# Patient Record
Sex: FEMALE | Race: WHITE | NOT HISPANIC OR LATINO | Employment: OTHER
[De-identification: names, ages, dates, MRNs, and addresses within clinical notes are randomized per-mention and may not be internally consistent; named-entity substitution may affect disease eponyms.]

---

## 2019-01-21 ENCOUNTER — RECORDS - HEALTHEAST (OUTPATIENT)
Dept: ADMINISTRATIVE | Facility: OTHER | Age: 64
End: 2019-01-21

## 2019-01-22 ENCOUNTER — COMMUNICATION - HEALTHEAST (OUTPATIENT)
Dept: TELEHEALTH | Facility: CLINIC | Age: 64
End: 2019-01-22

## 2019-01-22 ENCOUNTER — HOSPITAL ENCOUNTER (OUTPATIENT)
Dept: RADIOLOGY | Facility: HOSPITAL | Age: 64
Discharge: HOME | End: 2019-01-22
Attending: INTERNAL MEDICINE

## 2019-01-22 DIAGNOSIS — R13.10 DYSPHAGIA: ICD-10-CM

## 2019-02-26 ENCOUNTER — OFFICE VISIT - HEALTHEAST (OUTPATIENT)
Dept: CARDIOLOGY | Facility: CLINIC | Age: 64
End: 2019-02-26

## 2019-02-26 DIAGNOSIS — E78.00 HYPERCHOLESTEROLEMIA: ICD-10-CM

## 2019-02-26 DIAGNOSIS — R07.1 CHEST PAIN ON BREATHING: ICD-10-CM

## 2019-02-26 RX ORDER — MAGNESIUM OXIDE 400 MG/1
400 TABLET ORAL DAILY
Status: SHIPPED | COMMUNITY
Start: 2019-02-12 | End: 2024-04-14

## 2019-02-26 RX ORDER — LOPERAMIDE HCL 2 MG
2 CAPSULE ORAL 4 TIMES DAILY PRN
Status: SHIPPED | COMMUNITY
Start: 2018-11-19

## 2019-02-26 RX ORDER — GABAPENTIN 300 MG/1
600 CAPSULE ORAL 3 TIMES DAILY
Status: SHIPPED | COMMUNITY
Start: 2017-11-02

## 2019-02-26 RX ORDER — SUCRALFATE 1 G/1
1 TABLET ORAL
Status: SHIPPED | COMMUNITY
Start: 2019-02-26 | End: 2024-04-14

## 2019-02-26 RX ORDER — ACETAMINOPHEN 500 MG
1000 TABLET ORAL DAILY PRN
Status: SHIPPED | COMMUNITY
Start: 2019-02-26

## 2019-02-26 ASSESSMENT — MIFFLIN-ST. JEOR: SCORE: 1249.43

## 2020-04-11 ENCOUNTER — VIRTUAL VISIT (OUTPATIENT)
Dept: FAMILY MEDICINE | Facility: OTHER | Age: 65
End: 2020-04-11

## 2020-04-12 NOTE — PROGRESS NOTES
"Date: 2020 16:15:54  Clinician: Kristal Keyes  Clinician NPI: [id number]  Patient: Odette Escobar  Patient : 1955  Patient Address: [address] [place] [address], [place] [id number]  Patient Phone: [phone]  Visit Protocol: URI  Patient Summary:  Odette is a 64 year old ( : 1955 ) female who initiated a Visit for COVID-19 (Coronavirus) evaluation and screening. When asked the question \"Please sign me up to receive news, health information and promotions. \", Odette responded \"No\".    Odette states her symptoms started today.   Her symptoms consist of chills, a headache, a sore throat, a cough, nasal congestion, and malaise. She is experiencing mild difficulty breathing with activities but can speak normally in full sentences. Odette also feels feverish.   Symptom details     Nasal secretions: The color of her mucus is clear.    Cough: Odette coughs almost every minute and her cough is more bothersome at night. Phlegm does not come into her throat when she coughs. She does not believe her cough is caused by post-nasal drip.     Sore throat: Odette reports having moderate throat pain (4-6 on a 10 point pain scale), does not have exudate on her tonsils, and can swallow liquids. She is not sure if the lymph nodes in her neck are enlarged. A rash has not appeared on the skin since the sore throat started.     Temperature: Her current temperature is 102 degrees Fahrenheit. Odette has had a temperature over 100 degrees Fahrenheit for 1-2 days.     Headache: She states the headache is severe (7-9 on a 10 point pain scale).      Odette denies having rhinitis, facial pain or pressure, myalgias, diarrhea, vomiting, nausea, teeth pain, wheezing, and ear pain. She also denies taking antibiotic medication for the symptoms and having recent facial or sinus surgery in the past 60 days.   Precipitating events  Within the past week, Odette has not been exposed to someone with strep throat. She has not recently been " exposed to someone with influenza. Odette has been in close contact with the following high risk individuals: people with asthma, heart disease or diabetes and adults 65 or older.   Pertinent COVID-19 (Coronavirus) information  Odette has not traveled internationally or to the areas where COVID-19 (Coronavirus) is widespread, including cruise ship travel in the last 14 days before the start of her symptoms.   Odette does not work or volunteer as healthcare worker or a  and does not work or volunteer in a healthcare facility.   She does not live with a healthcare worker.   Odette has not had a close contact with a laboratory-confirmed COVID-19 patient within 14 days of symptom onset. She also has not had a close contact with a suspected COVID-19 patient within 14 days of symptom onset.   Pertinent medical history  Odette had 1 sinus infection within the past year.   Odette does not get yeast infections when she takes antibiotics.   Odette does not need a return to work/school note.   Weight: 175 lbs   Odette does not smoke or use smokeless tobacco.   Weight: 175 lbs    MEDICATIONS: losartan oral, amitriptyline oral, montelukast oral, sertraline oral, propranolol-hydrochlorothiazide oral, cimetidine oral, atorvastatin oral, gabapentin oral, omeprazole oral, Advair Diskus inhalation, albuterol sulfate inhalation, Tylenol oral, ALLERGIES: aspirin  Clinician Response:  Dear Odette,   Based on the information you have provided, you do have symptoms that are consistent with Coronavirus (COVID-19).  The coronavirus causes mild to severe respiratory illness with the most common symptoms including fever, cough and difficulty breathing. Unfortunately, many viruses cause similar symptoms and it can be difficult to distinguish between viruses, especially in mild cases, so we are presuming that anyone with cough or fever has coronavirus at this time.  Coronavirus/COVID-19 has reached the point of community spread in  [place], meaning that we are finding the virus in people with no known exposure risk for edward the virus. Given the increasing commonness of coronavirus in the community we are no longer testing patients who are not critically ill.  If you are a health care worker, you should refer to your employee health office for instructions about testing and returning to work.  For everyone else who has cough or fever, you should assume you are infected with coronavirus. Since you will not be tested but have symptoms that may be consistent with coronavirus, the CDC recommends you stay in self-isolation until these three things have happened:    You have had no fever for at least 72 hours (that is three full days of no fever without the use of medicine that reduces fevers)    AND   Other symptoms have improved (for example, when your cough or shortness of breath have improved)   AND   At least 7 days have passed since your symptoms first appeared.   How to Isolate:   Isolate yourself at home.  Do Not allow any visitors  Do Not go to work or school  Do Not go to S[employer],  centers, shopping, or other public places.  Do Not shake hands.  Avoid close contact with others (hugging, kissing).   Protect Others:   Cover Your Mouth and Nose with a mask, disposable tissue or wash cloth to avoid spreading germs to others.  Wash your hands and face frequently with soap and water.   We know it can be scary to hear that you might have COVID-19. Our team can help track your symptoms and make sure you are doing ok over the next two weeks using a program called "[employer] to keep in touch. When you receive an email from "[employer] please consider enrolling in our monitoring program. There is no cost to you for monitoring. Here is a URL where you can learn more: [email or web address]  Managing Symptoms:   At this time, we primarily recommend Tylenol (Acetaminophen) for fever or pain. If you have liver or kidney  problems, contact your primary care provider for instructions on use of tylenol. Adults can take 650 mg (two 325 mg pills) by mouth every 4-6 hours as needed OR 1,000 mg (two 500 mg pills) every 8 hours as needed. MAXIMUM DAILY DOSE: 3,000mg. For children, refer to dosing on bottle based on age or weight.   If you develop significant shortness of breath that prevents you from doing normal activities, please call 911 or proceed to the nearest emergency room and alert them immediately that you have been in self-isolation for possible coronavirus.  If you have a higher risk medical condition such as cancer, heart failure, end stage renal disease on dialysis or have a transplant, please reach out to your specialist's clinic to advise them of your [facility] visit should you not improve within the next two days.   For more information about COVID19 and options for caring for yourself at home, please visit the CDC website at https://www.cdc.gov/coronavirus/2019-ncov/about/steps-when-sick.htmlFor more options for care at [facility], please visit our website at [email or web address]    Diagnosis: Cough  Diagnosis ICD: R05  Prescription: brompheniramine-pseudoeph-DM (Bromfed DM) 2-30-10 mg/5 mL oral syrup 280 milliliter, 7 days supply. Take 10 milliliters by mouth every 6 hours for 7 days. Refills: 0, Refill as needed: no, Allow substitutions: yes  Prescription: benzonatate (Tessalon Perles) 100 mg oral capsule 21 capsule, 7 days supply. Take 1-2 capsule by mouth 3 times per day as needed. Refills: 0, Refill as needed: no, Allow substitutions: yes  Pharmacy: [id number] - [phone] - [address] [id number]

## 2020-07-03 DIAGNOSIS — Z11.59 SCREENING FOR VIRAL DISEASE: ICD-10-CM

## 2020-07-07 ENCOUNTER — AMBULATORY - HEALTHEAST (OUTPATIENT)
Dept: SCHEDULING | Facility: CLINIC | Age: 65
End: 2020-07-07

## 2021-02-23 ENCOUNTER — TRANSFERRED RECORDS (OUTPATIENT)
Dept: HEALTH INFORMATION MANAGEMENT | Facility: CLINIC | Age: 66
End: 2021-02-23

## 2021-06-02 VITALS — BODY MASS INDEX: 26.7 KG/M2 | HEIGHT: 64 IN | WEIGHT: 156.4 LBS

## 2021-06-07 ENCOUNTER — TRANSFERRED RECORDS (OUTPATIENT)
Dept: HEALTH INFORMATION MANAGEMENT | Facility: CLINIC | Age: 66
End: 2021-06-07

## 2021-06-16 PROBLEM — K21.00 GASTROESOPHAGEAL REFLUX DISEASE WITH ESOPHAGITIS: Status: ACTIVE | Noted: 2017-11-02

## 2021-06-23 NOTE — PROGRESS NOTES
Speech Language/Pathology  Videofluoroscopic Swallow Study     Problem:  Patient Active Problem List   Diagnosis     Low back pain       Onset date/date of surgery: 1/22/19  Pertinent history includes: Pt is a 63 year old female.  Pt was referred for this evaluation due to dysphagia with solids. Pt has an esophagram to follow. 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 This was treated with dilation. Pt reports that she was supposed to go back a second time but did not return.  Current Diet: Reg/Thin  Baseline Diet: Reg/Thin    Patient presents as alert and cooperative during this evaluation.   An  was not applicable.    Patient was given puree, honey, nectar, thin and regular solid.    Oral Phase:    Dentition/Oral hygiene: Intact/WNL    Bolus prep and oral control was not impaired. Mastication was safe and effective and the patient used rotary chewing.    Anterior-Posterior transit was not impaired.    Premature spillage did not occur with any texture.    Tongue base retraction was not impaired.    Oral stasis did not occur with any texture.    Pharyngeal Phase:    Aspiration did not occur with any texture.     Laryngeal penetration did not occur with any texture.     Swallow response was timely with all textures trialed.     Epiglottic movement was complete consistently across texture trials.    Pharyngeal stasis did not occur with any texture.     Pharyngeal constriction was not impaired.    Hyolaryngeal elevation was not impaired. Hyolaryngeal excursion was not impaired.    Cricopharyngeal function was not impaired. Cervical esophageal function was not impaired.    Assessment:    Patient demonstrated no oral and no pharyngeal dysphagia.    Patient is at minimal aspiration risk with all intake.    Rehab potential is good based on evaluation results.    Recommendations:    Plan: Regular and thin liquids    Strategies: Standard safe  swallow strategies of upright for intake, small bites and sips and eat slowly.    Speech therapy is not recommended at this time    Referrals: N/A    Time: 20 minutes dysphagia evaluation    Iesha Gregory MA, CCC-SLP

## 2021-06-24 NOTE — PATIENT INSTRUCTIONS - HE
1. Continue current medications  2. Try Tylenol or Ibuprofen for the chest discomfort, but if you take Ibuprofen make certain it is taken with food.  3. Follow up with PMD for doses of Atorvastatin and Losartan

## 2021-06-27 NOTE — PROGRESS NOTES
Progress Notes by Jenelle Solorzano MD at 2/26/2019  3:50 PM     Author: Jenelle Solorzano MD Service: -- Author Type: Physician    Filed: 2/26/2019  6:13 PM Encounter Date: 2/26/2019 Status: Signed    : Jenelle Solorzano MD (Physician)           Click to link to [facility] HEART CARE NOTE    Thank you, Dr. Johnson, for asking the [facility] Heart Care team to see Ms. Odette Escobar to evaluate pleuritic chest pain.    Assessment/Recommendations   Assessment:    1.  Pleuritic chest pain, likely musculoskeletal in etiology as the patient does have point tenderness on examination which reproduces her symptoms.  At this point, I recommended a trial of Tylenol or ibuprofen although cautioned that if she utilizes ibuprofen, she should take it with food.  I would also recommend that she follow-up with her primary physician thereafter.  2.  Hypercholesterolemia, currently on atorvastatin.  She has been off her atorvastatin recently but I encouraged her to go back on given her history of type 2 diabetes mellitus and need to slow progression of any coronary artery disease.    Plan:  1.  Continue current medications  2.  Trial of Tylenol or ibuprofen for chest wall tenderness  3.  Follow-up with primary physician regarding restarting both losartan and atorvastatin       History of Present Illness    Ms. Odette Escobar is a 63 y.o. female with strong family history of coronary artery disease, type 2 diabetes mellitus and previous history of atypical chest pain with negative stress test and echocardiogram one year ago presents to cardiac clinic today for evaluation of chest pain.  She describes pleuritic chest discomfort which has been present for 2 weeks now and aggravated by deep breathing as well as certain movements.  She has had a nonproductive cough as well.  Because of the symptoms, she was seen in the ED nearly 2 weeks ago where initial cardiac workup was unrevealing.  She is now referred here for  further recommendation.    ECG (personally reviewed): ECG in the ED demonstrates normal sinus rhythm.  No acute ST or T wave abnormalities    Cardiac Imaging Studies (personally reviewed): No recent cardiac imaging.     Physical Examination Review of Systems   Vitals:    02/26/19 1553   BP: 118/60   Pulse: 68   Resp: 16     Body mass index is 26.85 kg/m .  Wt Readings from Last 3 Encounters:   02/26/19 156 lb 6.4 oz (70.9 kg)   02/12/19 154 lb 15.7 oz (70.3 kg)   02/11/19 155 lb (70.3 kg)     General Appearance:   Awake, Alert, No acute distress.   HEENT:  No scleral icterus; the mucous membranes were pink and moist.   Neck: No cervical bruits or jugular venous distention    Chest: The spine was straight. The chest was symmetric.  There is tenderness to palpation in the posterior mid thoracic spine region which radiates around to the anterior chest.  This reproduces her discomfort.   Lungs:   Respirations unlabored; the lungs are clear to auscultation. No wheezing   Cardiovascular:    Regular rate and rhythm.  S1, S2 normal.  No murmur or gallop   Abdomen:  No organomegaly, masses, bruits, or tenderness. Bowels sounds are present   Extremities:  No peripheral edema, clubbing or cyanosis.   Skin: No xanthelasma. Warm, Dry.   Musculoskeletal: No tenderness.   Neurologic: Mood and affect are appropriate.    General: Night Sweats, Weight Loss  Eyes: Visual Distubance  Ears/Nose/Throat: Hearing Loss  Lungs: Cough, Shortness of Breath, Wheezing  Heart: Chest Pain, Shortness of Breath with activity  Stomach: Constipation, Diarrhea, Heartburn, Nausea  Bladder: Frequent Urination at Night  Muscle/Joints: Joint Pain, Muscle Pain  Skin: WNL  Nervous System: Falls, Daytime Sleepiness, Dizziness, Loss of Balance  Mental Health: Anxiety     Blood: Easy Bleeding, Easy Bruising     Medical History  Surgical History Family History Social History   Past Medical History:   Diagnosis Date   ? Angina at rest (H)    ? Asthma    ?  Diabetes (H)    ? Fibromyalgia    ? GERD (gastroesophageal reflux disease)    ? History of endometrial ablation    ? Menorrhagia    ? Migraine     Past Surgical History:   Procedure Laterality Date   ? ENDOMETRIAL ABLATION      Family History   Problem Relation Age of Onset   ? Heart failure Mother    ? Heart failure Father    ? Heart failure Sister    ? Coronary artery disease Brother    ? Coronary artery disease Brother    ? Coronary artery disease Brother    ? Stroke Sister     Social History     Socioeconomic History   ? Marital status: Single     Spouse name: Not on file   ? Number of children: Not on file   ? Years of education: Not on file   ? Highest education level: Not on file   Occupational History   ? Not on file   Social Needs   ? Financial resource strain: Not on file   ? Food insecurity:     Worry: Not on file     Inability: Not on file   ? Transportation needs:     Medical: Not on file     Non-medical: Not on file   Tobacco Use   ? Smoking status: Never Smoker   ? Smokeless tobacco: Never Used   Substance and Sexual Activity   ? Alcohol use: Yes     Comment: Occasional usage   ? Drug use: No   ? Sexual activity: Not on file   Lifestyle   ? Physical activity:     Days per week: Not on file     Minutes per session: Not on file   ? Stress: Not on file   Relationships   ? Social connections:     Talks on phone: Not on file     Gets together: Not on file     Attends Anabaptist service: Not on file     Active member of club or organization: Not on file     Attends meetings of clubs or organizations: Not on file     Relationship status: Not on file   ? Intimate partner violence:     Fear of current or ex partner: Not on file     Emotionally abused: Not on file     Physically abused: Not on file     Forced sexual activity: Not on file   Other Topics Concern   ? Not on file   Social History Narrative   ? Not on file          Medications  Allergies   Current Outpatient Medications   Medication Sig Dispense  Refill   ? acetaminophen (TYLENOL) 500 MG tablet Take 500 mg by mouth every 6 (six) hours as needed for pain.     ? albuterol (PROVENTIL HFA;VENTOLIN HFA) 90 mcg/actuation inhaler Inhale 2 puffs every 6 (six) hours as needed for wheezing.     ? CANNABIDIOL, CBD, EXTRACT ORAL Take 1 drop by mouth daily.     ? fluticasone (FLOVENT HFA) 110 mcg/actuation inhaler Inhale 2 puffs 2 (two) times a day.     ? gabapentin (NEURONTIN) 300 MG capsule Take 300 mg by mouth 3 (three) times a day.     ? loperamide (IMODIUM) 2 mg capsule Take 2 mg by mouth 4 (four) times a day.     ? magnesium oxide 400 mg cap Take 400 mg by mouth daily.     ? metFORMIN (GLUCOPHAGE) 500 MG tablet Take 500 mg by mouth daily with breakfast.            ? omeprazole (PRILOSEC) 20 MG capsule Take 20 mg by mouth daily.     ? propranolol (INDERAL LA) 60 mg 24 hr capsule Take 60 mg by mouth daily.     ? sertraline (ZOLOFT) 50 MG tablet Take 1 tablet (50 mg total) by mouth daily. 20 tablet 0   ? sucralfate (CARAFATE) 1 gram tablet Take 1 g by mouth once as needed.     ? TRAZODONE HCL (TRAZODONE ORAL) Take by mouth. Unknown dose       No current facility-administered medications for this visit.       Allergies   Allergen Reactions   ? Aspirin Hives   ? Dolobid [Diflunisal] Nausea And Vomiting         Lab Results    Chemistry/lipid CBC Cardiac Enzymes/BNP/TSH/INR   Lab Results   Component Value Date    CREATININE 0.69 02/11/2019    BUN 10 02/11/2019    K 3.5 02/11/2019     02/11/2019     02/11/2019    CO2 30 02/11/2019    Lab Results   Component Value Date    WBC 6.8 02/11/2019    HGB 13.8 02/11/2019    HCT 41.3 02/11/2019    MCV 94 02/11/2019     02/11/2019    Lab Results   Component Value Date    TROPONINI <0.01 02/11/2019    TSH 2.20 02/11/2019

## 2024-03-07 ENCOUNTER — HOSPITAL ENCOUNTER (EMERGENCY)
Facility: HOSPITAL | Age: 69
Discharge: HOME | End: 2024-03-07
Attending: EMERGENCY MEDICINE | Admitting: EMERGENCY MEDICINE
Payer: COMMERCIAL

## 2024-03-07 ENCOUNTER — APPOINTMENT (OUTPATIENT)
Dept: CT IMAGING | Facility: HOSPITAL | Age: 69
End: 2024-03-07
Attending: EMERGENCY MEDICINE
Payer: COMMERCIAL

## 2024-03-07 VITALS
TEMPERATURE: 98.3 F | WEIGHT: 142 LBS | SYSTOLIC BLOOD PRESSURE: 128 MMHG | HEIGHT: 64 IN | DIASTOLIC BLOOD PRESSURE: 65 MMHG | RESPIRATION RATE: 18 BRPM | BODY MASS INDEX: 24.24 KG/M2 | HEART RATE: 81 BPM | OXYGEN SATURATION: 98 %

## 2024-03-07 DIAGNOSIS — K52.9 ENTERITIS: ICD-10-CM

## 2024-03-07 LAB
ALBUMIN SERPL BCG-MCNC: 3.7 G/DL (ref 3.5–5.2)
ALBUMIN UR-MCNC: NEGATIVE MG/DL
ALP SERPL-CCNC: 84 U/L (ref 40–150)
ALT SERPL W P-5'-P-CCNC: 22 U/L (ref 0–50)
ANION GAP SERPL CALCULATED.3IONS-SCNC: 10 MMOL/L (ref 7–15)
APPEARANCE UR: CLEAR
AST SERPL W P-5'-P-CCNC: 31 U/L (ref 0–45)
BACTERIA #/AREA URNS HPF: ABNORMAL /HPF
BASOPHILS # BLD AUTO: 0.1 10E3/UL (ref 0–0.2)
BASOPHILS NFR BLD AUTO: 1 %
BILIRUB SERPL-MCNC: 0.5 MG/DL
BILIRUB UR QL STRIP: NEGATIVE
BUN SERPL-MCNC: 10.9 MG/DL (ref 8–23)
CALCIUM SERPL-MCNC: 9.7 MG/DL (ref 8.8–10.2)
CHLORIDE SERPL-SCNC: 104 MMOL/L (ref 98–107)
COLOR UR AUTO: COLORLESS
CREAT SERPL-MCNC: 0.63 MG/DL (ref 0.51–0.95)
DEPRECATED HCO3 PLAS-SCNC: 27 MMOL/L (ref 22–29)
EGFRCR SERPLBLD CKD-EPI 2021: >90 ML/MIN/1.73M2
EOSINOPHIL # BLD AUTO: 0.1 10E3/UL (ref 0–0.7)
EOSINOPHIL NFR BLD AUTO: 1 %
ERYTHROCYTE [DISTWIDTH] IN BLOOD BY AUTOMATED COUNT: 13.3 % (ref 10–15)
GLUCOSE SERPL-MCNC: 87 MG/DL (ref 70–99)
GLUCOSE UR STRIP-MCNC: 500 MG/DL
HCT VFR BLD AUTO: 42 % (ref 35–47)
HGB BLD-MCNC: 14 G/DL (ref 11.7–15.7)
HGB UR QL STRIP: NEGATIVE
IMM GRANULOCYTES # BLD: 0 10E3/UL
IMM GRANULOCYTES NFR BLD: 0 %
KETONES UR STRIP-MCNC: NEGATIVE MG/DL
LEUKOCYTE ESTERASE UR QL STRIP: ABNORMAL
LIPASE SERPL-CCNC: 31 U/L (ref 13–60)
LYMPHOCYTES # BLD AUTO: 2.4 10E3/UL (ref 0.8–5.3)
LYMPHOCYTES NFR BLD AUTO: 31 %
MCH RBC QN AUTO: 30.4 PG (ref 26.5–33)
MCHC RBC AUTO-ENTMCNC: 33.3 G/DL (ref 31.5–36.5)
MCV RBC AUTO: 91 FL (ref 78–100)
MONOCYTES # BLD AUTO: 0.8 10E3/UL (ref 0–1.3)
MONOCYTES NFR BLD AUTO: 11 %
NEUTROPHILS # BLD AUTO: 4.2 10E3/UL (ref 1.6–8.3)
NEUTROPHILS NFR BLD AUTO: 56 %
NITRATE UR QL: NEGATIVE
NRBC # BLD AUTO: 0 10E3/UL
NRBC BLD AUTO-RTO: 0 /100
PH UR STRIP: 6 [PH] (ref 5–7)
PLATELET # BLD AUTO: 269 10E3/UL (ref 150–450)
POTASSIUM SERPL-SCNC: 3.8 MMOL/L (ref 3.4–5.3)
PROT SERPL-MCNC: 7.5 G/DL (ref 6.4–8.3)
RBC # BLD AUTO: 4.61 10E6/UL (ref 3.8–5.2)
RBC URINE: <1 /HPF
SODIUM SERPL-SCNC: 141 MMOL/L (ref 135–145)
SP GR UR STRIP: 1 (ref 1–1.03)
SQUAMOUS EPITHELIAL: <1 /HPF
UROBILINOGEN UR STRIP-MCNC: <2 MG/DL
WBC # BLD AUTO: 7.6 10E3/UL (ref 4–11)
WBC URINE: <1 /HPF

## 2024-03-07 PROCEDURE — 96361 HYDRATE IV INFUSION ADD-ON: CPT

## 2024-03-07 PROCEDURE — 99285 EMERGENCY DEPT VISIT HI MDM: CPT | Mod: 25

## 2024-03-07 PROCEDURE — 74176 CT ABD & PELVIS W/O CONTRAST: CPT

## 2024-03-07 PROCEDURE — 96375 TX/PRO/DX INJ NEW DRUG ADDON: CPT

## 2024-03-07 PROCEDURE — 258N000003 HC RX IP 258 OP 636: Performed by: EMERGENCY MEDICINE

## 2024-03-07 PROCEDURE — 83690 ASSAY OF LIPASE: CPT | Performed by: EMERGENCY MEDICINE

## 2024-03-07 PROCEDURE — 96374 THER/PROPH/DIAG INJ IV PUSH: CPT

## 2024-03-07 PROCEDURE — 250N000011 HC RX IP 250 OP 636: Performed by: EMERGENCY MEDICINE

## 2024-03-07 PROCEDURE — 36415 COLL VENOUS BLD VENIPUNCTURE: CPT | Performed by: EMERGENCY MEDICINE

## 2024-03-07 PROCEDURE — 80053 COMPREHEN METABOLIC PANEL: CPT | Performed by: EMERGENCY MEDICINE

## 2024-03-07 PROCEDURE — 85025 COMPLETE CBC W/AUTO DIFF WBC: CPT | Performed by: EMERGENCY MEDICINE

## 2024-03-07 PROCEDURE — 81001 URINALYSIS AUTO W/SCOPE: CPT | Performed by: EMERGENCY MEDICINE

## 2024-03-07 RX ORDER — ONDANSETRON 2 MG/ML
4 INJECTION INTRAMUSCULAR; INTRAVENOUS ONCE
Status: COMPLETED | OUTPATIENT
Start: 2024-03-07 | End: 2024-03-07

## 2024-03-07 RX ORDER — OXYCODONE AND ACETAMINOPHEN 5; 325 MG/1; MG/1
1 TABLET ORAL EVERY 6 HOURS PRN
Qty: 12 TABLET | Refills: 0 | Status: SHIPPED | OUTPATIENT
Start: 2024-03-07 | End: 2024-03-10

## 2024-03-07 RX ORDER — MORPHINE SULFATE 4 MG/ML
4 INJECTION, SOLUTION INTRAMUSCULAR; INTRAVENOUS EVERY 30 MIN PRN
Status: DISCONTINUED | OUTPATIENT
Start: 2024-03-07 | End: 2024-03-07 | Stop reason: HOSPADM

## 2024-03-07 RX ORDER — ONDANSETRON 4 MG/1
4 TABLET, FILM COATED ORAL EVERY 8 HOURS PRN
Qty: 10 TABLET | Refills: 0 | Status: SHIPPED | OUTPATIENT
Start: 2024-03-07 | End: 2024-04-14

## 2024-03-07 RX ADMIN — ONDANSETRON 4 MG: 2 INJECTION INTRAMUSCULAR; INTRAVENOUS at 09:52

## 2024-03-07 RX ADMIN — MORPHINE SULFATE 4 MG: 4 INJECTION, SOLUTION INTRAMUSCULAR; INTRAVENOUS at 09:51

## 2024-03-07 RX ADMIN — SODIUM CHLORIDE 1000 ML: 9 INJECTION, SOLUTION INTRAVENOUS at 09:52

## 2024-03-07 ASSESSMENT — COLUMBIA-SUICIDE SEVERITY RATING SCALE - C-SSRS
2. HAVE YOU ACTUALLY HAD ANY THOUGHTS OF KILLING YOURSELF IN THE PAST MONTH?: NO
6. HAVE YOU EVER DONE ANYTHING, STARTED TO DO ANYTHING, OR PREPARED TO DO ANYTHING TO END YOUR LIFE?: NO
1. IN THE PAST MONTH, HAVE YOU WISHED YOU WERE DEAD OR WISHED YOU COULD GO TO SLEEP AND NOT WAKE UP?: NO

## 2024-03-07 ASSESSMENT — ACTIVITIES OF DAILY LIVING (ADL)
ADLS_ACUITY_SCORE: 35
ADLS_ACUITY_SCORE: 35

## 2024-03-07 NOTE — DISCHARGE INSTRUCTIONS
Encourage fluids.  Clear liquids only for the next day or 2.  Thereafter, advance diet as tolerated.  Ice or heat off-and-on to sore areas can help with pain.  Ibuprofen or Tylenol every 6 hours can help with pain.  Zofran every 6 hours if needed for nausea or vomiting.  1 Percocet every 6 hours if needed for stronger pain.  Do not drive with this.  Have someone drive you home today.  See your doctor or clinic in the next week if not getting better in the next few days.  See them sooner if worse or problems.

## 2024-03-07 NOTE — ED PROVIDER NOTES
EMERGENCY DEPARTMENT ENCOUNTER      NAME: Odette Escobar  AGE: 68 year old female  YOB: 1955  MRN: [id number]  EVALUATION DATE & TIME: 3/7/2024  9:15 AM    PCP: lAlie Johnson    ED PROVIDER: Martell Millan M.D.      Chief Complaint   Patient presents with    Abdominal Pain    Nausea & Vomiting         FINAL IMPRESSION:  1.  Acute enteritis.      ED COURSE & MEDICAL DECISION MAKIN:30 AM.  I met with the patient to gather history and to perform my initial exam. We discussed plans for the ED course, including diagnostic testing and treatment. PPE worn: cloth mask.  Patient with abdominal pain and nausea vomiting since last night.  Pain is diffuse and throughout.  No current nausea or vomiting.  Evaluation proceeding.  11:10 AM.  CT showing fluid loops consistent with enteritis otherwise negative.  Urinalysis with glucose but otherwise negative.  Chemistries, liver profile and lipase normal.  CBC normal.  Patient feeling better after fluids and medications will be discharged home.  Patient in agreement.    Pertinent Labs & Imaging studies reviewed. (See chart for details)  68 year old female presents to the Emergency Department for evaluation of abdominal pain with nausea and vomiting.    At the conclusion of the encounter I discussed the results of all of the tests and the disposition. The questions were answered. The patient or family acknowledged understanding and was agreeable with the care plan.              Medical Decision Making  Obtained supplemental history:Supplemental history obtained?: No  Reviewed external records: Both inpatient and outpatient computer records were reviewed.  Care impacted by chronic illness: Endometriosis, anxiety, asthma, diabetes, hypertension, GERD  Care significantly affected by social determinants of health:Access to Medical Care  Did you consider but not order tests?: Work up considered but not performed and documented in chart, if applicable  Did you  interpret images independently?: Independent interpretation of ECG and images noted in documentation, when applicable.  Consultation discussion with other provider:Did you involve another provider (consultant, , pharmacy, etc.)?: No  Anticipate discharge home after evaluation.      MEDICATIONS GIVEN IN THE EMERGENCY:  Medications   sodium chloride 0.9% BOLUS 1,000 mL (has no administration in time range)   ondansetron (ZOFRAN) injection 4 mg (has no administration in time range)   morphine (PF) injection 4 mg (has no administration in time range)       NEW PRESCRIPTIONS STARTED AT TODAY'S ER VISIT  New Prescriptions    No medications on file          =================================================================    HPI    Patient information was obtained from: The patient.    Use of : N/A         Odette Escobar is a 68 year old female with a pertinent history of endometriosis, anxiety, asthma and diabetes who presents to this ED today for evaluation of nausea vomiting abdominal pain since yesterday.  Pain is diffuse.  Recently started on Augmentin for a sinus infection.    She does not identify any waxing or waning symptoms otherwise, exacerbating or alleviating features, associated symptoms except as mentioned.  She otherwise denies any pain related complaints.    REVIEW OF SYSTEMS   Review of Systems patient complaining of nausea and vomiting and abdominal pain.  No other complaints made at this time.    PAST MEDICAL HISTORY:  No past medical history on file.    PAST SURGICAL HISTORY:  Past Surgical History:   Procedure Laterality Date    ENDOMETRIAL ABLATION             CURRENT MEDICATIONS:    acetaminophen (TYLENOL) 500 MG tablet  albuterol (PROVENTIL HFA;VENTOLIN HFA) 90 mcg/actuation inhaler  CANNABIDIOL, CBD, EXTRACT ORAL  famotidine (PEPCID) 20 MG tablet  fluticasone (FLOVENT HFA) 110 mcg/actuation inhaler  gabapentin (NEURONTIN) 300 MG capsule  loperamide (IMODIUM) 2 mg capsule  magnesium  "oxide 400 mg cap  metFORMIN (GLUCOPHAGE) 500 MG tablet  omeprazole (PRILOSEC) 20 MG capsule  propranolol (INDERAL LA) 60 mg 24 hr capsule  sertraline (ZOLOFT) 50 MG tablet  sucralfate (CARAFATE) 1 gram tablet  TRAZODONE HCL (TRAZODONE ORAL)        ALLERGIES:  Allergies   Allergen Reactions    Aspirin Hives    Dolobid [Diflunisal] Nausea and Vomiting       FAMILY HISTORY:  Family History   Problem Relation Age of Onset    Heart Failure Mother     Heart Failure Father     Heart Failure Sister     Coronary Artery Disease Brother     Coronary Artery Disease Brother     Coronary Artery Disease Brother     Cerebrovascular Disease Sister        SOCIAL HISTORY:   Social History     Socioeconomic History    Marital status: Single   Tobacco Use    Smoking status: Never    Smokeless tobacco: Never   Substance and Sexual Activity    Alcohol use: Yes     Comment: Alcoholic Drinks/day: Occasional usage    Drug use: No   Occasional alcohol.  No drugs or tobacco.    VITALS:  /70   Pulse 96   Temp 98.3  F (36.8  C) (Oral)   Resp 18   Ht 1.626 m (5' 4\")   Wt 64.4 kg (142 lb)   SpO2 96%   BMI 24.37 kg/m      PHYSICAL EXAM    Vital Signs:  /70   Pulse 96   Temp 98.3  F (36.8  C) (Oral)   Resp 18   Ht 1.626 m (5' 4\")   Wt 64.4 kg (142 lb)   SpO2 96%   BMI 24.37 kg/m    General:  On entering the room she is in no apparent distress.    Neck:  Neck supple with full range of motion and nontender.    Back:  Back and spine are nontender.  No costovertebral angle tenderness.    HEENT:  Oropharynx clear with moist mucous membranes.  HEENT unremarkable.    Pulmonary:  Chest clear to auscultation without rhonchi rales or wheezing.    Cardiovascular:  Cardiac regular rate and rhythm without murmurs rubs or gallops.    Abdomen:  Abdomen soft and diffusely mildly tender.  There is no rebound or guarding.    Muskuloskeletal:  She moves all 4 without any difficulty and has normal neurovascular exams.  Extremities without " clubbing, cyanosis, or edema.  Legs and calves are nontender.    Neuro:  She is alert and oriented ×3 and moves all extremities symmetrically.    Psych:  Normal affect.    Skin:  Unremarkable and warm and dry.       LAB:  All pertinent labs reviewed and interpreted.  Labs Ordered and Resulted from Time of ED Arrival to Time of ED Departure   ROUTINE UA WITH MICROSCOPIC REFLEX TO CULTURE - Abnormal       Result Value    Color Urine Colorless      Appearance Urine Clear      Glucose Urine 500 (*)     Bilirubin Urine Negative      Ketones Urine Negative      Specific Gravity Urine 1.005      Blood Urine Negative      pH Urine 6.0      Protein Albumin Urine Negative      Urobilinogen Urine <2.0      Nitrite Urine Negative      Leukocyte Esterase Urine 75 Lupillo/uL (*)     Bacteria Urine Few (*)     RBC Urine <1      WBC Urine <1      Squamous Epithelials Urine <1     COMPREHENSIVE METABOLIC PANEL - Normal    Sodium 141      Potassium 3.8      Carbon Dioxide (CO2) 27      Anion Gap 10      Urea Nitrogen 10.9      Creatinine 0.63      GFR Estimate >90      Calcium 9.7      Chloride 104      Glucose 87      Alkaline Phosphatase 84      AST 31      ALT 22      Protein Total 7.5      Albumin 3.7      Bilirubin Total 0.5     LIPASE - Normal    Lipase 31     CBC WITH PLATELETS AND DIFFERENTIAL    WBC Count 7.6      RBC Count 4.61      Hemoglobin 14.0      Hematocrit 42.0      MCV 91      MCH 30.4      MCHC 33.3      RDW 13.3      Platelet Count 269      % Neutrophils 56      % Lymphocytes 31      % Monocytes 11      % Eosinophils 1      % Basophils 1      % Immature Granulocytes 0      NRBCs per 100 WBC 0      Absolute Neutrophils 4.2      Absolute Lymphocytes 2.4      Absolute Monocytes 0.8      Absolute Eosinophils 0.1      Absolute Basophils 0.1      Absolute Immature Granulocytes 0.0      Absolute NRBCs 0.0         RADIOLOGY:  Reviewed all pertinent imaging. Please see official radiology report.  Abd/pelvis CT no contrast -  Stone Protocol   Final Result   IMPRESSION:    1.  A few prominent fluid-filled loops of small bowel in the abdomen are nonspecific but can be seen in the setting of enteritis.   2.  Otherwise, no acute abnormality in the abdomen and pelvis.   3.  Additional details in the findings.                        Martell Millan M.D.  Emergency Medicine  [facility] EMERGENCY DEPARTMENT  [address] [place] [id number]  [phone]  Dept: [phone]       Martell Millan MD  03/07/24 0935       Martell Millan MD  03/07/24 2768

## 2024-03-07 NOTE — ED TRIAGE NOTES
Patient c/o low abdominal pain , nausea and vomiting started yesterday and sore throat.  Patient stated she had sinus infection and  was prescribed augmentin.      Triage Assessment (Adult)       Row Name 03/07/24 0912          Triage Assessment    Airway WDL WDL        Respiratory WDL    Respiratory WDL WDL        Skin Circulation/Temperature WDL    Skin Circulation/Temperature WDL WDL        Cardiac WDL    Cardiac WDL WDL        Peripheral/Neurovascular WDL    Peripheral Neurovascular WDL WDL        Cognitive/Neuro/Behavioral WDL    Cognitive/Neuro/Behavioral WDL WDL

## 2024-03-11 ENCOUNTER — OFFICE VISIT (OUTPATIENT)
Dept: PHYSICAL MEDICINE AND REHAB | Facility: CLINIC | Age: 69
End: 2024-03-11
Payer: COMMERCIAL

## 2024-03-11 VITALS — DIASTOLIC BLOOD PRESSURE: 62 MMHG | OXYGEN SATURATION: 99 % | HEART RATE: 64 BPM | SYSTOLIC BLOOD PRESSURE: 116 MMHG

## 2024-03-11 DIAGNOSIS — G89.29 CHRONIC BILATERAL LOW BACK PAIN WITH RIGHT-SIDED SCIATICA: Primary | ICD-10-CM

## 2024-03-11 DIAGNOSIS — M54.41 CHRONIC BILATERAL LOW BACK PAIN WITH RIGHT-SIDED SCIATICA: Primary | ICD-10-CM

## 2024-03-11 DIAGNOSIS — M79.7 FIBROMYALGIA: ICD-10-CM

## 2024-03-11 DIAGNOSIS — G89.29 CHRONIC NECK PAIN: ICD-10-CM

## 2024-03-11 DIAGNOSIS — M54.2 CHRONIC NECK PAIN: ICD-10-CM

## 2024-03-11 DIAGNOSIS — M51.369 DDD (DEGENERATIVE DISC DISEASE), LUMBAR: ICD-10-CM

## 2024-03-11 DIAGNOSIS — M47.816 LUMBAR FACET ARTHROPATHY: ICD-10-CM

## 2024-03-11 DIAGNOSIS — M54.16 LUMBAR RADICULOPATHY: ICD-10-CM

## 2024-03-11 PROCEDURE — 99204 OFFICE O/P NEW MOD 45 MIN: CPT | Performed by: NURSE PRACTITIONER

## 2024-03-11 NOTE — PROGRESS NOTES
ASSESSMENT: Odette Escobar is a 68 year old female who presents for consultation with a past medical history significant for hyperlipidemia, GERD, asthma, hypertension, diabetes mellitus, anxiety, history of vertigo who presents today for new patient evaluation of:    -Chronic mild neck pain    -Chronic mild thoracic pain    -Progressive worsening chronic bilateral low back pain rating to bilateral hips and right sciatica/lumbar radiculopathy type symptoms in an L5 dermatomal pattern today.    Patient is neurologically intact on exam. No myelopathic or red flag symptoms.          No data to display                     Diagnoses and all orders for this visit:  Chronic bilateral low back pain with right-sided sciatica  -     MR Lumbar Spine w/o Contrast; Future  -     Physical Therapy  Referral; Future  Lumbar radiculopathy  -     MR Lumbar Spine w/o Contrast; Future  -     Physical Therapy  Referral; Future  Lumbar facet arthropathy  -     MR Lumbar Spine w/o Contrast; Future  -     Physical Therapy  Referral; Future  DDD (degenerative disc disease), lumbar  -     MR Lumbar Spine w/o Contrast; Future  -     Physical Therapy  Referral; Future  Fibromyalgia  -     Physical Therapy  Referral; Future  Chronic neck pain  -     Physical Therapy  Referral; Future    PLAN:  Reviewed spine anatomy and disease process. Discussed diagnosis and treatment options with the patient today. A shared decision making model was used.  The patient's values and choices were respected. The following represents what was discussed and decided upon by the provider and the patient.      -DIAGNOSTIC TESTS:  Images were personally reviewed and interpreted and explained to patient today using spine model.   -- Ordered lumbar spine MRI to further evaluate chronic progressive bilateral low back pain with lumbar radiculopathy.  --CT abdomen pelvis 3/7/2024 with normal alignment, mild disc height  loss at L5-S1 with disc bulging noted at L4-5 as well as upper lumbar spine.    -PHYSICAL THERAPY: Referral to physical therapy placed to address home exercises for core strengthening and nerve glides.  Discussed the importance of core strengthening, ROM, stretching exercises with the patient and how each of these entities is important in decreasing pain.  Explained to the patient that the purpose of physical therapy is to teach the patient a home exercise program.  These exercises need to be performed every day in order to decrease pain and prevent future occurrences of pain.        -MEDICATIONS: No changes in medications advised patient to continue with gabapentin and acetaminophen, she does take CBD Gummies and Percocet as needed for severe breakthrough pain.  Discussed with patient that we will not take over prescribing Percocet going forward.    Discussed multiple medication options today with patient. Discussed risks, side effects, and proper use of medications. Patient verbalized understanding.    -INTERVENTIONS:  Could consider potential RIGHT lumbar CHRISSIE pending imaging review which she would be open to.  Discussed risks and benefits of injections with patient today.    -PATIENT EDUCATION:  Total time of 46 minutes, on the day of service, spent with the patient, reviewing the chart, placing orders, and documenting.     -FOLLOW-UP:   Follow-up either nurse call if no appointment scheduled otherwise in person visit for MRI review.    Advised patient to call the Spine Center if symptoms worsen or you have problems controlling bladder and bowel function.   ______________________________________________________________________    SUBJECTIVE:  HPI:  Odette Escobar  Is a 68 year old female who presents today for new patient evaluation of low back pain bilateral lower back that radiates to bilateral hips with pain rating into the lateral thigh lateral shin and bilateral feet.  She reports that she has had this pain  for a long time but it has been worse since she had a fall on ice 2/15/2024, denies any new pain since fall just chronic worsening pain.  Today she reports that her pain is a 4/10, 10 at its worst, 2 at its best.  Denies any lower extremity weakness.  Denies bowel or bladder loss control, denies saddle anesthesia.    She does not endorse chronic neck pain as well as chronic thoracic pain however her back pain is her biggest concern today.    Patient reports a history of fibromyalgia.    -Treatment to Date: No prior spinal surgery.  Physical therapy [facility] in the past, nothing recent  Chiropractic treatments are not held in the past    Lumbar spine injection at age 26  Cervical spine injection 2020 [facility]:  Left third occipital nerve, C3, C4, C5 RFA 4/30/2021  Right third occipital nerve, C3, C4, C5 RFA 5/7/2021    -Medications:  Acetaminophen  CBD gabapentin  300 mg    *Percocet prescribed 3/7/2024, 12 tablets given - ED for acute enteritis    Current Outpatient Medications   Medication    acetaminophen (TYLENOL) 500 MG tablet    albuterol (PROVENTIL HFA;VENTOLIN HFA) 90 mcg/actuation inhaler    CANNABIDIOL, CBD, EXTRACT ORAL    famotidine (PEPCID) 20 MG tablet    fluticasone (FLOVENT HFA) 110 mcg/actuation inhaler    gabapentin (NEURONTIN) 300 MG capsule    loperamide (IMODIUM) 2 mg capsule    magnesium oxide 400 mg cap    metFORMIN (GLUCOPHAGE) 500 MG tablet    omeprazole (PRILOSEC) 20 MG capsule    ondansetron (ZOFRAN) 4 MG tablet    propranolol (INDERAL LA) 60 mg 24 hr capsule    sertraline (ZOLOFT) 50 MG tablet    sucralfate (CARAFATE) 1 gram tablet    TRAZODONE HCL (TRAZODONE ORAL)     No current facility-administered medications for this visit.       Allergies   Allergen Reactions    Aspirin Hives    Diflunisal Nausea and Vomiting     (Dolobid) Occurred approximately 20 years ago    Occurred approximately 20 years ago   Occurred approximately 20 years ago   (Dolobid) Occurred approximately 20 years ago     Fd&C Yellow #5 (Tartrazine) Nausea and Vomiting       No past medical history on file.     Patient Active Problem List   Diagnosis    Low back pain    Anxiety    Asthma    Diabetes mellitus (H)    Essential hypertension    Gastroesophageal reflux disease with esophagitis    Hyperlipidemia       Past Surgical History:   Procedure Laterality Date    ENDOMETRIAL ABLATION         Family History   Problem Relation Age of Onset    Heart Failure Mother     Heart Failure Father     Heart Failure Sister     Coronary Artery Disease Brother     Coronary Artery Disease Brother     Coronary Artery Disease Brother     Cerebrovascular Disease Sister        Reviewed past medical, surgical, and family history with patient found on new patient intake packet located in EMR Media tab.     SOCIAL HX: Patient does not smoke tobacco, currently denies alcohol use.    ROS: Joint pain, muscle pain, sciatica, imbalance, falls, dizziness, easy bruising, insomnia, excessive tiredness, anxiety, abdominal pain, diarrhea/constipation, nausea/vomiting, shortness of breath, cough, wheezing, palpitations, color changes in hands and feet, vision changes, eye pain, headache, hoarseness, ringing in ears, difficulty swallowing, weight gain and weight loss.  Specifically negative for bowel/bladder dysfunction, foot drop, fevers, chills. Otherwise 13 systems reviewed are negative. Please see the patient's intake questionnaire from today for details.    OBJECTIVE:    PHYSICAL EXAMINATION:    --CONSTITUTIONAL:  Vital signs as above.  No acute distress.  The patient is well nourished and well groomed.  --PSYCHIATRIC:  Appropriate mood and affect. The patient is awake, alert, oriented to person, place, time and answering questions appropriately with clear speech.    --SKIN:  Skin over the face, bilateral lower extremities, and posterior torso is clean, dry, intact without rashes.    --RESPIRATORY: Normal rhythm and effort. No abnormal accessory muscle  breathing patterns noted.   --STANDING EXAMINATION:  Normal lumbar lordosis noted, no lateral shift.  --MUSCULOSKELETAL: Range of motion is not limited in lumbar flexion, extension, lateral rotation.  Multilevel tenderness to palpation bilateral thoracic and lumbar spine greatest at the lumbosacral junction bilaterally.  Straight leg raising is negative to radicular pain. Sciatic notch non-tender.  --GROSS MOTOR: Gait is non-antalgic. Easily arises from a seated position.   --LOWER EXTREMITY MOTOR TESTING:  Plantar flexion left 5/5, right 5/5   Dorsiflexion left 5/5, right 5/5   Great toe MTP extension left 5/5, right 5/5  Knee flexion left 5/5, right 5/5  Knee extension left 5/5, right 5/5   Hip flexion left 5/5, right 5/5  Hip abduction left 5/5, right 5/5  Hip adduction left 5/5, right 5/5   --HIPS: Full range of motion bilaterally. Negative FABERs on the involved lower extremity.   --NEUROLOGICAL:  2/4 patellar, medial hamstring, and achilles reflexes bilaterally.  Sensation to light touch is intact in the bilateral L4, L5, and S1 dermatomes. Babinski is negative. No clonus.  Negative Evelyn reflex bilaterally.  --VASCULAR:  Bilateral lower extremities are warm.  There is no pitting edema of the bilateral lower extremities.    RESULTS: Prior medical records from [facility] and Care Everywhere were reviewed today.    Imaging: Spine imaging was personally reviewed and interpreted today. The images were shown to the patient and the findings were explained using a spine model.      Abd/pelvis CT no contrast - Stone Protocol    Result Date: 3/7/2024  EXAM: CT ABDOMEN PELVIS W/O CONTRAST LOCATION: [facility] DATE: 3/7/2024 INDICATION: abd pain COMPARISON: MRI abdomen 11/14/2022 TECHNIQUE: CT scan of the abdomen and pelvis was performed without IV contrast. Multiplanar reformats were obtained. Dose reduction techniques were used. CONTRAST: None. FINDINGS: LOWER CHEST: Normal.  HEPATOBILIARY: Normal unenhanced liver contours. Possible tiny layering gallstones within the gallbladder neck. No pericholecystic inflammation. PANCREAS: Normal. SPLEEN: Normal. ADRENAL GLANDS: Normal. KIDNEYS/BLADDER: Normal unenhanced renal contours. Punctate nonobstructing stone in the lower pole of the right kidney. No hydronephrosis. Normal bladder contours. BOWEL: No bowel obstruction. A few prominent fluid-filled loops of small bowel throughout the abdomen are seen. No evidence of acute diverticulitis. Normal appendix. No free fluid or free air. LYMPH NODES: Abdominal aorta is nonaneurysmal with moderate atheromatous disease. VASCULATURE: No lymphadenopathy. PELVIC ORGANS: No pelvic mass. MUSCULOSKELETAL: No destructive osseous lesions. Healed left rib fractures. Small fat-containing paraumbilical hernia.     IMPRESSION: 1.  A few prominent fluid-filled loops of small bowel in the abdomen are nonspecific but can be seen in the setting of enteritis. 2.  Otherwise, no acute abnormality in the abdomen and pelvis. 3.  Additional details in the findings.     XR Foot Left G/E 3 Views    Result Date: 3/5/2024  For Patients: As a result of the 21st Century Cures Act, medical imaging exams and procedure reports are released immediately into your electronic medical record. You may view this report before your referring provider. If you have questions, please contact your health care provider. EXAM: XR FOOT 3 VIEWS LEFT LOCATION: [facility] DATE: 3/5/2024 INDICATION: Trauma Pain COMPARISON: None.    No fracture or dislocation. There is osteoarthritic change at the proximal interphalangeal joint of the fourth digit. A plantar calcaneal enthesophyte is present.

## 2024-03-11 NOTE — PATIENT INSTRUCTIONS
~Spine Center Scheduling #[phone].  ~Please call our [facility] Spine Nurse Navigation #[phone] with any questions or concerns about your treatment plan, if symptoms worsen and you would like to be seen urgently, or if you have problems controlling bladder and bowel function.  ~For any future flareups or new symptoms, recommend follow-up in clinic or contact the nurse navigator line.  ~Please note that any My Chart messages may take multiple days for a response due to the high volume of patients seen in clinic.  Anything sent Thursday night or after will be answered the following week when able, as Olive Ghosh CNP does not work in clinic on Fridays.   ~Olive Ghosh CNP is at the [facility] on the first and third Tuesdays of the month only, otherwise primarily at the [place] [facility].        ~You have been referred for Physical Therapy to [facility]. They will call you to schedule an appointment.      Scheduling phone number is [phone] for [facility] [place], [place], or [place] location.  If you have not heard from the scheduling office within 2 business days, please call [phone] for ALL other locations.    Discussed the importance of core strengthening, ROM, stretching exercises and how each of these entities is important in decreasing pain and improving long term spine health.  The purpose of physical therapy is to teach you an individualized home exercise program.  These exercises need to be performed every day in order to decrease pain and prevent future occurrences of pain.           Imaging has been ordered. Radiology will call you to schedule. Please call below if you do not hear from them in the next couple of days.     [facility] Radiology Scheduling    Please call [phone] to schedule your image(s) (select option #1). There are 3 different locations, see below.     [facility]  [address]  [facility] [address]    [address], [facility] [address]   [facility] [address]    [place] [id number] [place] [phone]

## 2024-03-18 ENCOUNTER — APPOINTMENT (OUTPATIENT)
Dept: RADIOLOGY | Facility: HOSPITAL | Age: 69
End: 2024-03-18
Attending: STUDENT IN AN ORGANIZED HEALTH CARE EDUCATION/TRAINING PROGRAM
Payer: COMMERCIAL

## 2024-03-18 ENCOUNTER — APPOINTMENT (OUTPATIENT)
Dept: CT IMAGING | Facility: HOSPITAL | Age: 69
End: 2024-03-18
Attending: EMERGENCY MEDICINE
Payer: COMMERCIAL

## 2024-03-18 ENCOUNTER — HOSPITAL ENCOUNTER (EMERGENCY)
Facility: HOSPITAL | Age: 69
Discharge: HOME | End: 2024-03-18
Admitting: EMERGENCY MEDICINE
Payer: COMMERCIAL

## 2024-03-18 VITALS
DIASTOLIC BLOOD PRESSURE: 58 MMHG | TEMPERATURE: 99 F | OXYGEN SATURATION: 94 % | HEART RATE: 101 BPM | SYSTOLIC BLOOD PRESSURE: 121 MMHG | WEIGHT: 138 LBS | BODY MASS INDEX: 23.69 KG/M2 | RESPIRATION RATE: 24 BRPM

## 2024-03-18 DIAGNOSIS — J45.901 ASTHMA EXACERBATION: ICD-10-CM

## 2024-03-18 DIAGNOSIS — J18.9 PNEUMONIA OF RIGHT UPPER LOBE DUE TO INFECTIOUS ORGANISM: ICD-10-CM

## 2024-03-18 LAB
ANION GAP SERPL CALCULATED.3IONS-SCNC: 11 MMOL/L (ref 7–15)
BUN SERPL-MCNC: 7.4 MG/DL (ref 8–23)
CALCIUM SERPL-MCNC: 9.2 MG/DL (ref 8.8–10.2)
CHLORIDE SERPL-SCNC: 104 MMOL/L (ref 98–107)
CREAT SERPL-MCNC: 0.57 MG/DL (ref 0.51–0.95)
D DIMER PPP FEU-MCNC: <0.27 UG/ML FEU (ref 0–0.5)
DEPRECATED HCO3 PLAS-SCNC: 24 MMOL/L (ref 22–29)
EGFRCR SERPLBLD CKD-EPI 2021: >90 ML/MIN/1.73M2
ERYTHROCYTE [DISTWIDTH] IN BLOOD BY AUTOMATED COUNT: 13.2 % (ref 10–15)
FLUAV RNA SPEC QL NAA+PROBE: NEGATIVE
FLUBV RNA RESP QL NAA+PROBE: NEGATIVE
GLUCOSE SERPL-MCNC: 122 MG/DL (ref 70–99)
HCT VFR BLD AUTO: 38.8 % (ref 35–47)
HGB BLD-MCNC: 12.8 G/DL (ref 11.7–15.7)
MCH RBC QN AUTO: 30.1 PG (ref 26.5–33)
MCHC RBC AUTO-ENTMCNC: 33 G/DL (ref 31.5–36.5)
MCV RBC AUTO: 91 FL (ref 78–100)
PLATELET # BLD AUTO: 206 10E3/UL (ref 150–450)
POTASSIUM SERPL-SCNC: 3.4 MMOL/L (ref 3.4–5.3)
RBC # BLD AUTO: 4.25 10E6/UL (ref 3.8–5.2)
RSV RNA SPEC NAA+PROBE: NEGATIVE
SARS-COV-2 RNA RESP QL NAA+PROBE: NEGATIVE
SODIUM SERPL-SCNC: 139 MMOL/L (ref 135–145)
TROPONIN T SERPL HS-MCNC: 7 NG/L
WBC # BLD AUTO: 4.3 10E3/UL (ref 4–11)

## 2024-03-18 PROCEDURE — 84484 ASSAY OF TROPONIN QUANT: CPT | Performed by: EMERGENCY MEDICINE

## 2024-03-18 PROCEDURE — 250N000009 HC RX 250: Performed by: EMERGENCY MEDICINE

## 2024-03-18 PROCEDURE — 36415 COLL VENOUS BLD VENIPUNCTURE: CPT | Performed by: EMERGENCY MEDICINE

## 2024-03-18 PROCEDURE — 250N000011 HC RX IP 250 OP 636: Performed by: EMERGENCY MEDICINE

## 2024-03-18 PROCEDURE — 96374 THER/PROPH/DIAG INJ IV PUSH: CPT

## 2024-03-18 PROCEDURE — 250N000013 HC RX MED GY IP 250 OP 250 PS 637: Performed by: EMERGENCY MEDICINE

## 2024-03-18 PROCEDURE — 85379 FIBRIN DEGRADATION QUANT: CPT | Performed by: EMERGENCY MEDICINE

## 2024-03-18 PROCEDURE — 80048 BASIC METABOLIC PNL TOTAL CA: CPT | Performed by: EMERGENCY MEDICINE

## 2024-03-18 PROCEDURE — 94640 AIRWAY INHALATION TREATMENT: CPT

## 2024-03-18 PROCEDURE — 99285 EMERGENCY DEPT VISIT HI MDM: CPT | Mod: 25

## 2024-03-18 PROCEDURE — 87637 SARSCOV2&INF A&B&RSV AMP PRB: CPT | Performed by: EMERGENCY MEDICINE

## 2024-03-18 PROCEDURE — 71250 CT THORAX DX C-: CPT

## 2024-03-18 PROCEDURE — 85027 COMPLETE CBC AUTOMATED: CPT | Performed by: EMERGENCY MEDICINE

## 2024-03-18 PROCEDURE — 71046 X-RAY EXAM CHEST 2 VIEWS: CPT

## 2024-03-18 RX ORDER — PREDNISONE 20 MG/1
20 TABLET ORAL DAILY
Qty: 4 TABLET | Refills: 0 | Status: SHIPPED | OUTPATIENT
Start: 2024-03-19 | End: 2024-03-23

## 2024-03-18 RX ORDER — ALBUTEROL SULFATE 0.83 MG/ML
2.5 SOLUTION RESPIRATORY (INHALATION) EVERY 6 HOURS PRN
Qty: 90 ML | Refills: 0 | Status: SHIPPED | OUTPATIENT
Start: 2024-03-18 | End: 2024-04-14

## 2024-03-18 RX ORDER — DOXYCYCLINE 100 MG/1
100 CAPSULE ORAL 2 TIMES DAILY
Qty: 14 CAPSULE | Refills: 0 | Status: SHIPPED | OUTPATIENT
Start: 2024-03-19 | End: 2024-03-26

## 2024-03-18 RX ORDER — METHYLPREDNISOLONE SODIUM SUCCINATE 125 MG/2ML
125 INJECTION, POWDER, LYOPHILIZED, FOR SOLUTION INTRAMUSCULAR; INTRAVENOUS ONCE
Status: COMPLETED | OUTPATIENT
Start: 2024-03-18 | End: 2024-03-18

## 2024-03-18 RX ORDER — IPRATROPIUM BROMIDE AND ALBUTEROL SULFATE 2.5; .5 MG/3ML; MG/3ML
3 SOLUTION RESPIRATORY (INHALATION)
Status: DISCONTINUED | OUTPATIENT
Start: 2024-03-18 | End: 2024-03-18

## 2024-03-18 RX ORDER — IPRATROPIUM BROMIDE AND ALBUTEROL SULFATE 2.5; .5 MG/3ML; MG/3ML
3 SOLUTION RESPIRATORY (INHALATION) ONCE
Status: COMPLETED | OUTPATIENT
Start: 2024-03-18 | End: 2024-03-18

## 2024-03-18 RX ORDER — DOXYCYCLINE 100 MG/1
100 CAPSULE ORAL ONCE
Status: COMPLETED | OUTPATIENT
Start: 2024-03-18 | End: 2024-03-18

## 2024-03-18 RX ADMIN — IPRATROPIUM BROMIDE AND ALBUTEROL SULFATE 3 ML: .5; 3 SOLUTION RESPIRATORY (INHALATION) at 20:33

## 2024-03-18 RX ADMIN — DOXYCYCLINE HYCLATE 100 MG: 100 CAPSULE ORAL at 22:24

## 2024-03-18 RX ADMIN — METHYLPREDNISOLONE SODIUM SUCCINATE 125 MG: 125 INJECTION, POWDER, FOR SOLUTION INTRAMUSCULAR; INTRAVENOUS at 20:24

## 2024-03-18 ASSESSMENT — COLUMBIA-SUICIDE SEVERITY RATING SCALE - C-SSRS
6. HAVE YOU EVER DONE ANYTHING, STARTED TO DO ANYTHING, OR PREPARED TO DO ANYTHING TO END YOUR LIFE?: NO
1. IN THE PAST MONTH, HAVE YOU WISHED YOU WERE DEAD OR WISHED YOU COULD GO TO SLEEP AND NOT WAKE UP?: NO
2. HAVE YOU ACTUALLY HAD ANY THOUGHTS OF KILLING YOURSELF IN THE PAST MONTH?: NO

## 2024-03-18 ASSESSMENT — ACTIVITIES OF DAILY LIVING (ADL)
ADLS_ACUITY_SCORE: 35

## 2024-03-19 NOTE — ED TRIAGE NOTES
Pt states that she has been struggling with asthma for a month; she is reportedly toward the end of antibiotics for sinus infection. Pt reports ongoing wheezing, coughing. Pt states that she went to urgent care a week ago and they listened to her lungs and told her that she sounded clear and that she should finish antibiotics.      Triage Assessment (Adult)       Row Name 03/18/24 1919          Triage Assessment    Airway WDL WDL        Respiratory WDL    Respiratory WDL X  wheezing, coughing, asthma exacerbation, end of antibiotics for sinus infection

## 2024-03-19 NOTE — ED PROVIDER NOTES
EMERGENCY DEPARTMENT ENCOUNTER      NAME: Odette Escobar  AGE: 68 year old female  YOB: 1955  MRN: [id number]  EVALUATION DATE & TIME: No admission date for patient encounter.    PCP: Allie Johnson    ED PROVIDER: Chula Andrade PA-C      Chief Complaint   Patient presents with    Asthma Exacerbation     X 1 month         FINAL IMPRESSION:  1. Pneumonia of right upper lobe due to infectious organism    2. Asthma exacerbation          MEDICAL DECISION MAKING:    Pertinent Labs & Imaging studies reviewed. (See chart for details)  68 year old female presents to the Emergency Department for evaluation of cough and shortness of breath. The patient has been struggling with cough and intermittent shortness of breath for the past 4-6 weeks. She was seen by her primary care provider a week ago for a sinus infection and was placed on Augmentin. Since then she continued to have cough and wheezing. She was seen at the urgency room, had a physical exam and was sent home without further intervention. She was concerned as she continued to have cough and intermittent wheezing with shortness of breath. She was concerned about her symptoms and presented to the ED. On my evaluation, the patient was tachycardic at 111 and per tensive at 146/60 but otherwise vitally stable.  Examination with heart in tachycardic rate but regular rhythm.  Patient was coughing frequently during examination and I was unable to get clear breath sounds.  I did reevaluate after nebulizer and patient was not coughing during our conversation but would forcefully cough during exam and again it was hard to listen to her lungs but I did not appreciate any significant wheezing or crackles.  Differential diagnosis included COVID, influenza, RSV, pneumonia, PE, ACS, electrolyte derangement.    CBC without derangement.  BMP without significant derangement.  COVID, influenza, RSV testing negative.  Troponin negative at 8 and patient has been having  symptoms for the past several days and I do not feel ACS is likely cause of symptoms especially with pain being pleuritic in nature.  Concern for PE and D-dimer is negative at less than 0.27.  I did obtain a chest x-ray which showed indistinct airspace opacity in the right mid lung and I have concern for possible pneumonia so I did order a chest CT.  Chest CT shows right upper lobe pneumonia versus pneumonitis.  Patient's curb 65 score is 1 for her age and I do not feel she requires admission at this time.  Patient has been on Augmentin for the past week without improvement of symptoms and will add on doxycycline to cover for infection.  First dose of doxycycline given here.  Additionally, I do feel she likely has an asthma exacerbation which is not helping with her cough or any shortness of breath.  Will discharge home with nebulizer solution, prednisone, Augmentin and doxycycline with close follow-up with primary care.  Patient does have a primary care appointment on 3/21/2024 and I told her to keep this appointment.  We discussed signs and symptoms to return to the emergency department and all questions were answered to the best my ability.  She was discharged in stable condition.    Medical Decision Making  Obtained supplemental history:Supplemental history obtained?: Documented in chart  Reviewed external records: External records reviewed?: Documented in chart  Care impacted by chronic illness:Chronic Lung Disease, Chronic Pain, Diabetes, Hyperlipidemia, and Hypertension  Care significantly affected by social determinants of health:N/A  Did you consider but not order tests?: Work up considered but not performed and documented in chart, if applicable  Did you interpret images independently?: Independent interpretation of ECG and images noted in documentation, when applicable.  Consultation discussion with other provider:Did you involve another provider (consultant, , pharmacy, etc.)?: No  Discharge. I  prescribed additional prescription strength medication(s) as charted. See documentation for any additional details.     ED COURSE:  7:14 PM I met with the patient, obtained history, performed an initial exam, and discussed options and plan for diagnostics and treatment here in the ED.    9:19 PM Patient rechecked.    10:25 PM  Patient discharged after being provided with extensive anticipatory guidance and given return precautions, importance of PCP follow-up emphasized.    At the conclusion of the encounter I discussed the results of all of the tests and the disposition. The questions were answered. The patient or family acknowledged understanding and was agreeable with the care plan.     MEDICATIONS GIVEN IN THE EMERGENCY:  Medications   methylPREDNISolone sodium succinate (solu-MEDROL) injection 125 mg (125 mg Intravenous $Given 3/18/24 2024)   ipratropium - albuterol 0.5 mg/2.5 mg/3 mL (DUONEB) neb solution 3 mL (3 mLs Nebulization $Given 3/18/24 2033)   doxycycline hyclate (VIBRAMYCIN) capsule 100 mg (100 mg Oral $Given 3/18/24 2224)       NEW PRESCRIPTIONS STARTED AT TODAY'S ER VISIT  New Prescriptions    ALBUTEROL (PROVENTIL) (2.5 MG/3ML) 0.083% NEB SOLUTION    Take 1 vial (2.5 mg) by nebulization every 6 hours as needed for shortness of breath, wheezing or cough    AMOXICILLIN-CLAVULANATE (AUGMENTIN) 875-125 MG TABLET    Take 1 tablet by mouth 2 times daily for 7 days    DOXYCYCLINE HYCLATE (VIBRAMYCIN) 100 MG CAPSULE    Take 1 capsule (100 mg) by mouth 2 times daily for 7 days    PREDNISONE (DELTASONE) 20 MG TABLET    Take 1 tablet (20 mg) by mouth daily for 4 days            =================================================================    HPI:    Patient information was obtained from: Patient    Use of Interpretor: N/A     Odette Escobar is a 68 year old female with a pertinent history of asthma CASTRO, hypertension, and hyperlipidemia who presents to this ED for evaluation of asthma exacerbation    Per  "chart review, patient was seen on 3/14/2024 (~ 4 days ago) for an asthma attack with wheezing and lightheadedness. Noted this had occurred after she was doing some dusting in her house and she notes she has a symbicort inhaler but has not been using it. On exam she was not in respiratory distress. Patient was encouraged to use her rescue albuterol inhaler. Discharged with plans for primary care follow up.    Patient reports that she went to [place] in  the beginning of February 2024 since and has had a cough and wheezing since she came back.She comes in today as her symptoms have been exacerbated by cold weather and when she cleans the litter box. She was using symbicort but not using her rescue inhaler until she saw urgent care on the 3/14/24 (~4 days ago). Patient also reports that she has been having chest pain starting and shortness of breath starting yesterday (3/17/24).  She tried tessalon and her rescue inhaler without significant relief. Also notes \"struggling with a sinus infection and states her cough has not improved\". Currently on Augmentin, is on day 7 of 10.     She denied any fever, nausea, vomiting, diarrhea, abdominal pain, or known sick exposures. No other complaints at this time.    REVIEW OF SYSTEMS:  Negative unless otherwise stated in the above HPI.       PAST MEDICAL HISTORY:  History reviewed. No pertinent past medical history.    PAST SURGICAL HISTORY:  Past Surgical History:   Procedure Laterality Date    ENDOMETRIAL ABLATION             CURRENT MEDICATIONS:    No current facility-administered medications for this encounter.    Current Outpatient Medications:     albuterol (PROVENTIL) (2.5 MG/3ML) 0.083% neb solution, Take 1 vial (2.5 mg) by nebulization every 6 hours as needed for shortness of breath, wheezing or cough, Disp: 90 mL, Rfl: 0    [START ON 3/19/2024] amoxicillin-clavulanate (AUGMENTIN) 875-125 MG tablet, Take 1 tablet by mouth 2 times daily for 7 days, Disp: 14 tablet, Rfl: 0    " [START ON 3/19/2024] doxycycline hyclate (VIBRAMYCIN) 100 MG capsule, Take 1 capsule (100 mg) by mouth 2 times daily for 7 days, Disp: 14 capsule, Rfl: 0    [START ON 3/19/2024] predniSONE (DELTASONE) 20 MG tablet, Take 1 tablet (20 mg) by mouth daily for 4 days, Disp: 4 tablet, Rfl: 0    acetaminophen (TYLENOL) 500 MG tablet, [ACETAMINOPHEN (TYLENOL) 500 MG TABLET] Take 500 mg by mouth every 6 (six) hours as needed for pain., Disp: , Rfl:     CANNABIDIOL, CBD, EXTRACT ORAL, [CANNABIDIOL, CBD, EXTRACT ORAL] Take 1 drop by mouth daily., Disp: , Rfl:     famotidine (PEPCID) 20 MG tablet, [FAMOTIDINE (PEPCID) 20 MG TABLET] Take 1 tablet (20 mg total) by mouth 2 (two) times a day., Disp: 30 tablet, Rfl: 0    fluticasone (FLOVENT HFA) 110 mcg/actuation inhaler, [FLUTICASONE (FLOVENT HFA) 110 MCG/ACTUATION INHALER] Inhale 2 puffs 2 (two) times a day., Disp: , Rfl:     gabapentin (NEURONTIN) 300 MG capsule, [GABAPENTIN (NEURONTIN) 300 MG CAPSULE] Take 300 mg by mouth 3 (three) times a day., Disp: , Rfl:     loperamide (IMODIUM) 2 mg capsule, [LOPERAMIDE (IMODIUM) 2 MG CAPSULE] Take 2 mg by mouth 4 (four) times a day., Disp: , Rfl:     magnesium oxide 400 mg cap, [MAGNESIUM OXIDE 400 MG CAP] Take 400 mg by mouth daily., Disp: , Rfl:     metFORMIN (GLUCOPHAGE) 500 MG tablet, [METFORMIN (GLUCOPHAGE) 500 MG TABLET] Take 500 mg by mouth daily with breakfast.       , Disp: , Rfl:     omeprazole (PRILOSEC) 20 MG capsule, [OMEPRAZOLE (PRILOSEC) 20 MG CAPSULE] Take 20 mg by mouth daily., Disp: , Rfl:     ondansetron (ZOFRAN) 4 MG tablet, Take 1 tablet (4 mg) by mouth every 8 hours as needed for nausea, Disp: 10 tablet, Rfl: 0    propranolol (INDERAL LA) 60 mg 24 hr capsule, [PROPRANOLOL (INDERAL LA) 60 MG 24 HR CAPSULE] Take 60 mg by mouth daily., Disp: , Rfl:     sertraline (ZOLOFT) 50 MG tablet, [SERTRALINE (ZOLOFT) 50 MG TABLET] Take 1 tablet (50 mg total) by mouth daily., Disp: 20 tablet, Rfl: 0    sucralfate (CARAFATE) 1 gram  tablet, [SUCRALFATE (CARAFATE) 1 GRAM TABLET] Take 1 g by mouth once as needed., Disp: , Rfl:     TRAZODONE HCL (TRAZODONE ORAL), [TRAZODONE HCL (TRAZODONE ORAL)] Take by mouth. Unknown dose, Disp: , Rfl:       ALLERGIES:  Allergies   Allergen Reactions    Aspirin Hives    Diflunisal Nausea and Vomiting     (Dolobid) Occurred approximately 20 years ago    Occurred approximately 20 years ago   Occurred approximately 20 years ago   (Dolobid) Occurred approximately 20 years ago    Fd&C Yellow #5 (Tartrazine) Nausea and Vomiting       FAMILY HISTORY:  Family History   Problem Relation Age of Onset    Heart Failure Mother     Heart Failure Father     Heart Failure Sister     Coronary Artery Disease Brother     Coronary Artery Disease Brother     Coronary Artery Disease Brother     Cerebrovascular Disease Sister        SOCIAL HISTORY:   Social History     Socioeconomic History    Marital status:    Tobacco Use    Smoking status: Never    Smokeless tobacco: Never   Substance and Sexual Activity    Alcohol use: Yes     Comment: Alcoholic Drinks/day: Occasional usage    Drug use: No       VITALS:  Patient Vitals for the past 24 hrs:   BP Temp Temp src Pulse Resp SpO2 Weight   03/18/24 2115 125/60 -- -- 104 -- 96 % --   03/18/24 2041 118/63 -- -- 106 -- 99 % --   03/18/24 1918 (!) 146/60 99  F (37.2  C) Oral 111 24 97 % 62.6 kg (138 lb)       PHYSICAL EXAM    Constitutional: Well developed, Well nourished, NAD  HENT: Normocephalic, Atraumatic, Bilateral external ears normal, Oropharynx normal, mucous membranes moist, Nose normal.   Neck: Normal range of motion, No tenderness, Supple, No stridor.  Eyes: PERRL, EOMI, Conjunctiva normal, No discharge.   Respiratory: Decreased breath sounds throughout all lung fields, No respiratory distress, No wheezing, Speaks full sentences easily. Frequent cough.  Cardiovascular: Normal heart rate, Regular rhythm, No murmurs, No rubs, No gallops. Chest wall nontender.  GI: Soft, No  tenderness, No masses, No flank tenderness. No rebound or guarding.  Musculoskeletal: 2+ DP pulses. No edema. No cyanosis, No clubbing. Good range of motion in all major joints. No tenderness to palpation or major deformities noted. No tenderness of the CTLS spine.   Integument: Warm, Dry, No erythema, No rash. No petechiae.  Neurologic: Alert & oriented x 3, Normal motor function, Normal sensory function, No focal deficits noted. Normal gait.  Psychiatric: Affect normal, Judgment normal, Mood normal. Cooperative.    LAB:  All pertinent labs reviewed and interpreted.  Recent Results (from the past 24 hour(s))   CBC (+ platelets, no diff)    Collection Time: 03/18/24  8:21 PM   Result Value Ref Range    WBC Count 4.3 4.0 - 11.0 10e3/uL    RBC Count 4.25 3.80 - 5.20 10e6/uL    Hemoglobin 12.8 11.7 - 15.7 g/dL    Hematocrit 38.8 35.0 - 47.0 %    MCV 91 78 - 100 fL    MCH 30.1 26.5 - 33.0 pg    MCHC 33.0 31.5 - 36.5 g/dL    RDW 13.2 10.0 - 15.0 %    Platelet Count 206 150 - 450 10e3/uL   Basic metabolic panel    Collection Time: 03/18/24  8:21 PM   Result Value Ref Range    Sodium 139 135 - 145 mmol/L    Potassium 3.4 3.4 - 5.3 mmol/L    Chloride 104 98 - 107 mmol/L    Carbon Dioxide (CO2) 24 22 - 29 mmol/L    Anion Gap 11 7 - 15 mmol/L    Urea Nitrogen 7.4 (L) 8.0 - 23.0 mg/dL    Creatinine 0.57 0.51 - 0.95 mg/dL    GFR Estimate >90 >60 mL/min/1.73m2    Calcium 9.2 8.8 - 10.2 mg/dL    Glucose 122 (H) 70 - 99 mg/dL   Symptomatic Influenza A/B, RSV, & SARS-CoV2 PCR (COVID-19) Nasopharyngeal    Collection Time: 03/18/24  8:21 PM    Specimen: Nasopharyngeal; Swab   Result Value Ref Range    Influenza A PCR Negative Negative    Influenza B PCR Negative Negative    RSV PCR Negative Negative    SARS CoV2 PCR Negative Negative   Troponin T, High Sensitivity (now)    Collection Time: 03/18/24  8:21 PM   Result Value Ref Range    Troponin T, High Sensitivity 7 <=14 ng/L   D dimer quantitative    Collection Time: 03/18/24  8:21  PM   Result Value Ref Range    D-Dimer Quantitative <0.27 0.00 - 0.50 ug/mL FEU         RADIOLOGY:  Reviewed all pertinent imaging. Please see official radiology report.  Chest CT w/o contrast   Final Result   IMPRESSION:    1.  Findings consistent with a right upper lobe pneumonia or pneumonitis. This could be followed radiographically to ensure resolution.         XR Chest 2 Views   Final Result   IMPRESSION:       There is an indistinct airspace opacity in the right midlung at the level of the anterior third rib consistent with focal inflammation. Follow-up radiographs in 4-6 weeks are suggested for reassessment.       No left lung opacities.      Cardiac silhouette is normal in size. Vascular pedicle width is normal.      Diaphragmatic curvature is preserved. No pleural effusion.      Thoracic vertebra are maintained in height.          PROCEDURES:   None       CHRYSTAL, Marin Martinez , am serving as a scribe to document services personally performed by Chula Andrade PA-C based on my observation and the provider's statements to me. IChula PA-C attest that Marin Martinez  is acting in a scribe capacity, has observed my performance of the services and has documented them in accordance with my direction.    Chula Andrade PA-C  Emergency Medicine  [facility]  3/18/2024       Chula Andrade PA-C  03/18/24 5133

## 2024-03-19 NOTE — DISCHARGE INSTRUCTIONS
You are seen and evaluated here in the emergency department for your cough and shortness of breath.  You do have pneumonia as well as an asthma exacerbation.  Otherwise, workup here is reassuring.  You were on Augmentin for a sinus infection however, I would like to extend this to cover the length of treatment for your pneumonia as well.  Will also add on doxycycline to take twice daily for the next week.  You have close follow-up with your primary care in 3 days which will be perfect for recheck of symptoms.    Of course, in the meantime if you get worsening shortness of breath, cough, uncontrolled vomiting, coughing up blood, chest pain or any other concerns please return to the emergency department.    Otherwise, follow-up with primary care as scheduled on the 21st.

## 2024-03-21 ENCOUNTER — HOSPITAL ENCOUNTER (OUTPATIENT)
Dept: MRI IMAGING | Facility: HOSPITAL | Age: 69
Discharge: HOME | End: 2024-03-21
Attending: NURSE PRACTITIONER | Admitting: NURSE PRACTITIONER
Payer: COMMERCIAL

## 2024-03-21 ENCOUNTER — TELEPHONE (OUTPATIENT)
Dept: PHYSICAL MEDICINE AND REHAB | Facility: CLINIC | Age: 69
End: 2024-03-21
Payer: COMMERCIAL

## 2024-03-21 DIAGNOSIS — M47.816 LUMBAR FACET ARTHROPATHY: ICD-10-CM

## 2024-03-21 DIAGNOSIS — M51.369 DDD (DEGENERATIVE DISC DISEASE), LUMBAR: ICD-10-CM

## 2024-03-21 DIAGNOSIS — M54.16 LUMBAR RADICULOPATHY: ICD-10-CM

## 2024-03-21 DIAGNOSIS — M54.41 CHRONIC BILATERAL LOW BACK PAIN WITH RIGHT-SIDED SCIATICA: ICD-10-CM

## 2024-03-21 DIAGNOSIS — G89.29 CHRONIC BILATERAL LOW BACK PAIN WITH RIGHT-SIDED SCIATICA: ICD-10-CM

## 2024-03-21 PROCEDURE — 72148 MRI LUMBAR SPINE W/O DYE: CPT

## 2024-03-21 NOTE — TELEPHONE ENCOUNTER
----- Message from Olive Ghosh CNP sent at 3/21/2024 11:39 AM CDT -----  Please call patient and notify her that I did review her lumbar spine MRI.  There is slight shifting at a couple different levels of the spine with moderate facet arthropathy/arthritis at both L4-5 and L5-S1.  There is a disc bulge at multiple levels but very subtle nerve compression on the left only at L5-S1, and very mild central nerve compression at L1-2.  Given there is no significant RIGHT nerve compression I would recommend trialing physical therapy first which she is scheduled for.  If symptoms are not improving with physical therapy would recommend that she follows up in clinic to discuss other options at that time.  Thanks,  Olive

## 2024-03-24 ENCOUNTER — HOSPITAL ENCOUNTER (EMERGENCY)
Facility: HOSPITAL | Age: 69
Discharge: HOME | End: 2024-03-24
Payer: COMMERCIAL

## 2024-03-24 ENCOUNTER — APPOINTMENT (OUTPATIENT)
Dept: RADIOLOGY | Facility: HOSPITAL | Age: 69
End: 2024-03-24
Payer: COMMERCIAL

## 2024-03-24 VITALS
DIASTOLIC BLOOD PRESSURE: 84 MMHG | HEART RATE: 70 BPM | SYSTOLIC BLOOD PRESSURE: 149 MMHG | TEMPERATURE: 97.7 F | OXYGEN SATURATION: 97 % | RESPIRATION RATE: 22 BRPM

## 2024-03-24 DIAGNOSIS — J45.901 EXACERBATION OF ASTHMA, UNSPECIFIED ASTHMA SEVERITY, UNSPECIFIED WHETHER PERSISTENT: ICD-10-CM

## 2024-03-24 PROCEDURE — 73030 X-RAY EXAM OF SHOULDER: CPT | Mod: RT

## 2024-03-24 PROCEDURE — 99283 EMERGENCY DEPT VISIT LOW MDM: CPT

## 2024-03-24 PROCEDURE — 250N000009 HC RX 250

## 2024-03-24 PROCEDURE — 94640 AIRWAY INHALATION TREATMENT: CPT

## 2024-03-24 PROCEDURE — 71046 X-RAY EXAM CHEST 2 VIEWS: CPT

## 2024-03-24 RX ORDER — IPRATROPIUM BROMIDE AND ALBUTEROL SULFATE 2.5; .5 MG/3ML; MG/3ML
3 SOLUTION RESPIRATORY (INHALATION) ONCE
Status: COMPLETED | OUTPATIENT
Start: 2024-03-24 | End: 2024-03-24

## 2024-03-24 RX ORDER — BENZONATATE 100 MG/1
200 CAPSULE ORAL 3 TIMES DAILY PRN
Qty: 30 CAPSULE | Refills: 0 | Status: SHIPPED | OUTPATIENT
Start: 2024-03-24 | End: 2024-03-29

## 2024-03-24 RX ORDER — PREDNISONE 20 MG/1
TABLET ORAL
Qty: 10 TABLET | Refills: 0 | Status: SHIPPED | OUTPATIENT
Start: 2024-03-24 | End: 2024-04-14

## 2024-03-24 RX ADMIN — IPRATROPIUM BROMIDE AND ALBUTEROL SULFATE 3 ML: .5; 3 SOLUTION RESPIRATORY (INHALATION) at 22:54

## 2024-03-24 ASSESSMENT — ACTIVITIES OF DAILY LIVING (ADL)
ADLS_ACUITY_SCORE: 35
ADLS_ACUITY_SCORE: 35

## 2024-03-25 NOTE — ED PROVIDER NOTES
EMERGENCY DEPARTMENT ENCOUNTER      NAME: Odette Escobar  AGE: 68 year old female  YOB: 1955  MRN: [id number]  EVALUATION DATE & TIME: 03/24/24 10:33 PM    PCP: Allie Johnson    ED PROVIDER: Cari Camara PA-C      CHIEF COMPLAINT:  Back Pain and Cough      FINAL IMPRESSION:  1. Exacerbation of asthma, unspecified asthma severity, unspecified whether persistent          ED COURSE & MEDICAL DECISION MAKING:  Pertinent Labs & Imaging studies reviewed. (See chart for details)    The patient is a 68 year old-year-old female with a history of asthma and recent pneumonia on Augmentin and doxycycline presenting to the emergency department for evaluation of persistent cough and bilateral chest wall pain with coughing. Associated symptoms include ear fullness, nasal congestion, and rhinorrhea. She has been using her nebulizer treatments at home with temporary improvement of symptoms. She recently finished a course of prednisone with significantly improved symptoms, symptoms worsened once completing the course of prednisone.     Initial vital signs reviewed and significant for elevated blood pressure and borderline tachypnea with respiratory rate 22. Repeat vital signs improved. Breath sounds with expiratory wheezing throughout. No accessory muscle usage. Reproducible lateral rib tenderness bilaterally, no overlying skin changes or crepitus.     She also reports right shoulder pain onset after she pulled bins toward her while cleaning a few days ago. Shoulder pain is reproduced with pulling motion during strength testing, with tenderness to palpation right trapezius region. Strength and sensation intact. No increased warmth or swelling to suggest septic joint, gout, or RA. Given low mechanism of injury and reproducible tenderness with motion that provoked initial pain, suspect this is most likely musculoskeletal in etiology. Right shoulder XR obtained and was negative for acute fracture or  dislocation, pain most consistent with musculoskeletal etiology.    Differential diagnosis includes viral URI including RSV, influenza A/B, COVID-19, postviral cough, allergic rhinitis, pneumonia, asthma exacerbation, costochondritis, musculoskeletal chest wall sprain/strain, rib fractures secondary to coughing, gastroesophageal reflux. Rib pain not pleuritic in nature. Low clinical suspicion for congestive heart failure, pulmonary embolism, atypical ACS, aortic dissection, pertussis, bacterial trachelitis, serious bacterial illness at this time.      Discussed options for workup and management with the patient, she agrees with plan for chest XR and right shoulder XR. Chest XR negative, no rib fractures identified, reassuring given recent pneumonia. Rib pain most consistent with musculoskeletal sprain/strain in the setting of frequent cough and pneumonia. The patient was given a duoneb treatment with significantly improved breath sounds on repeat reassessment. Overall, patient history, exam, and imaging here most consistent with asthma exacerbation in setting of recent pneumonia.     Discussed plan for discharge to home with course of prednisone as this previously helped her symptoms, instruction to continue home albuterol inhaler and nebulizer use as needed, and close outpatient follow up with her primary care clinician for recheck. The patient verbalized understanding and is comfortable with this plan. Symptomatic home cares discussed. Emphasized importance of follow up with primary care clinician. Very strict return precautions discussed.     At the conclusion of the encounter I discussed the results of all of the tests and the disposition. The questions were answered. The patient or family acknowledged understanding and was agreeable with the care plan.       ED COURSE:  10:27 PM I met and introduced myself to the patient. I gathered initial history and performed an initial physical exam. We discussed options  and plan for diagnostics and treatment here in the ED.  11:29 PM I discussed the plan for discharge with the patient, and patient is agreeable. We discussed supportive cares at home and reasons for return to the ER including new or worsening symptoms - all questions and concerns addressed to the best of my ability. Strict return precautions discussed. Patient to be discharged by RN.      Medical Decision Making  Obtained supplemental history:Supplemental history obtained?: No  Reviewed external records: External records reviewed?: Outpatient Record: Office visits 03/19/2024, 03/21/2024  Care impacted by chronic illness:Chronic Pain, Diabetes, Hyperlipidemia, Hypertension, Mental Health, and Other: Asthma, GERD  Care significantly affected by social determinants of health:N/A  Did you consider but not order tests?: Work up considered but not performed and documented in chart, if applicable  Did you interpret images independently?: Independent interpretation of ECG and images noted in documentation, when applicable.  Consultation discussion with other provider:Did you involve another provider (consultant, MH, pharmacy, etc.)?: No  Discharge. I prescribed additional prescription strength medication(s) as charted. I considered admission, but discharged patient after significant clinical improvement.      CRITICAL CARE:  Not applicable      MEDICATIONS GIVEN IN THE EMERGENCY:  Medications   ipratropium - albuterol 0.5 mg/2.5 mg/3 mL (DUONEB) neb solution 3 mL (3 mLs Nebulization $Given 3/24/24 2070)       NEW PRESCRIPTIONS STARTED AT TODAY'S ER VISIT  New Prescriptions    PREDNISONE (DELTASONE) 20 MG TABLET    Take two tablets (= 40mg) each day for 5 (five) days          =================================================================    HPI    Patient information was obtained from: Patient    Use of Intrepreter: N/A        Odette Escobar is a 68 year old female with pertinent medical history of asthma, type 2 diabetes  mellitus, hypertension, hyperlipidemia, GERD, and alcohol abuse in remission who presents to the emergency department for evaluation of cough and back pain.    The patient reports weeks of persistent cough and bilateral rib pain, with recent onset of bilateral ear fullness and discomfort, right greater than left. She reports that her symptoms temporarily improve with nebulizer treatment at home, were significantly improved with prednisone and then worsened once she finished the course of prednisone.    She has been seen in clinic and was prescribed Augmentin initially, then doxycycline was added in a later ED visit. She states that she still has 1.5 weeks left of these prescriptions. The patient endorses history of asthma and states that she last used Symbicort twice this morning. Last nebulizer treatment this evening. She also reports right shoulder pain onset after she pulled bins toward her while cleaning a few days ago. Her shoulder pain is worse with pulling items toward her and is improved with avoiding pulling items, is not provoked by other exertion.    She denies a history of smoking. No recent injury or trauma.    Per chart review, the patient was seen in this ED on 03/18/2024 for evaluation of 4-6 weeks of cough and intermittent shortness of breath. She had been seen by her PCP one week prior and placed on Augmentin, but continued to have cough and intermittent wheezing with shortness of breath. Labs unremarkable. Chest CT showed right upper lobe pneumonia vs pneumonitis. 7 day course of doxycycline added to cover for infection. Patient was discharged with nebulizer solution, 20 mg prednisone, Augmentin, and doxycycline. She was [facility] at [facility] on [facility]/2024 with continued shortness of breath. Prednisone increased to 40 mg daily. Antibiotics, Symbicort, albuterol nebs continued. Patient was improved at follow up on 03/21/2024.      PAST MEDICAL HISTORY:  No past medical history on  file.    PAST SURGICAL HISTORY:  Past Surgical History:   Procedure Laterality Date    ENDOMETRIAL ABLATION         CURRENT MEDICATIONS:    Prior to Admission Medications   Prescriptions Last Dose Informant Patient Reported? Taking?   CANNABIDIOL, CBD, EXTRACT ORAL   Yes No   Sig: [CANNABIDIOL, CBD, EXTRACT ORAL] Take 1 drop by mouth daily.   TRAZODONE HCL (TRAZODONE ORAL)   Yes No   Sig: [TRAZODONE HCL (TRAZODONE ORAL)] Take by mouth. Unknown dose   acetaminophen (TYLENOL) 500 MG tablet   Yes No   Sig: [ACETAMINOPHEN (TYLENOL) 500 MG TABLET] Take 500 mg by mouth every 6 (six) hours as needed for pain.   albuterol (PROVENTIL) (2.5 MG/3ML) 0.083% neb solution   No No   Sig: Take 1 vial (2.5 mg) by nebulization every 6 hours as needed for shortness of breath, wheezing or cough   amoxicillin-clavulanate (AUGMENTIN) 875-125 MG tablet   No No   Sig: Take 1 tablet by mouth 2 times daily for 7 days   doxycycline hyclate (VIBRAMYCIN) 100 MG capsule   No No   Sig: Take 1 capsule (100 mg) by mouth 2 times daily for 7 days   famotidine (PEPCID) 20 MG tablet   No No   Sig: [FAMOTIDINE (PEPCID) 20 MG TABLET] Take 1 tablet (20 mg total) by mouth 2 (two) times a day.   fluticasone (FLOVENT HFA) 110 mcg/actuation inhaler   Yes No   Sig: [FLUTICASONE (FLOVENT HFA) 110 MCG/ACTUATION INHALER] Inhale 2 puffs 2 (two) times a day.   gabapentin (NEURONTIN) 300 MG capsule   Yes No   Sig: [GABAPENTIN (NEURONTIN) 300 MG CAPSULE] Take 300 mg by mouth 3 (three) times a day.   loperamide (IMODIUM) 2 mg capsule   Yes No   Sig: [LOPERAMIDE (IMODIUM) 2 MG CAPSULE] Take 2 mg by mouth 4 (four) times a day.   magnesium oxide 400 mg cap   Yes No   Sig: [MAGNESIUM OXIDE 400 MG CAP] Take 400 mg by mouth daily.   metFORMIN (GLUCOPHAGE) 500 MG tablet   Yes No   Sig: [METFORMIN (GLUCOPHAGE) 500 MG TABLET] Take 500 mg by mouth daily with breakfast.          omeprazole (PRILOSEC) 20 MG capsule   Yes No   Sig: [OMEPRAZOLE (PRILOSEC) 20 MG CAPSULE] Take 20 mg  by mouth daily.   ondansetron (ZOFRAN) 4 MG tablet   No No   Sig: Take 1 tablet (4 mg) by mouth every 8 hours as needed for nausea   predniSONE (DELTASONE) 20 MG tablet   No No   Sig: Take 1 tablet (20 mg) by mouth daily for 4 days   propranolol (INDERAL LA) 60 mg 24 hr capsule   Yes No   Sig: [PROPRANOLOL (INDERAL LA) 60 MG 24 HR CAPSULE] Take 60 mg by mouth daily.   sertraline (ZOLOFT) 50 MG tablet   No No   Sig: [SERTRALINE (ZOLOFT) 50 MG TABLET] Take 1 tablet (50 mg total) by mouth daily.   sucralfate (CARAFATE) 1 gram tablet   Yes No   Sig: [SUCRALFATE (CARAFATE) 1 GRAM TABLET] Take 1 g by mouth once as needed.      Facility-Administered Medications: None       ALLERGIES:  Allergies   Allergen Reactions    Aspirin Hives    Diflunisal Nausea and Vomiting     (Dolobid) Occurred approximately 20 years ago    Occurred approximately 20 years ago   Occurred approximately 20 years ago   (Dolobid) Occurred approximately 20 years ago    Fd&C Yellow #5 (Tartrazine) Nausea and Vomiting       FAMILY HISTORY:  Family History   Problem Relation Age of Onset    Heart Failure Mother     Heart Failure Father     Heart Failure Sister     Coronary Artery Disease Brother     Coronary Artery Disease Brother     Coronary Artery Disease Brother     Cerebrovascular Disease Sister        SOCIAL HISTORY:  Social History     Tobacco Use    Smoking status: Never    Smokeless tobacco: Never   Substance Use Topics    Alcohol use: Yes     Comment: Alcoholic Drinks/day: Occasional usage    Drug use: No        VITALS:    First Vitals:  Patient Vitals for the past 24 hrs:   BP Temp Temp src Pulse Resp SpO2   03/24/24 2317 -- -- -- 70 -- 97 %   03/24/24 2258 -- -- -- 67 -- 100 %   03/24/24 2257 -- -- -- 67 -- 97 %   03/24/24 2241 -- -- -- 69 -- 98 %   03/24/24 2236 -- -- -- 70 -- 97 %   03/24/24 2101 (!) 149/84 97.7  F (36.5  C) Temporal 74 22 95 %       Patient Vitals for the past 24 hrs:   BP Temp Temp src Pulse Resp SpO2   03/24/24 2317 -- --  -- 70 -- 97 %   03/24/24 2258 -- -- -- 67 -- 100 %   03/24/24 2257 -- -- -- 67 -- 97 %   03/24/24 2241 -- -- -- 69 -- 98 %   03/24/24 2236 -- -- -- 70 -- 97 %   03/24/24 2101 (!) 149/84 97.7  F (36.5  C) Temporal 74 22 95 %         PHYSICAL EXAM  VITAL SIGNS: BP (!) 149/84   Pulse 70   Temp 97.7  F (36.5  C) (Temporal)   Resp 22   SpO2 97%    GENERAL: Awake, alert, answering questions appropriately, in no acute distress.  HEENT: Moist mucous membranes. Posterior oropharynx clear with no erythema, no exudate. No tonsillar hypertrophy. Uvula midline. Tolerating secretions, no drooling.  Nasal congestion present. Tms pearly gray with no erythema,.  SPEECH:  Easy to understand speech, Normal volume and alexi. Normal phonation.  PULMONARY: No respiratory distress, Breathing comfortably on room air. Lungs with diffuse wheezing throughout, no rales or rhonchi.   CARDIOVASCULAR: Regular rate and rhythm, radial pulses present, symmetric, and normal.  CHEST: Reproducible bilateral lateral lower rib/chest wall tenderness, no crepitus, flail chest, or overlying skin changes.  ABDOMINAL: Soft, Nondistended, Nontender, No rebound or guarding, No palpable masses. Bowel sounds present.  BACK: No midline cervical, thoracic, or lumbar tenderness to palpation, no crepitus, step offs, or deformities.  EXTREMITIES: Extremities are warm and well perfused. Right shoulder tenderness elicited with pulling motion during strength testing, with tenderness to palpation right trapezius region. 5/5 strength, sensation, and ROM of right upper extremity. No bony tenderness to scapula, AC joint, clavicle, humeral head, olecranon process, or epicondyles. No increased warmth or swelling of the shoulder joint. No lower extremity edema.   NEUROLOGIC: Moving all extremities spontaneously.   SKIN: Exposed areas of skin warm, dry, no rashes.  PSYCH: Normal mood and affect.           RADIOLOGY/LAB:  Reviewed all pertinent imaging. Please see official  radiology report.  All pertinent labs reviewed and interpreted.  Results for orders placed or performed during the hospital encounter of 03/24/24   XR Chest 2 Views    Impression    IMPRESSION: Negative chest.   XR Shoulder Right 2 Views    Impression    IMPRESSION: Normal joint spaces and alignment. No fracture.         EKG:  None      PROCEDURES:  None        IBeronica, am serving as a scribe to document services personally performed by Cari Camara PA-C, based on my observation and the provider's statements to me. I, Cari Camara PA-C attest that Beronica Dill is acting in a scribe capacity, has observed my performance of the services and has documented them in accordance with my direction.         Cari Camara PA-C  Emergency Medicine  [facility] EMERGENCY DEPARTMENT  [address] [place] [id number]  [phone]  Dept: [phone]     Cari Camara PA-C  04/16/24 5624

## 2024-03-25 NOTE — ED TRIAGE NOTES
Patient reports right sided back pain has been seen recently and diagnosed with pneumonia states she feel she needs prednisone on top of her antibiotic states she tried a neb at home for wheezing.

## 2024-03-25 NOTE — TELEPHONE ENCOUNTER
Patient returned call. Results given and explained. Discussed recommendation for the PT as scheduled. She is instructed to return for a follow up with PSP should symptoms not improve with the PT or should they worsen/change. Stated understanding.     Patient wanted PSP to be aware that she was hospitalized/ER for pneumonia last night. Pain throughout back and up into chest with cough.

## 2024-03-25 NOTE — DISCHARGE INSTRUCTIONS
You were seen in the emergency department tonight for your symptoms.  Your x-rays were reassuring.  Please continue using your albuterol inhaler and nebulizer treatments at home as needed.  A prescription was provided for a course of steroid medications to take for your symptoms.  Please follow-up with your primary care physician within the next 3-5 days for close recheck.  Please return to the emergency department if you develop any new, worsening, or concerning symptoms.

## 2024-04-02 ENCOUNTER — APPOINTMENT (OUTPATIENT)
Dept: CT IMAGING | Facility: HOSPITAL | Age: 69
End: 2024-04-02
Payer: COMMERCIAL

## 2024-04-02 ENCOUNTER — THERAPY VISIT (OUTPATIENT)
Dept: PHYSICAL THERAPY | Facility: REHABILITATION | Age: 69
End: 2024-04-02
Attending: NURSE PRACTITIONER
Payer: COMMERCIAL

## 2024-04-02 ENCOUNTER — HOSPITAL ENCOUNTER (EMERGENCY)
Facility: HOSPITAL | Age: 69
Discharge: HOME | End: 2024-04-02
Payer: COMMERCIAL

## 2024-04-02 VITALS
DIASTOLIC BLOOD PRESSURE: 81 MMHG | WEIGHT: 138 LBS | RESPIRATION RATE: 16 BRPM | OXYGEN SATURATION: 97 % | HEART RATE: 84 BPM | HEIGHT: 64 IN | SYSTOLIC BLOOD PRESSURE: 110 MMHG | TEMPERATURE: 97.4 F | BODY MASS INDEX: 23.56 KG/M2

## 2024-04-02 DIAGNOSIS — M54.2 CHRONIC NECK PAIN: ICD-10-CM
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
## 2024-04-02 DIAGNOSIS — G89.29 CHRONIC BILATERAL LOW BACK PAIN WITH RIGHT-SIDED SCIATICA: ICD-10-CM

## 2024-04-02 DIAGNOSIS — S09.90XA INJURY OF HEAD, INITIAL ENCOUNTER: ICD-10-CM

## 2024-04-02 DIAGNOSIS — M51.369 DDD (DEGENERATIVE DISC DISEASE), LUMBAR: ICD-10-CM

## 2024-04-02 DIAGNOSIS — M54.41 CHRONIC BILATERAL LOW BACK PAIN WITH RIGHT-SIDED SCIATICA: ICD-10-CM

## 2024-04-02 DIAGNOSIS — M47.816 LUMBAR FACET ARTHROPATHY: ICD-10-CM

## 2024-04-02 DIAGNOSIS — M54.16 LUMBAR RADICULOPATHY: ICD-10-CM

## 2024-04-02 DIAGNOSIS — G89.29 CHRONIC NECK PAIN: ICD-10-CM
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
## 2024-04-02 DIAGNOSIS — M79.7 FIBROMYALGIA: ICD-10-CM

## 2024-04-02 PROBLEM — G25.0 BENIGN ESSENTIAL TREMOR: Status: ACTIVE | Noted: 2018-07-16

## 2024-04-02 PROBLEM — F10.11 ALCOHOL ABUSE, IN REMISSION: Status: ACTIVE | Noted: 2021-04-15

## 2024-04-02 PROBLEM — R06.02 SOBOE (SHORTNESS OF BREATH ON EXERTION): Status: ACTIVE | Noted: 2020-08-10

## 2024-04-02 PROBLEM — M54.12 CERVICAL RADICULAR PAIN: Status: ACTIVE | Noted: 2021-01-15

## 2024-04-02 PROBLEM — H81.10 BENIGN PAROXYSMAL POSITIONAL VERTIGO: Status: ACTIVE | Noted: 2020-01-14

## 2024-04-02 PROCEDURE — 99284 EMERGENCY DEPT VISIT MOD MDM: CPT | Mod: 25

## 2024-04-02 PROCEDURE — 97110 THERAPEUTIC EXERCISES: CPT | Mod: GP

## 2024-04-02 PROCEDURE — 97161 PT EVAL LOW COMPLEX 20 MIN: CPT | Mod: GP

## 2024-04-02 PROCEDURE — 70450 CT HEAD/BRAIN W/O DYE: CPT

## 2024-04-02 RX ORDER — OXYCODONE HYDROCHLORIDE 5 MG/1
TABLET ORAL
Qty: 8 TABLET | Refills: 0 | Status: ON HOLD | OUTPATIENT
Start: 2024-04-02 | End: 2024-04-17

## 2024-04-02 ASSESSMENT — ACTIVITIES OF DAILY LIVING (ADL): ADLS_ACUITY_SCORE: 35

## 2024-04-02 ASSESSMENT — COLUMBIA-SUICIDE SEVERITY RATING SCALE - C-SSRS
1. IN THE PAST MONTH, HAVE YOU WISHED YOU WERE DEAD OR WISHED YOU COULD GO TO SLEEP AND NOT WAKE UP?: NO
6. HAVE YOU EVER DONE ANYTHING, STARTED TO DO ANYTHING, OR PREPARED TO DO ANYTHING TO END YOUR LIFE?: NO
2. HAVE YOU ACTUALLY HAD ANY THOUGHTS OF KILLING YOURSELF IN THE PAST MONTH?: NO

## 2024-04-02 ASSESSMENT — VISUAL ACUITY: OU: 1

## 2024-04-02 NOTE — DISCHARGE INSTRUCTIONS
It was a pleasure taking care of you in the emergency department today. We saw you for a head injury. Imaging of your head was normal. Because of what we found today, we are reassured.     We recommend you take oxycodone for severe, breakthrough left wrist pain not otherwise managed by acetaminophen and ibuprofen.  Oxycodone is a strong narcotic pain medication that can cause drowsiness.  Please do not drive or operate heavy machinery while taking this medication.    In addition, we recommend that you follow up with [facility] for further work up and management of your fracture.    Lastly, while we are reassured by what we are seeing in the emergency department today, please understand that we only see you at one moment in time. Because of this, please understand that if your symptoms worsen, change or you develop new symptoms like worsening pain, and also concerns, or any other symptoms, please return to the emergency department for further evaluation.    Call 911 anytime you think you may need emergency care. For example, call if:    You have a seizure.     You passed out (lost consciousness).     You are confused or can't stay awake.     You have a headache that gets worse and does not go away.     You have new vision changes or one pupil (the black part in the middle of the eye) that is larger than the other.     You have slurred speech, balance problems, or decreased coordination.   Call your doctor now or seek immediate medical care if:    You have new or worse vomiting.     You feel less alert.     You have new weakness or numbness in any part of your body.     You have new symptoms, such as unclear thinking or changes in mood.   Watch closely for changes in your health, and be sure to contact your doctor if:    You do not get better as expected.     We are always happy to help you out here at [facility] EMERGENCY DEPARTMENT. Take care.

## 2024-04-02 NOTE — ED TRIAGE NOTES
Patient presents here for evaluation of her left fore arm related to a fall that occurred yesterday. She was seen at [facility] and was found to have a fracture. The arm was splinted. She is asking for a cast. She also notes that the provider at [facility] had advised her to come here yesterday for evaluation of a head injury. She did not come here because she was in too much pain with her arm and hand.      Triage Assessment (Adult)       Row Name 04/02/24 0911          Triage Assessment    Airway WDL WDL        Respiratory WDL    Respiratory WDL WDL        Skin Circulation/Temperature WDL    Skin Circulation/Temperature WDL WDL        Cardiac WDL    Cardiac WDL WDL        Peripheral/Neurovascular WDL    Peripheral Neurovascular WDL WDL        Cognitive/Neuro/Behavioral WDL    Cognitive/Neuro/Behavioral WDL WDL

## 2024-04-02 NOTE — ED PROVIDER NOTES
Emergency Department Encounter  [facility] EMERGENCY DEPARTMENT  Odette Escobar   AGE: 68 year old SEX: female  YOB: 1955  MRN: [id number]      EVALUATION DATE & TIME: No admission date for patient encounter. ; PCP: Allie Johnson   ED PROVIDER: Clementina Powers PA-C    CHIEF COMPLAINT: Head Injury      MEDICAL DECISION MAKING   Odette Escobar is a 68 year old female with a history of type 2 diabetes, hypertension, chronic neck pain, lumbar radiculopathy, degenerative disc disease lumbar spine, and hyperlipidemia who presents to the ED for evaluation of head injury sustained from the fall yesterday.  Patient recently diagnosed with a left wrist fracture and splinted by [facility] yesterday.  No blood thinners.  No LOC, vomiting, headache, or seizures after injury.    Vitals reviewed and hemodynamically stable, afebrile. On exam, patient is in no acute distress without any signs or symptoms of serious injury. A&Ox4. Head atraumatic without signs of basilar skull fracture. No focal neurological deficits. Lungs CTAB, breath sounds heard throughout. Abdomen non tender. No midline spinal tenderness. Patient able to actively rotate neck 45  left and right. GCS 15.  Left upper extremity with splint in place, distally neurovascularly intact.    CT head imaging indicated based on [place] CT Head Injury/Trauma Rule and reassuring. Given history, exam, and workup, I have low suspicion for intracranial hemorrhage or trauma,  intra abdominal trauma, pneumothorax, hollow viscus injury, stroke/TIA, thoracic aortic dissection, cardiac tamponade, and additional extremity fracture or dislocation.    Explained to the patient that they will likely be sore for the coming days and can use tylenol/ibuprofen to control the pain.  I provided a limited prescription of oxycodone for fracture left wrist after reviewing PDMP which shows no recent oxycodone fills.  I explained to patient that  losing these prescriptions is not a good idea as these are heavily controlled substances.  Plan to discharge home with symptomatic care and prompt primary care follow up.  Patient was provided with clear, written instructions of potential danger signs and where and when to return for emergency care or re-evaluation.    Medical Decision Making  Obtained supplemental history:Supplemental history obtained?: Documented in chart and Family Member/Significant Other  Reviewed external records: External records reviewed?: Documented in chart  Care impacted by chronic illness:Chronic Pain, Diabetes, Hyperlipidemia, and Hypertension  Care significantly affected by social determinants of health:N/A  Did you consider but not order tests?: Work up considered but not performed and documented in chart, if applicable  Did you interpret images independently?: Independent interpretation of ECG and images noted in documentation, when applicable.  Consultation discussion with other provider:Did you involve another provider (consultant, MH, pharmacy, etc.)?: No  Discharge. I prescribed additional prescription strength medication(s) as charted. See documentation for any additional details.    FINAL IMPRESSION       ICD-10-CM    1. Fall at home, initial encounter  W19.XXXA     Y92.009       2. Injury of head, initial encounter  S09.90XA           ED COURSE   9:19 AM I met and introduced myself to the patient. I gathered initial history and performed my physical exam. We discussed plan for initial workup.   10:37 AM Patient HR noted to be 151 at 10am. Inquired RN about reading. RN reports this reading is a mistake.  10:50 AM I rechecked the patient and discussed results, discharge, follow up, and reasons to return to the ED.     MEDICATIONS GIVEN IN THE EMERGENCY DEPARTMENT:   Medications - No data to display   NEW PRESCRIPTIONS STARTED AT TODAY'S ED VISIT:  Discharge Medication List as of 4/2/2024 10:52 AM        START taking these  "medications    Details   oxyCODONE (ROXICODONE) 5 MG tablet Take 1 tablet by mouth every 6 hours as needed for severe pain or breakthrough pain not otherwise improved by acetaminophen and/or ibuprofen., Disp-8 tablet, R-0, E-Prescribe                 =================================================================         HISTORY OF PRESENT ILLNESS   Patient information was obtained from: patient   Use of Intrepreter: N/A     Odette Escobar is a 68 year old female with a history of type 2 diabetes, hypertension, chronic neck pain, lumbar radiculopathy, degenerative disc disease lumbar spine, and hyperlipidemia who presents to the ED by private vehicle for evaluation of head injury.  Patient reports that yesterday she was bending over the side of the couch and her  \"startled\" her while yelling at the cat and she fell forward off the couch hitting her head on the carpeted ground.  He had left arm pain following this event and was seen by [facility] and diagnosed with a fracture in her left wrist was placed in a splint.  Patient denies headache but continues to have severe pain in her left arm.  Per patient, she was given a prescription for oxycodone, 10 tabs, but her  lost this prescription.    Patient not on blood thinners. Since fall, there has been no vomiting, LOC, or seizure-like activity.    Per chart review,  04/01/2024 - Family medicine phone call note reviewed.  Patient called family medicine clinic and notified of fall, x-ray, and broken wrist.  PDMP reviewed:   Belsomra 10 Mg Tablet (30) filled 03/19/2024  Gabapentin 300 Mg Capsule (180) filled 03/07/2024  No high risk scripts  30 day average MME/day - 3.00.    No past medical history on file.    Past Surgical History:   Procedure Laterality Date    ENDOMETRIAL ABLATION         Family History   Problem Relation Age of Onset    Heart Failure Mother     Heart Failure Father     Heart Failure Sister     Coronary Artery " "Disease Brother     Coronary Artery Disease Brother     Coronary Artery Disease Brother     Cerebrovascular Disease Sister        Social History     Tobacco Use    Smoking status: Never    Smokeless tobacco: Never   Substance Use Topics    Alcohol use: Yes     Comment: Alcoholic Drinks/day: Occasional usage    Drug use: No       oxyCODONE (ROXICODONE) 5 MG tablet  acetaminophen (TYLENOL) 500 MG tablet  albuterol (PROVENTIL) (2.5 MG/3ML) 0.083% neb solution  CANNABIDIOL, CBD, EXTRACT ORAL  famotidine (PEPCID) 20 MG tablet  fluticasone (FLOVENT HFA) 110 mcg/actuation inhaler  gabapentin (NEURONTIN) 300 MG capsule  loperamide (IMODIUM) 2 mg capsule  magnesium oxide 400 mg cap  metFORMIN (GLUCOPHAGE) 500 MG tablet  omeprazole (PRILOSEC) 20 MG capsule  ondansetron (ZOFRAN) 4 MG tablet  predniSONE (DELTASONE) 20 MG tablet  propranolol (INDERAL LA) 60 mg 24 hr capsule  sertraline (ZOLOFT) 50 MG tablet  sucralfate (CARAFATE) 1 gram tablet  TRAZODONE HCL (TRAZODONE ORAL)        PHYSICAL EXAM   VITALS:  Patient Vitals for the past 24 hrs:   BP Temp Temp src Pulse Resp SpO2 Height Weight   04/02/24 1045 -- -- -- 84 -- 97 % -- --   04/02/24 1039 -- -- -- 73 -- 96 % -- --   04/02/24 1000 110/81 -- -- -- -- 90 % -- --   04/02/24 0945 121/58 -- -- 76 -- 97 % -- --   04/02/24 0908 125/63 97.4  F (36.3  C) Oral 85 16 99 % 1.626 m (5' 4\") 62.6 kg (138 lb)       Physical Exam  Vitals and nursing note reviewed.   Constitutional:       General: She is awake. She is not in acute distress.     Appearance: She is not ill-appearing, toxic-appearing or diaphoretic.   HENT:      Head: Normocephalic and atraumatic. No raccoon eyes, Thomas's sign, abrasion, contusion, right periorbital erythema, left periorbital erythema or laceration.      Right Ear: Hearing normal. Tympanic membrane is not perforated or erythematous.      Left Ear: Hearing normal. Tympanic membrane is not perforated or erythematous.      Nose: No nasal deformity or nasal " tenderness.      Mouth/Throat:      Mouth: No injury.   Eyes:      General: Vision grossly intact. Gaze aligned appropriately.      Pupils: Pupils are equal, round, and reactive to light.   Cardiovascular:      Rate and Rhythm: Normal rate.      Heart sounds: S1 normal and S2 normal. Heart sounds not distant.      No friction rub.   Pulmonary:      Effort: No accessory muscle usage or respiratory distress.      Breath sounds: No decreased breath sounds.   Abdominal:      General: Abdomen is flat. There is no distension.      Palpations: Abdomen is soft. There is no mass.      Tenderness: There is no abdominal tenderness.   Musculoskeletal:      Left wrist: Normal pulse.      Left hand: Normal sensation.      Cervical back: No rigidity. No pain with movement or spinous process tenderness. Normal range of motion.      Thoracic back: No deformity or bony tenderness.      Lumbar back: No deformity or bony tenderness.      Comments: Left forearm and wrist in splint. Neurovascularly intact distally.   Skin:     General: Skin is warm.      Capillary Refill: Capillary refill takes less than 2 seconds.      Findings: No abrasion, bruising, ecchymosis or laceration.   Neurological:      General: No focal deficit present.      Mental Status: She is alert and oriented to person, place, and time. Mental status is at baseline.      GCS: GCS eye subscore is 4. GCS verbal subscore is 5. GCS motor subscore is 6.      Motor: No pronator drift.      Coordination: Romberg sign negative. Finger-Nose-Finger Test normal.          LABS AND IMAGING   All pertinent labs reviewed and interpreted  Labs Ordered and Resulted from Time of ED Arrival to Time of ED Departure - No data to display    Head CT w/o contrast   Final Result   IMPRESSION:   1.  No acute intracranial process.          Clementina Powers PA-C   Emergency Medicine   [facility] EMERGENCY DEPARTMENT  [address] [place] [address]  [id number]  [phone]  Dept: [phone]      Clementina Powers PA-C  04/02/24 1102

## 2024-04-02 NOTE — PROGRESS NOTES
PHYSICAL THERAPY EVALUATION  Type of Visit: Evaluation    See electronic medical record for Abuse and Falls Screening details.    Subjective       Presenting condition or subjective complaint: Fall  Date of onset: 03/11/24 (Referral date)    Relevant medical history: Anemia; Arthritis; Asthma; Bladder or bowel problems; Cancer; Chest pain; Cold or hot arm or leg; Depression; Diabetes; Dizziness; Fibromyalgia; High blood pressure; History of fractures; Migraines or headaches; Neck injury; Numbness or tingling in perianal area; Pain at night or rest; Severe dizziness; Severe headaches; Sleep disorder like apnea; Vision problems   Dates & types of surgery:      Prior diagnostic imaging/testing results: MRI; CT scan; X-ray     Prior therapy history for the same diagnosis, illness or injury: Yes      Pt is a 68 year old female presenting with complaints of low back pain. In February, she fell - the following morning, she started to have pain into her low back and hips. Pain has increased into her back after a fall yesterday - her significant other startled her and she turned and fell. Pain has also progressed to her neck. The pain goes down her RLE.    The symptoms started February 15 and was staying about the same until yesterday when she fell - she ended up breaking her left hand - being seen at [facility].    Pt describes the pain as intermittently sharp, otherwise more achy and rates it a 10/10 at its worst.     Aggravating Factors: sitting, standing up from sitting position, car transfers, stairs, bending over    Alleviating Factors: ice    Prior treatments: PT a long time ago for her back    Current level of function: painful to bowl; has to take daily chores slower, difficulty loading/unloading      Sleep Quality: pain interrupts sleep - difficulty falling asleep due to pain (also has sleep apnea, which plays a role)    Red Flags: Pt denies legs just giving way, denies changes in bowel and bladder symptoms  since pain onset, denies changes in sensation in saddle area    Pt goals: decrease pain    PMH: hyperlipidemia, GERD, asthma, HTN, diabetes mellitus, anxiety, history of vertigo, SOB, alcohol abuse (in remission)    DAGOBERTO (3/21/24): IMPRESSION:  1.  Central extrusion at L1-L2.  2.  Other degenerative change as detailed.  3.  No high-grade stenosis      Prior Level of Function  Transfers: Independent  Ambulation: Independent  ADL: Independent    Living Environment  Social support: With a significant other or spouse   Type of home: Apartment/condo; 2-story   Stairs to enter the home: Yes 15 Is there a railing: Yes   Ramp: No   Stairs inside the home: Yes 15 Is there a railing: Yes   Help at home: None  Equipment owned:       Employment: No    Hobbies/Interests: bowling, cards    Patient goals for therapy:       Objective   LUMBAR:    Gait: decreased alexi; slight shuffling pattern    Motor Control:   -TA Activation: able to perform upon mod verbal, tactile and visual cueing    Functional Screen:   -Sit to stand: 1UE assist   -Squat: dynamic knee valgus  -SLS: ~10 sec/side    ROM (* Denotes Pain):  -Flexion: hands to mid-shin  -Extension: 50% limited  -L SB: hand to knee*  -R SB: hand to knee*    Neuro Screen:   Myotomes/Strength (* Denotes Pain):   Myotomes L R   L1-2 (hip flexion) 4/5 4+/5   L3 (knee extension) 4+/5 4+/5   L4 (ankle DF) 4/5 4/5   L5 (g. toe ext) 4/5 4/5   S1 (ankle PF or knee flex) 4/5 (knee flex) 4/5 (knee flex)     Dermatomes: not formally assessed today but pt does report decreased sensation - had prior neuropathy   Babinski: - B    Special Tests:   L R   Slump     SLR -90 -70   Long Axis Traction      FADIR  - (but pain into thoracic region) - (but pain into thoracic region)   PATRICIA     Scour - +   Hamstring 90/90     Piriformis Test     Posterior Capsule Compression         LE Relevant Findings:   -Strength: hip ABD not formally assessed due to inability to lie on left side but performed in  supine hooklyin/5 B  -ROM: hip flex (90/90), IR and ER WFL     Joint Mobility:  -PA spring: unable to formally assess due to inability to lie on stomach - likely decreased mobility    Palpation: TTP thoracic spine and lumbar spine; TTP SIJ    SIJ Screen (Laslett's Cluster) (+/-):   -Distraction: +  -Sidelying Compression: NT  -Thigh Thrust: + on R  -Sacral Thrust: NT    Neck not assessed today - focused on LBP due to limited time and back being a bigger issue today compared to neck. Difficulty keeping pt on task - pt tends to talk about things outside her back pain. Extra time taken to discuss safety screening with pt to ensure pt felt safe at home.     Assessment & Plan   CLINICAL IMPRESSIONS  Medical Diagnosis: Chronic bilateral low back pain with right-sided sciatica; Lumbar radiculopathy; Lumbar facet arthropathy; DDD (degenerative disc disease), lumbar; Fibromyalgia; Chronic neck pain    Treatment Diagnosis: Low back pain; thoracic pain; neck pain   Impression/Assessment: Patient is a 68 year old female with low back pain complaints.  The following significant findings have been identified: Pain, Decreased ROM/flexibility, Decreased joint mobility, Decreased strength, Impaired balance, Impaired gait, Impaired muscle performance, and Decreased activity tolerance. These impairments interfere with their ability to perform self care tasks, work tasks, recreational activities, household chores, driving , household mobility, and community mobility as compared to previous level of function.     Clinical Decision Making (Complexity):  Clinical Presentation: Stable/Uncomplicated  Clinical Presentation Rationale: based on medical and personal factors listed in PT evaluation  Clinical Decision Making (Complexity): Low complexity    PLAN OF CARE  Treatment Interventions:  Modalities: Cryotherapy, Hot Pack, Ultrasound  Interventions: Gait Training, Manual Therapy, Neuromuscular Re-education, Therapeutic Activity,  Therapeutic Exercise, Self-Care/Home Management    Long Term Goals     PT Goal 1  Goal Identifier: Lifting  Goal Description: Pt will be able to lift at leat 10 pounds from floor with proper form and a pain rating of no more than 2/10  Rationale: to maximize safety and independence with performance of ADLs and functional tasks;to maximize safety and independence with self cares  Target Date: 07/23/24  PT Goal 2  Goal Identifier: Sleep  Goal Description: Pt will have 5/7 days of the week where sleep is not limited due to pain  Rationale: to maximize safety and independence with self cares  Target Date: 06/25/24  PT Goal 3  Goal Identifier: Transfers  Goal Description: Pt will be able to transfer in/out of a chair and the car with a pain rating of no more than 2/10  Rationale: to maximize safety and independence with performance of ADLs and functional tasks;to maximize safety and independence within the community;to maximize safety and independence with self cares  Target Date: 07/23/24      Frequency of Treatment: 1x/week, decreasing to every other  Duration of Treatment: 16 weeks    Recommended Referrals to Other Professionals:  none  Education Assessment:   Learner/Method: Patient    Risks and benefits of evaluation/treatment have been explained.   Patient/Family/caregiver agrees with Plan of Care.     Evaluation Time:           Signing Clinician: Brigette Arce PT      [facility]                                                                                   OUTPATIENT PHYSICAL THERAPY      PLAN OF TREATMENT FOR OUTPATIENT REHABILITATION   Patient's Last Name, First Name, Odette Giraldo YOB: 1955   Provider's Name   [facility]   Medical Record No.  [id number]     Onset Date: 03/11/24 (Referral date)  Start of Care Date: 04/02/24     Medical Diagnosis:  Chronic bilateral low back pain with right-sided sciatica; Lumbar  radiculopathy; Lumbar facet arthropathy; DDD (degenerative disc disease), lumbar; Fibromyalgia; Chronic neck pain      PT Treatment Diagnosis:  Low back pain; thoracic pain; neck pain Plan of Treatment  Frequency/Duration: 1x/week, decreasing to every other/ 16 weeks    Certification date from 04/02/24 to 06/30/24         See note for plan of treatment details and functional goals     Brigette Arce, PT                         I CERTIFY THE NEED FOR THESE SERVICES FURNISHED UNDER        THIS PLAN OF TREATMENT AND WHILE UNDER MY CARE     (Physician attestation of this document indicates review and certification of the therapy plan).              Referring Provider:  Olive Ghosh    Initial Assessment  See Epic Evaluation- Start of Care Date: 04/02/24

## 2024-04-09 ENCOUNTER — TELEPHONE (OUTPATIENT)
Dept: PHYSICAL THERAPY | Facility: REHABILITATION | Age: 69
End: 2024-04-09

## 2024-04-09 NOTE — TELEPHONE ENCOUNTER
Attempted to leave VM for patient regarding missed visit today. Voicemail box is full so unable to leave message for patient. [facility] will plan to continue to call patient today in order to confirm next scheduled appointment.  Krystal Garibay, PT, DPT, MHA  4/9/2024

## 2024-04-14 ENCOUNTER — APPOINTMENT (OUTPATIENT)
Dept: CT IMAGING | Facility: HOSPITAL | Age: 69
End: 2024-04-14
Attending: EMERGENCY MEDICINE
Payer: COMMERCIAL

## 2024-04-14 ENCOUNTER — HOSPITAL ENCOUNTER (OUTPATIENT)
Facility: HOSPITAL | Age: 69
Setting detail: OBSERVATION
Discharge: HOME | End: 2024-04-17
Attending: EMERGENCY MEDICINE | Admitting: INTERNAL MEDICINE
Payer: COMMERCIAL

## 2024-04-14 ENCOUNTER — APPOINTMENT (OUTPATIENT)
Dept: MRI IMAGING | Facility: HOSPITAL | Age: 69
End: 2024-04-14
Attending: EMERGENCY MEDICINE
Payer: COMMERCIAL

## 2024-04-14 ENCOUNTER — ANESTHESIA EVENT (OUTPATIENT)
Dept: SURGERY | Facility: HOSPITAL | Age: 69
End: 2024-04-14
Payer: COMMERCIAL

## 2024-04-14 ENCOUNTER — APPOINTMENT (OUTPATIENT)
Dept: ULTRASOUND IMAGING | Facility: HOSPITAL | Age: 69
End: 2024-04-14
Attending: STUDENT IN AN ORGANIZED HEALTH CARE EDUCATION/TRAINING PROGRAM
Payer: COMMERCIAL

## 2024-04-14 ENCOUNTER — ANESTHESIA (OUTPATIENT)
Dept: SURGERY | Facility: HOSPITAL | Age: 69
End: 2024-04-14
Payer: COMMERCIAL

## 2024-04-14 DIAGNOSIS — K74.00 HEPATIC FIBROSIS: ICD-10-CM

## 2024-04-14 DIAGNOSIS — K80.20 SYMPTOMATIC CHOLELITHIASIS: ICD-10-CM

## 2024-04-14 DIAGNOSIS — R10.11 RIGHT UPPER QUADRANT ABDOMINAL PAIN: ICD-10-CM

## 2024-04-14 DIAGNOSIS — R79.89 ELEVATED LFTS: ICD-10-CM

## 2024-04-14 DIAGNOSIS — K81.0 ACUTE CHOLECYSTITIS: Primary | ICD-10-CM

## 2024-04-14 DIAGNOSIS — I10 ESSENTIAL HYPERTENSION: ICD-10-CM

## 2024-04-14 DIAGNOSIS — K76.6 PORTAL HYPERTENSION (H): ICD-10-CM

## 2024-04-14 DIAGNOSIS — K83.8 DILATION OF COMMON BILE DUCT: ICD-10-CM

## 2024-04-14 PROBLEM — Z88.8 ALLERGIC TO ASPIRIN: Status: ACTIVE | Noted: 2019-05-22

## 2024-04-14 PROBLEM — Z87.19 HISTORY OF DIVERTICULITIS: Status: ACTIVE | Noted: 2019-01-13

## 2024-04-14 PROBLEM — N81.11 MIDLINE CYSTOCELE: Status: ACTIVE | Noted: 2019-02-28

## 2024-04-14 PROBLEM — M70.61 TROCHANTERIC BURSITIS OF BOTH HIPS: Status: ACTIVE | Noted: 2019-11-17

## 2024-04-14 PROBLEM — G44.52 NEW DAILY PERSISTENT HEADACHE: Status: ACTIVE | Noted: 2023-05-15

## 2024-04-14 PROBLEM — M22.2X1 PATELLOFEMORAL PAIN SYNDROME OF RIGHT KNEE: Status: ACTIVE | Noted: 2020-02-19

## 2024-04-14 PROBLEM — K22.2 ESOPHAGEAL STRICTURE: Status: ACTIVE | Noted: 2017-11-02

## 2024-04-14 PROBLEM — M75.100: Status: ACTIVE | Noted: 2017-05-05

## 2024-04-14 PROBLEM — B35.1 ONYCHOMYCOSIS: Status: ACTIVE | Noted: 2018-09-06

## 2024-04-14 PROBLEM — I89.1 LYMPHANGITIS: Status: ACTIVE | Noted: 2018-05-24

## 2024-04-14 PROBLEM — L30.9 HAND DERMATITIS: Status: ACTIVE | Noted: 2019-11-17
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
## 2024-04-14 PROBLEM — Z87.898 HISTORY OF SYNCOPE: Status: ACTIVE | Noted: 2018-03-07

## 2024-04-14 PROBLEM — D50.9 IDA (IRON DEFICIENCY ANEMIA): Status: ACTIVE | Noted: 2022-10-06

## 2024-04-14 PROBLEM — R20.0 NUMBNESS AND TINGLING IN LEFT HAND: Status: ACTIVE | Noted: 2022-03-07

## 2024-04-14 PROBLEM — E53.8 LOW FOLIC ACID: Status: ACTIVE | Noted: 2022-10-06

## 2024-04-14 PROBLEM — M17.0 LOCALIZED OSTEOARTHRITIS OF KNEES, BILATERAL: Status: ACTIVE | Noted: 2020-02-19

## 2024-04-14 PROBLEM — N95.2 POSTMENOPAUSAL ATROPHIC VAGINITIS: Status: ACTIVE | Noted: 2023-02-13

## 2024-04-14 PROBLEM — M70.62 TROCHANTERIC BURSITIS OF BOTH HIPS: Status: ACTIVE | Noted: 2019-11-17

## 2024-04-14 PROBLEM — M19.049 ARTHRITIS OF FINGER: Status: ACTIVE | Noted: 2019-11-17

## 2024-04-14 PROBLEM — K21.9 GASTROESOPHAGEAL REFLUX DISEASE WITHOUT ESOPHAGITIS: Status: ACTIVE | Noted: 2017-11-02

## 2024-04-14 PROBLEM — Z72.820 LACK OF ADEQUATE SLEEP: Status: ACTIVE | Noted: 2018-04-10

## 2024-04-14 PROBLEM — L21.9 SEBORRHEIC DERMATITIS OF SCALP: Status: ACTIVE | Noted: 2018-03-07

## 2024-04-14 PROBLEM — E61.1 IRON DEFICIENCY: Status: ACTIVE | Noted: 2024-01-10

## 2024-04-14 PROBLEM — R20.2 NUMBNESS AND TINGLING IN LEFT HAND: Status: ACTIVE | Noted: 2022-03-07

## 2024-04-14 PROBLEM — S61.451A CAT BITE OF RIGHT HAND: Status: ACTIVE | Noted: 2018-05-24

## 2024-04-14 LAB
ALBUMIN SERPL BCG-MCNC: 4 G/DL (ref 3.5–5.2)
ALP SERPL-CCNC: 156 U/L (ref 40–150)
ALT SERPL W P-5'-P-CCNC: 254 U/L (ref 0–50)
ANION GAP SERPL CALCULATED.3IONS-SCNC: 11 MMOL/L (ref 7–15)
APAP SERPL-MCNC: <5 UG/ML (ref 10–30)
AST SERPL W P-5'-P-CCNC: 499 U/L (ref 0–45)
BASOPHILS # BLD AUTO: 0 10E3/UL (ref 0–0.2)
BASOPHILS NFR BLD AUTO: 0 %
BILIRUB SERPL-MCNC: 1 MG/DL
BUN SERPL-MCNC: 11.4 MG/DL (ref 8–23)
CALCIUM SERPL-MCNC: 9.4 MG/DL (ref 8.8–10.2)
CHLORIDE SERPL-SCNC: 105 MMOL/L (ref 98–107)
CREAT SERPL-MCNC: 0.62 MG/DL (ref 0.51–0.95)
DEPRECATED HCO3 PLAS-SCNC: 25 MMOL/L (ref 22–29)
EGFRCR SERPLBLD CKD-EPI 2021: >90 ML/MIN/1.73M2
EOSINOPHIL # BLD AUTO: 0 10E3/UL (ref 0–0.7)
EOSINOPHIL NFR BLD AUTO: 0 %
ERYTHROCYTE [DISTWIDTH] IN BLOOD BY AUTOMATED COUNT: 13.3 % (ref 10–15)
ETHANOL SERPL-MCNC: <0.01 G/DL
GLUCOSE BLDC GLUCOMTR-MCNC: 172 MG/DL (ref 70–99)
GLUCOSE BLDC GLUCOMTR-MCNC: 175 MG/DL (ref 70–99)
GLUCOSE BLDC GLUCOMTR-MCNC: 178 MG/DL (ref 70–99)
GLUCOSE SERPL-MCNC: 207 MG/DL (ref 70–99)
HBA1C MFR BLD: 5.4 %
HCT VFR BLD AUTO: 42 % (ref 35–47)
HGB BLD-MCNC: 13.9 G/DL (ref 11.7–15.7)
IMM GRANULOCYTES # BLD: 0 10E3/UL
IMM GRANULOCYTES NFR BLD: 0 %
LIPASE SERPL-CCNC: 43 U/L (ref 13–60)
LYMPHOCYTES # BLD AUTO: 1.6 10E3/UL (ref 0.8–5.3)
LYMPHOCYTES NFR BLD AUTO: 18 %
MCH RBC QN AUTO: 30.2 PG (ref 26.5–33)
MCHC RBC AUTO-ENTMCNC: 33.1 G/DL (ref 31.5–36.5)
MCV RBC AUTO: 91 FL (ref 78–100)
MONOCYTES # BLD AUTO: 0.6 10E3/UL (ref 0–1.3)
MONOCYTES NFR BLD AUTO: 7 %
NEUTROPHILS # BLD AUTO: 6.6 10E3/UL (ref 1.6–8.3)
NEUTROPHILS NFR BLD AUTO: 75 %
NRBC # BLD AUTO: 0 10E3/UL
NRBC BLD AUTO-RTO: 0 /100
PLATELET # BLD AUTO: 184 10E3/UL (ref 150–450)
PLATELET # BLD AUTO: 217 10E3/UL (ref 150–450)
POTASSIUM SERPL-SCNC: 3.4 MMOL/L (ref 3.4–5.3)
PROT SERPL-MCNC: 6.9 G/DL (ref 6.4–8.3)
RBC # BLD AUTO: 4.6 10E6/UL (ref 3.8–5.2)
SODIUM SERPL-SCNC: 141 MMOL/L (ref 135–145)
TROPONIN T SERPL HS-MCNC: 6 NG/L
WBC # BLD AUTO: 8.8 10E3/UL (ref 4–11)

## 2024-04-14 PROCEDURE — 36415 COLL VENOUS BLD VENIPUNCTURE: CPT | Performed by: STUDENT IN AN ORGANIZED HEALTH CARE EDUCATION/TRAINING PROGRAM

## 2024-04-14 PROCEDURE — 99204 OFFICE O/P NEW MOD 45 MIN: CPT | Mod: 57 | Performed by: SURGERY

## 2024-04-14 PROCEDURE — 47562 LAPAROSCOPIC CHOLECYSTECTOMY: CPT | Mod: AS

## 2024-04-14 PROCEDURE — 250N000009 HC RX 250: Performed by: SURGERY

## 2024-04-14 PROCEDURE — 258N000003 HC RX IP 258 OP 636: Performed by: STUDENT IN AN ORGANIZED HEALTH CARE EDUCATION/TRAINING PROGRAM

## 2024-04-14 PROCEDURE — 258N000003 HC RX IP 258 OP 636: Performed by: ANESTHESIOLOGY

## 2024-04-14 PROCEDURE — 85049 AUTOMATED PLATELET COUNT: CPT | Performed by: SURGERY

## 2024-04-14 PROCEDURE — 710N000009 HC RECOVERY PHASE 1, LEVEL 1, PER MIN: Performed by: SURGERY

## 2024-04-14 PROCEDURE — 76705 ECHO EXAM OF ABDOMEN: CPT

## 2024-04-14 PROCEDURE — 250N000025 HC SEVOFLURANE, PER MIN: Performed by: SURGERY

## 2024-04-14 PROCEDURE — 96375 TX/PRO/DX INJ NEW DRUG ADDON: CPT

## 2024-04-14 PROCEDURE — 96361 HYDRATE IV INFUSION ADD-ON: CPT

## 2024-04-14 PROCEDURE — G0378 HOSPITAL OBSERVATION PER HR: HCPCS

## 2024-04-14 PROCEDURE — A9585 GADOBUTROL INJECTION: HCPCS | Performed by: INTERNAL MEDICINE

## 2024-04-14 PROCEDURE — 83690 ASSAY OF LIPASE: CPT | Performed by: STUDENT IN AN ORGANIZED HEALTH CARE EDUCATION/TRAINING PROGRAM

## 2024-04-14 PROCEDURE — 88304 TISSUE EXAM BY PATHOLOGIST: CPT | Mod: TC | Performed by: SURGERY

## 2024-04-14 PROCEDURE — 250N000011 HC RX IP 250 OP 636: Performed by: STUDENT IN AN ORGANIZED HEALTH CARE EDUCATION/TRAINING PROGRAM

## 2024-04-14 PROCEDURE — 99222 1ST HOSP IP/OBS MODERATE 55: CPT | Mod: FS | Performed by: INTERNAL MEDICINE

## 2024-04-14 PROCEDURE — 47562 LAPAROSCOPIC CHOLECYSTECTOMY: CPT | Performed by: SURGERY

## 2024-04-14 PROCEDURE — 255N000002 HC RX 255 OP 636: Performed by: INTERNAL MEDICINE

## 2024-04-14 PROCEDURE — S2900 ROBOTIC SURGICAL SYSTEM: HCPCS | Performed by: SURGERY

## 2024-04-14 PROCEDURE — 82077 ASSAY SPEC XCP UR&BREATH IA: CPT | Performed by: STUDENT IN AN ORGANIZED HEALTH CARE EDUCATION/TRAINING PROGRAM

## 2024-04-14 PROCEDURE — 250N000009 HC RX 250: Performed by: ANESTHESIOLOGY

## 2024-04-14 PROCEDURE — 99285 EMERGENCY DEPT VISIT HI MDM: CPT | Mod: 25

## 2024-04-14 PROCEDURE — 85004 AUTOMATED DIFF WBC COUNT: CPT | Performed by: STUDENT IN AN ORGANIZED HEALTH CARE EDUCATION/TRAINING PROGRAM

## 2024-04-14 PROCEDURE — 250N000013 HC RX MED GY IP 250 OP 250 PS 637: Performed by: SURGERY

## 2024-04-14 PROCEDURE — 99223 1ST HOSP IP/OBS HIGH 75: CPT | Mod: 57

## 2024-04-14 PROCEDURE — 272N000001 HC OR GENERAL SUPPLY STERILE: Performed by: SURGERY

## 2024-04-14 PROCEDURE — 360N000080 HC SURGERY LEVEL 7, PER MIN: Performed by: SURGERY

## 2024-04-14 PROCEDURE — 93005 ELECTROCARDIOGRAM TRACING: CPT | Mod: 59 | Performed by: STUDENT IN AN ORGANIZED HEALTH CARE EDUCATION/TRAINING PROGRAM

## 2024-04-14 PROCEDURE — 370N000017 HC ANESTHESIA TECHNICAL FEE, PER MIN: Performed by: SURGERY

## 2024-04-14 PROCEDURE — 250N000011 HC RX IP 250 OP 636: Performed by: SURGERY

## 2024-04-14 PROCEDURE — 36415 COLL VENOUS BLD VENIPUNCTURE: CPT | Performed by: SURGERY

## 2024-04-14 PROCEDURE — 84484 ASSAY OF TROPONIN QUANT: CPT | Performed by: STUDENT IN AN ORGANIZED HEALTH CARE EDUCATION/TRAINING PROGRAM

## 2024-04-14 PROCEDURE — 82962 GLUCOSE BLOOD TEST: CPT

## 2024-04-14 PROCEDURE — 250N000011 HC RX IP 250 OP 636: Performed by: ANESTHESIOLOGY

## 2024-04-14 PROCEDURE — 250N000011 HC RX IP 250 OP 636: Performed by: EMERGENCY MEDICINE

## 2024-04-14 PROCEDURE — 250N000013 HC RX MED GY IP 250 OP 250 PS 637: Performed by: INTERNAL MEDICINE

## 2024-04-14 PROCEDURE — 80143 DRUG ASSAY ACETAMINOPHEN: CPT | Performed by: EMERGENCY MEDICINE

## 2024-04-14 PROCEDURE — 999N000141 HC STATISTIC PRE-PROCEDURE NURSING ASSESSMENT: Performed by: SURGERY

## 2024-04-14 PROCEDURE — 258N000003 HC RX IP 258 OP 636: Performed by: INTERNAL MEDICINE

## 2024-04-14 PROCEDURE — 96374 THER/PROPH/DIAG INJ IV PUSH: CPT

## 2024-04-14 PROCEDURE — 83036 HEMOGLOBIN GLYCOSYLATED A1C: CPT | Performed by: INTERNAL MEDICINE

## 2024-04-14 PROCEDURE — 74174 CTA ABD&PLVS W/CONTRAST: CPT

## 2024-04-14 PROCEDURE — 82040 ASSAY OF SERUM ALBUMIN: CPT | Performed by: STUDENT IN AN ORGANIZED HEALTH CARE EDUCATION/TRAINING PROGRAM

## 2024-04-14 PROCEDURE — 36415 COLL VENOUS BLD VENIPUNCTURE: CPT | Performed by: EMERGENCY MEDICINE

## 2024-04-14 PROCEDURE — 74183 MRI ABD W/O CNTR FLWD CNTR: CPT

## 2024-04-14 PROCEDURE — C9113 INJ PANTOPRAZOLE SODIUM, VIA: HCPCS | Performed by: EMERGENCY MEDICINE

## 2024-04-14 RX ORDER — MORPHINE SULFATE 2 MG/ML
1 INJECTION, SOLUTION INTRAMUSCULAR; INTRAVENOUS
Status: DISCONTINUED | OUTPATIENT
Start: 2024-04-14 | End: 2024-04-15

## 2024-04-14 RX ORDER — FENTANYL CITRATE 50 UG/ML
25 INJECTION, SOLUTION INTRAMUSCULAR; INTRAVENOUS EVERY 5 MIN PRN
Status: DISCONTINUED | OUTPATIENT
Start: 2024-04-14 | End: 2024-04-14 | Stop reason: HOSPADM

## 2024-04-14 RX ORDER — HYDROMORPHONE HCL IN WATER/PF 6 MG/30 ML
0.4 PATIENT CONTROLLED ANALGESIA SYRINGE INTRAVENOUS
Status: DISCONTINUED | OUTPATIENT
Start: 2024-04-14 | End: 2024-04-15

## 2024-04-14 RX ORDER — NICOTINE POLACRILEX 4 MG
15-30 LOZENGE BUCCAL
Status: DISCONTINUED | OUTPATIENT
Start: 2024-04-14 | End: 2024-04-17 | Stop reason: HOSPADM

## 2024-04-14 RX ORDER — CELECOXIB 200 MG/1
200 CAPSULE ORAL DAILY
Status: DISCONTINUED | OUTPATIENT
Start: 2024-04-15 | End: 2024-04-17 | Stop reason: HOSPADM

## 2024-04-14 RX ORDER — DEXTROSE MONOHYDRATE 25 G/50ML
25-50 INJECTION, SOLUTION INTRAVENOUS
Status: DISCONTINUED | OUTPATIENT
Start: 2024-04-14 | End: 2024-04-17 | Stop reason: HOSPADM

## 2024-04-14 RX ORDER — PANTOPRAZOLE SODIUM 40 MG/1
40 TABLET, DELAYED RELEASE ORAL
Status: DISCONTINUED | OUTPATIENT
Start: 2024-04-15 | End: 2024-04-17 | Stop reason: HOSPADM

## 2024-04-14 RX ORDER — AMOXICILLIN 250 MG
1 CAPSULE ORAL 2 TIMES DAILY
Status: DISCONTINUED | OUTPATIENT
Start: 2024-04-14 | End: 2024-04-17 | Stop reason: HOSPADM

## 2024-04-14 RX ORDER — BACLOFEN 20 MG
1 TABLET ORAL DAILY
Status: DISCONTINUED | OUTPATIENT
Start: 2024-04-14 | End: 2024-04-14

## 2024-04-14 RX ORDER — EMPAGLIFLOZIN 10 MG/1
10 TABLET, FILM COATED ORAL DAILY
COMMUNITY

## 2024-04-14 RX ORDER — PROPOFOL 10 MG/ML
INJECTION, EMULSION INTRAVENOUS PRN
Status: DISCONTINUED | OUTPATIENT
Start: 2024-04-14 | End: 2024-04-14

## 2024-04-14 RX ORDER — AMOXICILLIN 250 MG
2 CAPSULE ORAL 2 TIMES DAILY PRN
Status: DISCONTINUED | OUTPATIENT
Start: 2024-04-14 | End: 2024-04-17 | Stop reason: HOSPADM

## 2024-04-14 RX ORDER — VARENICLINE 0.03 MG/.05ML
1 SPRAY NASAL 2 TIMES DAILY
COMMUNITY

## 2024-04-14 RX ORDER — ONDANSETRON 2 MG/ML
4 INJECTION INTRAMUSCULAR; INTRAVENOUS EVERY 6 HOURS PRN
Status: DISCONTINUED | OUTPATIENT
Start: 2024-04-14 | End: 2024-04-17 | Stop reason: HOSPADM

## 2024-04-14 RX ORDER — BENZONATATE 100 MG/1
200 CAPSULE ORAL 3 TIMES DAILY PRN
Status: ON HOLD | COMMUNITY
End: 2024-04-28

## 2024-04-14 RX ORDER — HYDROMORPHONE HYDROCHLORIDE 1 MG/ML
0.4 INJECTION, SOLUTION INTRAMUSCULAR; INTRAVENOUS; SUBCUTANEOUS EVERY 5 MIN PRN
Status: DISCONTINUED | OUTPATIENT
Start: 2024-04-14 | End: 2024-04-14 | Stop reason: HOSPADM

## 2024-04-14 RX ORDER — IBUPROFEN 200 MG
600 TABLET ORAL 2 TIMES DAILY PRN
Status: DISCONTINUED | OUTPATIENT
Start: 2024-04-14 | End: 2024-04-17 | Stop reason: HOSPADM

## 2024-04-14 RX ORDER — MONTELUKAST SODIUM 10 MG/1
10 TABLET ORAL AT BEDTIME
Status: DISCONTINUED | OUTPATIENT
Start: 2024-04-14 | End: 2024-04-17 | Stop reason: HOSPADM

## 2024-04-14 RX ORDER — IOPAMIDOL 755 MG/ML
65 INJECTION, SOLUTION INTRAVASCULAR ONCE
Status: COMPLETED | OUTPATIENT
Start: 2024-04-14 | End: 2024-04-14

## 2024-04-14 RX ORDER — BACITRACIN ZINC 500 [USP'U]/G
OINTMENT TOPICAL 2 TIMES DAILY
COMMUNITY
Start: 2024-03-07

## 2024-04-14 RX ORDER — SODIUM CHLORIDE, SODIUM LACTATE, POTASSIUM CHLORIDE, CALCIUM CHLORIDE 600; 310; 30; 20 MG/100ML; MG/100ML; MG/100ML; MG/100ML
INJECTION, SOLUTION INTRAVENOUS CONTINUOUS
Status: DISCONTINUED | OUTPATIENT
Start: 2024-04-14 | End: 2024-04-14

## 2024-04-14 RX ORDER — ESTRADIOL 0.1 MG/G
CREAM VAGINAL
COMMUNITY

## 2024-04-14 RX ORDER — ACETAMINOPHEN 325 MG/1
975 TABLET ORAL EVERY 8 HOURS
Qty: 27 TABLET | Refills: 0 | Status: DISCONTINUED | OUTPATIENT
Start: 2024-04-14 | End: 2024-04-15

## 2024-04-14 RX ORDER — BACLOFEN 5 MG/1
5-10 TABLET ORAL 2 TIMES DAILY PRN
COMMUNITY
Start: 2024-02-29

## 2024-04-14 RX ORDER — AMITRIPTYLINE HYDROCHLORIDE 10 MG/1
30 TABLET ORAL AT BEDTIME
Status: DISCONTINUED | OUTPATIENT
Start: 2024-04-14 | End: 2024-04-17 | Stop reason: HOSPADM

## 2024-04-14 RX ORDER — AMITRIPTYLINE HYDROCHLORIDE 10 MG/1
30 TABLET ORAL AT BEDTIME
COMMUNITY
Start: 2023-02-01

## 2024-04-14 RX ORDER — MAGNESIUM OXIDE 400 MG/1
400 TABLET ORAL DAILY
Status: DISCONTINUED | OUTPATIENT
Start: 2024-04-15 | End: 2024-04-17 | Stop reason: HOSPADM

## 2024-04-14 RX ORDER — FLUTICASONE FUROATE AND VILANTEROL 200; 25 UG/1; UG/1
1 POWDER RESPIRATORY (INHALATION) DAILY
Status: DISCONTINUED | OUTPATIENT
Start: 2024-04-14 | End: 2024-04-17 | Stop reason: HOSPADM

## 2024-04-14 RX ORDER — LIDOCAINE 40 MG/G
CREAM TOPICAL
Status: DISCONTINUED | OUTPATIENT
Start: 2024-04-14 | End: 2024-04-16

## 2024-04-14 RX ORDER — NALOXONE HYDROCHLORIDE 0.4 MG/ML
0.2 INJECTION, SOLUTION INTRAMUSCULAR; INTRAVENOUS; SUBCUTANEOUS
Status: DISCONTINUED | OUTPATIENT
Start: 2024-04-14 | End: 2024-04-17 | Stop reason: HOSPADM

## 2024-04-14 RX ORDER — LANOLIN ALCOHOL/MO/W.PET/CERES
3 CREAM (GRAM) TOPICAL
Status: DISCONTINUED | OUTPATIENT
Start: 2024-04-14 | End: 2024-04-17 | Stop reason: HOSPADM

## 2024-04-14 RX ORDER — PROCHLORPERAZINE MALEATE 5 MG
5 TABLET ORAL EVERY 6 HOURS PRN
Status: DISCONTINUED | OUTPATIENT
Start: 2024-04-14 | End: 2024-04-17 | Stop reason: HOSPADM

## 2024-04-14 RX ORDER — MORPHINE SULFATE 2 MG/ML
2 INJECTION, SOLUTION INTRAMUSCULAR; INTRAVENOUS
Status: DISCONTINUED | OUTPATIENT
Start: 2024-04-14 | End: 2024-04-15

## 2024-04-14 RX ORDER — SODIUM CHLORIDE, SODIUM LACTATE, POTASSIUM CHLORIDE, CALCIUM CHLORIDE 600; 310; 30; 20 MG/100ML; MG/100ML; MG/100ML; MG/100ML
INJECTION, SOLUTION INTRAVENOUS CONTINUOUS
Status: DISCONTINUED | OUTPATIENT
Start: 2024-04-14 | End: 2024-04-14 | Stop reason: HOSPADM

## 2024-04-14 RX ORDER — HYDROMORPHONE HCL IN WATER/PF 6 MG/30 ML
0.2 PATIENT CONTROLLED ANALGESIA SYRINGE INTRAVENOUS
Status: DISCONTINUED | OUTPATIENT
Start: 2024-04-14 | End: 2024-04-15

## 2024-04-14 RX ORDER — ALBUTEROL SULFATE 90 UG/1
2 AEROSOL, METERED RESPIRATORY (INHALATION) EVERY 6 HOURS PRN
COMMUNITY

## 2024-04-14 RX ORDER — NALOXONE HYDROCHLORIDE 0.4 MG/ML
0.4 INJECTION, SOLUTION INTRAMUSCULAR; INTRAVENOUS; SUBCUTANEOUS
Status: DISCONTINUED | OUTPATIENT
Start: 2024-04-14 | End: 2024-04-17 | Stop reason: HOSPADM

## 2024-04-14 RX ORDER — PROPRANOLOL HYDROCHLORIDE 120 MG/1
120 CAPSULE, EXTENDED RELEASE ORAL DAILY
Status: ON HOLD | COMMUNITY
End: 2024-04-17

## 2024-04-14 RX ORDER — HYDROCORTISONE 25 MG/G
OINTMENT TOPICAL 2 TIMES DAILY PRN
COMMUNITY
Start: 2024-03-21

## 2024-04-14 RX ORDER — BUDESONIDE AND FORMOTEROL FUMARATE DIHYDRATE 160; 4.5 UG/1; UG/1
2 AEROSOL RESPIRATORY (INHALATION)
COMMUNITY

## 2024-04-14 RX ORDER — BENZONATATE 100 MG/1
200 CAPSULE ORAL 3 TIMES DAILY PRN
Status: DISCONTINUED | OUTPATIENT
Start: 2024-04-14 | End: 2024-04-17 | Stop reason: HOSPADM

## 2024-04-14 RX ORDER — FERROUS GLUCONATE 324(38)MG
324 TABLET ORAL
COMMUNITY
Start: 2023-05-10

## 2024-04-14 RX ORDER — FAMOTIDINE 20 MG/1
20 TABLET, FILM COATED ORAL 2 TIMES DAILY
COMMUNITY

## 2024-04-14 RX ORDER — LIDOCAINE 40 MG/G
CREAM TOPICAL
Status: DISCONTINUED | OUTPATIENT
Start: 2024-04-14 | End: 2024-04-17

## 2024-04-14 RX ORDER — FENTANYL CITRATE 50 UG/ML
INJECTION, SOLUTION INTRAMUSCULAR; INTRAVENOUS PRN
Status: DISCONTINUED | OUTPATIENT
Start: 2024-04-14 | End: 2024-04-14

## 2024-04-14 RX ORDER — FLUTICASONE PROPIONATE 50 MCG
2 SPRAY, SUSPENSION (ML) NASAL DAILY PRN
COMMUNITY

## 2024-04-14 RX ORDER — CELECOXIB 200 MG/1
200 CAPSULE ORAL DAILY
Status: ON HOLD | COMMUNITY
Start: 2024-01-04 | End: 2024-04-20

## 2024-04-14 RX ORDER — IBUPROFEN 200 MG
600 TABLET ORAL 2 TIMES DAILY PRN
Status: ON HOLD | COMMUNITY
End: 2024-04-28

## 2024-04-14 RX ORDER — CEFAZOLIN SODIUM/WATER 2 G/20 ML
2 SYRINGE (ML) INTRAVENOUS
Status: COMPLETED | OUTPATIENT
Start: 2024-04-14 | End: 2024-04-14

## 2024-04-14 RX ORDER — LIDOCAINE HYDROCHLORIDE 10 MG/ML
INJECTION, SOLUTION INFILTRATION; PERINEURAL PRN
Status: DISCONTINUED | OUTPATIENT
Start: 2024-04-14 | End: 2024-04-14

## 2024-04-14 RX ORDER — SEMAGLUTIDE 0.68 MG/ML
0.5 INJECTION, SOLUTION SUBCUTANEOUS
Status: ON HOLD | COMMUNITY
Start: 2023-06-15 | End: 2024-04-28

## 2024-04-14 RX ORDER — ONDANSETRON 4 MG/1
4 TABLET, ORALLY DISINTEGRATING ORAL EVERY 6 HOURS PRN
Status: DISCONTINUED | OUTPATIENT
Start: 2024-04-14 | End: 2024-04-14

## 2024-04-14 RX ORDER — AMOXICILLIN 250 MG
1 CAPSULE ORAL 2 TIMES DAILY PRN
Status: DISCONTINUED | OUTPATIENT
Start: 2024-04-14 | End: 2024-04-17 | Stop reason: HOSPADM

## 2024-04-14 RX ORDER — MONTELUKAST SODIUM 10 MG/1
1 TABLET ORAL AT BEDTIME
COMMUNITY

## 2024-04-14 RX ORDER — OXYCODONE HYDROCHLORIDE 5 MG/1
5 TABLET ORAL EVERY 4 HOURS PRN
Status: DISCONTINUED | OUTPATIENT
Start: 2024-04-14 | End: 2024-04-17 | Stop reason: HOSPADM

## 2024-04-14 RX ORDER — DEXAMETHASONE SODIUM PHOSPHATE 10 MG/ML
INJECTION, SOLUTION INTRAMUSCULAR; INTRAVENOUS PRN
Status: DISCONTINUED | OUTPATIENT
Start: 2024-04-14 | End: 2024-04-14

## 2024-04-14 RX ORDER — CETIRIZINE HYDROCHLORIDE 10 MG/1
10 TABLET ORAL DAILY
Status: DISCONTINUED | OUTPATIENT
Start: 2024-04-14 | End: 2024-04-17 | Stop reason: HOSPADM

## 2024-04-14 RX ORDER — SODIUM CHLORIDE, SODIUM LACTATE, POTASSIUM CHLORIDE, CALCIUM CHLORIDE 600; 310; 30; 20 MG/100ML; MG/100ML; MG/100ML; MG/100ML
INJECTION, SOLUTION INTRAVENOUS CONTINUOUS
Status: ACTIVE | OUTPATIENT
Start: 2024-04-14 | End: 2024-04-15

## 2024-04-14 RX ORDER — ONDANSETRON 2 MG/ML
INJECTION INTRAMUSCULAR; INTRAVENOUS PRN
Status: DISCONTINUED | OUTPATIENT
Start: 2024-04-14 | End: 2024-04-14

## 2024-04-14 RX ORDER — KETOROLAC TROMETHAMINE 30 MG/ML
INJECTION, SOLUTION INTRAMUSCULAR; INTRAVENOUS PRN
Status: DISCONTINUED | OUTPATIENT
Start: 2024-04-14 | End: 2024-04-14

## 2024-04-14 RX ORDER — ONDANSETRON 2 MG/ML
4 INJECTION INTRAMUSCULAR; INTRAVENOUS EVERY 6 HOURS PRN
Status: DISCONTINUED | OUTPATIENT
Start: 2024-04-14 | End: 2024-04-14

## 2024-04-14 RX ORDER — BISACODYL 10 MG
10 SUPPOSITORY, RECTAL RECTAL DAILY PRN
Status: DISCONTINUED | OUTPATIENT
Start: 2024-04-17 | End: 2024-04-17 | Stop reason: HOSPADM

## 2024-04-14 RX ORDER — FAMOTIDINE 20 MG/1
20 TABLET, FILM COATED ORAL 2 TIMES DAILY
Status: DISCONTINUED | OUTPATIENT
Start: 2024-04-14 | End: 2024-04-17 | Stop reason: HOSPADM

## 2024-04-14 RX ORDER — SUVOREXANT 10 MG/1
1 TABLET, FILM COATED ORAL
COMMUNITY
Start: 2024-03-19

## 2024-04-14 RX ORDER — ALBUTEROL SULFATE 90 UG/1
2 AEROSOL, METERED RESPIRATORY (INHALATION) EVERY 6 HOURS PRN
Status: DISCONTINUED | OUTPATIENT
Start: 2024-04-14 | End: 2024-04-17 | Stop reason: HOSPADM

## 2024-04-14 RX ORDER — POLYETHYLENE GLYCOL 3350 17 G/17G
17 POWDER, FOR SOLUTION ORAL DAILY
Status: DISCONTINUED | OUTPATIENT
Start: 2024-04-15 | End: 2024-04-17 | Stop reason: HOSPADM

## 2024-04-14 RX ORDER — ATORVASTATIN CALCIUM 80 MG/1
80 TABLET, FILM COATED ORAL DAILY
Status: ON HOLD | COMMUNITY
End: 2024-04-20

## 2024-04-14 RX ORDER — ONDANSETRON 2 MG/ML
4 INJECTION INTRAMUSCULAR; INTRAVENOUS EVERY 30 MIN PRN
Status: DISCONTINUED | OUTPATIENT
Start: 2024-04-14 | End: 2024-04-14 | Stop reason: HOSPADM

## 2024-04-14 RX ORDER — ONDANSETRON 2 MG/ML
4 INJECTION INTRAMUSCULAR; INTRAVENOUS ONCE
Status: COMPLETED | OUTPATIENT
Start: 2024-04-14 | End: 2024-04-14

## 2024-04-14 RX ORDER — LOSARTAN POTASSIUM 25 MG/1
25 TABLET ORAL DAILY
Status: DISCONTINUED | OUTPATIENT
Start: 2024-04-14 | End: 2024-04-17 | Stop reason: HOSPADM

## 2024-04-14 RX ORDER — LOSARTAN POTASSIUM 25 MG/1
1 TABLET ORAL DAILY
Status: ON HOLD | COMMUNITY
Start: 2023-06-07 | End: 2024-04-17

## 2024-04-14 RX ORDER — CETIRIZINE HYDROCHLORIDE 10 MG/1
10 TABLET ORAL DAILY
COMMUNITY

## 2024-04-14 RX ORDER — ONDANSETRON 4 MG/1
4 TABLET, ORALLY DISINTEGRATING ORAL EVERY 6 HOURS PRN
Status: DISCONTINUED | OUTPATIENT
Start: 2024-04-14 | End: 2024-04-17 | Stop reason: HOSPADM

## 2024-04-14 RX ORDER — GADOBUTROL 604.72 MG/ML
6 INJECTION INTRAVENOUS ONCE
Status: COMPLETED | OUTPATIENT
Start: 2024-04-14 | End: 2024-04-14

## 2024-04-14 RX ORDER — SERTRALINE HYDROCHLORIDE 100 MG/1
100 TABLET, FILM COATED ORAL DAILY
Status: DISCONTINUED | OUTPATIENT
Start: 2024-04-14 | End: 2024-04-17 | Stop reason: HOSPADM

## 2024-04-14 RX ORDER — IPRATROPIUM BROMIDE AND ALBUTEROL SULFATE 2.5; .5 MG/3ML; MG/3ML
1 SOLUTION RESPIRATORY (INHALATION) 3 TIMES DAILY PRN
COMMUNITY

## 2024-04-14 RX ORDER — HEPARIN SODIUM 5000 [USP'U]/.5ML
5000 INJECTION, SOLUTION INTRAVENOUS; SUBCUTANEOUS EVERY 8 HOURS
Status: DISCONTINUED | OUTPATIENT
Start: 2024-04-15 | End: 2024-04-16

## 2024-04-14 RX ORDER — ONDANSETRON 4 MG/1
4 TABLET, ORALLY DISINTEGRATING ORAL EVERY 30 MIN PRN
Status: DISCONTINUED | OUTPATIENT
Start: 2024-04-14 | End: 2024-04-14 | Stop reason: HOSPADM

## 2024-04-14 RX ORDER — FENTANYL CITRATE 50 UG/ML
50 INJECTION, SOLUTION INTRAMUSCULAR; INTRAVENOUS EVERY 5 MIN PRN
Status: DISCONTINUED | OUTPATIENT
Start: 2024-04-14 | End: 2024-04-14 | Stop reason: HOSPADM

## 2024-04-14 RX ORDER — ACETAMINOPHEN 160 MG
4000 TABLET,DISINTEGRATING ORAL DAILY
COMMUNITY

## 2024-04-14 RX ORDER — CEFAZOLIN SODIUM/WATER 2 G/20 ML
2 SYRINGE (ML) INTRAVENOUS SEE ADMIN INSTRUCTIONS
Status: DISCONTINUED | OUTPATIENT
Start: 2024-04-14 | End: 2024-04-16

## 2024-04-14 RX ORDER — INDOCYANINE GREEN AND WATER 25 MG
2.5 KIT INJECTION ONCE
Status: COMPLETED | OUTPATIENT
Start: 2024-04-14 | End: 2024-04-14

## 2024-04-14 RX ORDER — OXYCODONE HYDROCHLORIDE 5 MG/1
10 TABLET ORAL EVERY 4 HOURS PRN
Status: DISCONTINUED | OUTPATIENT
Start: 2024-04-14 | End: 2024-04-17 | Stop reason: HOSPADM

## 2024-04-14 RX ORDER — LIDOCAINE HYDROCHLORIDE AND EPINEPHRINE 10; 10 MG/ML; UG/ML
INJECTION, SOLUTION INFILTRATION; PERINEURAL PRN
Status: DISCONTINUED | OUTPATIENT
Start: 2024-04-14 | End: 2024-04-14 | Stop reason: HOSPADM

## 2024-04-14 RX ORDER — BACLOFEN 20 MG
1 TABLET ORAL DAILY
COMMUNITY

## 2024-04-14 RX ORDER — ATORVASTATIN CALCIUM 40 MG/1
80 TABLET, FILM COATED ORAL DAILY
Status: DISCONTINUED | OUTPATIENT
Start: 2024-04-14 | End: 2024-04-14

## 2024-04-14 RX ORDER — PROPRANOLOL HCL 60 MG
120 CAPSULE, EXTENDED RELEASE 24HR ORAL DAILY
Status: DISCONTINUED | OUTPATIENT
Start: 2024-04-14 | End: 2024-04-17 | Stop reason: HOSPADM

## 2024-04-14 RX ORDER — SERTRALINE HYDROCHLORIDE 100 MG/1
100 TABLET, FILM COATED ORAL DAILY
COMMUNITY

## 2024-04-14 RX ORDER — AMMONIUM LACTATE 12 G/100G
CREAM TOPICAL DAILY PRN
COMMUNITY
Start: 2024-04-05

## 2024-04-14 RX ORDER — MORPHINE SULFATE 4 MG/ML
4 INJECTION, SOLUTION INTRAMUSCULAR; INTRAVENOUS ONCE
Status: COMPLETED | OUTPATIENT
Start: 2024-04-14 | End: 2024-04-14

## 2024-04-14 RX ORDER — GABAPENTIN 300 MG/1
600 CAPSULE ORAL 3 TIMES DAILY
Status: DISCONTINUED | OUTPATIENT
Start: 2024-04-14 | End: 2024-04-17 | Stop reason: HOSPADM

## 2024-04-14 RX ORDER — HYDROMORPHONE HYDROCHLORIDE 1 MG/ML
0.2 INJECTION, SOLUTION INTRAMUSCULAR; INTRAVENOUS; SUBCUTANEOUS EVERY 5 MIN PRN
Status: DISCONTINUED | OUTPATIENT
Start: 2024-04-14 | End: 2024-04-14 | Stop reason: HOSPADM

## 2024-04-14 RX ORDER — NALOXONE HYDROCHLORIDE 0.4 MG/ML
0.1 INJECTION, SOLUTION INTRAMUSCULAR; INTRAVENOUS; SUBCUTANEOUS
Status: DISCONTINUED | OUTPATIENT
Start: 2024-04-14 | End: 2024-04-14 | Stop reason: HOSPADM

## 2024-04-14 RX ORDER — ACETAMINOPHEN 325 MG/1
650 TABLET ORAL EVERY 4 HOURS PRN
Status: DISCONTINUED | OUTPATIENT
Start: 2024-04-17 | End: 2024-04-17 | Stop reason: HOSPADM

## 2024-04-14 RX ADMIN — GADOBUTROL 6 ML: 604.72 INJECTION INTRAVENOUS at 12:00

## 2024-04-14 RX ADMIN — SUGAMMADEX 150 MG: 100 INJECTION, SOLUTION INTRAVENOUS at 16:27

## 2024-04-14 RX ADMIN — PHENYLEPHRINE HYDROCHLORIDE 200 MCG: 10 INJECTION INTRAVENOUS at 15:33

## 2024-04-14 RX ADMIN — DEXTROSE AND SODIUM CHLORIDE: 5; 900 INJECTION, SOLUTION INTRAVENOUS at 12:02

## 2024-04-14 RX ADMIN — MORPHINE SULFATE 4 MG: 4 INJECTION, SOLUTION INTRAMUSCULAR; INTRAVENOUS at 06:10

## 2024-04-14 RX ADMIN — GABAPENTIN 600 MG: 300 CAPSULE ORAL at 20:26

## 2024-04-14 RX ADMIN — CETIRIZINE HYDROCHLORIDE 10 MG: 10 TABLET, FILM COATED ORAL at 20:36

## 2024-04-14 RX ADMIN — FENTANYL CITRATE 75 MCG: 50 INJECTION INTRAMUSCULAR; INTRAVENOUS at 16:34

## 2024-04-14 RX ADMIN — KETOROLAC TROMETHAMINE 15 MG: 30 INJECTION, SOLUTION INTRAMUSCULAR at 16:40

## 2024-04-14 RX ADMIN — PROPOFOL 180 MG: 10 INJECTION, EMULSION INTRAVENOUS at 15:22

## 2024-04-14 RX ADMIN — SODIUM CHLORIDE, POTASSIUM CHLORIDE, SODIUM LACTATE AND CALCIUM CHLORIDE 1000 ML: 600; 310; 30; 20 INJECTION, SOLUTION INTRAVENOUS at 06:13

## 2024-04-14 RX ADMIN — FENTANYL CITRATE 100 MCG: 50 INJECTION INTRAMUSCULAR; INTRAVENOUS at 15:22

## 2024-04-14 RX ADMIN — PHENYLEPHRINE HYDROCHLORIDE 200 MCG: 10 INJECTION INTRAVENOUS at 15:27

## 2024-04-14 RX ADMIN — BENZONATATE 200 MG: 100 CAPSULE ORAL at 20:35

## 2024-04-14 RX ADMIN — SODIUM CHLORIDE, POTASSIUM CHLORIDE, SODIUM LACTATE AND CALCIUM CHLORIDE: 600; 310; 30; 20 INJECTION, SOLUTION INTRAVENOUS at 14:39

## 2024-04-14 RX ADMIN — DEXMEDETOMIDINE HYDROCHLORIDE 8 MCG: 100 INJECTION, SOLUTION INTRAVENOUS at 16:35

## 2024-04-14 RX ADMIN — DEXAMETHASONE SODIUM PHOSPHATE 10 MG: 10 INJECTION, SOLUTION INTRAMUSCULAR; INTRAVENOUS at 15:22

## 2024-04-14 RX ADMIN — ONDANSETRON 4 MG: 2 INJECTION INTRAMUSCULAR; INTRAVENOUS at 06:11

## 2024-04-14 RX ADMIN — PANTOPRAZOLE SODIUM 40 MG: 40 INJECTION, POWDER, FOR SOLUTION INTRAVENOUS at 07:03

## 2024-04-14 RX ADMIN — FENTANYL CITRATE 25 MCG: 50 INJECTION INTRAMUSCULAR; INTRAVENOUS at 16:25

## 2024-04-14 RX ADMIN — FAMOTIDINE 20 MG: 20 TABLET ORAL at 20:26

## 2024-04-14 RX ADMIN — Medication 2 G: at 15:27

## 2024-04-14 RX ADMIN — INDOCYANINE GREEN AND WATER 2.5 MG: KIT at 14:41

## 2024-04-14 RX ADMIN — SERTRALINE HYDROCHLORIDE 100 MG: 100 TABLET ORAL at 20:35

## 2024-04-14 RX ADMIN — FENTANYL CITRATE 50 MCG: 50 INJECTION, SOLUTION INTRAMUSCULAR; INTRAVENOUS at 17:03

## 2024-04-14 RX ADMIN — HYDROMORPHONE HYDROCHLORIDE 0.2 MG: 0.2 INJECTION, SOLUTION INTRAMUSCULAR; INTRAVENOUS; SUBCUTANEOUS at 20:38

## 2024-04-14 RX ADMIN — SODIUM CHLORIDE, POTASSIUM CHLORIDE, SODIUM LACTATE AND CALCIUM CHLORIDE: 600; 310; 30; 20 INJECTION, SOLUTION INTRAVENOUS at 16:30

## 2024-04-14 RX ADMIN — AMITRIPTYLINE HYDROCHLORIDE 30 MG: 10 TABLET, FILM COATED ORAL at 22:36

## 2024-04-14 RX ADMIN — PHENYLEPHRINE HYDROCHLORIDE 200 MCG: 10 INJECTION INTRAVENOUS at 16:12

## 2024-04-14 RX ADMIN — SODIUM CHLORIDE, POTASSIUM CHLORIDE, SODIUM LACTATE AND CALCIUM CHLORIDE: 600; 310; 30; 20 INJECTION, SOLUTION INTRAVENOUS at 23:40

## 2024-04-14 RX ADMIN — IOPAMIDOL 65 ML: 755 INJECTION, SOLUTION INTRAVENOUS at 07:59

## 2024-04-14 RX ADMIN — MONTELUKAST 10 MG: 10 TABLET, FILM COATED ORAL at 22:37

## 2024-04-14 RX ADMIN — LIDOCAINE HYDROCHLORIDE 5 ML: 10 INJECTION, SOLUTION INFILTRATION; PERINEURAL at 15:22

## 2024-04-14 RX ADMIN — FENTANYL CITRATE 50 MCG: 50 INJECTION, SOLUTION INTRAMUSCULAR; INTRAVENOUS at 16:56

## 2024-04-14 RX ADMIN — DEXMEDETOMIDINE HYDROCHLORIDE 12 MCG: 100 INJECTION, SOLUTION INTRAVENOUS at 16:32

## 2024-04-14 RX ADMIN — ONDANSETRON 4 MG: 2 INJECTION INTRAMUSCULAR; INTRAVENOUS at 15:22

## 2024-04-14 ASSESSMENT — ACTIVITIES OF DAILY LIVING (ADL)
ADLS_ACUITY_SCORE: 36
ADLS_ACUITY_SCORE: 35
ADLS_ACUITY_SCORE: 36
ADLS_ACUITY_SCORE: 35
ADLS_ACUITY_SCORE: 36
ADLS_ACUITY_SCORE: 35
ADLS_ACUITY_SCORE: 38

## 2024-04-14 ASSESSMENT — ENCOUNTER SYMPTOMS
ABDOMINAL PAIN: 1
DYSURIA: 0
DIARRHEA: 0
NAUSEA: 1
HEMATURIA: 0
VOMITING: 1

## 2024-04-14 ASSESSMENT — COLUMBIA-SUICIDE SEVERITY RATING SCALE - C-SSRS
2. HAVE YOU ACTUALLY HAD ANY THOUGHTS OF KILLING YOURSELF IN THE PAST MONTH?: NO
1. IN THE PAST MONTH, HAVE YOU WISHED YOU WERE DEAD OR WISHED YOU COULD GO TO SLEEP AND NOT WAKE UP?: NO
6. HAVE YOU EVER DONE ANYTHING, STARTED TO DO ANYTHING, OR PREPARED TO DO ANYTHING TO END YOUR LIFE?: NO

## 2024-04-14 ASSESSMENT — COPD QUESTIONNAIRES: COPD: 1

## 2024-04-14 NOTE — CONSULTS
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    Hemoglobin A1C (%)    Date Value    05/17/2022 7.0 (H)    02/24/2022 7.0 (H)    01/17/2022 7.2 (H)       X LAST 3 BMI 8/16/2022 8/1/2022 7/15/2022    BODY MASS INDEX 28.44 29.29 29.84    Some recent data might be hidden    10/11/2021: exercise: walking 30-45 minutes; biking 20-30 minutes daily. No low blood sugars. 109-278. They are running higher.    Hgb A1c (%)    Date Value    02/21/2019 6.0 (H)    12/06/2018 6.1 (H)    07/19/2018 5.9 (H)       Hemoglobin A1C (%)    Date Value    06/29/2021 6.6 (H)    04/15/2021 6.9 (H)    01/14/2021 7.1 (H)       6/30/2021: no low blood sugars.    4/15/2021, 1/15/2021: BS: over 150, No low blood sugar,No BS in the 200's.    Hgb A1c (%)    Date Value    02/21/2019 6.0 (H)    12/06/2018 6.1 (H)    07/19/2018 5.9 (H)       Hemoglobin A1C (%)    Date Value    01/14/2021 7.1 (H)    11/12/2020 8.3 (H)    08/10/2020 6.2 (H)    8/10/2020: no low blood sugars. BS at goal   2/19/2020: no low blood sugars. Staying active. Trying to follow diabetic diet.    Hgb A1c (%)    Date Value    02/21/2019 6.0 (H)    1/14/2020:  Blood sugar 91 to 158. Taking  medications as recommended.  Exercising 3 to 5 times a week.  Trying to work on diet and exercise changes   11/17/2019: no low blood sugars,  at highest and 132 was her lowest. Average if >140. She has gained 37 # since her previous low blood sugars. She is working on diet and exercise changes. [facility] 5/7 days.    Wt Readings from Last 3 Encounters:    11/14/19 181 lb 6.4 oz (82.3 kg)    10/08/19 179 lb (81.2 kg)    09/26/19 179 lb 6.4 oz (81.4 kg)       X LAST 3 BMI 11/14/2019 10/8/2019 9/26/2019    BODY MASS INDEX 31.14 30.73 30.79    Some recent data might be hidden    9/19/2019: BS , no low blood sugars   Hgb A1c (%)    Date Value    02/21/2019 6.0 (H)    12/06/2018 6.1 (H)    07/19/2018 5.9 (H)       Hemoglobin A1C (%)    Date Value    08/22/2019 6.3 (H)    07/05/2019 6.6 (H)    04/10/2019 6.3 (H)       8/22/2019: -150's,    6/27/2019: -130's, rare 140-150   6/4/2019: -189, no low blood sugars, she is trying to exercise more, she is watching what she is eating.    1/13/2019: not currently on medications, no low blood sugars.    Hgb A1c (%)    Date Value    12/06/2018 6.1 (H)    9/6/2018: no low blood sugars   4/10/2018: no low blood sugars but feels lower with exercise   7/6/2017: no low blood sugars, BS at goal   4/4/2017: no low blood sugar; FBS highest 157, no low blood sugar    1/8/2017: no low blood sugars; -140   7/6/2017: no low blood sugars, BS at goal   4/4/2017: no low blood sugar; FBS highest 157, no low blood sugar    1/8/2017: no low blood sugars; -140   1/13/2019: check blood sugar up to daily FBS but likely will not need to check more than 3 times weekly, continue diet and exercise changes.    9/6/2018: Continue to work on diet and exercise changes   4/10/2018: Labs done today: -     Hgb A1c; Future, -     Creatinine / GFR; Future. Eat a piece of fruit or [employer] (or the like) prior to exercise.    12/15/2017: stable per patient   11/2/2017: increased  symptoms of burning pain.    7/6/2017, 4/4/2017: Stop by lab for: -     Hgb A1c; Future    1/8/2017:    I recommend that you make lab appointment for:    -     Microalb/Creat Ratio; Future   -     Hgb A1c; Future   Done today: -     Diabetic Foot Check (Ep101)   10/13/2016: rare blood sugar in the 70's 2 weeks ago, no recent lows   9/22/2016: no low blood sugars; FBS <120   HGB A1C (%)    Date Value    07/07/2016 5.5    05/23/2016 5.7*    03/18/2016 5.4       7/10/2016: no low blood sugars; FBS    4/18/2016: no low blood sugars; -110's, rare 150   HGBA1C (%)    Date Value    03/18/2016 5.4    12/21/2015 5.6    10/06/2015 5.3       3/31/2016: no low blood sugars.    2/23/2016: no low blood sugar.    11/13/2015: Odette increased metformin to 1000 mg by mouth in the morning as her blood sugars were higher after her steroid knee injection. No low blood sugars. blood sugar .       Became hypoglycemic on Lantus insulin, now tolerating metformin bid.  She has also lost significant weight with diet and exercise changes.      Consult with endocrine:    1. Well controlled type 2 diabetes    initially needed insulin , now on metformin 500 mg po once daily dose reduced 5/6/15   Was taking metformin 500 mg po twice daily prior to that    Controlled with diet    Has lost 50 pounds since jan 2015       Rapid weight loss can sometimes result in relative hyperinsulinemia       The symptoms that the patient is describing where she feels low if she goes without eating for long period of time appears to be related to increased senstivity excursions in blood sugars . If she eats a meal which is rich in carbohydrates( like the oatmeal on Wednesday, which she ate before going to [employer]) it will cause her blood sugars to rise , Her body responds by releasing a lot of Insulin( sec to insulin resistance), The sugar gets cleared out of her system rapidly ,and this Can cause her to feel poorly. However if protein and fat are  also incorporated in the meal then it will decrease the absorption of the carbohydrate, prevent the rapid increase in blood sugar and the relative hyperinsulinemic response and the patient should do better.       Overall she eats well    Recommend that she continue to make sure that she eats protein with each meal and avoid eatings carb alone       She can continue metformin 500 mg po daily       if she has any readings below 60 And she has symptoms of hypoglycemia with that , then she can be referred back to endocrine for further eval   4/18/2016: L>R bilateral foot numbness and tingling.  Currently taking gabapentin for hot flashes-declines increase of this.       Goals/Recommendations:    8/16/2022, 10/11/2021:    Please stop by lab today for:    -     Hgb A1C; Future   -     Albumin/Creatinine Ratio,Random Urine; Future   6/30/2021:    Type 2 diabetes mellitus with hypoglycemia without coma, without long-term current use of insulin (HRC)   BMI 29.0-29.9,adult   Please stop by lab today :   -     Hgb A1C; Future   -     Basic Metabolic Panel; Future   4/15/2021: recheck a1c.   1/15/2021:    Please stop by lab today for:    -     Hgb A1C; Future   -     Microalb/Creat Ratio; Future   8/10/2020: labs today   2/19/2020:    Please make appointment for:-     Optometry Consult-Adult/Peds   Done today: -     Diabetic Foot Check (Ep101)    1/14/2020:    Please stop by lab today for:    -     Hgb A1C; Future   -     Microalbumin/Creatinine Ratio; Future   -     Basic Metabolic Panel; Future   11/17/2019:    Please stop by lab today for: -     Hgb A1C; Future   Please increase to: -     metFORMIN (GLUCOPHAGE) 500 MG tablet; Take 1 Tablet by mouth two times a day with meals.   She is aware that she should notify me of any low blood sugars.    9/19/2019:    Continue current plan   Continue going to the [facility] daily.    Continue healthy, diabetic diet.    8/22/2019: will continue to monitor   6/29/2019:    Please return to  clinic in 1 week for:    -     Hgb A1C; Future   -     Vitamin B12 Only; Future   12/15/2017: continue current    11/2/2017:    Please stop by lab for: -     Hgb A1c; Future   Please increase to: -     gabapentin (NEURONTIN) 300 MG capsule; Take 3 Caps by mouth three times a day.   return to clinic in 1 month and as needed.       10/13/2016:    Stop by lab for: -     Hgb A1c; Future   Increase to-     atorvastatin (LIPITOR) 40 MG tablet; 40 mg daily for 4 weeks then 80 mg daily. Keep Rx on file for future fill   Follow up with me in 3 months and as needed.    9/22/2016: continue current    7/10/2016: stable; Stop by lab for:  Hgb A1c; Future   4/18/2016: stable   3/31/2016: stable, continue current    2/23/2016: continue current    11/13/2015: Decrease to metformin 500 mg daily to avoid hypoglycemia. Follow up in 1 month and as needed.    metformin 500 mg daily and increase as needed. If she has any readings below 60 And she has symptoms of hypoglycemia with that , then she can be referred back to endocrine for further eval   4/18/2016: Let me know if you are interested in increasing your gabapentin      Frequent episodes of sinusitis 04/18/2016     Priority: Medium     Formatting of this note might be different from the original.   Careplan:   Background:     3/7/2022:  Recent URI.  She felt that her symptoms had resolved and then started to note maxillary tenderness.  Particularly on the right side.  Not having fevers or chills.   4/18/2016: on 4/14/16 seen at [facility] and treatment with Augmentin. Improvement of symptoms with antibiotics.       Goals/Recommendations:     3/7/2022:  Amoxicillin 500 mg by mouth t.i.d.   4/18/2016: complete course of antibiotics      Strain of right deltoid muscle 02/23/2016     Priority: Medium     Formatting of this note might be different from the original.   Careplan:   Background:     1/8/2017: continues to have pain despite injection with ortho and Physical Therapy    2/23/2016:  Odette reports that her right deltoid pain has not resolved. Her knee pain, chest wall pain have resolved. She reports laying on the right shoulder is bothersome at night when laying on the effected arm. No numbness, no tingling, no weakness.       Goals/Recommendations:     1/8/2017: Follow up with ortho   2/23/2016:    Orders:   -     PHYSICAL THERAPY   I will see you back in 2 months and as needed.      Essential hypertension 01/25/2016     Priority: Medium     Formatting of this note might be different from the original.  Careplan:  Background:     8/22/2019: taking her medication as recommended today.   BP Readings from Last 3 Encounters:   08/22/19 (!) 154/87   08/16/19 (!) 149/86   07/18/19 126/67   6/29/2019: Odette did not take blood pressure medication today due to her   hurry to make her appointments today.   6/4/2019: BP controlled losartan.   5/6/2019:  Patient is restarted propranolol 60 mg daily.  Blood pressure   remains slightly elevated   1/13/2019: stable despite her stopping all of her medications.   4/18/2016: no concerns.   3/31/2016: Odette denies complaints. Odette is taking her medications as   directed and denies side effects.Taking lisinopril 5 mg daily for the past   2 weeks.    2/23/2016: Odette denies symptoms of hypertension. She is taking naprosyn   for her shoulder pain which may effect her blood pressure. She has also   gained 10#.   Goals/Recommendations:    8/22/2019:   Increase to: -     losartan (COZAAR) 25 MG tablet; Take 3 Tablets by mouth   daily.  Please return to clinic in 2 weeks for nurse blood pressure recheck -       Hypertension Follow Up (Spr259)  Stop by lab today for:-     Basic Metabolic Panel; Future  6/29/2019:   Please restart medication  Return to clinic in 1 week for a nurse visit (make sure that you take your   medication 2 weeks before this appointment)  Drink 2 liters of water daily.   6/4/2019: continue losartan and propranolol.   5/6/2019: Change dose of -      propranolol (INDERALLA) 80 MG 24 hour   release capsule; Take 1 Capsule by mouth daily.  Return to clinic for   nurse blood pressure visit: -     Hypertension Follow Up (Bcy568)  4/18/2016: Stop by lab for: BASIC METABOLIC PANEL; Future  3/31/2016: At Follow up appointment in 1 month will recheck: BASIC   METABOLIC PANEL; Future  2/23/2016:   -stop naprosyn, start acetaminophen as discussed  -low salt diet  -work on diet and exercise changes-goal to lose the 10# which were gained  Overview:   Overview:   Formatting of this note may be different from the original.  Careplan:  Background:    4/18/2016: no concerns.   BP Readings from Last 3 Encounters:   04/18/16 103/73   03/31/16 130/77   03/22/16 130/73   3/31/2016: Odette denies complaints. Odette is taking her medications as   directed and denies side effects.Taking lisinopril 5 mg daily for the past   2 weeks.   BP Readings from Last 3 Encounters:   03/31/16 130/77   03/22/16 130/73   03/11/16 149/81   2/23/2016: Odette denies symptoms of hypertension. She is taking naprosyn   for her shoulder pain which may effect her blood pressure. She has also   gained 10#.   BP Readings from Last 3 Encounters:   02/22/16 138/82   02/09/16 134/80   02/08/16 138/78   Goals/Recommendations:    4/18/2016: Stop by lab for: BASIC METABOLIC PANEL; Future  3/31/2016: At Follow up appointment in 1 month will recheck: BASIC   METABOLIC PANEL; Future  2/23/2016:   -stop naprosyn, start acetaminophen as discussed  -low salt diet  -work on diet and exercise changes-goal to lose the 10# which were gained  Formatting of this note may be different from the original.  Careplan:  Background:    4/18/2016: no concerns.   BP Readings from Last 3 Encounters:   04/18/16 103/73   03/31/16 130/77   03/22/16 130/73   3/31/2016: Odette denies complaints. Odette is taking her medications as   directed and denies side effects.Taking lisinopril 5 mg daily for the past   2 weeks.   BP Readings from Last 3  Encounters:   03/31/16 130/77   03/22/16 130/73   03/11/16 149/81   2/23/2016: Odette denies symptoms of hypertension. She is taking naprosyn   for her shoulder pain which may effect her blood pressure. She has also   gained 10#.   BP Readings from Last 3 Encounters:   02/22/16 138/82   02/09/16 134/80   02/08/16 138/78   Goals/Recommendations:    4/18/2016: Stop by lab for: BASIC METABOLIC PANEL; Future  3/31/2016: At Follow up appointment in 1 month will recheck: BASIC   METABOLIC PANEL; Future  2/23/2016:   -stop naprosyn, start acetaminophen as discussed  -low salt diet  -work on diet and exercise changes-goal to lose the 10# which were gained        Hot flashes 12/22/2015     Priority: Medium     Formatting of this note is different from the original.   Careplan:   Background:     1/13/2019: Odette stopped nearly all of her medications. No current complaints.    4/18/2016: controlled with gabapentin 300 tid   12/22/2015: 2-3 months of hot flashes. No significant weight changes. No fevers, no chills. No cough, no night sweats.  No LMP recorded. Patient has had an ablation.    Wt Readings from Last 5 Encounters:    12/21/15 151 lb (68.493 kg)    12/03/15 148 lb 9.6 oz (67.405 kg)    11/12/15 145 lb 12.8 oz (66.134 kg)    10/23/15 147 lb (66.679 kg)    10/19/15 147 lb 9.6 oz (66.951 kg)       Goals/Recommendations:     4/18/2016: continue current    12/22/2015:  Please stop by lab for:   - TSH, SENSITIVE with FT4, FT3 (if needed); Future   - HEMOGRAM/PLTS; Future   - FSH; Future      Mild persistent asthma without complication 09/17/2015     Priority: Medium     Formatting of this note might be different from the original.   Careplan:   Background:     8/16/2022: stable on Advair 250/50, albuterol prn   1/13/2019: Odette stopped nearly all of her medications. No current complaints.    1/8/2017: Symptoms controlled on Flovent, but she stopped it due to cost. She wants to do a trial without a controller medication.     Increased use of albuterol despite being on Flovent 110  g two puffs twice day.  Increase Flovent monitor.   5/26/15:   Negative methacholine challenge test, normal spirometry 12/1/2011.    Pulmonology consult: 4/15: cough thought to be due to chronic rhinitis with postnasal drip. Flovent stopped.    Allergy consult:  5/2015: felt that cough is due to asthma. recommended restart Flovent.       Goals/Recommendations:   8/16/2022: continue current      1/8/2017: trial off of Flovent   5/26/15: She how she does with allergy treatment and restarting flovent.      Chronic constipation 07/23/2015     Priority: Medium     Formatting of this note might be different from the original.   Careplan:   Background:     1/13/2019; no current complaints. She has stopped medication. Currently on abx for treatment of diverticulitis.    Continues to have symptoms of constipation on miralax daily.       Goals/Recommendations:     1/13/2019: monitor   Increase to miralax bid.      Right hand tendonitis 07/23/2015     Priority: Medium    Blepharitis 06/23/2015     Priority: Medium    Nuclear sclerotic cataract of both eyes 06/23/2015     Priority: Medium    Presbyopia 06/23/2015     Priority: Medium    Asthma 05/26/2015     Priority: Medium     Careplan:  Background:    Negative methacholine challenge test, normal spirometry 12/1/2011.   Pulmonology consult: 4/15: cough thought to be due to chronic rhinitis   with postnasal drip. Flovent stopped.   Allergy consult:  5/2015: felt that cough is due to asthma. recommended   restart Flovent.   Goals/Recommendations:  She how she does with allergy treatment and   restarting flovent.        Adjustment disorder with mixed anxiety and depressed mood 05/26/2015     Priority: Medium     Formatting of this note is different from the original.   Careplan:   Background:     1/13/2019: Odette has stopped her sertraline. She wanted to see how she felt off of medication. Her self reporting scores are  higher but not diagnostic of depression. I have recommended a psychiatry consult -see notes from 1/7/19.    PHQ-9 1/10/2019 1/2/2019 12/6/2018 9/6/2018 4/10/2018 3/15/2018 2/13/2018    PHQ9 Score - Smartform 15 9 11 6 6 7 15       Last YUKO-7 Score: 7      12/15/2017: She is taking her medication as directed. No side effects. Improvement of symptoms.    She has a history of anxiety and depression. Trigger: multiple health care issues, family stress with her children see note from 5/21/15.    PHQ-9      1. Little interest or pleasure in doing things Not at all    2. Feeling down, depressed, or hopeless Not at all    3. Trouble falling or staying asleep, or sleeping too much Several days    4. Feeling tired or having little energy More than half the days    5. Poor appetite or overeating Several days    6. Feeling bad about yourself -- or that you are a failure or have let yourself or your family down Several days    7. Trouble concentrating on things, such as reading the newspaper or watching television Several days    8. Moving or speaking so slowly that other people could have noticed? Or the opposite -- being so fidgety or restless that you have been moving around a lot more than usual Not at all    9. Thoughts that you would be better off dead or of hurting yourself in some way Not at all    Total 6       YUKO-7      1. Feeling nervous, anxious or on edge 1    2. Not being able to stop or control worrying 2    3. Worrying too much about different things 2    4. Trouble relaxing 0    5. Being so restless that it is hard to sit still 0    6. Becoming easily annoyed or irritable 1    7. Feeling afraid as if something awful might happen 1    8. If you checked off any problems, how difficult have these made it for you to do your work, take care of things at home, or get along with other people? Somewhat difficult    Total 7    4/22/15: Anxiety-likely due to recent medical issues.       Goals/Recommendations:      1/13/2019: referral to psychiatry for consultation.    12/15/2017:    Continue sertraline   Continue therapy   Continue therapy with Boo Harris.  Her sleep has improved. Consider adding SSRI if needed.    4/22/15: Trial of therapy  Formatting of this note is different from the original.   Overview:    Formatting of this note may be different from the original.   Careplan:   Background:     12/15/2017: She is taking her medication as directed. No side effects. Improvement of symptoms.    She has a history of anxiety and depression. Trigger: multiple health care issues, family stress with her children see note from 5/21/15.    PHQ-9      1. Little interest or pleasure in doing things Not at all    2. Feeling down, depressed, or hopeless Not at all    3. Trouble falling or staying asleep, or sleeping too much Several days    4. Feeling tired or having little energy More than half the days    5. Poor appetite or overeating Several days    6. Feeling bad about yourself -- or that you are a failure or have let yourself or your family down Several days    7. Trouble concentrating on things, such as reading the newspaper or watching television Several days    8. Moving or speaking so slowly that other people could have noticed? Or the opposite -- being so fidgety or restless that you have been moving around a lot more than usual Not at all    9. Thoughts that you would be better off dead or of hurting yourself in some way Not at all    Total 6       YUKO-7      1. Feeling nervous, anxious or on edge 1    2. Not being able to stop or control worrying 2    3. Worrying too much about different things 2    4. Trouble relaxing 0    5. Being so restless that it is hard to sit still 0    6. Becoming easily annoyed or irritable 1    7. Feeling afraid as if something awful might happen 1    8. If you checked off any problems, how difficult have these made it for you to do your work, take care of things at home, or get along with  other people? Somewhat difficult    Total 7       Goals/Recommendations:     12/15/2017:    Continue sertraline   Continue therapy   Continue therapy with B[facility] Harris.  Her sleep has improved. Consider adding SSRI if needed.      Tension headache 05/26/2015     Priority: Medium     Formatting of this note might be different from the original.   Careplan:   Background:     9/6/2018: unchanged tension headache/ chronic daily headache from baseline.    4/24/15: normal MRI.    Trigger: stress, TMJ.       Goals/Recommendations:     9/6/2018: Continue current plan, Continue to follow up with neurology   Continue therapy with Boo Harris, consult with TMJ clinic, trial of cyclobenzaprine.      Low back pain 04/26/2015     Priority: Medium    Anxiety 04/22/2015     Priority: Medium     Overview:   Overview:   Careplan:  Background:  Anxiety-likely due to recent medical issues.   Goals/Recommendations:  Trial of therapy   Careplan:  Background:  Anxiety-likely due to recent medical issues.   Goals/Recommendations:  Trial of therapy        Hyperlipidemia 03/17/2015     Priority: Medium    Chest pain 03/17/2015     Priority: Medium    History of allergy to aspirin 03/17/2015     Priority: Medium    Type 2 diabetes mellitus with hypoglycemia without coma (H) 01/28/2015     Priority: Medium    Other cirrhosis of liver (H) 12/29/2014     Priority: Medium     Formatting of this note is different from the original.   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                        - Portal hypertensive gastropathy.                        - Small hiatal hernia.                        - Normal duodenal bulb, first portion of the duodenum                         and second portion of the duodenum.                        - No specimens collected.    "Recommendation:      - Discharge patient to home (with escort).                        - Resume previous diet.                        - Return to liver clinic as previously scheduled.   6/30/2021: she states that she has not been drinking alcohol.    4/15/2021:  Last visit with hepatology was 01/2021.  At that point fibro scan suggested possible cirrhosis.  She is recommended to follow-up in 6 months with hepatology in do labs in fiber scan.  Patient states no new symptoms.  She has since stopped drinking and is going through chemical dependency counseling last drink of alcohol was Stephenson's Day 2021. Patient reports that her partner, family and friends have been supportive.    8/10/2020, 1/14/2020, 5/6/2019:  Recommended working on diet and exercise changes.  Avoid alcohol.   AST (SGOT) (U/L)    Date Value    01/13/2020 95 (H)    12/06/2018 54 (H)       ALT (SGPT) (U/L)    Date Value    01/13/2020 64 (H)    12/06/2018 55       Bilirubin, Total    Date Value    01/13/2020 0.7 mg/dL    12/06/2018 0.4 mg/dl       1/13/2019: following with [facility]   9/6/2018: asymptomatic    1/13/2019: recommended recheck lft in 1 year   12/15/2017, 7/10/2016: asymptomatic    12/29/14:She has lost significant weight 191# to 148# when she found out that she had DM.       Goals/Recommendations:    10/11/2021: Hep A vaccination today   6/30/2021:    WOODS (nonalcoholic steatohepatitis)   Please stop by lab today :-     Liver Panel(Hepatic Function Panel); Future   4/15/2021: Continue lab of labs and recheck liver scan 7/2021   8/10/2020, 1/14/2020: Please stop by lab today for: -     Liver Panel(Hepatic Function Panel); Future   5/6/2019, 9/6/2018: Continue to work on diet and exercise changes   12/15/2017, 7/10/2016:  Stop by lab for: Liver Panel(Hepatic Function Panel); Future   12/29/14: Seen by hepatology.     \"PLAN: Patient was congratulated on her remarkable commitment to better control of diabetes and her weight. This is evidenced " "by her improved liver enzymes. We did talk about fatty liver and the reversibility of it if caught early. We did also discuss drug-induced liver injury which can happen in patients, particularly women over age 50 who have fatty liver. She was advised to be careful with new medications, particularly antibiotics. I do think she is okay to go ahead and start metformin as indicated per Dr. Johnson. We will recheck her labs today. If they remain normal, I would recommend that she have repeat labs in approximately 4 and 8 months. I think if they remain normal, she does not need to remain a liver patient and can have her liver tests followed by Dr. Johnson. Again, she was commended on her significant commitment to lifestyle changes. I think that her alcohol consumption at 3 to 5 alcoholic beverages per week is acceptable. She was advised to have a repeat colonoscopy in 2020.    All of her questions were addressed. 45 minutes were spent with this patient, greater than 50% spent in counseling and coordination of care.    Veronika West, CNP\"      Vitiligo 09/30/2010     Priority: Medium     Formatting of this note might be different from the original.   Followed by Dr. Maureen Perales.      CASTRO (obstructive sleep apnea) 08/24/2010     Priority: Medium     Formatting of this note might be different from the original.   CASTRO (Obstructive Sleep Apnea)-using CPAP  Formatting of this note might be different from the original.   Overview:    CASTRO (Obstructive Sleep Apnea)-using CPAP      Allergic rhinitis 08/18/2010     Priority: Medium     Formatting of this note might be different from the original.   Careplan:   Background:  Followed by [facility] ENT Dr. Ventura Zimmerman. He feels that she has not been having recurrent sinusitis and surgery not recommended.       Goals/Recommendations:  Continue to treat rhinitis and continue to followed by [facility] allergist Dr. Sims.      Insomnia 05/04/2010     Priority: Medium     " Formatting of this note might be different from the original.   Careplan:   Background:     5/6/2019:  Patient is restart her CPAP.  Continues to have symptoms of insomnia.  Discussed non medication treatments for insomnia including CBT and she is in agreement with trial of this   1/13/2019: Odette stopped nearly all of her medications. No current complaints.    3/7/2018: increased problems with insomnia with baseline FM and tension HA. Taking amitriptyline 40 mg daily. She is interested in maximizing amitriptyline to 50 mg hs; however if this is not helpful than we will wean off amitriptyline and consider Lyrica vs other.    5/2015: she stopped Trazodone. She started amitriptyline with benefit.    5/6/2010: She was previously on Ambien for insomnia.  We discussed stopping the Ambien and starting amitriptyline instead.  This is not beneficial for her will consider starting trazodone.      Goals/Recommendations:     5/6/2019:  Referral for CBT   1/13/2019: monitor   3/7/2018: as above   Continue amitriptyline.      Plantar fasciitis 03/05/2007     Priority: Medium     Formatting of this note might be different from the original.   Right foot      Careplan:   Background:  4/15/2021:  Patient has a history of plantar fasciitis.  She has noted plantar heel pain.  Typically wears in the morning when she gets out of bed and after walks.      Goals/Recommendations:  4/15/2021:    Plantar fasciitis   Wear comfortable shoes   Stretches/excerise (see below) 3 times daily          Ice       SOCIAL HISTORY:  Social History     Tobacco Use    Smoking status: Never    Smokeless tobacco: Never   Substance Use Topics    Alcohol use: Yes     Comment: Alcoholic Drinks/day: Occasional usage    Drug use: No   No tobacco, no etoh since June 2023.     FAMILY HISTORY:  Family History   Problem Relation Age of Onset    Heart Failure Mother     Heart Failure Father     Heart Failure Sister     Coronary Artery Disease Brother     Coronary  Artery Disease Brother     Coronary Artery Disease Brother     Cerebrovascular Disease Sister    Mother and maternal grandmother with liver disease.     ALLERGIES:   Allergies   Allergen Reactions    Aspirin Hives    Diflunisal Nausea and Vomiting     (Dolobid) Occurred approximately 20 years ago    Occurred approximately 20 years ago   Occurred approximately 20 years ago   (Dolobid) Occurred approximately 20 years ago    Fd&C Yellow #5 (Tartrazine) Nausea and Vomiting     MEDICATIONS:   No current facility-administered medications for this encounter.     Current Outpatient Medications   Medication Sig Dispense Refill    acetaminophen (TYLENOL) 500 MG tablet [ACETAMINOPHEN (TYLENOL) 500 MG TABLET] Take 500 mg by mouth every 6 (six) hours as needed for pain.      albuterol (PROVENTIL) (2.5 MG/3ML) 0.083% neb solution Take 1 vial (2.5 mg) by nebulization every 6 hours as needed for shortness of breath, wheezing or cough 90 mL 0    CANNABIDIOL, CBD, EXTRACT ORAL [CANNABIDIOL, CBD, EXTRACT ORAL] Take 1 drop by mouth daily.      famotidine (PEPCID) 20 MG tablet [FAMOTIDINE (PEPCID) 20 MG TABLET] Take 1 tablet (20 mg total) by mouth 2 (two) times a day. 30 tablet 0    fluticasone (FLOVENT HFA) 110 mcg/actuation inhaler [FLUTICASONE (FLOVENT HFA) 110 MCG/ACTUATION INHALER] Inhale 2 puffs 2 (two) times a day.      gabapentin (NEURONTIN) 300 MG capsule [GABAPENTIN (NEURONTIN) 300 MG CAPSULE] Take 300 mg by mouth 3 (three) times a day.      loperamide (IMODIUM) 2 mg capsule [LOPERAMIDE (IMODIUM) 2 MG CAPSULE] Take 2 mg by mouth 4 (four) times a day.      magnesium oxide 400 mg cap [MAGNESIUM OXIDE 400 MG CAP] Take 400 mg by mouth daily.      metFORMIN (GLUCOPHAGE) 500 MG tablet [METFORMIN (GLUCOPHAGE) 500 MG TABLET] Take 500 mg by mouth daily with breakfast.             omeprazole (PRILOSEC) 20 MG capsule [OMEPRAZOLE (PRILOSEC) 20 MG CAPSULE] Take 20 mg by mouth daily.      ondansetron (ZOFRAN) 4 MG tablet Take 1 tablet (4  mg) by mouth every 8 hours as needed for nausea 10 tablet 0    oxyCODONE (ROXICODONE) 5 MG tablet Take 1 tablet by mouth every 6 hours as needed for severe pain or breakthrough pain not otherwise improved by acetaminophen and/or ibuprofen. 8 tablet 0    predniSONE (DELTASONE) 20 MG tablet Take two tablets (= 40mg) each day for 5 (five) days 10 tablet 0    propranolol (INDERAL LA) 60 mg 24 hr capsule [PROPRANOLOL (INDERAL LA) 60 MG 24 HR CAPSULE] Take 60 mg by mouth daily.      sertraline (ZOLOFT) 50 MG tablet [SERTRALINE (ZOLOFT) 50 MG TABLET] Take 1 tablet (50 mg total) by mouth daily. 20 tablet 0    sucralfate (CARAFATE) 1 gram tablet [SUCRALFATE (CARAFATE) 1 GRAM TABLET] Take 1 g by mouth once as needed.      TRAZODONE HCL (TRAZODONE ORAL) [TRAZODONE HCL (TRAZODONE ORAL)] Take by mouth. Unknown dose       PHYSICAL EXAM:   /56   Pulse 80   Temp 97.7  F (36.5  C) (Axillary)   Resp 25   Wt 60.8 kg (134 lb 1.6 oz)   SpO2 100%   BMI 23.02 kg/m     GEN: NAD, female appears stated age lying in bed  HEENT: No icterus, no lymphadenopathy  HRT: RRR  LUNGS: CTA  ABD: +BS, soft, diffuse tenderness worse in the RUQ  SKIN: No rash, jaundice  MSKL: no LE edema, strength 5/5 all 4 extrems  NEURO: Alert and oriented, appropriate mood and affect     ADDITIONAL DATA:   I reviewed the patient's new clinical lab test results.   Recent Labs   Lab Test 04/14/24 0600 03/18/24 2021 03/07/24  0945   WBC 8.8 4.3 7.6   HGB 13.9 12.8 14.0   MCV 91 91 91    206 269     Recent Labs   Lab Test 04/14/24 0600 03/18/24 2021 03/07/24  0945   POTASSIUM 3.4 3.4 3.8   CHLORIDE 105 104 104   CO2 25 24 27   BUN 11.4 7.4* 10.9   ANIONGAP 11 11 10     Recent Labs   Lab Test 04/14/24  0600 03/07/24  1046 03/07/24  0945 02/11/19  0853 02/11/19  0702   ALBUMIN 4.0  --  3.7  --  4.0   BILITOTAL 1.0  --  0.5  --  0.3   *  --  22  --  54*   *  --  31  --  42*   PROTEIN  --  Negative  --  Negative  --    LIPASE 43  --  31  --    --         Imaging results:  CTA chest/abdomen/pelvis 4/14/24:  FINDINGS:   CT ANGIOGRAM CHEST, ABDOMEN, AND PELVIS: Normal caliber thoracic aorta which is negative for intramural hematoma, dissection, or aneurysm. The proximal aortic arch branch vessels are patent. Normal caliber abdominal aorta which is negative for dissection   or aneurysm. The celiac, superior mesenteric, bilateral renal, inferior mesenteric, bilateral common, internal and external iliac, common femoral and upper femoral and profunda femoral arteries are patent without significant stenosis. No active extravasation. Mild scattered atherosclerotic plaque. Lower paraesophageal varices.  LUNGS AND PLEURA: Lungs are clear. No pleural effusions.  MEDIASTINUM/AXILLAE: Normal.  CORONARY ARTERY CALCIFICATION: Mild.  HEPATOBILIARY: Hypertrophy of the caudate lobe suggesting fibrosis. No mass. Cholelithiasis with mild gallbladder wall thickening. Dilated upper common bile duct up to 9 mm.  PANCREAS: Normal.  SPLEEN: Normal.  ADRENAL GLANDS: Normal.  KIDNEYS/BLADDER: Mild right renal pelviectasis. No urinary calculi or hydronephrosis.  BOWEL: No obstruction. Mild diffuse hyperenhancement of the gastric mucosa suggesting possible gastritis. Duodenal diverticulum. Distal colonic diverticulosis.  LYMPH NODES: Normal.  PELVIC ORGANS: Pelvic phleboliths.  MUSCULOSKELETAL: Small fat-containing periumbilical hernia. Chronic bilateral rib fractures. Mild multilevel degenerative changes in the thoracolumbar spine.                                                                   IMPRESSION:  1.  No active extravasation identified.   2.  Mild diffuse hyperenhancement of the gastric mucosa suggesting possible gastritis.   3.  Morphologic changes of hepatic fibrosis with paraesophageal varices suggesting portal hypertension.  4.  Mildly dilated upper common bile duct as seen on the same day ultrasound. MRCP is recommended for further evaluation.  5.  Distal colonic  diverticulosis without acute diverticulitis.  6.  Cholelithiasis with mild gallbladder wall thickening, better evaluated on the same day ultrasound.    RUQ ultrasound 4/14/24:  FINDINGS:  GALLBLADDER: Cholelithiasis. Mild gallbladder wall thickening (4 mm). Trace pericholecystic fluid. Negative sonographic Fontanez's sign.  BILE DUCTS: The common duct is dilated and measures 11 mm.   LIVER: Coarsened echotexture, suggesting underlying fibrosis. Smooth contour. No focal mass.  RIGHT KIDNEY: Mild pelviectasis.  PANCREAS: The pancreas is obscured by overlying gas.  No ascites.                                                                   IMPRESSION:  1.  Cholelithiasis with mild gallbladder wall thickening and trace pericholecystic fluid but negative sonographic Fontanez's sign. Findings are equivocal for acute cholecystitis with wall thickening potentially related to intrinsic hepatic disease.  2.  Dilated common bile duct up to 11 mm. Recommend MRCP to evaluate for potential choledocholithiasis which is not seen sonographically.  3.  Coarsened hepatic echotexture suggesting fibrosis. No focal mass.  4.   Mild right renal pelviectasis.        ASSESSMENT:    CBD dilation  Elevated liver enzymes  RUQ pain  This is a 69 y/o female with PMH type 2 diabetes, cirrhosis due to fatty liver, and asthma admitted today for RUQ pain and elevated liver tests with imaging showing CBD dilation concerning for biliary obstruction. She has known about gallstones and had cholecystectomy recommended in the past but not completed due to onset of the covid-19 pandemic. Agree with plan for MRCP and pending results she may need an ERCP/lap denia. She is at increased risk of morbidity and mortality with abdominal surgery in the setting of her cirrhosis but is Child-[place] A and well-compensated so at lowest risk. She notes a red color to emesis after multiple episodes of vomiting, which may be bleeding from a Helga-Guerrero tear or potential  bleeding from gastritis seen on CT (which may be portal hypertensive gastropathy in the setting of her cirrhosis). EGD 10/2023 at  negative for varices or gastropathy. Hgb stable currently and she had a BM earlier today without overt GI bleeding. Will continue to monitor and only consider upper endoscopy for overt bleeding or drop in hemoglobin.     PLAN:  - NPO  - MRCP as planned - if evidence of choledocholithiasis she will need an ERCP and surgery consult  - Supportive cares and anti-emetics per medicine  - Ongoing outpatient Hepatology follow up at [facility] juju Diez PA-C  [facility]  4/14/2024 9:51 AM  [phone] (office)    This case was discussed with Dr. Bender who agrees to the above assessment and plan.    45 minutes of total time was spent today providing patient care, including patient evaluation, reviewing documentation/test result, and .   ________________________________________________________________________        GI ATTENDING BRIEF ADDENDUM:  The patient was seen and examined.  Discussed with APN/PA.  Agree with findings and plan as above with the following addendum:     Await MRCP results. ERCP tomorrow if there are signs of biliary obstruction.      Frankie Bender M.D.  [facility]  Office: [phone]

## 2024-04-14 NOTE — CONSULTS
General Surgery Consultation  Odette Escobar MRN# [id number]   Age/Sex: 68 year old female YOB: 1955     Reason for consult: 1. Acute cholecystitis    2. Right upper quadrant abdominal pain    3. Elevated LFTs    4. Hepatic fibrosis    5. Portal hypertension (H)    6. Symptomatic cholelithiasis    7. Dilation of common bile duct            Requesting physician: Dr. Victor                  Assessment and Plan:   Assessment:  Odette Escobar is a 68-year-old female PMH DM type II, fatty liver cirrhosis followed by [facility], cholelithiasis presenting with right upper quadrant pain with elevated liver enzymes.  CT and US demonstrating Gallbladder wall thickening, MRCP without choledocholithiasis.  Patient remains afebrile and stable vital signs without leukocytosis.  Plan for laparoscopic cholecystectomy today    Plan:  -NPO  -Activity as tolerated  -Plan for OR today as schedule allows  -Medical management per primary team          Chief Complaint:     Chief Complaint   Patient presents with    Abdominal Pain    Vomiting        History is obtained from the patient    HPI:   Odette Escobar is a 68 year old female who presents with right upper quadrant pain and vomiting, patient goes into detail regarding her fall in February and injury sustained then including back soreness and left arm injury.  Patient states that she realizes she has gallstones since 2020 and her doctor has been following it and recommended she had them out but due to COVID she had to delay the operation.  Patient sees [facility] GI outpatient and had a recent endoscopy.  Patient denies fever, chills, changes in bowel or bladder.  States that she had vomited multiple times although no is not nauseated and has not vomited.  Pain is well-controlled.  Last bowel movement earlier today.  And is passing gas.    Patient states she has an allergy to aspirin and potentially a yellow contrast?  States she had a itching and red bump reaction after  seeing GI at some point for an endoscopy.  Patient has had tonsillectomy and orthopedic surgeries denies prior abdominal surgeries.  Denies taking blood thinners.          Past Medical History:   History reviewed. No pertinent past medical history.           Past Surgical History:     Past Surgical History:   Procedure Laterality Date    ENDOMETRIAL ABLATION               Social History:    reports that she has never smoked. She has never used smokeless tobacco. She reports current alcohol use. She reports that she does not use drugs.           Family History:     Family History   Problem Relation Age of Onset    Heart Failure Mother     Heart Failure Father     Heart Failure Sister     Coronary Artery Disease Brother     Coronary Artery Disease Brother     Coronary Artery Disease Brother     Cerebrovascular Disease Sister               Allergies:     Allergies   Allergen Reactions    Aspirin Hives    Diflunisal Nausea and Vomiting     (Dolobid) Occurred approximately 20 years ago    Occurred approximately 20 years ago   Occurred approximately 20 years ago   (Dolobid) Occurred approximately 20 years ago    Fd&C Yellow #5 (Tartrazine) Nausea and Vomiting              Medications:     Prior to Admission medications    Medication Sig Start Date End Date Taking? Authorizing Provider   acetaminophen (TYLENOL) 500 MG tablet Take 1,000 mg by mouth daily as needed for pain 2/26/19  Yes Provider, Historical   albuterol (PROAIR HFA/PROVENTIL HFA/VENTOLIN HFA) 108 (90 Base) MCG/ACT inhaler Inhale 2 puffs into the lungs every 6 hours as needed for shortness of breath, wheezing or cough   Yes Unknown, Entered By History   amitriptyline (ELAVIL) 10 MG tablet Take 30 mg by mouth at bedtime 2/1/23  Yes Unknown, Entered By History   ammonium lactate (AMLACTIN) 12 % external cream Apply topically daily as needed for dry skin 4/5/24  Yes Unknown, Entered By History   atorvastatin (LIPITOR) 80 MG tablet Take 80 mg by mouth daily   Yes  Unknown, Entered By History   bacitracin 500 UNIT/GM external ointment Apply topically 2 times daily 3/7/24  Yes Unknown, Entered By History   Baclofen (LIORESAL) 5 MG tablet Take 5-10 mg by mouth 2 times daily as needed for muscle spasms or other (or sleep) 2/29/24  Yes Unknown, Entered By History   BELSOMRA 10 MG tablet Take 1 tablet by mouth nightly as needed for sleep 3/19/24  Yes Unknown, Entered By History   benzonatate (TESSALON) 100 MG capsule Take 200 mg by mouth 3 times daily as needed for cough   Yes Unknown, Entered By History   budesonide-formoterol (SYMBICORT) 160-4.5 MCG/ACT Inhaler Inhale 2 puffs into the lungs two times daily Rinse mouth/gargle after use   Yes Unknown, Entered By History   celecoxib (CELEBREX) 200 MG capsule Take 200 mg by mouth daily 1/4/24  Yes Unknown, Entered By History   cetirizine (ZYRTEC) 10 MG tablet Take 10 mg by mouth daily   Yes Unknown, Entered By History   cholecalciferol (VITAMIN D3) 50 MCG (2000 UT) CAPS Take 6,000 Units by mouth daily   Yes Unknown, Entered By History   estradiol (ESTRACE) 0.1 MG/GM vaginal cream Place vaginally twice a week   Yes Unknown, Entered By History   famotidine (PEPCID) 20 MG tablet Take 20 mg by mouth 2 times daily   Yes Unknown, Entered By History   ferrous gluconate (FERGON) 324 (38 Fe) MG tablet Take 324 mg by mouth daily (with breakfast) 5/10/23  Yes Unknown, Entered By History   fluticasone (FLONASE) 50 MCG/ACT nasal spray Spray 2 sprays into both nostrils daily as needed for rhinitis or allergies   Yes Unknown, Entered By History   gabapentin (NEURONTIN) 300 MG capsule Take 600 mg by mouth 3 times daily 11/2/17  Yes Provider, Historical   hydrocortisone 2.5 % ointment Apply topically 2 times daily as needed for other (Eczema) 3/21/24  Yes Unknown, Entered By History   ibuprofen (ADVIL/MOTRIN) 200 MG tablet Take 600 mg by mouth 2 times daily as needed for pain   Yes Unknown, Entered By History   ipratropium - albuterol 0.5 mg/2.5 mg/3  mL (DUONEB) 0.5-2.5 (3) MG/3ML neb solution Take 1 vial by nebulization 3 times daily as needed for shortness of breath, wheezing or cough   Yes Unknown, Entered By History   JARDIANCE 10 MG TABS tablet Take 10 mg by mouth daily   Yes Unknown, Entered By History   loperamide (IMODIUM) 2 mg capsule Take 2 mg by mouth 4 times daily as needed for diarrhea 11/19/18  Yes Provider, Historical   losartan (COZAAR) 25 MG tablet Take 1 tablet by mouth daily 6/7/23  Yes Unknown, Entered By History   Magnesium Oxide -Mg Supplement 500 MG TABS Take 1 tablet by mouth daily   Yes Unknown, Entered By History   montelukast (SINGULAIR) 10 MG tablet Take 1 tablet by mouth at bedtime   Yes Unknown, Entered By History   Multiple Vitamins-Minerals (MULTIVITAMIN WOMEN 50+) TABS Take 1 tablet by mouth daily   Yes Unknown, Entered By History   omeprazole (PRILOSEC) 20 MG capsule Take 1 capsule by mouth daily 11/18/18  Yes Provider, Historical   oxyCODONE (ROXICODONE) 5 MG tablet Take 1 tablet by mouth every 6 hours as needed for severe pain or breakthrough pain not otherwise improved by acetaminophen and/or ibuprofen. 4/2/24  Yes Clementina Powers, KAIDEN   OZEMPIC, 0.25 OR 0.5 MG/DOSE, 2 MG/3ML pen Inject 0.5 mg Subcutaneous every 7 days Tuesdays 6/15/23  Yes Unknown, Entered By History   polyethylene glycol-propylene glycol (SYSTANE ULTRA) 0.4-0.3 % SOLN ophthalmic solution Place 1 drop into both eyes 4 times daily as needed for dry eyes   Yes Unknown, Entered By History   propranolol ER (INDERAL LA) 120 MG 24 hr capsule Take 120 mg by mouth daily   Yes Unknown, Entered By History   sertraline (ZOLOFT) 100 MG tablet Take 100 mg by mouth daily   Yes Unknown, Entered By History   TYRVAYA 0.03 MG/ACT nasal spray Spray 1 spray into both nostrils 2 times daily   Yes Unknown, Entered By History              Review of Systems:   The Review of Systems is negative other than noted in the HPI            Physical Exam:   Patient Vitals for the past  "24 hrs:   BP Temp Temp src Pulse Resp SpO2 Height Weight   04/14/24 1209 122/76 -- -- 71 18 100 % 1.626 m (5' 4\") --   04/14/24 1100 115/65 -- -- 79 -- 100 % -- --   04/14/24 1045 119/59 -- -- -- -- 100 % -- --   04/14/24 1030 131/69 -- -- 75 -- 97 % -- --   04/14/24 0645 117/56 -- -- 80 -- 100 % -- --   04/14/24 0630 128/63 -- -- 79 25 100 % -- --   04/14/24 0615 (!) 144/69 -- -- 73 27 100 % -- --   04/14/24 0608 137/82 -- -- 76 29 -- -- --   04/14/24 0607 -- -- -- -- -- 100 % -- --   04/14/24 0541 123/58 97.7  F (36.5  C) Axillary 74 -- 100 % -- 60.8 kg (134 lb 1.6 oz)        No intake or output data in the 24 hours ending 04/14/24 1411   Constitutional:   Awake, alert, cooperative, no apparent distress, and appears stated age, difficult to keep patient on track with conversation, very talkative       Eyes:   PERRLA, conjunctiva/corneas clear, EOM's intact; no scleral edema or icterus noted        ENT:   Normocephalic, without obvious abnormality, atraumatic, Lips, mucosa, and tongue normal          Lungs:   Normal respiratory effort, no accessory muscle use         Abdomen:   Generally soft and without distention.  Mild tenderness over the epigastric area and some tenderness with right upper quadrant palpation.  Equivocal Fontanez sign though leaning toward positive.  Generally nontender in the lower quadrants.       Musculoskeletal:   No obvious swelling, bruising or deformity, cast on left forearm       Skin:   Skin color and texture normal for patient, no rashes or lesions              Data:         All imaging studies reviewed by me.    Results for orders placed or performed during the hospital encounter of 04/14/24 (from the past 24 hour(s))   CBC with platelets differential    Narrative    The following orders were created for panel order CBC with platelets differential.  Procedure                               Abnormality         Status                     ---------                               -----------  "        ------                     CBC with platelets and d...[[id number]]                      Final result                 Please view results for these tests on the individual orders.   Comprehensive metabolic panel   Result Value Ref Range    Sodium 141 135 - 145 mmol/L    Potassium 3.4 3.4 - 5.3 mmol/L    Carbon Dioxide (CO2) 25 22 - 29 mmol/L    Anion Gap 11 7 - 15 mmol/L    Urea Nitrogen 11.4 8.0 - 23.0 mg/dL    Creatinine 0.62 0.51 - 0.95 mg/dL    GFR Estimate >90 >60 mL/min/1.73m2    Calcium 9.4 8.8 - 10.2 mg/dL    Chloride 105 98 - 107 mmol/L    Glucose 207 (H) 70 - 99 mg/dL    Alkaline Phosphatase 156 (H) 40 - 150 U/L     (H) 0 - 45 U/L     (H) 0 - 50 U/L    Protein Total 6.9 6.4 - 8.3 g/dL    Albumin 4.0 3.5 - 5.2 g/dL    Bilirubin Total 1.0 <=1.2 mg/dL   Lipase   Result Value Ref Range    Lipase 43 13 - 60 U/L   Troponin T, High Sensitivity   Result Value Ref Range    Troponin T, High Sensitivity 6 <=14 ng/L   CBC with platelets and differential   Result Value Ref Range    WBC Count 8.8 4.0 - 11.0 10e3/uL    RBC Count 4.60 3.80 - 5.20 10e6/uL    Hemoglobin 13.9 11.7 - 15.7 g/dL    Hematocrit 42.0 35.0 - 47.0 %    MCV 91 78 - 100 fL    MCH 30.2 26.5 - 33.0 pg    MCHC 33.1 31.5 - 36.5 g/dL    RDW 13.3 10.0 - 15.0 %    Platelet Count 217 150 - 450 10e3/uL    % Neutrophils 75 %    % Lymphocytes 18 %    % Monocytes 7 %    % Eosinophils 0 %    % Basophils 0 %    % Immature Granulocytes 0 %    NRBCs per 100 WBC 0 <1 /100    Absolute Neutrophils 6.6 1.6 - 8.3 10e3/uL    Absolute Lymphocytes 1.6 0.8 - 5.3 10e3/uL    Absolute Monocytes 0.6 0.0 - 1.3 10e3/uL    Absolute Eosinophils 0.0 0.0 - 0.7 10e3/uL    Absolute Basophils 0.0 0.0 - 0.2 10e3/uL    Absolute Immature Granulocytes 0.0 <=0.4 10e3/uL    Absolute NRBCs 0.0 10e3/uL   Ethyl Alcohol Level   Result Value Ref Range    Alcohol ethyl <0.01 <=0.01 g/dL   US Abdomen Limited    Narrative    EXAM: US ABDOMEN LIMITED  LOCATION: [facility]  DATE: 4/14/2024    INDICATION: pain  COMPARISON: Ultrasound 10/18/2023, CT abdomen pelvis 03/07/2024  TECHNIQUE: Limited abdominal ultrasound.    FINDINGS:    GALLBLADDER: Cholelithiasis. Mild gallbladder wall thickening (4 mm). Trace pericholecystic fluid. Negative sonographic Fontanez's sign.    BILE DUCTS: The common duct is dilated and measures 11 mm.     LIVER: Coarsened echotexture, suggesting underlying fibrosis. Smooth contour. No focal mass.    RIGHT KIDNEY: Mild pelviectasis.    PANCREAS: The pancreas is obscured by overlying gas.    No ascites.      Impression    IMPRESSION:  1.  Cholelithiasis with mild gallbladder wall thickening and trace pericholecystic fluid but negative sonographic Fontanez's sign. Findings are equivocal for acute cholecystitis with wall thickening potentially related to intrinsic hepatic disease.  2.  Dilated common bile duct up to 11 mm. Recommend MRCP to evaluate for potential choledocholithiasis which is not seen sonographically.  3.  Coarsened hepatic echotexture suggesting fibrosis. No focal mass.  4.   Mild right renal pelviectasis.   CTA Chest Abdomen Pelvis w Contrast    Narrative    EXAM: CTA CHEST ABDOMEN PELVIS W CONTRAST  LOCATION: [facility]  DATE: 4/14/2024    INDICATION: Right sided chest and abdominal pain.  Supple hematemesis.  Evaluate for active bleeding Helga Guerrero tear cholecystitis  COMPARISON: Ultrasound abdomen 04/14/2024, CT chest 03/18/2024, CT abdomen pelvis 03/07/2024  TECHNIQUE: CT angiogram chest abdomen pelvis during arterial phase of injection of IV contrast. 2D and 3D MIP reconstructions were performed by the CT technologist. Dose reduction techniques were used.   CONTRAST: ISOVUE 370 65ML    FINDINGS:   CT ANGIOGRAM CHEST, ABDOMEN, AND PELVIS: Normal caliber thoracic aorta which is negative for intramural hematoma, dissection, or aneurysm. The proximal aortic arch branch vessels are patent. Normal caliber  abdominal aorta which is negative for dissection   or aneurysm. The celiac, superior mesenteric, bilateral renal, inferior mesenteric, bilateral common, internal and external iliac, common femoral and upper femoral and profunda femoral arteries are patent without significant stenosis. No active   extravasation. Mild scattered atherosclerotic plaque. Lower paraesophageal varices.    LUNGS AND PLEURA: Lungs are clear. No pleural effusions.    MEDIASTINUM/AXILLAE: Normal.    CORONARY ARTERY CALCIFICATION: Mild.    HEPATOBILIARY: Hypertrophy of the caudate lobe suggesting fibrosis. No mass. Cholelithiasis with mild gallbladder wall thickening. Dilated upper common bile duct up to 9 mm.    PANCREAS: Normal.    SPLEEN: Normal.    ADRENAL GLANDS: Normal.    KIDNEYS/BLADDER: Mild right renal pelviectasis. No urinary calculi or hydronephrosis.    BOWEL: No obstruction. Mild diffuse hyperenhancement of the gastric mucosa suggesting possible gastritis. Duodenal diverticulum. Distal colonic diverticulosis.    LYMPH NODES: Normal.    PELVIC ORGANS: Pelvic phleboliths.    MUSCULOSKELETAL: Small fat-containing periumbilical hernia. Chronic bilateral rib fractures. Mild multilevel degenerative changes in the thoracolumbar spine.      Impression    IMPRESSION:  1.  No active extravasation identified.   2.  Mild diffuse hyperenhancement of the gastric mucosa suggesting possible gastritis.   3.  Morphologic changes of hepatic fibrosis with paraesophageal varices suggesting portal hypertension.  4.  Mildly dilated upper common bile duct as seen on the same day ultrasound. MRCP is recommended for further evaluation.  5.  Distal colonic diverticulosis without acute diverticulitis.  6.  Cholelithiasis with mild gallbladder wall thickening, better evaluated on the same day ultrasound.     Acetaminophen level   Result Value Ref Range    Acetaminophen <5.0 (L) 10.0 - 30.0 ug/mL   MR Abdomen MRCP w/o & w Contrast    Narrative    EXAM: MR  ABDOMEN MRCP W/O and W CONTRAST  LOCATION: [facility]  DATE: 4/14/2024    INDICATION: Right upper quadrant abdominal pain elevated liver function test this was requested by gastroenterologist.  COMPARISON: CT and ultrasound 4/14/2024  TECHNIQUE: Routine MR liver/pancreas protocol including axial and coronal MRCP sequences. 2D and 3D reconstruction performed by MR technologist including MIP reconstruction and slab cholangiograms. If performed with contrast, additional dynamic T1 post   IV contrast images.  CONTRAST: 6ml gadavist     FINDINGS:     MRCP: Mild biliary dilation with the common duct measuring up to 0.8 cm and tapering smoothly at the ampulla. No biliary wall thickening, enhancement, or filling defects. Normal pancreatic duct anatomy. The gallbladder is distended with tiny stones,   pericholecystic fluid, and mild wall thickening.    LIVER: No significant hepatic abnormality. No focal masses. No evidence of cirrhosis or significant fat or iron deposition.    PANCREAS: No evidence of mass or pancreatitis.    ADDITIONAL FINDINGS: No significant abnormality in the spleen, kidneys, and adrenal glands. No adenopathy. No ascites.      Impression    IMPRESSION:  1.  Mild biliary dilation, but no choledocholithiasis or other source of obstruction.    2.  Tiny gallstones with mild wall thickening and pericholecystic fluid raising concern for cholecystitis.           Hali Meléndez PA-C   Physicians  [facility]  [address] [place] [id number]

## 2024-04-14 NOTE — CONSULTS
General Surgery Consultation  Odette Escobar MRN# [id number]   Age/Sex: 68 year old female YOB: 1955     Reason for consult: Acute cholecystitis       Requesting physician: Dr. Vang                    Assessment and Plan:   Assessment:  acute cholecystitis  Gallstones  Mild dilation of biliary duct    68-year-old female presented with acute cholecystitis.  There was findings of minimally dilation of the biliary duct.  MRCP shows no choledocholithiasis.    Plan:  -The OR today for robotic cholecystectomy  - The risks and benefits of the procedure were explained detail to the patient. The risks include infection, bleeding, damage to the surrounding structures. Patient verbalized understanding provided consent to undergo the procedure above.            Chief Complaint:     Chief Complaint   Patient presents with    Abdominal Pain    Vomiting        History is obtained from the patient    HPI:   Odette Escobar is a 68 year old female who presents to the ED with complaints of abdominal pain.  Patient had abdominal pain that started last night.  The pain was located in the right upper quadrant.  Patient states that she had nausea and dry heaves.  Patient was worked up and found to have acute cholecystitis.  However the patient also had findings of biliary duct dilation.  As result an MRCP was completed.  Patient has no further complaints at this time.  Patient does have a history of alcoholic liver cirrhosis.  Denies any chest pain shortness of breath.          Past Medical History:   History reviewed. No pertinent past medical history.           Past Surgical History:     Past Surgical History:   Procedure Laterality Date    ENDOMETRIAL ABLATION               Social History:    reports that she has never smoked. She has never used smokeless tobacco. She reports current alcohol use. She reports that she does not use drugs.           Family History:     Family History   Problem Relation Age of Onset    Heart  Failure Mother     Heart Failure Father     Heart Failure Sister     Coronary Artery Disease Brother     Coronary Artery Disease Brother     Coronary Artery Disease Brother     Cerebrovascular Disease Sister               Allergies:     Allergies   Allergen Reactions    Aspirin Hives    Diflunisal Nausea and Vomiting     (Dolobid) Occurred approximately 20 years ago    Occurred approximately 20 years ago   Occurred approximately 20 years ago   (Dolobid) Occurred approximately 20 years ago    Fd&C Yellow #5 (Tartrazine) Nausea and Vomiting              Medications:     Prior to Admission medications    Medication Sig Start Date End Date Taking? Authorizing Provider   acetaminophen (TYLENOL) 500 MG tablet Take 1,000 mg by mouth daily as needed for pain 2/26/19  Yes Provider, Historical   albuterol (PROAIR HFA/PROVENTIL HFA/VENTOLIN HFA) 108 (90 Base) MCG/ACT inhaler Inhale 2 puffs into the lungs every 6 hours as needed for shortness of breath, wheezing or cough   Yes Unknown, Entered By History   amitriptyline (ELAVIL) 10 MG tablet Take 30 mg by mouth at bedtime 2/1/23  Yes Unknown, Entered By History   ammonium lactate (AMLACTIN) 12 % external cream Apply topically daily as needed for dry skin 4/5/24  Yes Unknown, Entered By History   atorvastatin (LIPITOR) 80 MG tablet Take 80 mg by mouth daily   Yes Unknown, Entered By History   bacitracin 500 UNIT/GM external ointment Apply topically 2 times daily 3/7/24  Yes Unknown, Entered By History   Baclofen (LIORESAL) 5 MG tablet Take 5-10 mg by mouth 2 times daily as needed for muscle spasms or other (or sleep) 2/29/24  Yes Unknown, Entered By History   BELSOMRA 10 MG tablet Take 1 tablet by mouth nightly as needed for sleep 3/19/24  Yes Unknown, Entered By History   benzonatate (TESSALON) 100 MG capsule Take 200 mg by mouth 3 times daily as needed for cough   Yes Unknown, Entered By History   budesonide-formoterol (SYMBICORT) 160-4.5 MCG/ACT Inhaler Inhale 2 puffs into  the lungs two times daily Rinse mouth/gargle after use   Yes Unknown, Entered By History   celecoxib (CELEBREX) 200 MG capsule Take 200 mg by mouth daily 1/4/24  Yes Unknown, Entered By History   cetirizine (ZYRTEC) 10 MG tablet Take 10 mg by mouth daily   Yes Unknown, Entered By History   cholecalciferol (VITAMIN D3) 50 MCG (2000 UT) CAPS Take 6,000 Units by mouth daily   Yes Unknown, Entered By History   estradiol (ESTRACE) 0.1 MG/GM vaginal cream Place vaginally twice a week   Yes Unknown, Entered By History   famotidine (PEPCID) 20 MG tablet Take 20 mg by mouth 2 times daily   Yes Unknown, Entered By History   ferrous gluconate (FERGON) 324 (38 Fe) MG tablet Take 324 mg by mouth daily (with breakfast) 5/10/23  Yes Unknown, Entered By History   fluticasone (FLONASE) 50 MCG/ACT nasal spray Spray 2 sprays into both nostrils daily as needed for rhinitis or allergies   Yes Unknown, Entered By History   gabapentin (NEURONTIN) 300 MG capsule Take 600 mg by mouth 3 times daily 11/2/17  Yes Provider, Historical   hydrocortisone 2.5 % ointment Apply topically 2 times daily as needed for other (Eczema) 3/21/24  Yes Unknown, Entered By History   ibuprofen (ADVIL/MOTRIN) 200 MG tablet Take 600 mg by mouth 2 times daily as needed for pain   Yes Unknown, Entered By History   ipratropium - albuterol 0.5 mg/2.5 mg/3 mL (DUONEB) 0.5-2.5 (3) MG/3ML neb solution Take 1 vial by nebulization 3 times daily as needed for shortness of breath, wheezing or cough   Yes Unknown, Entered By History   JARDIANCE 10 MG TABS tablet Take 10 mg by mouth daily   Yes Unknown, Entered By History   loperamide (IMODIUM) 2 mg capsule Take 2 mg by mouth 4 times daily as needed for diarrhea 11/19/18  Yes Provider, Historical   losartan (COZAAR) 25 MG tablet Take 1 tablet by mouth daily 6/7/23  Yes Unknown, Entered By History   Magnesium Oxide -Mg Supplement 500 MG TABS Take 1 tablet by mouth daily   Yes Unknown, Entered By History   montelukast  "(SINGULAIR) 10 MG tablet Take 1 tablet by mouth at bedtime   Yes Unknown, Entered By History   Multiple Vitamins-Minerals (MULTIVITAMIN WOMEN 50+) TABS Take 1 tablet by mouth daily   Yes Unknown, Entered By History   omeprazole (PRILOSEC) 20 MG capsule Take 1 capsule by mouth daily 11/18/18  Yes Provider, Historical   oxyCODONE (ROXICODONE) 5 MG tablet Take 1 tablet by mouth every 6 hours as needed for severe pain or breakthrough pain not otherwise improved by acetaminophen and/or ibuprofen. 4/2/24  Yes Clementina Powers, KAIDEN   OZEMPIC, 0.25 OR 0.5 MG/DOSE, 2 MG/3ML pen Inject 0.5 mg Subcutaneous every 7 days Tuesdays 6/15/23  Yes Unknown, Entered By History   polyethylene glycol-propylene glycol (SYSTANE ULTRA) 0.4-0.3 % SOLN ophthalmic solution Place 1 drop into both eyes 4 times daily as needed for dry eyes   Yes Unknown, Entered By History   propranolol ER (INDERAL LA) 120 MG 24 hr capsule Take 120 mg by mouth daily   Yes Unknown, Entered By History   sertraline (ZOLOFT) 100 MG tablet Take 100 mg by mouth daily   Yes Unknown, Entered By History   TYRVAYA 0.03 MG/ACT nasal spray Spray 1 spray into both nostrils 2 times daily   Yes Unknown, Entered By History              Review of Systems:   The Review of Systems is negative other than noted in the HPI            Physical Exam:   Patient Vitals for the past 24 hrs:   BP Temp Temp src Pulse Resp SpO2 Height Weight   04/14/24 1209 122/76 -- -- 71 18 100 % 1.626 m (5' 4\") --   04/14/24 1100 115/65 -- -- 79 -- 100 % -- --   04/14/24 1045 119/59 -- -- -- -- 100 % -- --   04/14/24 1030 131/69 -- -- 75 -- 97 % -- --   04/14/24 0645 117/56 -- -- 80 -- 100 % -- --   04/14/24 0630 128/63 -- -- 79 25 100 % -- --   04/14/24 0615 (!) 144/69 -- -- 73 27 100 % -- --   04/14/24 0608 137/82 -- -- 76 29 -- -- --   04/14/24 0607 -- -- -- -- -- 100 % -- --   04/14/24 0541 123/58 97.7  F (36.5  C) Axillary 74 -- 100 % -- 60.8 kg (134 lb 1.6 oz)        No intake or output data in " the 24 hours ending 04/14/24 1503   Constitutional:   awake, alert, cooperative, no apparent distress, and appears stated age       Eyes:   PERRL, conjunctiva/corneas clear, EOM's intact; no scleral edema or icterus noted        ENT:   Normocephalic, without obvious abnormality, atraumatic, Lips, mucosa, and tongue normal        Hematologic / Lymphatic:   No lymphadenopathy       Lungs:   Normal respiratory effort, no accessory muscle use       Cardiovascular:   Regular rate and rhythm       Abdomen:   Palpation right upper quadrant.  Abdomen soft, nondistended.       Musculoskeletal:   No obvious swelling, bruising or deformity       Skin:   Skin color and texture normal for patient, no rashes or lesions              Data:        All imaging studies reviewed by me.  I personally reviewed the imagings and agree with findings of mild biliary dilation.  Some wall thickening and pericholecystic fluid.      Christiano Cruz DO  General Surgeon  [facility]  Surgery [facility] - [address]  [place] [address]?  Office: [phone]  Employed by - [facility]  Pager: [phone]

## 2024-04-14 NOTE — ANESTHESIA PREPROCEDURE EVALUATION
Anesthesia Pre-Procedure Evaluation    Patient: Odette Escobar   MRN: [id number] : 1955        Procedure : Procedure(s):  CHOLECYSTECTOMY, ROBOT-ASSISTED, LAPAROSCOPIC, USING DA ROCOI XI          History reviewed. No pertinent past medical history.   Past Surgical History:   Procedure Laterality Date    ENDOMETRIAL ABLATION        Allergies   Allergen Reactions    Aspirin Hives    Diflunisal Nausea and Vomiting     (Dolobid) Occurred approximately 20 years ago    Occurred approximately 20 years ago   Occurred approximately 20 years ago   (Dolobid) Occurred approximately 20 years ago    Fd&C Yellow #5 (Tartrazine) Nausea and Vomiting      Social History     Tobacco Use    Smoking status: Never    Smokeless tobacco: Never   Substance Use Topics    Alcohol use: Yes     Comment: Alcoholic Drinks/day: Occasional usage      Wt Readings from Last 1 Encounters:   24 60.8 kg (134 lb 1.6 oz)        Anesthesia Evaluation            ROS/MED HX  ENT/Pulmonary:     (+) sleep apnea,                     asthma    COPD,              Neurologic:  - neg neurologic ROS   (+)    peripheral neuropathy,                            Cardiovascular:     (+)  hypertension- -   -  - -                                      METS/Exercise Tolerance:     Hematologic:     (+)      anemia,          Musculoskeletal:   (+)  arthritis,             GI/Hepatic:     (+) GERD,            liver disease,       Renal/Genitourinary:       Endo:     (+)  type II DM,                    Psychiatric/Substance Use:     (+)   alcohol abuse      Infectious Disease:  - neg infectious disease ROS     Malignancy:       Other:  - neg other ROS    (+)  , H/O Chronic Pain,         Physical Exam    Airway  airway exam normal      Mallampati: II   TM distance: > 3 FB   Neck ROM: full   Mouth opening: > 3 cm    Respiratory Devices and Support         Dental       (+) Minor Abnormalities - some fillings, tiny chips      Cardiovascular   cardiovascular exam normal   "     Rhythm and rate: regular and normal     Pulmonary   pulmonary exam normal        breath sounds clear to auscultation           OUTSIDE LABS:  CBC:   Lab Results   Component Value Date    WBC 8.8 04/14/2024    WBC 4.3 03/18/2024    HGB 13.9 04/14/2024    HGB 12.8 03/18/2024    HCT 42.0 04/14/2024    HCT 38.8 03/18/2024     04/14/2024     03/18/2024     BMP:   Lab Results   Component Value Date     04/14/2024     03/18/2024    POTASSIUM 3.4 04/14/2024    POTASSIUM 3.4 03/18/2024    CHLORIDE 105 04/14/2024    CHLORIDE 104 03/18/2024    CO2 25 04/14/2024    CO2 24 03/18/2024    BUN 11.4 04/14/2024    BUN 7.4 (L) 03/18/2024    CR 0.62 04/14/2024    CR 0.57 03/18/2024     (H) 04/14/2024     (H) 03/18/2024     COAGS: No results found for: \"PTT\", \"INR\", \"FIBR\"  POC: No results found for: \"BGM\", \"HCG\", \"HCGS\"  HEPATIC:   Lab Results   Component Value Date    ALBUMIN 4.0 04/14/2024    PROTTOTAL 6.9 04/14/2024     (H) 04/14/2024     (H) 04/14/2024    ALKPHOS 156 (H) 04/14/2024    BILITOTAL 1.0 04/14/2024     OTHER:   Lab Results   Component Value Date    JOVON 9.4 04/14/2024    MAG 1.7 (L) 02/11/2019    LIPASE 43 04/14/2024    TSH 2.20 02/11/2019       Anesthesia Plan    ASA Status:  3, emergent    NPO Status:  NPO Appropriate    Anesthesia Type: General.     - Airway: ETT   Induction: Intravenous, RSI, Propofol.   Maintenance: TIVA.        Consents    Anesthesia Plan(s) and associated risks, benefits, and realistic alternatives discussed. Questions answered and patient/representative(s) expressed understanding.     - Discussed:     - Discussed with:  Patient      - Extended Intubation/Ventilatory Support Discussed: No.           Postoperative Care    Pain management: IV analgesics, Multi-modal analgesia, Oral pain medications.   PONV prophylaxis: Ondansetron (or other 5HT-3), Dexamethasone or Solumedrol     Comments:               Adrian Johnson MD    I have " reviewed the pertinent notes and labs in the chart from the past 30 days and (re)examined the patient.  Any updates or changes from those notes are reflected in this note.

## 2024-04-14 NOTE — ANESTHESIA PROCEDURE NOTES
Airway       Patient location during procedure: OR       Procedure Start/Stop Times: 4/14/2024 3:25 PM  Staff -        CRNA: Prasanna Mckeon APRN CRNA       Performed By: CRNA  Consent for Airway        Urgency: elective  Indications and Patient Condition       Indications for airway management: [place]-procedural       Induction type:intravenous       Mask difficulty assessment: 1 - vent by mask    Final Airway Details       Final airway type: endotracheal airway       Successful airway: ETT - single  Endotracheal Airway Details        ETT size (mm): 7.0       Cuffed: yes       Cuff volume (mL): 5       Successful intubation technique: direct laryngoscopy       DL Blade Type: Garibay 2       Grade View of Cords: 1       Adjucts: stylet and tooth guard       Position: Right       Measured from: gums/teeth       Secured at (cm): 22       Bite block used: None    Post intubation assessment        Placement verified by: capnometry, equal breath sounds and chest rise        Number of attempts at approach: 1       Number of other approaches attempted: 0       Secured with: silk tape       Ease of procedure: easy       Dentition: Intact and Unchanged    Medication(s) Administered   Medication Administration Time: 4/14/2024 3:25 PM

## 2024-04-14 NOTE — ED TRIAGE NOTES
Pt states RUQ pain 8/10.  Pt endorses vomitting before coming to hospital.  Denies chest pain.  Pt has asthma and feels little short of breath currently.

## 2024-04-14 NOTE — OP NOTE
[facility]    Operative Note    Pre-operative diagnosis: Acute cholecystitis [K81.0]  Post-operative diagnosis Acute cholecystitis, umbilical hernia 2 cm with incarcerated omentum.    Procedure: CHOLECYSTECTOMY, ROBOT-ASSISTED, LAPAROSCOPIC, USING DA ROCIO XI, N/A - Abdomen  PRIMARY REPAIR OF  UMBILICAL HERNIA, N/A - Abdomen    Surgeon: Surgeons and Role:     * Christiano Cruz DO - Primary  Anesthesia: General   Estimated Blood Loss: 5 mL    Drains: None    Specimens:   ID Type Source Tests Collected by Time Destination   1 :  Tissue Gallbladder SURGICAL PATHOLOGY EXAM Christiano Cruz DO 4/14/2024  4:04 PM      Findings:     -Acute cholecystitis   -2 cm umbilical hernia with omentum adherent into the umbilical hernia.  Had to take down the omentum in order to visualize and complete rest of cholecystectomy.  Primary repair of ventral hernia.  .  Complications: None.  Implants: * No implants in log *      Indication: 68-year-old female presenting with abdominal pain.  Patient was worked up and found to have acute cholecystitis.  After evaluation, offered.  Procedure of robotic cholecystectomy.  The risks and benefits of the procedure were explained detail to the patient. The risks include infection, bleeding, damage to the surrounding structures. Patient verbalized understanding provided consent to undergo the procedure above.       Procedure: Patient was brought back to the operating room and was placed on the operating table in the supine position.  The patient's extremities were padded and positioned in the usual fashion.  The patient then underwent anesthesia sedation and intubation.  The patient's abdomen was prepped and draped in the usual sterile fashion.  Prior to initiating the procedure, a timeout was completed.  All present were in agreement.    1% lidocaine with epinephrine was instilled at Whipple's point in the left upper quadrant.  A 15 blade was used to make a skin knick incision  at Whipple's point.  A Veress needle was inserted into the abdomen and insufflation was initiated. An 8 mm trocar was inserted at the infraumbilical port site.  Insufflation was then initiated.  Once insufflation was completed, a general survey was completed.  I could identify that the patient had no injury of the abdominal contents upon entering the abdomen.  Distended gallbladder.  Also the patient had omentum that was herniated through the umbilical hernia.    An 8 mm port was placed to the right of the umbilicus.  Two 8 mm ports were placed in the left abdominal quadrant.  These ports were placed under direct visualization.  The robot was then docked and the robotic instruments were then inserted into the abdomen under direct visualization. Due to the omentum being herniated through the umbilical hernia, this had to be reduced.  After reduction of the omentum, I could identify that the patient had a 2 cm umbilical hernia.  I then used an oh V-Loc stitch permanent to repair this in a running fashion.  I did not use mesh due to the procedure of cholecystectomy which could potentially do contamination of the mesh.    The gallbladder was then grasped with tip up graspers and retracted cephalad.  The cystic duct and cystic artery were then carefully dissected and isolated with a combination of blunt dissection with the hook electrocautery.  Once the cystic artery and cystic duct were isolated the critical view was obtained.  Operative cholangiogram was completed to confirm anatomy with firefly.  Weck clips were used to clip across the cystic artery and duct.  The hook cautery was used to transect between the Weck clips of the cystic duct and cystic artery.  The gallbladder was removed off of the liver bed using electrocautery.  The gallbladder was then removed from the abdomen using an Endo Catch bag through the 8 mm left lower quadrant port.      Inspection of the liver bed revealed good hemostasis.  The fascia of  the gallbladder extraction site was then closed using an 0 Vicryl suture with a suture passer.  A 3-0 Vicryl suture was used to perform deep dermal sutures at the 12 mm port site.  All surgical sites were then closed with a 4-0 Vicryl suture in a subcuticular fashion.  Surgical glue was used to reinforce all surgical skin incisions.  At the end of the procedure, a final count was completed.  I was told all sharps, sponges, instruments were accounted for.  The patient tolerated the procedure with no complications.  The patient was then extubated and brought back to the PACU in stable condition.    Hali THOMPSON was present to assist myself in the surgical case.  Her assistance was required for exposure and visualization, leading to decreased operating time, and decreased blood loss.                      Christiano Cruz DO  General Surgeon  [facility]  Surgery [facility] - [address] [address]?  Office: [phone]  Employed by - [facility]  Pager: [phone]

## 2024-04-14 NOTE — ED PROVIDER NOTES
EMERGENCY DEPARTMENT ENCOUNTER      NAME: Odette Escobar  AGE: 68 year old female  YOB: 1955  MRN: [id number]  EVALUATION DATE & TIME: 4/14/2024  5:45 AM    PCP: Allie Johnson    ED PROVIDER: Em Victor M.D.      CHIEF COMPLAINT     Chief Complaint   Patient presents with     Abdominal Pain     Vomiting         FINAL IMPRESSION:     1. Right upper quadrant abdominal pain    2. Elevated LFTs    3. Hepatic fibrosis    4. Portal hypertension (H)    5. Symptomatic cholelithiasis    6. Dilation of common bile duct          MEDICAL DECISION MAKING:       Pertinent Labs & Imaging studies reviewed. (See chart for details)    68 year old female presents to the Emergency Department for evaluation of right-sided chest abdominal pain.    History    External Record(s) review as documented below    Exam  Nontoxic cooperative and pleasant appears in discomfort.  Pain is located throughout the right lower chest upper abdomen.  No guarding no rebound no signs of trauma she has a cast on the left upper extremity.    Differential Diagnosis include but not limited to cystitis peptic ulcer disease pancreatitis obstruction PE Helga-Guerrero tear acute coronary syndrome dissection among others.      Vital Signs: Reviewed  EKG: Sinus rhythm normal anterior progression normal axis.  Imaging: I independently interpreted CT chest and abdomen no free air..Formal read by radiologist.  Home meds: Reviewed  ED meds: Protonix Zofran morphine  Fluids: Liter normal saline  Labs:  K 3.4  Cr 0.62  Wbc 8.8  Hgb 13.9  platelets 217    Clinical Impression and Decision Making    68-year-old female presents by herself for right-sided chest and abdominal pain.  Started yesterday.  Associated with 15 episodes of vomiting.  She said it was red but she had a cupcake yesterday at a party.    In the past she has been told she needs to have her gallbladder out but COVID happened.    she also been having left-sided chest pain since  February.    she fell in February and hurt her right side.  Then she fell again recently and broke her left arm  States she is not currently taking any narcotic medications her primary care doctor told her to stop.  She has been sober from drinking.    Exam she is nontoxic she is cooperative and pleasant she has slightly bizarre behaviors somewhat very talkative on unable to stay still.  She does use CBD's but did not use today.  Otherwise her neck is supple.  She is oriented x 3 under no meningeal signs.    Note that labs and imaging were ordered prior to my evaluation of patient.    Etiologies considered for the right-sided pain and the vomiting of pinkish fluid on the left-sided chest pain.  Given that is more localized to the right upper quadrant biliary pathology was considered.  Ultrasound ordered.    Review of external records patient has history of liver cirrhosis previous history of alcohol use.  History of esophageal strictures.  Given her reported hematemesis will image chest and abdomen.    Acute coronary syndrome was also on the differential given her reported left-sided chest pain this is continuous been going on since February EKG without ST segment elevation and troponin less than 6 which is very reassuring.    Ultrasound does reveal gallstones does reveal inflammation negative Fontanez sign.  CTA chest and abdomen done because of report of red vomiting.  No free air possible some gastritis.  There is inflammation around the gallbladder.    Given Protonix.  I discussed with both GI and general surgery.  Both in agreement with MRCP.    Patient updated.  In agreement with plan.  No vomiting while in the emergency department.    Considered acetaminophen because of elevated liver function test patient denies taking multiple doses.  Acetaminophen level is negative.  I think this is less likely.    In addition to the work out documented, I considered the following work up antibiotics.  No evidence of  infection.        Medical Decision Making    History:  Supplemental history from: Documented in chart  External Record(s) reviewed: Documented in chart    Work Up:  Chart documentation includes differential considered and any EKGs or imaging independently interpreted by provider, where specified.  In additional to work up documented, I considered the following work up: Documented in chart, if applicable.    External consultation:  Discussion of management with another provider: Hospitalist and Other: ERICK    Complicating factors:  Care impacted by chronic illness: Diabetes, Hyperlipidemia, Hypertension, and Other: asthma, GERD, alcohol abuse, CASTRO   Care affected by social determinants of health: N/A    Disposition considerations: Admit.      Review of External Records  Hospital/Clinic: [facility]  Date: 4/11/2024  Asthma, Diabetic    External Consultation  ERICK, Dr. Bender   Hospitalist, Dr. Vang  General Surgery, Dr. Cruz    ED COURSE   6:32 AM I met with the patient, obtained history, performed an initial exam, and discussed options and plan for diagnostics and treatment here in the ED.   7:00 AM I rechecked on the patient and updated them.  7:43 AM I rechecked on the patient and updated them.  8:37 AM I rechecked on the patient and updated them.  9:02 AM I rechecked on the patient and updated them on lab.  9:17 AM I spoke with ERICK, Dr. Bender.   9:20 AM I rechecked on the patient and updated them on the plan.  9:38 AM I spoke with the admitting Hospitalist, Dr. Vang.   9:41 AM I spoke with general surgery, Dr. Cruz.   10:48 AM reevaluated and updated.  In agreement with plan.  At the time of the dictation pending MRCP.  Admitting doctor GI and general surgery to follow.    At the conclusion of the encounter I discussed the results of all of the tests and the disposition. The questions were answered. The patient acknowledged understanding and was agreeable with the care plan.         MEDICATIONS GIVEN IN THE  EMERGENCY:     Medications   lactated ringers BOLUS 1,000 mL (0 mLs Intravenous Stopped 4/14/24 0647)   ondansetron (ZOFRAN) injection 4 mg (4 mg Intravenous $Given 4/14/24 0611)   morphine (PF) injection 4 mg (4 mg Intravenous $Given 4/14/24 0610)   pantoprazole (PROTONIX) IV push injection 40 mg (40 mg Intravenous $Given 4/14/24 0703)   iopamidol (ISOVUE-370) solution 65 mL (65 mLs Intravenous $Given 4/14/24 3150)       NEW PRESCRIPTIONS STARTED AT TODAY'S ER VISIT     New Prescriptions    No medications on file          =================================================================    HPI     Patient information was obtained from: patient     Use of : N/A       Odette Escobar is a 68 year old female who presents by private auto for evaluation of right sided abdominal pain and vomiting.     The patient reports that she has right sided abdominal pain and left sided chest pain.The patient fell on 2/15/2024 and reports that she has had this pain since then. Patient is being treated by the spine clinic for her pain.     She also reports 15 episodes of red colored vomiting. Patient ate red cupcakes yesterday and thinks the color may be secondary to that. Patient has also had intermittent constipation. Also reports some shortness of breath. Of note, patient reports that has had a couple mechanical falls recently. She has a cast on her left arm.    Of note, patient was recently on Oxycodone. However, her doctor recommended she stop due to her recent falls. Patient does not have hx of opioid dependency. Patient denies taking excessive Tylenol.    Of note, patient has hx of gallstones and was recommended to get a cholecystectomy; however doctor told her to wait until after COVID. She has hx of Tonsillectomy.     Denies alcohol use, tobacco use, marijuana use, or other drug use.     Denies diarrhea, hematuria, dysuria, leg swelling, rashes or any other complaints at this time.       REVIEW OF SYSTEMS   Review  of Systems   Cardiovascular:  Positive for chest pain (left sided). Negative for leg swelling.   Gastrointestinal:  Positive for abdominal pain (right sided), nausea and vomiting (15 episodes). Negative for diarrhea.   Genitourinary:  Negative for dysuria and hematuria.   Skin:  Negative for rash.   All other systems reviewed and are negative.      PAST MEDICAL HISTORY:   No past medical history on file.    PAST SURGICAL HISTORY:     Past Surgical History:   Procedure Laterality Date     ENDOMETRIAL ABLATION           CURRENT MEDICATIONS:   acetaminophen (TYLENOL) 500 MG tablet  albuterol (PROVENTIL) (2.5 MG/3ML) 0.083% neb solution  CANNABIDIOL, CBD, EXTRACT ORAL  famotidine (PEPCID) 20 MG tablet  fluticasone (FLOVENT HFA) 110 mcg/actuation inhaler  gabapentin (NEURONTIN) 300 MG capsule  loperamide (IMODIUM) 2 mg capsule  magnesium oxide 400 mg cap  metFORMIN (GLUCOPHAGE) 500 MG tablet  omeprazole (PRILOSEC) 20 MG capsule  ondansetron (ZOFRAN) 4 MG tablet  oxyCODONE (ROXICODONE) 5 MG tablet  predniSONE (DELTASONE) 20 MG tablet  propranolol (INDERAL LA) 60 mg 24 hr capsule  sertraline (ZOLOFT) 50 MG tablet  sucralfate (CARAFATE) 1 gram tablet  TRAZODONE HCL (TRAZODONE ORAL)         ALLERGIES:     Allergies   Allergen Reactions     Aspirin Hives     Diflunisal Nausea and Vomiting     (Dolobid) Occurred approximately 20 years ago    Occurred approximately 20 years ago   Occurred approximately 20 years ago   (Dolobid) Occurred approximately 20 years ago     Fd&C Yellow #5 (Tartrazine) Nausea and Vomiting       FAMILY HISTORY:     Family History   Problem Relation Age of Onset     Heart Failure Mother      Heart Failure Father      Heart Failure Sister      Coronary Artery Disease Brother      Coronary Artery Disease Brother      Coronary Artery Disease Brother      Cerebrovascular Disease Sister        SOCIAL HISTORY:     Social History     Socioeconomic History     Marital status:    Tobacco Use     Smoking  status: Never     Smokeless tobacco: Never   Substance and Sexual Activity     Alcohol use: Yes     Comment: Alcoholic Drinks/day: Occasional usage     Drug use: No     Social Determinants of Health     Financial Resource Strain: At Risk (3/21/2024)    [facility]    Financial Resource Strain      Is it hard for you to pay for the very basics like food, housing, medical care or heating?: Yes   Food Insecurity: Not At Risk (3/21/2024)    [facility]    Food Insecurity      Does your food run out before you have the money to buy more?: No   Transportation Needs: Not At Risk (3/21/2024)    Received from [facility]    Transportation Needs      Does a lack of transportation keep you from your medical appointments or from getting your medications?: No   Interpersonal Safety: Unknown (2/21/2024)    [facility] from [facility]    Humiliation, Afraid, Rape, and Kick questionnaire      Physically Abused: No      Sexually Abused: No       VITALS:   /56   Pulse 80   Temp 97.7  F (36.5  C) (Axillary)   Resp 25   Wt 60.8 kg (134 lb 1.6 oz)   SpO2 100%   BMI 23.02 kg/m      PHYSICAL EXAM     Physical Exam  Vitals and nursing note reviewed. Exam conducted with a chaperone present.   Constitutional:       General: She is not in acute distress.     Appearance: She is well-developed and normal weight. She is not ill-appearing, toxic-appearing or diaphoretic.   Neurological:      Mental Status: She is alert.         Physical Exam   Constitutional: well appearing, cooperative and non toxic.    Head: Atraumatic.     Nose: Nose normal.     Mouth/Throat: Oropharynx is clear and moist.     Eyes: EOM are normal. Pupils are equal, round, and reactive to light.     Ears: Bilateral pearly white tympanic membranes.    Neck: Normal range of motion. Neck supple.     Cardiovascular: Normal rate, regular rhythm and normal heart sounds.       Pulmonary/Chest: Normal effort  and breath sounds normal.     Abdominal: soft nontender. Tenderness to palpation in RUQ.     Musculoskeletal: Normal range of motion. Cast on the left arm.     Neurological: Moves upper and lower extremities equally. Oriented x3    Lymphatics: no edema    : NA    Skin: Skin is warm and dry.     Psychiatric: Normal mood and affect. Behavior is normal.       LAB:     All pertinent labs reviewed and interpreted.  Labs Ordered and Resulted from Time of ED Arrival to Time of ED Departure   COMPREHENSIVE METABOLIC PANEL - Abnormal       Result Value    Sodium 141      Potassium 3.4      Carbon Dioxide (CO2) 25      Anion Gap 11      Urea Nitrogen 11.4      Creatinine 0.62      GFR Estimate >90      Calcium 9.4      Chloride 105      Glucose 207 (*)     Alkaline Phosphatase 156 (*)      (*)      (*)     Protein Total 6.9      Albumin 4.0      Bilirubin Total 1.0     ACETAMINOPHEN LEVEL - Abnormal    Acetaminophen <5.0 (*)    LIPASE - Normal    Lipase 43     TROPONIN T, HIGH SENSITIVITY - Normal    Troponin T, High Sensitivity 6     ETHYL ALCOHOL LEVEL - Normal    Alcohol ethyl <0.01     CBC WITH PLATELETS AND DIFFERENTIAL    WBC Count 8.8      RBC Count 4.60      Hemoglobin 13.9      Hematocrit 42.0      MCV 91      MCH 30.2      MCHC 33.1      RDW 13.3      Platelet Count 217      % Neutrophils 75      % Lymphocytes 18      % Monocytes 7      % Eosinophils 0      % Basophils 0      % Immature Granulocytes 0      NRBCs per 100 WBC 0      Absolute Neutrophils 6.6      Absolute Lymphocytes 1.6      Absolute Monocytes 0.6      Absolute Eosinophils 0.0      Absolute Basophils 0.0      Absolute Immature Granulocytes 0.0      Absolute NRBCs 0.0          RADIOLOGY:     Reviewed all pertinent imaging. Please see official radiology report.  CTA Chest Abdomen Pelvis w Contrast   Final Result   IMPRESSION:   1.  No active extravasation identified.    2.  Mild diffuse  hyperenhancement of the gastric mucosa suggesting possible gastritis.    3.  Morphologic changes of hepatic fibrosis with paraesophageal varices suggesting portal hypertension.   4.  Mildly dilated upper common bile duct as seen on the same day ultrasound. MRCP is recommended for further evaluation.   5.  Distal colonic diverticulosis without acute diverticulitis.   6.  Cholelithiasis with mild gallbladder wall thickening, better evaluated on the same day ultrasound.         US Abdomen Limited   Final Result   IMPRESSION:   1.  Cholelithiasis with mild gallbladder wall thickening and trace pericholecystic fluid but negative sonographic Fontanez's sign. Findings are equivocal for acute cholecystitis with wall thickening potentially related to intrinsic hepatic disease.   2.  Dilated common bile duct up to 11 mm. Recommend MRCP to evaluate for potential choledocholithiasis which is not seen sonographically.   3.  Coarsened hepatic echotexture suggesting fibrosis. No focal mass.   4.   Mild right renal pelviectasis.      MR Abdomen MRCP w/o & w Contrast    (Results Pending)        EKG:     EKG #1  Sinus rhythm normal anterior progression normal axis    Time:[id number]    Ventricular rate 63 bmp  Axis normal  IL interval 170 ms  QRS duration 78 ms  QT//442 ms    Compared to previous EKG on February 11, 2019 sinus rhythm rate of 82  I have independently reviewed and interpreted the EKG(s) documented above.      PROCEDURES:     Procedures      I, Bj Valle, am serving as a scribe to document services personally performed by Dr. Victor based on my observation and the provider's statements to me. I, Em Victor MD attest that Bj Valle is acting in a scribe capacity, has observed my performance of the services and has documented them in accordance with my direction.    Em Victor M.D.  Emergency Medicine  [facility] EMERGENCY DEPARTMENT  [address]  [address]  [phone]  Dept: [phone]       Em Victor MD  04/14/24 1058

## 2024-04-14 NOTE — PHARMACY-ADMISSION MEDICATION HISTORY
Pharmacist Admission Medication History    Admission medication history is complete. The information provided in this note is only as accurate as the sources available at the time of the update.    Information Source(s): Patient, Clinic records, and [facility]    Pertinent Information: Medication list was very outdated, multiple changes made. Patient recently had MTM visit w/PharmD through [employer]. Propranolol not mentioned on MTM visit note but patient states she is taking. Losartan prescribed for 75 mg daily but reduced to 25 mg daily due to lower blood pressure. Patient hasn't taken ozempic in ~ 2 weeks because her last pen broke, planning on resuming on Tuesday. Fluorometholone eye drops prescribed but patient states she doesn't use them, uses the tryvaya daily and systane PRN. Amitriptyline prescribed for 2 tabs daily, but recent trial of 30 mg at bedtime per doctor.     Changes made to PTA medication list:  Added: albuterol HFA, amitriptyline, ammonium lactate cream, atorvastatin, bacitracin, baclofen, benzonate, symbicort, celebrex, cetirizine, vitamin D3, multivitamin, estradiol, ferrous gluconate, hydrocortisone cream, losartan, montelukast, ozempic, suvorexant, travaya nasal spray, systane eye drops  Deleted: albuterol nebs, CBD extract, flovent HFA, metformin, ondansetron, prednisone, sucralfate, trazodone  Changed: tylenol, gabapentin, loperamide, magnesium, propranolol, sertraline    Allergies reviewed with patient and updates made in EHR: yes    Medication History Completed By: MIRIAM BUCIO RP 4/14/2024 12:22 PM    PTA Med List   Medication Sig Last Dose    acetaminophen (TYLENOL) 500 MG tablet Take 1,000 mg by mouth daily as needed for pain More than a month at PRN    albuterol (PROAIR HFA/PROVENTIL HFA/VENTOLIN HFA) 108 (90 Base) MCG/ACT inhaler Inhale 2 puffs into the lungs every 6 hours as needed for shortness of breath, wheezing or cough Past Week     amitriptyline (ELAVIL) 10 MG tablet Take 30 mg by mouth at bedtime 4/13/2024 at HS    ammonium lactate (AMLACTIN) 12 % external cream Apply topically daily as needed for dry skin 4/13/2024 at AM    atorvastatin (LIPITOR) 80 MG tablet Take 80 mg by mouth daily 4/13/2024 at AM    bacitracin 500 UNIT/GM external ointment Apply topically 2 times daily 4/13/2024 at PM    Baclofen (LIORESAL) 5 MG tablet Take 5-10 mg by mouth 2 times daily as needed for muscle spasms or other (or sleep) 4/13/2024 at HS    BELSOMRA 10 MG tablet Take 1 tablet by mouth nightly as needed for sleep Past Week    benzonatate (TESSALON) 100 MG capsule Take 200 mg by mouth 3 times daily as needed for cough Past Week    budesonide-formoterol (SYMBICORT) 160-4.5 MCG/ACT Inhaler Inhale 2 puffs into the lungs two times daily Rinse mouth/gargle after use 4/13/2024 at PM    celecoxib (CELEBREX) 200 MG capsule Take 200 mg by mouth daily 4/13/2024 at AM    cetirizine (ZYRTEC) 10 MG tablet Take 10 mg by mouth daily 4/13/2024 at AM    cholecalciferol (VITAMIN D3) 50 MCG (2000 UT) CAPS Take 6,000 Units by mouth daily 4/13/2024 at AM    estradiol (ESTRACE) 0.1 MG/GM vaginal cream Place vaginally twice a week Past Week    famotidine (PEPCID) 20 MG tablet Take 20 mg by mouth 2 times daily 4/13/2024 at PM    ferrous gluconate (FERGON) 324 (38 Fe) MG tablet Take 324 mg by mouth daily (with breakfast) 4/13/2024 at AM    fluticasone (FLONASE) 50 MCG/ACT nasal spray Spray 2 sprays into both nostrils daily as needed for rhinitis or allergies Past Week    gabapentin (NEURONTIN) 300 MG capsule Take 600 mg by mouth 3 times daily 4/13/2024 at PM    hydrocortisone 2.5 % ointment Apply topically 2 times daily as needed for other (Eczema) Past Week    ibuprofen (ADVIL/MOTRIN) 200 MG tablet Take 600 mg by mouth 2 times daily as needed for pain Past Week    ipratropium - albuterol 0.5 mg/2.5 mg/3 mL (DUONEB) 0.5-2.5 (3) MG/3ML neb solution Take 1 vial by nebulization 3 times  daily as needed for shortness of breath, wheezing or cough Past Week    JARDIANCE 10 MG TABS tablet Take 10 mg by mouth daily 4/13/2024 at AM    loperamide (IMODIUM) 2 mg capsule Take 2 mg by mouth 4 times daily as needed for diarrhea Past Month    losartan (COZAAR) 25 MG tablet Take 1 tablet by mouth daily 4/13/2024 at AM    Magnesium Oxide -Mg Supplement 500 MG TABS Take 1 tablet by mouth daily 4/13/2024 at AM    montelukast (SINGULAIR) 10 MG tablet Take 1 tablet by mouth at bedtime 4/13/2024 at HS    Multiple Vitamins-Minerals (MULTIVITAMIN WOMEN 50+) TABS Take 1 tablet by mouth daily 4/13/2024 at AM    omeprazole (PRILOSEC) 20 MG capsule Take 1 capsule by mouth daily 4/13/2024 at AM    oxyCODONE (ROXICODONE) 5 MG tablet Take 1 tablet by mouth every 6 hours as needed for severe pain or breakthrough pain not otherwise improved by acetaminophen and/or ibuprofen. Past Month    OZEMPIC, 0.25 OR 0.5 MG/DOSE, 2 MG/3ML pen Inject 0.5 mg Subcutaneous every 7 days Tuesdays 4/2/2024 at AM    polyethylene glycol-propylene glycol (SYSTANE ULTRA) 0.4-0.3 % SOLN ophthalmic solution Place 1 drop into both eyes 4 times daily as needed for dry eyes Past Week    propranolol ER (INDERAL LA) 120 MG 24 hr capsule Take 120 mg by mouth daily 4/13/2024 at AM    sertraline (ZOLOFT) 100 MG tablet Take 100 mg by mouth daily 4/13/2024 at AM    TYRVAYA 0.03 MG/ACT nasal spray Spray 1 spray into both nostrils 2 times daily 4/13/2024 at PM

## 2024-04-14 NOTE — ANESTHESIA CARE TRANSFER NOTE
Patient: Odette Escobar    Procedure: Procedure(s):  CHOLECYSTECTOMY, ROBOT-ASSISTED, LAPAROSCOPIC, USING DA ROCIO XI  PRIMARY REPAIR OF  UMBILICAL HERNIA       Diagnosis: Acute cholecystitis [K81.0]  Diagnosis Additional Information: No value filed.    Anesthesia Type:   General     Note:    Oropharynx: oropharynx clear of all foreign objects  Level of Consciousness: drowsy  Oxygen Supplementation: face mask  Level of Supplemental Oxygen (L/min / FiO2): 8  Independent Airway: airway patency satisfactory and stable  Dentition: dentition unchanged  Vital Signs Stable: post-procedure vital signs reviewed and stable  Report to RN Given: handoff report given  Patient transferred to: PACU    Handoff Report: Identifed the Patient, Identified the Reponsible Provider, Reviewed the pertinent medical history, Discussed the surgical course, Reviewed Intra-OP anesthesia mangement and issues during anesthesia, Set expectations for post-procedure period and Allowed opportunity for questions and acknowledgement of understanding      Vitals:  Vitals Value Taken Time   /67 04/14/24 1642   Temp 36.7  C (98.1  F) 04/14/24 1641   Pulse 73 04/14/24 1644   Resp 20 04/14/24 1644   SpO2 100 % 04/14/24 1644   Vitals shown include unfiled device data.    Electronically Signed By: JASWINDER Roy CRNA  April 14, 2024  4:45 PM

## 2024-04-14 NOTE — H&P
[facility]    History and Physical - Hospitalist Service       Date of Admission:  4/14/2024    Assessment & Plan      Odette Escobar is a 68 year old female admitted on 4/14/2024 for acute cholecystitis.    Elevated LFT, Cholelithiasis with acute cholecystitis:  Presents with acute RUQ abdominal pain and vomiting. Found to have elevated LFT.  US reveals cholelithiasis with mild gallbladder wall thickening, dilated common bile duct up to 11 mm.   - NPO and IVF  - MRCP  - Symptomatic management: antiemetics and pain control  - GI and surgery consult    Alcoholic liver cirrhosis:  Her LFT baseline is normal. She normally follows up in the liver clinic. Patient reports that her last visit was just a few days prior to this admission and she was told that her liver cirrhosis is stable and can follow up in 6 months. 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     Diabetes, type II: On Jardiance and weekly Ozempic. Hold PTA Jardiance.   - Novolog sliding scale    Essential hypertension: Continue PTA losartan    Asthma: not on exacerbation. Continue PTA inhaler and singulair            Diet:  NPO  DVT Prophylaxis: Low Risk/Ambulatory with no VTE prophylaxis indicated  [place]onez Catheter: Not present  Lines: None     Cardiac Monitoring: None  Code Status:  Full code    Clinically Significant Risk Factors Present on Admission                  # Hypertension: Noted on problem list          # Asthma: noted on problem list        Disposition Plan     Medically Ready for Discharge: Anticipated Tomorrow           John Vang MD  Hospitalist Service  [facility]  Securely message with [employer] (more info)  Text page via [employer] Paging/Directory     ______________________________________________________________________    Chief Complaint   Abdominal pain and vomiting    History is obtained from the patient    History of Present Illness   Odette Escobar is a 68 year old female with a medical history  of diabetes and alcoholic liver cirrhosis presents to ER for abdominal pain and vomiting. Patient reports that last night, she developed RUQ abdominal pain. She also has nausea and vomited 13 times. Initially, the emesis was food content. It then contained some blood. No fever, cough, chest pain and urinary symptoms. Her last bowel movement was this morning.  In ER, CT can of the abdomen shows cholelithiasis and dilated common bile duct. Lab test reveals elevated LFT.      Past Medical History    No past medical history on file.    Past Surgical History   Past Surgical History:   Procedure Laterality Date    ENDOMETRIAL ABLATION         Prior to Admission Medications   Prior to Admission Medications   Prescriptions Last Dose Informant Patient Reported? Taking?   CANNABIDIOL, CBD, EXTRACT ORAL   Yes No   Sig: [CANNABIDIOL, CBD, EXTRACT ORAL] Take 1 drop by mouth daily.   TRAZODONE HCL (TRAZODONE ORAL)   Yes No   Sig: [TRAZODONE HCL (TRAZODONE ORAL)] Take by mouth. Unknown dose   acetaminophen (TYLENOL) 500 MG tablet   Yes No   Sig: [ACETAMINOPHEN (TYLENOL) 500 MG TABLET] Take 500 mg by mouth every 6 (six) hours as needed for pain.   albuterol (PROVENTIL) (2.5 MG/3ML) 0.083% neb solution   No No   Sig: Take 1 vial (2.5 mg) by nebulization every 6 hours as needed for shortness of breath, wheezing or cough   famotidine (PEPCID) 20 MG tablet   No No   Sig: [FAMOTIDINE (PEPCID) 20 MG TABLET] Take 1 tablet (20 mg total) by mouth 2 (two) times a day.   fluticasone (FLOVENT HFA) 110 mcg/actuation inhaler   Yes No   Sig: [FLUTICASONE (FLOVENT HFA) 110 MCG/ACTUATION INHALER] Inhale 2 puffs 2 (two) times a day.   gabapentin (NEURONTIN) 300 MG capsule   Yes No   Sig: [GABAPENTIN (NEURONTIN) 300 MG CAPSULE] Take 300 mg by mouth 3 (three) times a day.   loperamide (IMODIUM) 2 mg capsule   Yes No   Sig: [LOPERAMIDE (IMODIUM) 2 MG CAPSULE] Take 2 mg by mouth 4 (four) times a day.   magnesium oxide 400 mg cap   Yes No   Sig:  [MAGNESIUM OXIDE 400 MG CAP] Take 400 mg by mouth daily.   metFORMIN (GLUCOPHAGE) 500 MG tablet   Yes No   Sig: [METFORMIN (GLUCOPHAGE) 500 MG TABLET] Take 500 mg by mouth daily with breakfast.          omeprazole (PRILOSEC) 20 MG capsule   Yes No   Sig: [OMEPRAZOLE (PRILOSEC) 20 MG CAPSULE] Take 20 mg by mouth daily.   ondansetron (ZOFRAN) 4 MG tablet   No No   Sig: Take 1 tablet (4 mg) by mouth every 8 hours as needed for nausea   oxyCODONE (ROXICODONE) 5 MG tablet   No No   Sig: Take 1 tablet by mouth every 6 hours as needed for severe pain or breakthrough pain not otherwise improved by acetaminophen and/or ibuprofen.   predniSONE (DELTASONE) 20 MG tablet   No No   Sig: Take two tablets (= 40mg) each day for 5 (five) days   propranolol (INDERAL LA) 60 mg 24 hr capsule   Yes No   Sig: [PROPRANOLOL (INDERAL LA) 60 MG 24 HR CAPSULE] Take 60 mg by mouth daily.   sertraline (ZOLOFT) 50 MG tablet   No No   Sig: [SERTRALINE (ZOLOFT) 50 MG TABLET] Take 1 tablet (50 mg total) by mouth daily.   sucralfate (CARAFATE) 1 gram tablet   Yes No   Sig: [SUCRALFATE (CARAFATE) 1 GRAM TABLET] Take 1 g by mouth once as needed.      Facility-Administered Medications: None        Review of Systems    The 10 point Review of Systems is negative other than noted in the HPI or here.      Physical Exam   Vital Signs: Temp: 97.7  F (36.5  C) Temp src: Axillary BP: 117/56 Pulse: 80   Resp: 25 SpO2: 100 % O2 Device: None (Room air)    Weight: 134 lbs 1.6 oz    General appearance: not in acute distress  HEENT: PERRL, EOMI  Lungs: Clear breath sounds in bilateral lung fields  Cardiovascular: Regular rate and rhythm, normal S1-S2  Abdomen: Soft, RUQ tenderness to palpation, no distension, normal bowel sound  Musculoskeletal: No joint swelling  Skin: No rash and no edema  Neurology: AAO ×3.  Cranial nerves II - XII normal.  Normal muscle strength in all four extremities.     Medical Decision Making       60 MINUTES SPENT BY ME on the date of  service doing chart review, history, exam, documentation & further activities per the note.      Data     I have personally reviewed the following data over the past 24 hrs:    8.8  \   13.9   / 217     141 105 11.4 /  207 (H)   3.4 25 0.62 \     ALT: 254 (H) AST: 499 (H) AP: 156 (H) TBILI: 1.0   ALB: 4.0 TOT PROTEIN: 6.9 LIPASE: 43     Trop: 6 BNP: N/A       Imaging results reviewed over the past 24 hrs:   Recent Results (from the past 24 hour(s))   US Abdomen Limited    Narrative    EXAM: US ABDOMEN LIMITED  LOCATION: [facility]  DATE: 4/14/2024    INDICATION: pain  COMPARISON: Ultrasound 10/18/2023, CT abdomen pelvis 03/07/2024  TECHNIQUE: Limited abdominal ultrasound.    FINDINGS:    GALLBLADDER: Cholelithiasis. Mild gallbladder wall thickening (4 mm). Trace pericholecystic fluid. Negative sonographic Fontanez's sign.    BILE DUCTS: The common duct is dilated and measures 11 mm.     LIVER: Coarsened echotexture, suggesting underlying fibrosis. Smooth contour. No focal mass.    RIGHT KIDNEY: Mild pelviectasis.    PANCREAS: The pancreas is obscured by overlying gas.    No ascites.      Impression    IMPRESSION:  1.  Cholelithiasis with mild gallbladder wall thickening and trace pericholecystic fluid but negative sonographic Fontanez's sign. Findings are equivocal for acute cholecystitis with wall thickening potentially related to intrinsic hepatic disease.  2.  Dilated common bile duct up to 11 mm. Recommend MRCP to evaluate for potential choledocholithiasis which is not seen sonographically.  3.  Coarsened hepatic echotexture suggesting fibrosis. No focal mass.  4.   Mild right renal pelviectasis.   CTA Chest Abdomen Pelvis w Contrast    Narrative    EXAM: CTA CHEST ABDOMEN PELVIS W CONTRAST  LOCATION: [facility]  DATE: 4/14/2024    INDICATION: Right sided chest and abdominal pain.  Supple hematemesis.  Evaluate for active bleeding Helga Guerrero tear  cholecystitis  COMPARISON: Ultrasound abdomen 04/14/2024, CT chest 03/18/2024, CT abdomen pelvis 03/07/2024  TECHNIQUE: CT angiogram chest abdomen pelvis during arterial phase of injection of IV contrast. 2D and 3D MIP reconstructions were performed by the CT technologist. Dose reduction techniques were used.   CONTRAST: ISOVUE 370 65ML    FINDINGS:   CT ANGIOGRAM CHEST, ABDOMEN, AND PELVIS: Normal caliber thoracic aorta which is negative for intramural hematoma, dissection, or aneurysm. The proximal aortic arch branch vessels are patent. Normal caliber abdominal aorta which is negative for dissection   or aneurysm. The celiac, superior mesenteric, bilateral renal, inferior mesenteric, bilateral common, internal and external iliac, common femoral and upper femoral and profunda femoral arteries are patent without significant stenosis. No active   extravasation. Mild scattered atherosclerotic plaque. Lower paraesophageal varices.    LUNGS AND PLEURA: Lungs are clear. No pleural effusions.    MEDIASTINUM/AXILLAE: Normal.    CORONARY ARTERY CALCIFICATION: Mild.    HEPATOBILIARY: Hypertrophy of the caudate lobe suggesting fibrosis. No mass. Cholelithiasis with mild gallbladder wall thickening. Dilated upper common bile duct up to 9 mm.    PANCREAS: Normal.    SPLEEN: Normal.    ADRENAL GLANDS: Normal.    KIDNEYS/BLADDER: Mild right renal pelviectasis. No urinary calculi or hydronephrosis.    BOWEL: No obstruction. Mild diffuse hyperenhancement of the gastric mucosa suggesting possible gastritis. Duodenal diverticulum. Distal colonic diverticulosis.    LYMPH NODES: Normal.    PELVIC ORGANS: Pelvic phleboliths.    MUSCULOSKELETAL: Small fat-containing periumbilical hernia. Chronic bilateral rib fractures. Mild multilevel degenerative changes in the thoracolumbar spine.      Impression    IMPRESSION:  1.  No active extravasation identified.   2.  Mild diffuse hyperenhancement of the gastric mucosa suggesting possible  gastritis.   3.  Morphologic changes of hepatic fibrosis with paraesophageal varices suggesting portal hypertension.  4.  Mildly dilated upper common bile duct as seen on the same day ultrasound. MRCP is recommended for further evaluation.  5.  Distal colonic diverticulosis without acute diverticulitis.  6.  Cholelithiasis with mild gallbladder wall thickening, better evaluated on the same day ultrasound.     MR Abdomen MRCP w/o & w Contrast    Narrative    EXAM: MR ABDOMEN MRCP W/O and W CONTRAST  LOCATION: [facility]  DATE: 4/14/2024    INDICATION: Right upper quadrant abdominal pain elevated liver function test this was requested by gastroenterologist.  COMPARISON: CT and ultrasound 4/14/2024  TECHNIQUE: Routine MR liver/pancreas protocol including axial and coronal MRCP sequences. 2D and 3D reconstruction performed by MR technologist including MIP reconstruction and slab cholangiograms. If performed with contrast, additional dynamic T1 post   IV contrast images.  CONTRAST: 6ml gadavist     FINDINGS:     MRCP: Mild biliary dilation with the common duct measuring up to 0.8 cm and tapering smoothly at the ampulla. No biliary wall thickening, enhancement, or filling defects. Normal pancreatic duct anatomy. The gallbladder is distended with tiny stones,   pericholecystic fluid, and mild wall thickening.    LIVER: No significant hepatic abnormality. No focal masses. No evidence of cirrhosis or significant fat or iron deposition.    PANCREAS: No evidence of mass or pancreatitis.    ADDITIONAL FINDINGS: No significant abnormality in the spleen, kidneys, and adrenal glands. No adenopathy. No ascites.      Impression    IMPRESSION:  1.  Mild biliary dilation, but no choledocholithiasis or other source of obstruction.    2.  Tiny gallstones with mild wall thickening and pericholecystic fluid raising concern for cholecystitis.

## 2024-04-15 LAB
ALBUMIN SERPL BCG-MCNC: 3.3 G/DL (ref 3.5–5.2)
ALP SERPL-CCNC: 162 U/L (ref 40–150)
ALT SERPL W P-5'-P-CCNC: 393 U/L (ref 0–50)
ANION GAP SERPL CALCULATED.3IONS-SCNC: 8 MMOL/L (ref 7–15)
AST SERPL W P-5'-P-CCNC: 399 U/L (ref 0–45)
ATRIAL RATE - MUSE: 63 BPM
BILIRUB DIRECT SERPL-MCNC: 2.56 MG/DL (ref 0–0.3)
BILIRUB SERPL-MCNC: 2.9 MG/DL
BUN SERPL-MCNC: 7 MG/DL (ref 8–23)
CALCIUM SERPL-MCNC: 9.1 MG/DL (ref 8.8–10.2)
CHLORIDE SERPL-SCNC: 108 MMOL/L (ref 98–107)
CREAT SERPL-MCNC: 0.53 MG/DL (ref 0.51–0.95)
DEPRECATED HCO3 PLAS-SCNC: 24 MMOL/L (ref 22–29)
DIASTOLIC BLOOD PRESSURE - MUSE: NORMAL MMHG
EGFRCR SERPLBLD CKD-EPI 2021: >90 ML/MIN/1.73M2
ERYTHROCYTE [DISTWIDTH] IN BLOOD BY AUTOMATED COUNT: 13.5 % (ref 10–15)
GLUCOSE BLDC GLUCOMTR-MCNC: 123 MG/DL (ref 70–99)
GLUCOSE BLDC GLUCOMTR-MCNC: 146 MG/DL (ref 70–99)
GLUCOSE BLDC GLUCOMTR-MCNC: 76 MG/DL (ref 70–99)
GLUCOSE BLDC GLUCOMTR-MCNC: 87 MG/DL (ref 70–99)
GLUCOSE BLDC GLUCOMTR-MCNC: 89 MG/DL (ref 70–99)
GLUCOSE SERPL-MCNC: 125 MG/DL (ref 70–99)
HCT VFR BLD AUTO: 37.8 % (ref 35–47)
HGB BLD-MCNC: 12.3 G/DL (ref 11.7–15.7)
INTERPRETATION ECG - MUSE: NORMAL
LIPASE SERPL-CCNC: 28 U/L (ref 13–60)
MAGNESIUM SERPL-MCNC: 1.9 MG/DL (ref 1.7–2.3)
MCH RBC QN AUTO: 30.1 PG (ref 26.5–33)
MCHC RBC AUTO-ENTMCNC: 32.5 G/DL (ref 31.5–36.5)
MCV RBC AUTO: 93 FL (ref 78–100)
P AXIS - MUSE: 74 DEGREES
PHOSPHATE SERPL-MCNC: 4 MG/DL (ref 2.5–4.5)
PLATELET # BLD AUTO: 181 10E3/UL (ref 150–450)
POTASSIUM SERPL-SCNC: 3.8 MMOL/L (ref 3.4–5.3)
PR INTERVAL - MUSE: 170 MS
PROT SERPL-MCNC: 5.8 G/DL (ref 6.4–8.3)
QRS DURATION - MUSE: 78 MS
QT - MUSE: 432 MS
QTC - MUSE: 442 MS
R AXIS - MUSE: 75 DEGREES
RBC # BLD AUTO: 4.08 10E6/UL (ref 3.8–5.2)
SODIUM SERPL-SCNC: 140 MMOL/L (ref 135–145)
SYSTOLIC BLOOD PRESSURE - MUSE: NORMAL MMHG
T AXIS - MUSE: 70 DEGREES
VENTRICULAR RATE- MUSE: 63 BPM
WBC # BLD AUTO: 6.1 10E3/UL (ref 4–11)

## 2024-04-15 PROCEDURE — 36415 COLL VENOUS BLD VENIPUNCTURE: CPT | Performed by: SURGERY

## 2024-04-15 PROCEDURE — 82565 ASSAY OF CREATININE: CPT | Performed by: SURGERY

## 2024-04-15 PROCEDURE — 83735 ASSAY OF MAGNESIUM: CPT | Performed by: SURGERY

## 2024-04-15 PROCEDURE — 84100 ASSAY OF PHOSPHORUS: CPT | Performed by: SURGERY

## 2024-04-15 PROCEDURE — 250N000013 HC RX MED GY IP 250 OP 250 PS 637: Performed by: SURGERY

## 2024-04-15 PROCEDURE — 96372 THER/PROPH/DIAG INJ SC/IM: CPT | Performed by: SURGERY

## 2024-04-15 PROCEDURE — 250N000011 HC RX IP 250 OP 636: Performed by: SURGERY

## 2024-04-15 PROCEDURE — 250N000013 HC RX MED GY IP 250 OP 250 PS 637: Performed by: INTERNAL MEDICINE

## 2024-04-15 PROCEDURE — 83690 ASSAY OF LIPASE: CPT

## 2024-04-15 PROCEDURE — 82248 BILIRUBIN DIRECT: CPT | Performed by: SURGERY

## 2024-04-15 PROCEDURE — G0378 HOSPITAL OBSERVATION PER HR: HCPCS

## 2024-04-15 PROCEDURE — 99232 SBSQ HOSP IP/OBS MODERATE 35: CPT | Mod: FS | Performed by: FAMILY MEDICINE

## 2024-04-15 PROCEDURE — 99232 SBSQ HOSP IP/OBS MODERATE 35: CPT | Mod: FS

## 2024-04-15 PROCEDURE — 82962 GLUCOSE BLOOD TEST: CPT

## 2024-04-15 PROCEDURE — 85014 HEMATOCRIT: CPT | Performed by: SURGERY

## 2024-04-15 PROCEDURE — 96361 HYDRATE IV INFUSION ADD-ON: CPT

## 2024-04-15 RX ADMIN — FAMOTIDINE 20 MG: 20 TABLET ORAL at 08:16

## 2024-04-15 RX ADMIN — POLYETHYLENE GLYCOL 3350 17 G: 17 POWDER, FOR SOLUTION ORAL at 08:16

## 2024-04-15 RX ADMIN — ACETAMINOPHEN 975 MG: 325 TABLET ORAL at 02:48

## 2024-04-15 RX ADMIN — FAMOTIDINE 20 MG: 20 TABLET ORAL at 20:15

## 2024-04-15 RX ADMIN — SERTRALINE HYDROCHLORIDE 100 MG: 100 TABLET ORAL at 08:16

## 2024-04-15 RX ADMIN — GABAPENTIN 600 MG: 300 CAPSULE ORAL at 14:03

## 2024-04-15 RX ADMIN — MAGNESIUM OXIDE TAB 400 MG (241.3 MG ELEMENTAL MG) 400 MG: 400 (241.3 MG) TAB at 08:16

## 2024-04-15 RX ADMIN — AMITRIPTYLINE HYDROCHLORIDE 30 MG: 10 TABLET, FILM COATED ORAL at 21:57

## 2024-04-15 RX ADMIN — CETIRIZINE HYDROCHLORIDE 10 MG: 10 TABLET, FILM COATED ORAL at 08:16

## 2024-04-15 RX ADMIN — GABAPENTIN 600 MG: 300 CAPSULE ORAL at 20:12

## 2024-04-15 RX ADMIN — SENNOSIDES AND DOCUSATE SODIUM 1 TABLET: 8.6; 5 TABLET ORAL at 20:15

## 2024-04-15 RX ADMIN — SENNOSIDES AND DOCUSATE SODIUM 1 TABLET: 8.6; 5 TABLET ORAL at 08:17

## 2024-04-15 RX ADMIN — GABAPENTIN 600 MG: 300 CAPSULE ORAL at 08:15

## 2024-04-15 RX ADMIN — LOSARTAN POTASSIUM 25 MG: 25 TABLET, FILM COATED ORAL at 08:16

## 2024-04-15 RX ADMIN — PANTOPRAZOLE SODIUM 40 MG: 40 TABLET, DELAYED RELEASE ORAL at 07:01

## 2024-04-15 RX ADMIN — MONTELUKAST 10 MG: 10 TABLET, FILM COATED ORAL at 21:57

## 2024-04-15 RX ADMIN — CELECOXIB 200 MG: 200 CAPSULE ORAL at 08:22

## 2024-04-15 RX ADMIN — OXYCODONE HYDROCHLORIDE 10 MG: 5 TABLET ORAL at 12:00

## 2024-04-15 RX ADMIN — PROPRANOLOL HYDROCHLORIDE 120 MG: 60 CAPSULE, EXTENDED RELEASE ORAL at 08:22

## 2024-04-15 RX ADMIN — OXYCODONE HYDROCHLORIDE 10 MG: 5 TABLET ORAL at 08:15

## 2024-04-15 RX ADMIN — FLUTICASONE FUROATE AND VILANTEROL TRIFENATATE 1 PUFF: 200; 25 POWDER RESPIRATORY (INHALATION) at 08:24

## 2024-04-15 RX ADMIN — ACETAMINOPHEN 975 MG: 325 TABLET ORAL at 11:59

## 2024-04-15 RX ADMIN — HEPARIN SODIUM 5000 UNITS: 10000 INJECTION, SOLUTION INTRAVENOUS; SUBCUTANEOUS at 11:59

## 2024-04-15 ASSESSMENT — ACTIVITIES OF DAILY LIVING (ADL)
DEPENDENT_IADLS:: INDEPENDENT
ADLS_ACUITY_SCORE: 41
ADLS_ACUITY_SCORE: 45
ADLS_ACUITY_SCORE: 45
ADLS_ACUITY_SCORE: 41
ADLS_ACUITY_SCORE: 45
ADLS_ACUITY_SCORE: 38
ADLS_ACUITY_SCORE: 41
ADLS_ACUITY_SCORE: 38
ADLS_ACUITY_SCORE: 41
ADLS_ACUITY_SCORE: 41
ADLS_ACUITY_SCORE: 45
ADLS_ACUITY_SCORE: 41
ADLS_ACUITY_SCORE: 38
ADLS_ACUITY_SCORE: 41
ADLS_ACUITY_SCORE: 42
ADLS_ACUITY_SCORE: 38
ADLS_ACUITY_SCORE: 45
ADLS_ACUITY_SCORE: 38

## 2024-04-15 NOTE — PLAN OF CARE
"PRIMARY DIAGNOSIS: \"GENERIC\" NURSING  OUTPATIENT/OBSERVATION GOALS TO BE MET BEFORE DISCHARGE:  ADLs back to baseline: Yes    Activity and level of assistance: Ambulating independently.    Pain status: Improved-controlled with oral pain medications.    Return to near baseline physical activity: Yes     Discharge Planner Nurse   Safe discharge environment identified: Yes  Barriers to discharge: Yes       Entered by: Rayna Link RN 04/15/2024 4:27 PM     Please review provider order for any additional goals.   Nurse to notify provider when observation goals have been met and patient is ready for discharge.Goal Outcome Evaluation:                        "

## 2024-04-15 NOTE — PLAN OF CARE
Problem: Adult Inpatient Plan of Care  Goal: Absence of Hospital-Acquired Illness or Injury  Outcome: Progressing  Intervention: Identify and Manage Fall Risk  Recent Flowsheet Documentation  Taken 4/15/2024 0001 by Jody Crane RN  Safety Promotion/Fall Prevention: activity supervised  Intervention: Prevent and Manage VTE (Venous Thromboembolism) Risk  Recent Flowsheet Documentation  Taken 4/15/2024 0001 by Jody Crane RN  VTE Prevention/Management: SCDs (sequential compression devices) on  Goal: Optimal Comfort and Wellbeing  Outcome: Progressing  Intervention: Monitor Pain and Promote Comfort  Recent Flowsheet Documentation  Taken 4/15/2024 0333 by Jody Crane RN  Pain Management Interventions: medication (see MAR)   Goal Outcome Evaluation:       Pt is A&Ox4, VSS. On RA with ETCO2. Schedule Tylenol given. Pt felt relief. IV. LR running continuous at 75mL/hr. Tolerating a clear liquid diet. Pure wick in place, BS was 146 at 0300. Assist +1. continue to monitor.

## 2024-04-15 NOTE — PROGRESS NOTES
"PRIMARY DIAGNOSIS: \"GENERIC\" NURSING  OUTPATIENT/OBSERVATION GOALS TO BE MET BEFORE DISCHARGE:  ADLs back to baseline: Yes    Activity and level of assistance: Ambulating independently.    Pain status: Improved-controlled with oral pain medications.    Return to near baseline physical activity: Yes     Discharge Planner Nurse   Safe discharge environment identified: Yes  Barriers to discharge: Yes       Entered by: Ann Ferguson RN 04/15/2024 2:43 PM   GI saw the patient late this afternoon and are planning a EGD for tomorrow.   Please review provider order for any additional goals.   Nurse to notify provider when observation goals have been met and patient is ready for discharge.  "

## 2024-04-15 NOTE — PROGRESS NOTES
"GI PROGRESS NOTE  4/15/2024  Odette Escobar  1955  [id number]    Subjective:   Feeling improved, mild RUQ pain. Had some LLQ pain earlier today, but now gone. NPO.      Objective:     Blood pressure 129/60, pulse 56, temperature 98.1  F (36.7  C), temperature source Oral, resp. rate 16, height 1.626 m (5' 4\"), weight 60.8 kg (134 lb 1.6 oz), SpO2 98%.    Body mass index is 23.02 kg/m .  Gen: NAD, family at bedside.   Cardio: RRR  GI: Non-distended, BS positive, soft, tenderness over incisions, no rebound  Neuro: Alert and orientated  Skin: No jaundice     Laboratory:  BMP  Recent Labs   Lab 04/15/24  1239 04/15/24  0745 04/15/24  0732 04/14/24  1712 04/14/24  0600   NA  --   --  140  --  141   POTASSIUM  --   --  3.8  --  3.4   CHLORIDE  --   --  108*  --  105   JOVON  --   --  9.1  --  9.4   CO2  --   --  24  --  25   BUN  --   --  7.0*  --  11.4   CR  --   --  0.53  --  0.62   GLC 89 123* 125*   < > 207*    < > = values in this interval not displayed.     CBC  Recent Labs   Lab 04/15/24  0732 04/14/24  1816 04/14/24  0600   WBC 6.1  --  8.8   RBC 4.08  --  4.60   HGB 12.3  --  13.9   HCT 37.8  --  42.0   MCV 93  --  91   MCH 30.1  --  30.2   MCHC 32.5  --  33.1   RDW 13.5  --  13.3    184 217     INRNo lab results found in last 7 days.   LFTs  Recent Labs   Lab Test 04/15/24  0732 04/14/24  0600 03/07/24  1046 03/07/24  0945 02/11/19  0853   ALBUMIN 3.3* 4.0  --  3.7  --    BILITOTAL 2.9* 1.0  --  0.5  --    * 254*  --  22  --    * 499*  --  31  --    PROTEIN  --   --  Negative  --  Negative   LIPASE 28 43  --  31  --      Imaging:  EXAM: MR ABDOMEN MRCP W/O and W CONTRAST  LOCATION: [facility]  DATE: 4/14/2024     INDICATION: Right upper quadrant abdominal pain elevated liver function test this was requested by gastroenterologist.  COMPARISON: CT and ultrasound 4/14/2024  TECHNIQUE: Routine MR liver/pancreas protocol including axial and coronal MRCP " sequences. 2D and 3D reconstruction performed by MR technologist including MIP reconstruction and slab cholangiograms. If performed with contrast, additional dynamic T1 post   IV contrast images.  CONTRAST: 6ml gadavist      FINDINGS:      MRCP: Mild biliary dilation with the common duct measuring up to 0.8 cm and tapering smoothly at the ampulla. No biliary wall thickening, enhancement, or filling defects. Normal pancreatic duct anatomy. The gallbladder is distended with tiny stones,   pericholecystic fluid, and mild wall thickening.     LIVER: No significant hepatic abnormality. No focal masses. No evidence of cirrhosis or significant fat or iron deposition.     PANCREAS: No evidence of mass or pancreatitis.     ADDITIONAL FINDINGS: No significant abnormality in the spleen, kidneys, and adrenal glands. No adenopathy. No ascites.                                       IMPRESSION:  1.  Mild biliary dilation, but no choledocholithiasis or other source of obstruction.     2.  Tiny gallstones with mild wall thickening and pericholecystic fluid raising concern for cholecystitis.    EXAM: CTA CHEST ABDOMEN PELVIS W CONTRAST  LOCATION: [facility]  DATE: 4/14/2024                                               IMPRESSION:  1.  No active extravasation identified.   2.  Mild diffuse hyperenhancement of the gastric mucosa suggesting possible gastritis.   3.  Morphologic changes of hepatic fibrosis with paraesophageal varices suggesting portal hypertension.  4.  Mildly dilated upper common bile duct as seen on the same day ultrasound. MRCP is recommended for further evaluation.  5.  Distal colonic diverticulosis without acute diverticulitis.  6.  Cholelithiasis with mild gallbladder wall thickening, better evaluated on the same day ultrasound.    EXAM: US ABDOMEN LIMITED  LOCATION: [facility]  DATE: 4/14/2024                                    IMPRESSION:  1.  Cholelithiasis with  mild gallbladder wall thickening and trace pericholecystic fluid but negative sonographic Fontanez's sign. Findings are equivocal for acute cholecystitis with wall thickening potentially related to intrinsic hepatic disease.  2.  Dilated common bile duct up to 11 mm. Recommend MRCP to evaluate for potential choledocholithiasis which is not seen sonographically.  3.  Coarsened hepatic echotexture suggesting fibrosis. No focal mass.  4.   Mild right renal pelviectasis.     Assessment:   Elevated bilirubin  Dilated CBD  68-year-old female who presented with abdominal pain and found to have acute cholecystitis status post robotic lap cholecystectomy 4/14/2024 who we were asked to again see due to rising LFTs, particularly bilirubin.  Bilirubin 1.0 yesterday, and MRCP showed a dilated common bile duct (0.8) but no signs of obstruction.  Today, total bilirubin 2.9, direct 2.56. Concern for retained stone vs passed stone. We will proceed with EUS +/- ERCP tomorrow, tentatively planned for noon. Nursing updated.      Plan:   1. Clears tonight  2. EUS +/- ERCP tomorrow  3. NPO midnight  4. Trend LFTs        44 minutes of total time was spent providing patient care, including patient evaluation, reviewing documentation/test results, , and documentation.                                                                        Kassie Kyle PA-C  Thank you for the opportunity to participate in the care of this patient.   Please feel free to call me with any questions or concerns.  Phone number [phone].

## 2024-04-15 NOTE — ANESTHESIA POSTPROCEDURE EVALUATION
Patient: Odette Escobar    Procedure: Procedure(s):  CHOLECYSTECTOMY, ROBOT-ASSISTED, LAPAROSCOPIC, USING DA ROCIO XI  PRIMARY REPAIR OF  UMBILICAL HERNIA       Anesthesia Type:  General    Note:  Disposition: Inpatient   Postop Pain Control: Uneventful            Sign Out: Well controlled pain   PONV: No   Neuro/Psych: Uneventful            Sign Out: Acceptable/Baseline neuro status   Airway/Respiratory: Uneventful            Sign Out: Acceptable/Baseline resp. status   CV/Hemodynamics: Uneventful            Sign Out: Acceptable CV status; No obvious hypovolemia; No obvious fluid overload   Other NRE: NONE   DID A NON-ROUTINE EVENT OCCUR? No    Event details/Postop Comments:  Per chart documentation, no apparent anesthetic complications            Last vitals:  Vitals Value Taken Time   /53 04/14/24 1715   Temp 37.1  C (98.8  F) 04/14/24 1727   Pulse 79 04/14/24 1729   Resp 39 04/14/24 1729   SpO2 99 % 04/14/24 1729   Vitals shown include unfiled device data.    Electronically Signed By: Thomas Padilla MD  April 15, 2024  5:23 PM

## 2024-04-15 NOTE — CONSULTS
Care Management Initial Consult    General Information  Assessment completed with: Patient,    Type of CM/SW Visit: Initial Assessment    Primary Care Provider verified and updated as needed: Yes   Readmission within the last 30 days: no previous admission in last 30 days         Advance Care Planning: Advance Care Planning Reviewed: present on chart          Communication Assessment  Patient's communication style: spoken language (English or Bilingual)             Cognitive  Cognitive/Neuro/Behavioral: WDL  Level of Consciousness: alert     Orientation: oriented x 4  Mood/Behavior: labile, calm, cooperative     Speech: clear    Living Environment:   People in home: significant other     Current living Arrangements: apartment      Able to return to prior arrangements: yes       Family/Social Support:  Care provided by: self  Provides care for: no one, unable/limited ability to care for self  Marital Status: Lives with Significant Other  Significant Other          Description of Support System: Supportive    Support Assessment: Adequate family and caregiver support    Current Resources:   Patient receiving home care services: No     Community Resources: None  Equipment currently used at home:    Supplies currently used at home: None    Employment/Financial:  Employment Status:          Financial Concerns: none   Referral to Financial Worker: No       Does the patient's insurance plan have a 3 day qualifying hospital stay waiver?  No    Lifestyle & Psychosocial Needs:  Social Determinants of Health     Food Insecurity: Not At Risk (3/21/2024)    Received from [employer]    Food Insecurity     Does your food run out before you have the money to buy more?: No   Depression: Not at risk (1/4/2024)    Received from [facility] [employer]    PHQ-2     PHQ-2 Score: 1   Housing Stability: Not on file   Tobacco Use: Low Risk  (4/14/2024)    Patient History     Smoking Tobacco Use: Never      Smokeless Tobacco Use: Never     Passive Exposure: Not on file   Financial Resource Strain: At Risk (3/21/2024)    Received from [email or web address]    Financial Resource Strain     Is it hard for you to pay for the very basics like food, housing, medical care or heating?: Yes   Alcohol Use: Unknown (1/6/2021)    Received from [email or web address]    AUDIT-C     Frequency of Alcohol Consumption: Not on file     Average Number of Drinks: 1 or 2     Frequency of Binge Drinking: Not on file   Transportation Needs: Not At Risk (3/21/2024)    Received from [email or web address]    Transportation Needs     Does a lack of transportation keep you from your medical appointments or from getting your medications?: No   Physical Activity: Not on file   Interpersonal Safety: Unknown (2/21/2024)    Received from [email or web address]    Humiliation, Afraid, Rape, and Kick questionnaire     Fear of Current or Ex-Partner: Not on file     Emotionally Abused: Not on file     Physically Abused: No     Sexually Abused: No   Stress: Not on file   Social Connections: Not on file   Health Literacy: Not on file       Functional Status:  Prior to admission patient needed assistance:   Dependent ADLs:: Independent  Dependent IADLs:: Independent  Assesssment of Functional Status: At functional baseline    Mental Health Status:  Mental Health Status: No Current Concerns       Chemical Dependency Status:  Chemical Dependency Status: No Current Concerns             Values/Beliefs:  Spiritual, Cultural Beliefs, Adventist Practices, Values that affect care:                 Additional Information:  HINA met with pt to introduce role of CM, complete  initial assessment, and to discuss needs at time of d/c. Pt comes from home; lives with s/o, and noted to be independent at baseline but weaker than her baseline. Pt stated she would be interested in home care at discharge; referral sent for RN PT. Care management to   follow through hospitalization.  9:14 AM    Brenna Kjellberg, BSW LSW  4/15/2024

## 2024-04-15 NOTE — PROGRESS NOTES
General Surgery Progress Note:    Hospital Day # 0    ASSESSMENT:  1. Acute cholecystitis    2. Right upper quadrant abdominal pain    3. Elevated LFTs    4. Hepatic fibrosis    5. Portal hypertension (H)    6. Symptomatic cholelithiasis    7. Dilation of common bile duct      Odette Escobar is a 68 year old female who is s/p robotic laparoscopic cholecystectomy on 4/14/24. MRCP on 4/14 showed mild biliary dilation but no choledocholithiasis. Afebrile, vitally stable, normal WBC (6.1), elevated T-bili trending up (1<2.9), elevated Alk Phos trending up (156<162), transaminitis (, ). GI following for evaluation of elevated T bili. Possible she has passed a stone during the procedure but will defer to GI about possible imaging or intervention. Will make patient NPO until GI has seen her.    PLAN:  - NPO  - GI following, appreciate recs  - Pain medications  - Encourage activity as able to promote bowel function  - Medical management per primary team  - General surgery will continue to follow    SUBJECTIVE:   She is doing okay today. Reports LLQ abdominal pain near incision site. Passing flatus and no BM. Voiding with no issues, using pure wick. Tolerating clears with no nausea, vomiting. Concerned about going home alone and needing help around the house.    Patient Vitals for the past 24 hrs:   BP Temp Temp src Pulse Resp SpO2 Height   04/15/24 0800 -- -- -- 78 -- 98 % --   04/15/24 0742 121/59 98  F (36.7  C) Oral -- 16 -- --   04/15/24 0300 125/64 97.5  F (36.4  C) Oral 71 16 98 % --   04/15/24 0000 127/64 98.1  F (36.7  C) Oral 68 12 -- --   04/14/24 1915 131/60 98.3  F (36.8  C) Oral 77 11 96 % --   04/14/24 1815 131/63 98  F (36.7  C) Oral 98 12 98 % --   04/14/24 1800 115/56 -- -- 72 -- 95 % --   04/14/24 1750 119/58 -- -- 73 -- 96 % --   04/14/24 1746 121/57 97.7  F (36.5  C) Oral 74 20 97 % --   04/14/24 1727 -- 98.8  F (37.1  C) Temporal 76 24 100 % --   04/14/24 1715 107/53 -- -- 72 18 97 % --  "  04/14/24 1713 -- -- -- 75 16 98 % --   04/14/24 1703 119/56 98.7  F (37.1  C) Temporal 77 22 98 % --   04/14/24 1656 -- -- -- 74 16 99 % --   04/14/24 1645 122/58 -- -- 72 18 100 % --   04/14/24 1641 (!) 150/67 98.1  F (36.7  C) Temporal 75 18 100 % --   04/14/24 1209 122/76 -- -- 71 18 100 % 1.626 m (5' 4\")   04/14/24 1100 115/65 -- -- 79 -- 100 % --   04/14/24 1045 119/59 -- -- -- -- 100 % --   04/14/24 1030 131/69 -- -- 75 -- 97 % --     PHYSICAL EXAM:  General: patient seen resting in bed, no acute distress  Resp: no respiratory distress, breathing comfortably on RA  Abdomen: soft, mildly distended, lower abdominal tenderness to palpitation around incision sites. No guarding or rebound tenderness. Incisions clean, dry, intact covered in dermabond.  Extremities: warm and well perfused    04/14 0700 - 04/15 0659  In: 1310 [I.V.:1310]  Out: -     Admission on 04/14/2024   Component Date Value    Sodium 04/14/2024 141     Potassium 04/14/2024 3.4     Carbon Dioxide (CO2) 04/14/2024 25     Anion Gap 04/14/2024 11     Urea Nitrogen 04/14/2024 11.4     Creatinine 04/14/2024 0.62     GFR Estimate 04/14/2024 >90     Calcium 04/14/2024 9.4     Chloride 04/14/2024 105     Glucose 04/14/2024 207 (H)     Alkaline Phosphatase 04/14/2024 156 (H)     AST 04/14/2024 499 (H)     ALT 04/14/2024 254 (H)     Protein Total 04/14/2024 6.9     Albumin 04/14/2024 4.0     Bilirubin Total 04/14/2024 1.0     Lipase 04/14/2024 43     Ventricular Rate 04/14/2024 63     Atrial Rate 04/14/2024 63     IA Interval 04/14/2024 170     QRS Duration 04/14/2024 78     QT 04/14/2024 432     QTc 04/14/2024 442     P Axis 04/14/2024 74     R AXIS 04/14/2024 75     T Axis 04/14/2024 70     Interpretation ECG 04/14/2024                      Value:Sinus rhythm  Normal ECG  When compared with ECG of 11-FEB-2019 07:02,  Questionable change in QRS axis  Nonspecific T wave abnormality now evident in Lateral leads  Confirmed by SEE ED PROVIDER NOTE FOR, ECG " INTERPRETATION (4000),  GÓMEZ PEREZ ([id number]) on 4/15/2024 4:21:46 AM      Troponin T, High Sensiti* 04/14/2024 6     WBC Count 04/14/2024 8.8     RBC Count 04/14/2024 4.60     Hemoglobin 04/14/2024 13.9     Hematocrit 04/14/2024 42.0     MCV 04/14/2024 91     MCH 04/14/2024 30.2     MCHC 04/14/2024 33.1     RDW 04/14/2024 13.3     Platelet Count 04/14/2024 217     % Neutrophils 04/14/2024 75     % Lymphocytes 04/14/2024 18     % Monocytes 04/14/2024 7     % Eosinophils 04/14/2024 0     % Basophils 04/14/2024 0     % Immature Granulocytes 04/14/2024 0     NRBCs per 100 WBC 04/14/2024 0     Absolute Neutrophils 04/14/2024 6.6     Absolute Lymphocytes 04/14/2024 1.6     Absolute Monocytes 04/14/2024 0.6     Absolute Eosinophils 04/14/2024 0.0     Absolute Basophils 04/14/2024 0.0     Absolute Immature Granul* 04/14/2024 0.0     Absolute NRBCs 04/14/2024 0.0     Alcohol ethyl 04/14/2024 <0.01     Acetaminophen 04/14/2024 <5.0 (L)     Hemoglobin A1C 04/14/2024 5.4     GLUCOSE BY METER POCT 04/14/2024 175 (H)     Platelet Count 04/14/2024 184     GLUCOSE BY METER POCT 04/14/2024 178 (H)     GLUCOSE BY METER POCT 04/14/2024 172 (H)     Protein Total 04/15/2024 5.8 (L)     Albumin 04/15/2024 3.3 (L)     Bilirubin Total 04/15/2024 2.9 (H)     Alkaline Phosphatase 04/15/2024 162 (H)     AST 04/15/2024 399 (H)     ALT 04/15/2024 393 (H)     Bilirubin Direct 04/15/2024 2.56 (H)     Sodium 04/15/2024 140     Potassium 04/15/2024 3.8     Chloride 04/15/2024 108 (H)     Carbon Dioxide (CO2) 04/15/2024 24     Anion Gap 04/15/2024 8     Urea Nitrogen 04/15/2024 7.0 (L)     Creatinine 04/15/2024 0.53     GFR Estimate 04/15/2024 >90     Calcium 04/15/2024 9.1     Glucose 04/15/2024 125 (H)     Magnesium 04/15/2024 1.9     Phosphorus 04/15/2024 4.0     WBC Count 04/15/2024 6.1     RBC Count 04/15/2024 4.08     Hemoglobin 04/15/2024 12.3     Hematocrit 04/15/2024 37.8     MCV 04/15/2024 93     MCH 04/15/2024 30.1     MCHC  04/15/2024 32.5     RDW 04/15/2024 13.5     Platelet Count 04/15/2024 181     GLUCOSE BY METER POCT 04/15/2024 146 (H)     Lipase 04/15/2024 28     GLUCOSE BY METER POCT 04/15/2024 123 (H)         Munira Diaz PA-C  [facility]  [address] [address]

## 2024-04-15 NOTE — PROGRESS NOTES
"PRIMARY DIAGNOSIS: \"GENERIC\" NURSING  OUTPATIENT/OBSERVATION GOALS TO BE MET BEFORE DISCHARGE:  ADLs back to baseline: Yes    Activity and level of assistance: Up with standby assistance.    Pain status: Improved-controlled with oral pain medications.    Return to near baseline physical activity: Yes     Discharge Planner Nurse   Safe discharge environment identified: Yes  Barriers to discharge: Yes       Entered by: Ann Ferguson RN 04/15/2024 11:22 AM   GI to see patient today. MD made patient NPO for possible procedure.   Please review provider order for any additional goals.   Nurse to notify provider when observation goals have been met and patient is ready for discharge.  "

## 2024-04-15 NOTE — PROVIDER NOTIFICATION
Patient name Odette Escobar. Patient is requesting pta meds albuterol inhaler. Please advice. Rustam MOROCHO RN.

## 2024-04-15 NOTE — PROGRESS NOTES
"PRIMARY DIAGNOSIS: \"GENERIC\" NURSING  OUTPATIENT/OBSERVATION GOALS TO BE MET BEFORE DISCHARGE:  ADLs back to baseline: No    Activity and level of assistance: Up with standby assistance.    Pain status: Improved but still requiring IV narcotics.    Return to near baseline physical activity: No     Discharge Planner Nurse   Safe discharge environment identified: No  Barriers to discharge: No       Entered by: Yamilka Pina RN 04/15/2024 12:02 AM     Please review provider order for any additional goals.   Nurse to notify provider when observation goals have been met and patient is ready for discharge.  "

## 2024-04-15 NOTE — PLAN OF CARE
"Goal Outcome Evaluation:    Pt c/o of dull liver and gallbladder pain 4/10. PRN 0.2mg IV Dilaudid given bringing pain to a 2/10. LR running continuous at 75mL/hr. Tolerating a clear liquid diet. Up to bedside commode voiding small amount of urine A1, BS for 90mL at 2200 continue to monitor. BG ac 178  Pt refused before meal insulin. . PRN Tessalon Pearls given for reported cough. Pt reports \"they helped\".      Problem: Adult Inpatient Plan of Care  Goal: Readiness for Transition of Care  Outcome: Progressing     Problem: Adult Inpatient Plan of Care  Goal: Optimal Comfort and Wellbeing  Outcome: Progressing       Problem: Adult Inpatient Plan of Care  Goal: Absence of Hospital-Acquired Illness or Injury  Intervention: Prevent and Manage VTE (Venous Thromboembolism) Risk  Recent Flowsheet Documentation  Taken 4/14/2024 1756 by Yamilka Pina, RN  VTE Prevention/Management: SCDs (sequential compression devices) on     Problem: Adult Inpatient Plan of Care  Goal: Absence of Hospital-Acquired Illness or Injury  Intervention: Identify and Manage Fall Risk  Recent Flowsheet Documentation  Taken 4/14/2024 1756 by Yamilka Pina, RN  Safety Promotion/Fall Prevention: activity supervised     Problem: Adult Inpatient Plan of Care  Goal: Optimal Comfort and Wellbeing  Intervention: Monitor Pain and Promote Comfort  Recent Flowsheet Documentation  Taken 4/14/2024 2023 by Yamilka Pina, RN  Pain Management Interventions: medication (see MAR)  Taken 4/14/2024 1755 by Yamilka Pina, RN  Pain Management Interventions: medication (see MAR)         Plan of Care Reviewed With: patient    Overall Patient Progress: improvingOverall Patient Progress: improving           "

## 2024-04-15 NOTE — PROGRESS NOTES
[facility]    Medicine Progress Note - Hospitalist Service    Date of Admission:  4/14/2024    Assessment & Plan   Odette Escobar is a 68 year old female admitted on 4/14/2024 for acute cholecystitis. Patient had MRCP that showed mild biliary dilatation but no choledocholithiasis or source of obstruction. Patient underwent laparoscopic cholecystectomy 4/14/2024. Post procedurally patient had elevated LFT and bilirubin. GI planning for ERCP tomorrow.     Cholelithiasis with acute cholecystitis  POD#1 S/p laparoscopic cholecystectomy (4/14/2024)  Transaminitis   Presents with acute RUQ abdominal pain and vomiting.   -- US reveals cholelithiasis with mild gallbladder wall thickening, dilated common bile duct up to 11 mm.   -- MRCP 4/14 - Mild biliary dilation, but no choledocholithiasis or other source of obstruction. Tiny gallstones with mild wall thickening and pericholecystic fluid raising concern for cholecystitis  -- S/p lap denia 4/14  -- Uptrending LFTs and bilirubin post-procedurally   -- GI planning for ERCP tomorrow  - clears today, NPO at midnight   - trend LFT  - Continue pain management, antiemetics PRN     Alcoholic liver cirrhosis:  Her LFT baseline is normal. She normally follows up in the liver clinic. Patient reports that her last visit was just a few days prior to this admission and she was told that her liver cirrhosis is stable and can follow up in 6 months. 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     Diabetes, type II: On Jardiance and weekly Ozempic. Hold PTA Jardiance.   - Novolog sliding scale    Essential hypertension: Continue PTA losartan, propanolol   Asthma: not on exacerbation. Continue PTA inhaler and singulair  Chronic Back Pain: continue PTA gabapentin.        Observation Goals: -diagnostic tests and consults completed and resulted, -vital signs normal or at patient baseline, -tolerating oral intake to maintain hydration, Nurse to notify provider when observation  goals have been met and patient is ready for discharge.  Diet: Advance Diet as Tolerated: Clear Liquid Diet    DVT Prophylaxis: Pneumatic compression devices   [place] Catheter: Not present  Lines: None     Cardiac Monitoring: None  Code Status: Full Code      Clinically Significant Risk Factors Present on Admission                  # Hypertension: Noted on problem list          # Asthma: noted on problem list        Disposition Plan     Medically Ready for Discharge: Anticipated Tomorrow       The patient's care was discussed with the Attending Physician, Dr. Sisi Mcdonnell who independently met with and assessed the patient and is in agreement with the assessment and plan     Deisy Romero PA-C  Hospitalist Service  [facility]  Securely message with [employer] (more info)  Text page via [facility] Paging/Directory   ______________________________________________________________________    Interval History   Patient doing well today, having some abdominal pain but states this is well controlled with pain medications. No nausea today, was tolerating clear liquid diet. Has been passing gas today but no bowel movement. Discussed plan for ERCP tomorrow, patient agreeable.    Physical Exam   Vital Signs: Temp: 97.5  F (36.4  C) Temp src: Oral BP: 125/64 Pulse: 71   Resp: 16 SpO2: 98 % O2 Device: None (Room air) Oxygen Delivery: 6 LPM  Weight: 134 lbs 1.6 oz    Constitutional: awake, alert, no apparent distress, and appears stated age  Eyes: Lids and lashes normal, extra ocular muscles intact, sclera clear, conjunctiva normal  Respiratory: No increased work of breathing, no accessory muscle use, clear to auscultation bilaterally, no crackles or wheezing  Cardiovascular: Regular rate and rhythm, normal S1 and S2, no S3 or S4, and no murmur noted  GI: Soft, non-distended, normal bowel sounds. Slightly tender with palpation. Surgical sites are intact and well-healing, no dehiscence or purulent drainage or surrounding  erythema.  Skin: Normal skin color, texture, turgor. No rashes and no jaundice  Musculoskeletal: no lower extremity pitting edema present.   Neurologic: Awake, alert.   Neuropsychiatric: Appropriate mood, affect and eye contact. Cooperative.    Medical Decision Making             Data     I have personally reviewed the following data over the past 24 hrs:    6.1  \   12.3   / 181     140 108 (H) 7.0 (L) /  89   3.8 24 0.53 \     ALT: 393 (H) AST: 399 (H) AP: 162 (H) TBILI: 2.9 (H)   ALB: 3.3 (L) TOT PROTEIN: 5.8 (L) LIPASE: 28       Imaging results reviewed over the past 24 hrs:   No results found for this or any previous visit (from the past 24 hour(s)).

## 2024-04-15 NOTE — PLAN OF CARE
"PRIMARY DIAGNOSIS: \"GENERIC\" NURSING  OUTPATIENT/OBSERVATION GOALS TO BE MET BEFORE DISCHARGE:  ADLs back to baseline: No    Activity and level of assistance: Up with standby assistance.    Pain status: Improved-controlled with oral pain medications.    Return to near baseline physical activity: No     Discharge Planner Nurse   Safe discharge environment identified: Yes  Barriers to discharge: Yes       Entered by: Jody Crane RN 04/15/2024 3:09 AM     Please review provider order for any additional goals.   Nurse to notify provider when observation goals have been met and patient is ready for discharge.Goal Outcome Evaluation:                        "

## 2024-04-16 ENCOUNTER — APPOINTMENT (OUTPATIENT)
Dept: RADIOLOGY | Facility: HOSPITAL | Age: 69
End: 2024-04-16
Attending: INTERNAL MEDICINE
Payer: COMMERCIAL

## 2024-04-16 ENCOUNTER — ANESTHESIA (OUTPATIENT)
Dept: SURGERY | Facility: HOSPITAL | Age: 69
End: 2024-04-16
Payer: COMMERCIAL

## 2024-04-16 ENCOUNTER — ANESTHESIA EVENT (OUTPATIENT)
Dept: SURGERY | Facility: HOSPITAL | Age: 69
End: 2024-04-16
Payer: COMMERCIAL

## 2024-04-16 LAB
ALBUMIN SERPL BCG-MCNC: 3 G/DL (ref 3.5–5.2)
ALP SERPL-CCNC: 147 U/L (ref 40–150)
ALT SERPL W P-5'-P-CCNC: 281 U/L (ref 0–50)
ANION GAP SERPL CALCULATED.3IONS-SCNC: 7 MMOL/L (ref 7–15)
AST SERPL W P-5'-P-CCNC: 187 U/L (ref 0–45)
BILIRUB DIRECT SERPL-MCNC: 0.9 MG/DL (ref 0–0.3)
BILIRUB SERPL-MCNC: 1.5 MG/DL
BUN SERPL-MCNC: 7.2 MG/DL (ref 8–23)
CALCIUM SERPL-MCNC: 9 MG/DL (ref 8.8–10.2)
CHLORIDE SERPL-SCNC: 108 MMOL/L (ref 98–107)
CREAT SERPL-MCNC: 0.58 MG/DL (ref 0.51–0.95)
DEPRECATED HCO3 PLAS-SCNC: 30 MMOL/L (ref 22–29)
EGFRCR SERPLBLD CKD-EPI 2021: >90 ML/MIN/1.73M2
ERCP: NORMAL
ERYTHROCYTE [DISTWIDTH] IN BLOOD BY AUTOMATED COUNT: 13.7 % (ref 10–15)
GLUCOSE BLDC GLUCOMTR-MCNC: 67 MG/DL (ref 70–99)
GLUCOSE BLDC GLUCOMTR-MCNC: 72 MG/DL (ref 70–99)
GLUCOSE BLDC GLUCOMTR-MCNC: 73 MG/DL (ref 70–99)
GLUCOSE BLDC GLUCOMTR-MCNC: 73 MG/DL (ref 70–99)
GLUCOSE BLDC GLUCOMTR-MCNC: 74 MG/DL (ref 70–99)
GLUCOSE BLDC GLUCOMTR-MCNC: 79 MG/DL (ref 70–99)
GLUCOSE BLDC GLUCOMTR-MCNC: 79 MG/DL (ref 70–99)
GLUCOSE SERPL-MCNC: 83 MG/DL (ref 70–99)
HCT VFR BLD AUTO: 37.1 % (ref 35–47)
HGB BLD-MCNC: 12 G/DL (ref 11.7–15.7)
MCH RBC QN AUTO: 30.3 PG (ref 26.5–33)
MCHC RBC AUTO-ENTMCNC: 32.3 G/DL (ref 31.5–36.5)
MCV RBC AUTO: 94 FL (ref 78–100)
PLATELET # BLD AUTO: 158 10E3/UL (ref 150–450)
POTASSIUM SERPL-SCNC: 3.7 MMOL/L (ref 3.4–5.3)
PROT SERPL-MCNC: 5.4 G/DL (ref 6.4–8.3)
RBC # BLD AUTO: 3.96 10E6/UL (ref 3.8–5.2)
SODIUM SERPL-SCNC: 145 MMOL/L (ref 135–145)
UPPER EUS: NORMAL
WBC # BLD AUTO: 5.9 10E3/UL (ref 4–11)

## 2024-04-16 PROCEDURE — C1726 CATH, BAL DIL, NON-VASCULAR: HCPCS | Performed by: INTERNAL MEDICINE

## 2024-04-16 PROCEDURE — 258N000003 HC RX IP 258 OP 636: Performed by: NURSE ANESTHETIST, CERTIFIED REGISTERED

## 2024-04-16 PROCEDURE — 250N000011 HC RX IP 250 OP 636: Performed by: SURGERY

## 2024-04-16 PROCEDURE — C2625 STENT, NON-COR, TEM W/DEL SY: HCPCS | Performed by: INTERNAL MEDICINE

## 2024-04-16 PROCEDURE — C1769 GUIDE WIRE: HCPCS | Performed by: INTERNAL MEDICINE

## 2024-04-16 PROCEDURE — 250N000013 HC RX MED GY IP 250 OP 250 PS 637: Performed by: SURGERY

## 2024-04-16 PROCEDURE — 250N000013 HC RX MED GY IP 250 OP 250 PS 637: Performed by: INTERNAL MEDICINE

## 2024-04-16 PROCEDURE — 272N000001 HC OR GENERAL SUPPLY STERILE: Performed by: INTERNAL MEDICINE

## 2024-04-16 PROCEDURE — 99232 SBSQ HOSP IP/OBS MODERATE 35: CPT | Mod: FS | Performed by: FAMILY MEDICINE

## 2024-04-16 PROCEDURE — 36415 COLL VENOUS BLD VENIPUNCTURE: CPT

## 2024-04-16 PROCEDURE — 258N000003 HC RX IP 258 OP 636: Performed by: ANESTHESIOLOGY

## 2024-04-16 PROCEDURE — 99232 SBSQ HOSP IP/OBS MODERATE 35: CPT | Mod: FS

## 2024-04-16 PROCEDURE — 255N000002 HC RX 255 OP 636: Performed by: INTERNAL MEDICINE

## 2024-04-16 PROCEDURE — 96361 HYDRATE IV INFUSION ADD-ON: CPT

## 2024-04-16 PROCEDURE — 250N000011 HC RX IP 250 OP 636: Performed by: NURSE ANESTHETIST, CERTIFIED REGISTERED

## 2024-04-16 PROCEDURE — 85027 COMPLETE CBC AUTOMATED: CPT

## 2024-04-16 PROCEDURE — 999N000141 HC STATISTIC PRE-PROCEDURE NURSING ASSESSMENT: Performed by: INTERNAL MEDICINE

## 2024-04-16 PROCEDURE — 360N000083 HC SURGERY LEVEL 3 W/ FLUORO, PER MIN: Performed by: INTERNAL MEDICINE

## 2024-04-16 PROCEDURE — 82962 GLUCOSE BLOOD TEST: CPT

## 2024-04-16 PROCEDURE — 370N000017 HC ANESTHESIA TECHNICAL FEE, PER MIN: Performed by: INTERNAL MEDICINE

## 2024-04-16 PROCEDURE — 999N000180 XR SURGERY CARM FLUORO LESS THAN 5 MIN

## 2024-04-16 PROCEDURE — 258N000003 HC RX IP 258 OP 636

## 2024-04-16 PROCEDURE — 80076 HEPATIC FUNCTION PANEL: CPT | Performed by: SURGERY

## 2024-04-16 PROCEDURE — G0378 HOSPITAL OBSERVATION PER HR: HCPCS

## 2024-04-16 PROCEDURE — 250N000009 HC RX 250: Performed by: NURSE ANESTHETIST, CERTIFIED REGISTERED

## 2024-04-16 DEVICE — BILIARY STENT WITH NAVIFLEXTM RX DELIVERY SYSTEM
Type: IMPLANTABLE DEVICE | Site: ESOPHAGUS | Status: NON-FUNCTIONAL
Brand: ADVANIX™ BILIARY
Removed: 2024-06-14

## 2024-04-16 RX ORDER — PROPOFOL 10 MG/ML
INJECTION, EMULSION INTRAVENOUS PRN
Status: DISCONTINUED | OUTPATIENT
Start: 2024-04-16 | End: 2024-04-16

## 2024-04-16 RX ORDER — SODIUM CHLORIDE, SODIUM LACTATE, POTASSIUM CHLORIDE, CALCIUM CHLORIDE 600; 310; 30; 20 MG/100ML; MG/100ML; MG/100ML; MG/100ML
INJECTION, SOLUTION INTRAVENOUS CONTINUOUS
Status: DISCONTINUED | OUTPATIENT
Start: 2024-04-16 | End: 2024-04-16

## 2024-04-16 RX ORDER — SODIUM CHLORIDE 9 MG/ML
INJECTION, SOLUTION INTRAVENOUS CONTINUOUS
Status: ACTIVE | OUTPATIENT
Start: 2024-04-16 | End: 2024-04-16

## 2024-04-16 RX ORDER — SODIUM CHLORIDE 9 MG/ML
INJECTION, SOLUTION INTRAVENOUS CONTINUOUS
Status: DISCONTINUED | OUTPATIENT
Start: 2024-04-16 | End: 2024-04-16

## 2024-04-16 RX ORDER — PROPOFOL 10 MG/ML
INJECTION, EMULSION INTRAVENOUS CONTINUOUS PRN
Status: DISCONTINUED | OUTPATIENT
Start: 2024-04-16 | End: 2024-04-16

## 2024-04-16 RX ORDER — KETAMINE HYDROCHLORIDE 10 MG/ML
INJECTION INTRAMUSCULAR; INTRAVENOUS PRN
Status: DISCONTINUED | OUTPATIENT
Start: 2024-04-16 | End: 2024-04-16

## 2024-04-16 RX ORDER — LIDOCAINE HYDROCHLORIDE 10 MG/ML
INJECTION, SOLUTION INFILTRATION; PERINEURAL PRN
Status: DISCONTINUED | OUTPATIENT
Start: 2024-04-16 | End: 2024-04-16

## 2024-04-16 RX ORDER — SODIUM CHLORIDE, SODIUM LACTATE, POTASSIUM CHLORIDE, CALCIUM CHLORIDE 600; 310; 30; 20 MG/100ML; MG/100ML; MG/100ML; MG/100ML
INJECTION, SOLUTION INTRAVENOUS CONTINUOUS PRN
Status: DISCONTINUED | OUTPATIENT
Start: 2024-04-16 | End: 2024-04-16

## 2024-04-16 RX ORDER — LIDOCAINE 40 MG/G
CREAM TOPICAL
Status: DISCONTINUED | OUTPATIENT
Start: 2024-04-16 | End: 2024-04-17

## 2024-04-16 RX ORDER — LIDOCAINE 40 MG/G
CREAM TOPICAL
Status: DISCONTINUED | OUTPATIENT
Start: 2024-04-16 | End: 2024-04-17 | Stop reason: HOSPADM

## 2024-04-16 RX ADMIN — AMITRIPTYLINE HYDROCHLORIDE 30 MG: 10 TABLET, FILM COATED ORAL at 21:10

## 2024-04-16 RX ADMIN — PROPRANOLOL HYDROCHLORIDE 120 MG: 60 CAPSULE, EXTENDED RELEASE ORAL at 08:16

## 2024-04-16 RX ADMIN — PHENYLEPHRINE HYDROCHLORIDE 100 MCG: 10 INJECTION INTRAVENOUS at 12:31

## 2024-04-16 RX ADMIN — CETIRIZINE HYDROCHLORIDE 10 MG: 10 TABLET, FILM COATED ORAL at 08:19

## 2024-04-16 RX ADMIN — KETAMINE HYDROCHLORIDE 10 MG: 10 INJECTION INTRAMUSCULAR; INTRAVENOUS at 12:58

## 2024-04-16 RX ADMIN — SODIUM CHLORIDE, POTASSIUM CHLORIDE, SODIUM LACTATE AND CALCIUM CHLORIDE: 600; 310; 30; 20 INJECTION, SOLUTION INTRAVENOUS at 11:41

## 2024-04-16 RX ADMIN — PHENYLEPHRINE HYDROCHLORIDE 100 MCG: 10 INJECTION INTRAVENOUS at 13:31

## 2024-04-16 RX ADMIN — PROPOFOL 20 MG: 10 INJECTION, EMULSION INTRAVENOUS at 12:09

## 2024-04-16 RX ADMIN — SENNOSIDES AND DOCUSATE SODIUM 1 TABLET: 8.6; 5 TABLET ORAL at 08:19

## 2024-04-16 RX ADMIN — FLUTICASONE FUROATE AND VILANTEROL TRIFENATATE 1 PUFF: 200; 25 POWDER RESPIRATORY (INHALATION) at 08:18

## 2024-04-16 RX ADMIN — MAGNESIUM OXIDE TAB 400 MG (241.3 MG ELEMENTAL MG) 400 MG: 400 (241.3 MG) TAB at 08:19

## 2024-04-16 RX ADMIN — SODIUM CHLORIDE 75 ML/HR: 9 INJECTION, SOLUTION INTRAVENOUS at 15:45

## 2024-04-16 RX ADMIN — SODIUM CHLORIDE, POTASSIUM CHLORIDE, SODIUM LACTATE AND CALCIUM CHLORIDE: 600; 310; 30; 20 INJECTION, SOLUTION INTRAVENOUS at 14:18

## 2024-04-16 RX ADMIN — FAMOTIDINE 20 MG: 20 TABLET ORAL at 08:19

## 2024-04-16 RX ADMIN — GABAPENTIN 600 MG: 300 CAPSULE ORAL at 21:10

## 2024-04-16 RX ADMIN — PHENYLEPHRINE HYDROCHLORIDE 100 MCG: 10 INJECTION INTRAVENOUS at 13:14

## 2024-04-16 RX ADMIN — PHENYLEPHRINE HYDROCHLORIDE 100 MCG: 10 INJECTION INTRAVENOUS at 12:25

## 2024-04-16 RX ADMIN — SERTRALINE HYDROCHLORIDE 100 MG: 100 TABLET ORAL at 08:17

## 2024-04-16 RX ADMIN — MONTELUKAST 10 MG: 10 TABLET, FILM COATED ORAL at 21:10

## 2024-04-16 RX ADMIN — LIDOCAINE HYDROCHLORIDE 50 ML: 10 INJECTION, SOLUTION INFILTRATION; PERINEURAL at 12:07

## 2024-04-16 RX ADMIN — GABAPENTIN 600 MG: 300 CAPSULE ORAL at 08:17

## 2024-04-16 RX ADMIN — KETAMINE HYDROCHLORIDE 20 MG: 10 INJECTION INTRAMUSCULAR; INTRAVENOUS at 12:07

## 2024-04-16 RX ADMIN — PANTOPRAZOLE SODIUM 40 MG: 40 TABLET, DELAYED RELEASE ORAL at 08:20

## 2024-04-16 RX ADMIN — SODIUM CHLORIDE, POTASSIUM CHLORIDE, SODIUM LACTATE AND CALCIUM CHLORIDE: 600; 310; 30; 20 INJECTION, SOLUTION INTRAVENOUS at 12:00

## 2024-04-16 RX ADMIN — PROPOFOL 40 MG: 10 INJECTION, EMULSION INTRAVENOUS at 12:07

## 2024-04-16 RX ADMIN — PROPOFOL 150 MCG/KG/MIN: 10 INJECTION, EMULSION INTRAVENOUS at 12:07

## 2024-04-16 RX ADMIN — OXYCODONE HYDROCHLORIDE 10 MG: 5 TABLET ORAL at 14:51

## 2024-04-16 RX ADMIN — PROCHLORPERAZINE EDISYLATE 5 MG: 5 INJECTION INTRAMUSCULAR; INTRAVENOUS at 14:51

## 2024-04-16 RX ADMIN — OXYCODONE HYDROCHLORIDE 5 MG: 5 TABLET ORAL at 08:17

## 2024-04-16 RX ADMIN — FAMOTIDINE 20 MG: 20 TABLET ORAL at 21:10

## 2024-04-16 RX ADMIN — SENNOSIDES AND DOCUSATE SODIUM 1 TABLET: 8.6; 5 TABLET ORAL at 08:20

## 2024-04-16 RX ADMIN — LOSARTAN POTASSIUM 25 MG: 25 TABLET, FILM COATED ORAL at 08:17

## 2024-04-16 ASSESSMENT — ACTIVITIES OF DAILY LIVING (ADL)
ADLS_ACUITY_SCORE: 36
ADLS_ACUITY_SCORE: 35
ADLS_ACUITY_SCORE: 41
ADLS_ACUITY_SCORE: 41
ADLS_ACUITY_SCORE: 35
ADLS_ACUITY_SCORE: 41
ADLS_ACUITY_SCORE: 36
ADLS_ACUITY_SCORE: 37
ADLS_ACUITY_SCORE: 36
ADLS_ACUITY_SCORE: 41
ADLS_ACUITY_SCORE: 41
ADLS_ACUITY_SCORE: 40
ADLS_ACUITY_SCORE: 35
ADLS_ACUITY_SCORE: 41
ADLS_ACUITY_SCORE: 35
ADLS_ACUITY_SCORE: 41
ADLS_ACUITY_SCORE: 35
ADLS_ACUITY_SCORE: 37
ADLS_ACUITY_SCORE: 40
ADLS_ACUITY_SCORE: 41
ADLS_ACUITY_SCORE: 40
ADLS_ACUITY_SCORE: 40
ADLS_ACUITY_SCORE: 41

## 2024-04-16 NOTE — ANESTHESIA POSTPROCEDURE EVALUATION
Patient: Odette Escobar    Procedure: Procedure(s):  ESOPHAGOGASTRODUODENOSCOPY, WITH ENDOSCOPIC ULTRASOUND GUIDANCE  ENDOSCOPIC RETROGRADE CHOLANGIOPANCREATOGRAPHY, BILIARY SPHINCTEROTOMY, STONE EXTRACTION, BILIARY STENT PLACEMENT       Anesthesia Type:  MAC    Note:     Postop Pain Control: Uneventful            Sign Out: Well controlled pain   PONV: No   Neuro/Psych: Uneventful            Sign Out: Acceptable/Baseline neuro status   Airway/Respiratory: Uneventful            Sign Out: Acceptable/Baseline resp. status   CV/Hemodynamics: Uneventful            Sign Out: Acceptable CV status; No obvious hypovolemia; No obvious fluid overload   Other NRE: NONE   DID A NON-ROUTINE EVENT OCCUR?            Last vitals:  Vitals Value Taken Time   /78 04/16/24 1447   Temp 37.1  C (98.7  F) 04/16/24 1447   Pulse 54 04/16/24 1447   Resp 18 04/16/24 1447   SpO2 99 % 04/16/24 1447       Electronically Signed By: Efrem Melo MD  April 16, 2024  2:49 PM

## 2024-04-16 NOTE — PLAN OF CARE
Problem: Adult Inpatient Plan of Care  Goal: Absence of Hospital-Acquired Illness or Injury  Intervention: Identify and Manage Fall Risk  Recent Flowsheet Documentation  Taken 4/16/2024 0400 by Katelyn Rees RN  Safety Promotion/Fall Prevention: activity supervised  Taken 4/16/2024 0000 by Katelyn Rees RN  Safety Promotion/Fall Prevention: activity supervised     Problem: Adult Inpatient Plan of Care  Goal: Optimal Comfort and Wellbeing  Intervention: Monitor Pain and Promote Comfort  Recent Flowsheet Documentation  Taken 4/15/2024 2336 by Katelyn Rees RN  Pain Management Interventions:   repositioned   rest   pillow support provided   Goal Outcome Evaluation:      Plan of Care Reviewed With: patient      Pt A&Ox4. Vitals stable in RA. Denies pain. Ax1 to commode. NPO Midnight.  For EGD today.

## 2024-04-16 NOTE — OR NURSING
Notified MD at 1107 AM regarding critical results read back.      Spoke with: Dr. Melo    Orders were obtained.    Comments: Blood sugar 67- Dr. Melo ordered to recheck in 30 min.      Recheck BS at 1137am was 73. RN notified  regarding repeat BS at 11:50am- no new orders.

## 2024-04-16 NOTE — PLAN OF CARE
Problem: Dysrhythmia  Goal: Normalized Cardiac Rhythm  Outcome: Not Progressing     Problem: Adult Inpatient Plan of Care  Goal: Optimal Comfort and Wellbeing  Outcome: Progressing   Goal Outcome Evaluation:       Pt's heart rhythm was sinus varun with a HR in the high 40's to low 50's. Pt is lethargic but oriented x4. Pt is able to express concerns. Pt is tolerating continuous normal saline infusion well.   Da Sheehan RN  4/16/2024  6:48 PM

## 2024-04-16 NOTE — PROGRESS NOTES
PRIMARY DIAGNOSIS: ACUTE PAIN  OUTPATIENT/OBSERVATION GOALS TO BE MET BEFORE DISCHARGE:  1. Pain Status: Improved but still requiring IV narcotics.    2. Return to near baseline physical activity: Yes    3. Cleared for discharge by consultants (if involved): No    Discharge Planner Nurse   Safe discharge environment identified: Yes  Barriers to discharge: Yes       Entered by: Ann Ferguson RN 04/16/2024 12:41 PM     Please review provider order for any additional goals.   Nurse to notify provider when observation goals have been met and patient is ready for discharge.

## 2024-04-16 NOTE — PROGRESS NOTES
[facility] Progress Note - Hospitalist Service    Date of Admission:  4/14/2024    Assessment & Plan   Odette Escobar is a 68 year old female admitted on 4/14/2024 for acute cholecystitis. Patient had MRCP that showed mild biliary dilatation but no choledocholithiasis or source of obstruction. Patient underwent laparoscopic cholecystectomy 4/14/2024. Post procedurally patient had elevated LFT and bilirubin. S/p ERCP 4/16/2024 which showed choledocholithiasis.  Had complete removal of stone with balloon extraction and biliary sphincterotomy.  1 temporary stent was placed in the common bile duct.     Cholelithiasis with acute cholecystitis  Choledocholithiasis  POD#2 Laparoscopic cholecystectomy (4/14/2024)  S/p ERCP with temporary stent to CBD (4/16/2024)  Presents with acute RUQ abdominal pain and vomiting.   -- US reveals cholelithiasis with mild gallbladder wall thickening, dilated common bile duct up to 11 mm.   -- MRCP 4/14; Mild biliary dilation, but no choledocholithiasis or other source of obstruction. Tiny gallstones with mild wall thickening and pericholecystic fluid raising concern for cholecystitis  -- S/p lap denia 4/14  -- S/p ERCP 4/16 which found choledocholithiasis; balloon extraction and biliary sphincterotomy   -Did have 1 temporary stent placed in CBD   -Repeat ERCP in 8 to 10 weeks for stent removal  -- Clear liquid diet for tonight and can advance in the morning as tolerated  -- Bradycardia post procedurally;   - placed on telemetry monitoring and continuous pulse ox  - IVF for the evening   - hold parameters for propanolol   - Continue pain management with tylenol, oxycodone  - Antiemetics with PRN compazine, Zofran. If refractory consider Benadryl     Alcoholic liver cirrhosis:  Her LFT baseline is normal. She normally follows up in the liver clinic. Patient reports that her last visit was just a few days prior to this admission and she was told that her  liver cirrhosis is stable and can follow up in 6 months. 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     Diabetes, type II: On Jardiance and weekly Ozempic. Hold PTA Jardiance.   - Cont POCT  - BG stable, continue sliding scale PRN     Essential hypertension: Stable.   -- Continue PTA losartan, propanolol; add hold parameters given post procedural bradycardia     Asthma: not on exacerbation. Continue PTA inhaler and singulair  Chronic Back Pain: continue PTA gabapentin.   RA: holding celecoxib        Observation Goals: -diagnostic tests and consults completed and resulted, -vital signs normal or at patient baseline, -tolerating oral intake to maintain hydration, Nurse to notify provider when observation goals have been met and patient is ready for discharge.  Diet: Clear Liquid Diet    DVT Prophylaxis: Pneumatic Compression Devices  Quinonez Catheter: Not present  Lines: None     Cardiac Monitoring: ACTIVE order. Indication: Bradycardias (48 hours)  Code Status: Full Code      Clinically Significant Risk Factors Present on Admission              # Hypoalbuminemia: Lowest albumin = 3 g/dL at 4/16/2024  7:00 AM, will monitor as appropriate     # Hypertension: Noted on problem list          # Financial/Environmental Concerns: none  # Asthma: noted on problem list        Disposition Plan     Medically Ready for Discharge: Anticipated Tomorrow       The patient's care was discussed with the Attending Physician, Dr. Sisi Mcdonnell who independently met with and assessed the patient and is in agreement with the assessment and plan     Deisy Romero PA-C  Hospitalist Service  [facility]  Securely message with [employer] (more info)  Text page via [employer] Paging/Directory   ______________________________________________________________________    Interval History   Met with patient post procedurally.  Nauseous but otherwise feeling well.  Discussed plans for clear liquid diet overnight and advancing in the morning if doing  well.     Physical Exam   Vital Signs: Temp: 98.5  F (36.9  C) Temp src: Oral BP: 108/53 Pulse: 50   Resp: 18 SpO2: 97 % O2 Device: None (Room air)    Weight: 134 lbs 1.6 oz    Constitutional: awake, alert, no apparent distress, and appears stated age  Respiratory: No increased work of breathing, no accessory muscle use, clear to auscultation bilaterally, no crackles or wheezing  Cardiovascular: Regular rate and rhythm, normal S1 and S2, no S3 or S4, and no murmur noted  Skin: Normal skin color, texture, turgor. No rashes and no jaundice  Neuropsychiatric: Appropriate mood, affect and eye contact. Cooperative.    Medical Decision Making             Data     I have personally reviewed the following data over the past 24 hrs:    5.9  \   12.0   / 158     145 108 (H) 7.2 (L) /  73   3.7 30 (H) 0.58 \     ALT: 281 (H) AST: 187 (H) AP: 147 TBILI: 1.5 (H)   ALB: 3.0 (L) TOT PROTEIN: 5.4 (L) LIPASE: N/A       Imaging results reviewed over the past 24 hrs:   Recent Results (from the past 24 hour(s))   XR Surgery ABIMAEL Fluoro L/T 5 Min    Narrative    This exam was marked as non-reportable because it will not be read by a   radiologist or a [facility] non-radiologist provider.

## 2024-04-16 NOTE — ANESTHESIA CARE TRANSFER NOTE
Patient: Odette Escobar    Procedure: Procedure(s):  ESOPHAGOGASTRODUODENOSCOPY, WITH ENDOSCOPIC ULTRASOUND GUIDANCE  ENDOSCOPIC RETROGRADE CHOLANGIOPANCREATOGRAPHY, BILIARY SPHINCTEROTOMY, STONE EXTRACTION, BILIARY STENT PLACEMENT       Diagnosis: Elevated LFTs [R79.89]  Diagnosis Additional Information: No value filed.    Anesthesia Type:   MAC     Note:    Oropharynx: oropharynx clear of all foreign objects and spontaneously breathing  Level of Consciousness: drowsy  Oxygen Supplementation: room air    Independent Airway: airway patency satisfactory and stable  Dentition: dentition unchanged  Vital Signs Stable: post-procedure vital signs reviewed and stable  Report to RN Given: handoff report given  Patient transferred to: Phase II    Handoff Report: Identifed the Patient, Identified the Reponsible Provider, Reviewed the pertinent medical history, Discussed the surgical course, Reviewed Intra-OP anesthesia mangement and issues during anesthesia, Set expectations for post-procedure period and Allowed opportunity for questions and acknowledgement of understanding      Vitals:  Vitals Value Taken Time   BP     Temp     Pulse     Resp     SpO2         Electronically Signed By: JASWINDER Hernández CRNA  April 16, 2024  2:32 PM

## 2024-04-16 NOTE — PLAN OF CARE
"PRIMARY DIAGNOSIS: \"GENERIC\" NURSING  OUTPATIENT/OBSERVATION GOALS TO BE MET BEFORE DISCHARGE:  ADLs back to baseline: Yes    Activity and level of assistance: Ambulating independently.    Pain status: Improved-controlled with oral pain medications.    Return to near baseline physical activity: Yes     Discharge Planner Nurse   Safe discharge environment identified: Yes  Barriers to discharge: Yes       Entered by: Rayna Link RN 04/15/2024 9:20 PM     Please review provider order for any additional goals.   Nurse to notify provider when observation goals have been met and patient is ready for discharge.Goal Outcome Evaluation:                        "

## 2024-04-16 NOTE — PLAN OF CARE
Problem: Adult Inpatient Plan of Care  Goal: Absence of Hospital-Acquired Illness or Injury  Intervention: Identify and Manage Fall Risk  Recent Flowsheet Documentation  Taken 4/15/2024 1935 by Rayna Link RN  Safety Promotion/Fall Prevention: activity supervised  Taken 4/15/2024 1528 by Rayna Link RN  Safety Promotion/Fall Prevention:   activity supervised   safety round/check completed     Problem: Adult Inpatient Plan of Care  Goal: Absence of Hospital-Acquired Illness or Injury  Intervention: Identify and Manage Fall Risk  Recent Flowsheet Documentation  Taken 4/15/2024 1935 by Rayna Link RN  Safety Promotion/Fall Prevention: activity supervised  Taken 4/15/2024 1528 by Rayna Link RN  Safety Promotion/Fall Prevention:   activity supervised   safety round/check completed  Intervention: Prevent Skin Injury  Recent Flowsheet Documentation  Taken 4/15/2024 1528 by Rayna Link RN  Body Position: position changed independently  Intervention: Prevent and Manage VTE (Venous Thromboembolism) Risk  Recent Flowsheet Documentation  Taken 4/15/2024 1935 by Rayna Link RN  VTE Prevention/Management: SCDs (sequential compression devices) on  Taken 4/15/2024 1528 by Rayna Link RN  VTE Prevention/Management: SCDs (sequential compression devices) on  Intervention: Prevent Infection  Recent Flowsheet Documentation  Taken 4/15/2024 1935 by Rayna Link RN  Infection Prevention: hand hygiene promoted  Taken 4/15/2024 1528 by Rayna Link RN  Infection Prevention: hand hygiene promoted     Problem: Adult Inpatient Plan of Care  Goal: Absence of Hospital-Acquired Illness or Injury  Intervention: Prevent and Manage VTE (Venous Thromboembolism) Risk  Recent Flowsheet Documentation  Taken 4/15/2024 1935 by Rayna Link RN  VTE Prevention/Management: SCDs (sequential compression devices) on  Taken 4/15/2024 1528 by Rayna Link RN  VTE Prevention/Management:  SCDs (sequential compression devices) on   Goal Outcome Evaluation:       Pt is alert&O to baseline, able to make needs know,denies pain at this shift, and scheduled for EGD  tomorrow. Pt will be NPO effective from midnight. Pt bed in low position, commode at the bedside, and call light within reach.

## 2024-04-16 NOTE — PROGRESS NOTES
Care Management Follow Up    Length of Stay (days): 0    Expected Discharge Date: 04/16/2024     Concerns to be Addressed:     discharge planning  Patient plan of care discussed at interdisciplinary rounds: Yes    Anticipated Discharge Disposition: Home Care     Anticipated Discharge Services: None  Anticipated Discharge DME: None    Patient/family educated on Medicare website which has current facility and service quality ratings: yes  Education Provided on the Discharge Plan: Yes  Patient/Family in Agreement with the Plan: yes    Referrals Placed by CM/SW:yes    Private pay costs discussed: Not applicable    Additional Information:  SW met with pt to discuss, and confirm discharge plan to home with home care services; RN PT. All parties aware, and agreeable to discharge plan. [facility] intake notified of potential discharge in 1-2 days. Care management following. Family to transport.  10:44 AM    Brenna Kjellberg, BSW LSW  4/16/2024

## 2024-04-16 NOTE — PROGRESS NOTES
"PRIMARY DIAGNOSIS: \"GENERIC\" NURSING  OUTPATIENT/OBSERVATION GOALS TO BE MET BEFORE DISCHARGE:  ADLs back to baseline: Yes    Activity and level of assistance:   AX1 to bedside commode    Pain status: Pain free.    Return to near baseline physical activity: Yes     Discharge Planner Nurse   Safe discharge environment identified: Yes  Barriers to discharge: Yes       Entered by: Katelyn Rees RN 04/16/2024 6:44 AM     Please review provider order for any additional goals.   Nurse to notify provider when observation goals have been met and patient is ready for discharge.  "

## 2024-04-16 NOTE — PROGRESS NOTES
General Surgery Progress Note:    Hospital Day # 0    ASSESSMENT:  1. Acute cholecystitis    2. Right upper quadrant abdominal pain    3. Elevated LFTs    4. Hepatic fibrosis    5. Portal hypertension (H)    6. Symptomatic cholelithiasis    7. Dilation of common bile duct      Odette Escobar is a 68 year old female who is s/p robotic laparoscopic cholecystectomy on 4/14/24. MRCP on 4/14 showed mild biliary dilation but no choledocholithiasis. Patient is appropriately tender post-operatively and pain is adequately controlled. Afebrile, vitally stable, normal WBC (5.9), elevated T-bili trending down (1<2.9>1.5), transaminitis trending down. GI following and plan for EUS +/- ERCP today. Continue NPO and diet advancement per GI.    PLAN:  - GI following, plan for EUS +/- ERCP today  - NPO, ok for diet per surgery but will defer to GI recs  - Pain medications  - Encourage activity to promote bowel function  - Medical management per primary team  - General surgery will continue to follow    SUBJECTIVE:   She is doing better today. Continues to have lower abdominal pain that is worse with movement. Passing flatus, no BM. Voiding with no issues. Denies fever, chills.     Patient Vitals for the past 24 hrs:   BP Temp Temp src Pulse Resp SpO2   04/16/24 1100 107/59 98.5  F (36.9  C) Oral 61 16 100 %   04/16/24 0700 -- 98  F (36.7  C) Oral -- 16 --   04/16/24 0400 -- -- -- -- -- 98 %   04/16/24 0346 114/55 97.5  F (36.4  C) Oral 65 16 --   04/15/24 2336 117/59 97.7  F (36.5  C) Oral 65 16 99 %   04/15/24 1935 107/57 -- -- 62 -- 98 %   04/15/24 1934 107/57 97.3  F (36.3  C) Oral -- 17 --   04/15/24 1528 122/85 98  F (36.7  C) Oral 61 17 98 %     PHYSICAL EXAM:  General: patient seen resting in bed, no acute distress  Resp: no respiratory distress, breathing comfortably on RA  Abdomen: soft, non-distended, appropriately tender post-operatively mainly in lower abdomen around incision sites. Incisions clean, dry,  intact.  Extremities: warm and well perfused    04/15 0700 - 04/16 0659  In: 240 [P.O.:240]  Out: 650 [Urine:650]    Admission on 04/14/2024   Component Date Value    Sodium 04/14/2024 141     Potassium 04/14/2024 3.4     Carbon Dioxide (CO2) 04/14/2024 25     Anion Gap 04/14/2024 11     Urea Nitrogen 04/14/2024 11.4     Creatinine 04/14/2024 0.62     GFR Estimate 04/14/2024 >90     Calcium 04/14/2024 9.4     Chloride 04/14/2024 105     Glucose 04/14/2024 207 (H)     Alkaline Phosphatase 04/14/2024 156 (H)     AST 04/14/2024 499 (H)     ALT 04/14/2024 254 (H)     Protein Total 04/14/2024 6.9     Albumin 04/14/2024 4.0     Bilirubin Total 04/14/2024 1.0     Lipase 04/14/2024 43     Ventricular Rate 04/14/2024 63     Atrial Rate 04/14/2024 63     MN Interval 04/14/2024 170     QRS Duration 04/14/2024 78     QT 04/14/2024 432     QTc 04/14/2024 442     P Axis 04/14/2024 74     R AXIS 04/14/2024 75     T Axis 04/14/2024 70     Interpretation ECG 04/14/2024                      Value:Sinus rhythm  Normal ECG  When compared with ECG of 11-FEB-2019 07:02,  Questionable change in QRS axis  Nonspecific T wave abnormality now evident in Lateral leads  Confirmed by SEE ED PROVIDER NOTE FOR, ECG INTERPRETATION ([id number]),  GÓMEZ PEREZ ([id number]) on 4/15/2024 4:21:46 AM      Troponin T, High Sensiti* 04/14/2024 6     WBC Count 04/14/2024 8.8     RBC Count 04/14/2024 4.60     Hemoglobin 04/14/2024 13.9     Hematocrit 04/14/2024 42.0     MCV 04/14/2024 91     MCH 04/14/2024 30.2     MCHC 04/14/2024 33.1     RDW 04/14/2024 13.3     Platelet Count 04/14/2024 217     % Neutrophils 04/14/2024 75     % Lymphocytes 04/14/2024 18     % Monocytes 04/14/2024 7     % Eosinophils 04/14/2024 0     % Basophils 04/14/2024 0     % Immature Granulocytes 04/14/2024 0     NRBCs per 100 WBC 04/14/2024 0     Absolute Neutrophils 04/14/2024 6.6     Absolute Lymphocytes 04/14/2024 1.6     Absolute Monocytes 04/14/2024 0.6     Absolute Eosinophils  04/14/2024 0.0     Absolute Basophils 04/14/2024 0.0     Absolute Immature Granul* 04/14/2024 0.0     Absolute NRBCs 04/14/2024 0.0     Alcohol ethyl 04/14/2024 <0.01     Acetaminophen 04/14/2024 <5.0 (L)     Hemoglobin A1C 04/14/2024 5.4     GLUCOSE BY METER POCT 04/14/2024 175 (H)     Platelet Count 04/14/2024 184     GLUCOSE BY METER POCT 04/14/2024 178 (H)     GLUCOSE BY METER POCT 04/14/2024 172 (H)     Protein Total 04/15/2024 5.8 (L)     Albumin 04/15/2024 3.3 (L)     Bilirubin Total 04/15/2024 2.9 (H)     Alkaline Phosphatase 04/15/2024 162 (H)     AST 04/15/2024 399 (H)     ALT 04/15/2024 393 (H)     Bilirubin Direct 04/15/2024 2.56 (H)     Sodium 04/15/2024 140     Potassium 04/15/2024 3.8     Chloride 04/15/2024 108 (H)     Carbon Dioxide (CO2) 04/15/2024 24     Anion Gap 04/15/2024 8     Urea Nitrogen 04/15/2024 7.0 (L)     Creatinine 04/15/2024 0.53     GFR Estimate 04/15/2024 >90     Calcium 04/15/2024 9.1     Glucose 04/15/2024 125 (H)     Magnesium 04/15/2024 1.9     Phosphorus 04/15/2024 4.0     WBC Count 04/15/2024 6.1     RBC Count 04/15/2024 4.08     Hemoglobin 04/15/2024 12.3     Hematocrit 04/15/2024 37.8     MCV 04/15/2024 93     MCH 04/15/2024 30.1     MCHC 04/15/2024 32.5     RDW 04/15/2024 13.5     Platelet Count 04/15/2024 181     GLUCOSE BY METER POCT 04/15/2024 146 (H)     Lipase 04/15/2024 28     GLUCOSE BY METER POCT 04/15/2024 123 (H)     GLUCOSE BY METER POCT 04/15/2024 89     GLUCOSE BY METER POCT 04/15/2024 76     GLUCOSE BY METER POCT 04/15/2024 87     Protein Total 04/16/2024 5.4 (L)     Albumin 04/16/2024 3.0 (L)     Bilirubin Total 04/16/2024 1.5 (H)     Alkaline Phosphatase 04/16/2024 147     AST 04/16/2024 187 (H)     ALT 04/16/2024 281 (H)     Bilirubin Direct 04/16/2024 0.90 (H)     Sodium 04/16/2024 145     Potassium 04/16/2024 3.7     Chloride 04/16/2024 108 (H)     Carbon Dioxide (CO2) 04/16/2024 30 (H)     Anion Gap 04/16/2024 7     Urea Nitrogen 04/16/2024 7.2 (L)      Creatinine 04/16/2024 0.58     GFR Estimate 04/16/2024 >90     Calcium 04/16/2024 9.0     Glucose 04/16/2024 83     WBC Count 04/16/2024 5.9     RBC Count 04/16/2024 3.96     Hemoglobin 04/16/2024 12.0     Hematocrit 04/16/2024 37.1     MCV 04/16/2024 94     MCH 04/16/2024 30.3     MCHC 04/16/2024 32.3     RDW 04/16/2024 13.7     Platelet Count 04/16/2024 158     GLUCOSE BY METER POCT 04/16/2024 79     GLUCOSE BY METER POCT 04/16/2024 74     GLUCOSE BY METER POCT 04/16/2024 79     GLUCOSE BY METER POCT 04/16/2024 67 (L)         Munira Diaz PA-C  [facility] General Surgery  [address]  [address]  [place] [address]

## 2024-04-16 NOTE — ANESTHESIA PREPROCEDURE EVALUATION
Anesthesia Pre-Procedure Evaluation    Patient: Odette Escobar   MRN: [id number] : 1955        Procedure : Procedure(s):  ESOPHAGOGASTRODUODENOSCOPY, WITH ENDOSCOPIC ULTRASOUND GUIDANCE  ENDOSCOPIC RETROGRADE CHOLANGIOPANCREATOGRAPHY          Past Medical History:   Diagnosis Date    Alcoholic cirrhosis of liver (H)     Asthma     DM2 (diabetes mellitus, type 2) (H)     Fibromyalgia     History of skin cancer     Hypertension     Migraine     Motion sickness     CASTRO on CPAP     Vertigo       Past Surgical History:   Procedure Laterality Date    ENDOMETRIAL ABLATION      HERNIORRHAPHY, UMBILICAL, ROBOT-ASSISTED, LAPAROSCOPIC, USING DA ROCIO XI N/A 2024    Procedure: PRIMARY REPAIR OF  UMBILICAL HERNIA;  Surgeon: Christiano Cruz DO;  Location: [facility] [place]    HYSTERECTOMY      LAPAROSCOPIC CHOLECYSTECTOMY N/A 2024    Procedure: CHOLECYSTECTOMY, ROBOT-ASSISTED, LAPAROSCOPIC, USING DA ROCIO XI;  Surgeon: Christiano Cruz DO;  Location: [facility] [place] OR    TONSILLECTOMY        Allergies   Allergen Reactions    Aspirin Hives    Diflunisal Nausea and Vomiting     (Dolobid) Occurred approximately 20 years ago    Occurred approximately 20 years ago   Occurred approximately 20 years ago   (Dolobid) Occurred approximately 20 years ago    Fd&C Yellow #5 (Tartrazine) Nausea and Vomiting and Hives      Social History     Tobacco Use    Smoking status: Never    Smokeless tobacco: Never   Substance Use Topics    Alcohol use: Yes     Comment: Alcoholic Drinks/day: Occasional usage      Wt Readings from Last 1 Encounters:   24 60.8 kg (134 lb 1.6 oz)        Anesthesia Evaluation   Pt has not had prior anesthetic         ROS/MED HX  ENT/Pulmonary:  - neg pulmonary ROS   (+) sleep apnea,                    Mild Persistent, asthma                  Neurologic:  - neg neurologic ROS     Cardiovascular:     (+) Dyslipidemia hypertension- -   -  - -                                      METS/Exercise Tolerance: >4 METS   "  Hematologic:  - neg hematologic  ROS     Musculoskeletal:   (+)  arthritis,             GI/Hepatic:     (+) GERD,         cholecystitis/cholelithiasis,   liver disease,       Renal/Genitourinary:  - neg Renal ROS     Endo:     (+)  type II DM,                    Psychiatric/Substance Use:     (+) psychiatric history anxiety and depression alcohol abuse      Infectious Disease:  - neg infectious disease ROS     Malignancy:  - neg malignancy ROS     Other:  - neg other ROS    (+)  , H/O Chronic Pain,         Physical Exam    Airway  airway exam normal      Mallampati: II   TM distance: > 3 FB   Neck ROM: full   Mouth opening: > 3 cm    Respiratory Devices and Support         Dental  no notable dental history         Cardiovascular   cardiovascular exam normal          Pulmonary   pulmonary exam normal                OUTSIDE LABS:  CBC:   Lab Results   Component Value Date    WBC 5.9 04/16/2024    WBC 6.1 04/15/2024    HGB 12.0 04/16/2024    HGB 12.3 04/15/2024    HCT 37.1 04/16/2024    HCT 37.8 04/15/2024     04/16/2024     04/15/2024     BMP:   Lab Results   Component Value Date     04/16/2024     04/15/2024    POTASSIUM 3.7 04/16/2024    POTASSIUM 3.8 04/15/2024    CHLORIDE 108 (H) 04/16/2024    CHLORIDE 108 (H) 04/15/2024    CO2 30 (H) 04/16/2024    CO2 24 04/15/2024    BUN 7.2 (L) 04/16/2024    BUN 7.0 (L) 04/15/2024    CR 0.58 04/16/2024    CR 0.53 04/15/2024    GLC 73 04/16/2024    GLC 67 (L) 04/16/2024     COAGS: No results found for: \"PTT\", \"INR\", \"FIBR\"  POC: No results found for: \"BGM\", \"HCG\", \"HCGS\"  HEPATIC:   Lab Results   Component Value Date    ALBUMIN 3.0 (L) 04/16/2024    PROTTOTAL 5.4 (L) 04/16/2024     (H) 04/16/2024     (H) 04/16/2024    ALKPHOS 147 04/16/2024    BILITOTAL 1.5 (H) 04/16/2024     OTHER:   Lab Results   Component Value Date    A1C 5.4 04/14/2024    JOVON 9.0 04/16/2024    PHOS 4.0 04/15/2024    MAG 1.9 04/15/2024    LIPASE 28 04/15/2024    TSH " 2.20 02/11/2019       Anesthesia Plan    ASA Status:  3    NPO Status:  NPO Appropriate    Anesthesia Type: MAC.     - Reason for MAC: straight local not clinically adequate   Induction: Propofol.   Maintenance: TIVA.        Consents    Anesthesia Plan(s) and associated risks, benefits, and realistic alternatives discussed. Questions answered and patient/representative(s) expressed understanding.     - Discussed:     - Discussed with:  Patient, Spouse      - Extended Intubation/Ventilatory Support Discussed: No.      - Patient is DNR/DNI Status: No     Use of blood products discussed: No .     Postoperative Care    Pain management: IV analgesics, Multi-modal analgesia, Oral pain medications.   PONV prophylaxis: Ondansetron (or other 5HT-3)     Comments:    Other Comments: Ketamine prn.  Vasopressin prn.  GETA prn.           Efrem Melo MD    I have reviewed the pertinent notes and labs in the chart from the past 30 days and (re)examined the patient.  Any updates or changes from those notes are reflected in this note.          # Hypoalbuminemia: Lowest albumin = 3 g/dL at 4/16/2024  7:00 AM, will monitor as appropriate

## 2024-04-16 NOTE — PROGRESS NOTES
"PRIMARY DIAGNOSIS: \"GENERIC\" NURSING  OUTPATIENT/OBSERVATION GOALS TO BE MET BEFORE DISCHARGE:  ADLs back to baseline: Yes    Activity and level of assistance: Ax1    Pain status: Improved-controlled with oral pain medications.    Return to near baseline physical activity: Yes     Discharge Planner Nurse   Safe discharge environment identified: Yes  Barriers to discharge: Yes       Entered by: Katelyn Rees RN 04/16/2024 4:25 AM     Please review provider order for any additional goals.   Nurse to notify provider when observation goals have been met and patient is ready for discharge.    Pt A&Ox4. VSS in RA. NPO Midnight. Up to bedside commode with Ax1.   "

## 2024-04-17 VITALS
OXYGEN SATURATION: 98 % | DIASTOLIC BLOOD PRESSURE: 54 MMHG | RESPIRATION RATE: 18 BRPM | WEIGHT: 134.1 LBS | TEMPERATURE: 97.7 F | SYSTOLIC BLOOD PRESSURE: 95 MMHG | BODY MASS INDEX: 22.9 KG/M2 | HEART RATE: 70 BPM | HEIGHT: 64 IN

## 2024-04-17 LAB
ALBUMIN SERPL BCG-MCNC: 2.6 G/DL (ref 3.5–5.2)
ALP SERPL-CCNC: 131 U/L (ref 40–150)
ALT SERPL W P-5'-P-CCNC: 176 U/L (ref 0–50)
ANION GAP SERPL CALCULATED.3IONS-SCNC: 7 MMOL/L (ref 7–15)
AST SERPL W P-5'-P-CCNC: 86 U/L (ref 0–45)
BILIRUB DIRECT SERPL-MCNC: 0.46 MG/DL (ref 0–0.3)
BILIRUB SERPL-MCNC: 0.9 MG/DL
BUN SERPL-MCNC: 10.9 MG/DL (ref 8–23)
CALCIUM SERPL-MCNC: 8.3 MG/DL (ref 8.8–10.2)
CHLORIDE SERPL-SCNC: 106 MMOL/L (ref 98–107)
CREAT SERPL-MCNC: 0.54 MG/DL (ref 0.51–0.95)
DEPRECATED HCO3 PLAS-SCNC: 28 MMOL/L (ref 22–29)
EGFRCR SERPLBLD CKD-EPI 2021: >90 ML/MIN/1.73M2
ERYTHROCYTE [DISTWIDTH] IN BLOOD BY AUTOMATED COUNT: 13.3 % (ref 10–15)
GLUCOSE BLDC GLUCOMTR-MCNC: 111 MG/DL (ref 70–99)
GLUCOSE BLDC GLUCOMTR-MCNC: 127 MG/DL (ref 70–99)
GLUCOSE BLDC GLUCOMTR-MCNC: 135 MG/DL (ref 70–99)
GLUCOSE BLDC GLUCOMTR-MCNC: 71 MG/DL (ref 70–99)
GLUCOSE BLDC GLUCOMTR-MCNC: 97 MG/DL (ref 70–99)
GLUCOSE SERPL-MCNC: 120 MG/DL (ref 70–99)
HCT VFR BLD AUTO: 35.3 % (ref 35–47)
HGB BLD-MCNC: 11.4 G/DL (ref 11.7–15.7)
MCH RBC QN AUTO: 30.4 PG (ref 26.5–33)
MCHC RBC AUTO-ENTMCNC: 32.3 G/DL (ref 31.5–36.5)
MCV RBC AUTO: 94 FL (ref 78–100)
PLATELET # BLD AUTO: 186 10E3/UL (ref 150–450)
POTASSIUM SERPL-SCNC: 3.3 MMOL/L (ref 3.4–5.3)
POTASSIUM SERPL-SCNC: 3.3 MMOL/L (ref 3.4–5.3)
PROT SERPL-MCNC: 4.9 G/DL (ref 6.4–8.3)
RBC # BLD AUTO: 3.75 10E6/UL (ref 3.8–5.2)
SODIUM SERPL-SCNC: 141 MMOL/L (ref 135–145)
WBC # BLD AUTO: 9.2 10E3/UL (ref 4–11)

## 2024-04-17 PROCEDURE — 80053 COMPREHEN METABOLIC PANEL: CPT | Performed by: INTERNAL MEDICINE

## 2024-04-17 PROCEDURE — 250N000013 HC RX MED GY IP 250 OP 250 PS 637

## 2024-04-17 PROCEDURE — 36415 COLL VENOUS BLD VENIPUNCTURE: CPT

## 2024-04-17 PROCEDURE — 96361 HYDRATE IV INFUSION ADD-ON: CPT

## 2024-04-17 PROCEDURE — 99285 EMERGENCY DEPT VISIT HI MDM: CPT | Mod: 25

## 2024-04-17 PROCEDURE — 258N000003 HC RX IP 258 OP 636: Performed by: FAMILY MEDICINE

## 2024-04-17 PROCEDURE — 99231 SBSQ HOSP IP/OBS SF/LOW 25: CPT | Mod: 24

## 2024-04-17 PROCEDURE — 82962 GLUCOSE BLOOD TEST: CPT

## 2024-04-17 PROCEDURE — 85027 COMPLETE CBC AUTOMATED: CPT

## 2024-04-17 PROCEDURE — 250N000013 HC RX MED GY IP 250 OP 250 PS 637: Performed by: INTERNAL MEDICINE

## 2024-04-17 PROCEDURE — G0378 HOSPITAL OBSERVATION PER HR: HCPCS

## 2024-04-17 PROCEDURE — 258N000003 HC RX IP 258 OP 636: Performed by: HOSPITALIST

## 2024-04-17 PROCEDURE — 84132 ASSAY OF SERUM POTASSIUM: CPT

## 2024-04-17 PROCEDURE — 99239 HOSP IP/OBS DSCHRG MGMT >30: CPT

## 2024-04-17 RX ORDER — ONDANSETRON 4 MG/1
4 TABLET, ORALLY DISINTEGRATING ORAL EVERY 6 HOURS PRN
Status: DISCONTINUED | OUTPATIENT
Start: 2024-04-17 | End: 2024-04-17

## 2024-04-17 RX ORDER — OXYCODONE HYDROCHLORIDE 5 MG/1
5 TABLET ORAL EVERY 6 HOURS PRN
Qty: 10 TABLET | Refills: 0 | Status: SHIPPED | OUTPATIENT
Start: 2024-04-17

## 2024-04-17 RX ORDER — SODIUM CHLORIDE 9 MG/ML
INJECTION, SOLUTION INTRAVENOUS CONTINUOUS
Status: ACTIVE | OUTPATIENT
Start: 2024-04-17 | End: 2024-04-17

## 2024-04-17 RX ORDER — LOSARTAN POTASSIUM 25 MG/1
25 TABLET ORAL DAILY
Status: ON HOLD
Start: 2024-04-19 | End: 2024-04-29

## 2024-04-17 RX ORDER — PROPRANOLOL HYDROCHLORIDE 120 MG/1
120 CAPSULE, EXTENDED RELEASE ORAL DAILY
Status: ON HOLD
Start: 2024-04-19 | End: 2024-04-29

## 2024-04-17 RX ORDER — POTASSIUM CHLORIDE 1500 MG/1
40 TABLET, EXTENDED RELEASE ORAL ONCE
Status: COMPLETED | OUTPATIENT
Start: 2024-04-17 | End: 2024-04-17

## 2024-04-17 RX ORDER — ONDANSETRON 2 MG/ML
4 INJECTION INTRAMUSCULAR; INTRAVENOUS EVERY 6 HOURS PRN
Status: DISCONTINUED | OUTPATIENT
Start: 2024-04-17 | End: 2024-04-17

## 2024-04-17 RX ORDER — FLUMAZENIL 0.1 MG/ML
0.2 INJECTION, SOLUTION INTRAVENOUS
Status: ACTIVE | OUTPATIENT
Start: 2024-04-17 | End: 2024-04-17

## 2024-04-17 RX ADMIN — SODIUM CHLORIDE 500 ML: 9 INJECTION, SOLUTION INTRAVENOUS at 08:41

## 2024-04-17 RX ADMIN — FAMOTIDINE 20 MG: 20 TABLET ORAL at 08:39

## 2024-04-17 RX ADMIN — SERTRALINE HYDROCHLORIDE 100 MG: 100 TABLET ORAL at 08:36

## 2024-04-17 RX ADMIN — FLUTICASONE FUROATE AND VILANTEROL TRIFENATATE 1 PUFF: 200; 25 POWDER RESPIRATORY (INHALATION) at 08:33

## 2024-04-17 RX ADMIN — MAGNESIUM OXIDE TAB 400 MG (241.3 MG ELEMENTAL MG) 400 MG: 400 (241.3 MG) TAB at 08:39

## 2024-04-17 RX ADMIN — GABAPENTIN 600 MG: 300 CAPSULE ORAL at 08:39

## 2024-04-17 RX ADMIN — CETIRIZINE HYDROCHLORIDE 10 MG: 10 TABLET, FILM COATED ORAL at 08:39

## 2024-04-17 RX ADMIN — SODIUM CHLORIDE: 9 INJECTION, SOLUTION INTRAVENOUS at 03:50

## 2024-04-17 RX ADMIN — POTASSIUM CHLORIDE 40 MEQ: 1500 TABLET, EXTENDED RELEASE ORAL at 08:39

## 2024-04-17 RX ADMIN — PANTOPRAZOLE SODIUM 40 MG: 40 TABLET, DELAYED RELEASE ORAL at 08:39

## 2024-04-17 ASSESSMENT — ACTIVITIES OF DAILY LIVING (ADL)
ADLS_ACUITY_SCORE: 36
ADLS_ACUITY_SCORE: 40
ADLS_ACUITY_SCORE: 36
ADLS_ACUITY_SCORE: 36
ADLS_ACUITY_SCORE: 40
ADLS_ACUITY_SCORE: 36
ADLS_ACUITY_SCORE: 40
ADLS_ACUITY_SCORE: 40
ADLS_ACUITY_SCORE: 36
ADLS_ACUITY_SCORE: 40
ADLS_ACUITY_SCORE: 36
ADLS_ACUITY_SCORE: 36
ADLS_ACUITY_SCORE: 40

## 2024-04-17 NOTE — PROGRESS NOTES
"GI PROGRESS NOTE  4/15/2024  Odette Escobar  1955  [id number]    Subjective:   Feeling improved, had some pain and nausea this AM, but now improved. Tolerating clears. Has full liquid diet ordered for lunch.     Objective:     Blood pressure 100/51, pulse 70, temperature 97.7  F (36.5  C), temperature source Oral, resp. rate 18, height 1.626 m (5' 4\"), weight 60.8 kg (134 lb 1.6 oz), SpO2 98%.    Body mass index is 23.02 kg/m .  Gen: NAD,   Cardio: RRR  GI: Non-distended, BS positive, soft, mild tenderness over incisions, no rebound  Neuro: Alert and orientated  Skin: No jaundice     Laboratory:  BMP  Recent Labs   Lab 04/17/24  1118 04/17/24  0718 04/17/24  0603 04/16/24  0751 04/16/24  0700 04/15/24  0745 04/15/24  0732   NA  --  141  --   --  145  --  140   POTASSIUM  --  3.3*  --   --  3.7  --  3.8   CHLORIDE  --  106  --   --  108*  --  108*   JOVON  --  8.3*  --   --  9.0  --  9.1   CO2  --  28  --   --  30*  --  24   BUN  --  10.9  --   --  7.2*  --  7.0*   CR  --  0.54  --   --  0.58  --  0.53   * 120* 97   < > 83   < > 125*    < > = values in this interval not displayed.     CBC  Recent Labs   Lab 04/17/24  0718 04/16/24  0700 04/15/24  0732   WBC 9.2 5.9 6.1   RBC 3.75* 3.96 4.08   HGB 11.4* 12.0 12.3   HCT 35.3 37.1 37.8   MCV 94 94 93   MCH 30.4 30.3 30.1   MCHC 32.3 32.3 32.5   RDW 13.3 13.7 13.5    158 181     INRNo lab results found in last 7 days.   LFTs  Recent Labs   Lab Test 04/17/24  0718 04/16/24  0700 04/15/24  0732 04/14/24  0600 03/07/24  1046 03/07/24  0945 02/11/19  0853 02/11/19  0702   ALBUMIN 2.6* 3.0* 3.3* 4.0  --  3.7  --    < >   BILITOTAL 0.9 1.5* 2.9* 1.0  --  0.5  --   --    * 281* 393* 254*  --  22  --   --    AST 86* 187* 399* 499*  --  31  --   --    PROTEIN  --   --   --   --  Negative  --  Negative  --    LIPASE  --   --  28 43  --  31  --   --     < > = values in this interval not displayed.     Imaging:  EXAM: MR ABDOMEN MRCP W/O and W " CONTRAST  LOCATION: [facility]  DATE: 4/14/2024     INDICATION: Right upper quadrant abdominal pain elevated liver function test this was requested by gastroenterologist.  COMPARISON: CT and ultrasound 4/14/2024  TECHNIQUE: Routine MR liver/pancreas protocol including axial and coronal MRCP sequences. 2D and 3D reconstruction performed by MR technologist including MIP reconstruction and slab cholangiograms. If performed with contrast, additional dynamic T1 post   IV contrast images.  CONTRAST: 6ml gadavist      FINDINGS:      MRCP: Mild biliary dilation with the common duct measuring up to 0.8 cm and tapering smoothly at the ampulla. No biliary wall thickening, enhancement, or filling defects. Normal pancreatic duct anatomy. The gallbladder is distended with tiny stones,   pericholecystic fluid, and mild wall thickening.     LIVER: No significant hepatic abnormality. No focal masses. No evidence of cirrhosis or significant fat or iron deposition.     PANCREAS: No evidence of mass or pancreatitis.     ADDITIONAL FINDINGS: No significant abnormality in the spleen, kidneys, and adrenal glands. No adenopathy. No ascites.                                       IMPRESSION:  1.  Mild biliary dilation, but no choledocholithiasis or other source of obstruction.     2.  Tiny gallstones with mild wall thickening and pericholecystic fluid raising concern for cholecystitis.    EXAM: CTA CHEST ABDOMEN PELVIS W CONTRAST  LOCATION: [facility]  DATE: 4/14/2024                                               IMPRESSION:  1.  No active extravasation identified.   2.  Mild diffuse hyperenhancement of the gastric mucosa suggesting possible gastritis.   3.  Morphologic changes of hepatic fibrosis with paraesophageal varices suggesting portal hypertension.  4.  Mildly dilated upper common bile duct as seen on the same day ultrasound. MRCP is recommended for further evaluation.  5.  Distal  colonic diverticulosis without acute diverticulitis.  6.  Cholelithiasis with mild gallbladder wall thickening, better evaluated on the same day ultrasound.    EXAM: US ABDOMEN LIMITED  LOCATION: [facility]  DATE: 4/14/2024                                    IMPRESSION:  1.  Cholelithiasis with mild gallbladder wall thickening and trace pericholecystic fluid but negative sonographic Fontanez's sign. Findings are equivocal for acute cholecystitis with wall thickening potentially related to intrinsic hepatic disease.  2.  Dilated common bile duct up to 11 mm. Recommend MRCP to evaluate for potential choledocholithiasis which is not seen sonographically.  3.  Coarsened hepatic echotexture suggesting fibrosis. No focal mass.  4.   Mild right renal pelviectasis.     Assessment:   Elevated bilirubin  Dilated CBD  68-year-old female who presented with abdominal pain and found to have acute cholecystitis status post robotic lap cholecystectomy 4/14/2024 who we were asked to again see due to rising LFTs, particularly bilirubin.  Now status post EUS/ERCP 4/16/2024 which showed choledocholithiasis status post biliary sphincterotomy and balloon extraction.  Temporary stent was placed.  LFTs have since improved, she is tolerating diet advancement.       Plan:   1.  Advance diet as tolerated  2.  Okay from our standpoint for discharge home  3. Will need repeat ERCP in 8 to 10 weeks for stent removal, our office will arrange.  4. Trend LFTs as outpatient until normal.        24 minutes of total time was spent providing patient care, including patient evaluation, reviewing documentation/test results, , and documentation.                                                                        Kassie Kyle PA-C  Thank you for the opportunity to participate in the care of this patient.   Please feel free to call me with any questions or concerns.  Phone number [phone].

## 2024-04-17 NOTE — PROGRESS NOTES
Notified MD that Pt had these blood pressures.104/59, 93/54, 83/47. Pt normal saline IVF at 75 ml /hr was discontinued. Also pt's blood sugar was 71. Gave apple juice to increase her blood sugar.

## 2024-04-17 NOTE — DISCHARGE INSTRUCTIONS
From your Surgeon:    Follow up:  For straightforward laparoscopic procedures, our practice is to check-in with you over the phone a few days after your procedure.  If you would like a scheduled follow up appointment in clinic, please call us at [phone] to schedule an appointment at your convenience.  If you would prefer to follow up with us further by phone, please let us know so that we may contact you 2-3 weeks following your procedure.        Diet: Regular diet. Patients can have difficulty with constipation following surgery, due in part to the administration of narcotic pain medications.  If you are suffering with constipation, you should avoid foods such as hard cheeses or red meat.  Foods high in fiber are recommended.      Activity: You should continue to be active at home, including getting up and walking frequently.  If possible, limit the amount of time spent in bed.    Restrictions: You should not lift greater than 20 pounds for 2 weeks, and will want to avoid strenuous physical activity for 1-2 weeks.  You should limit your physical activity if it causes you discomfort; however, this should resolve within 1-2 weeks.   Walking does not count as strenuous physical activity and you are safe to walk up and down stairs.  Following 2 weeks you may resume normal physical activity.    Work:  You can return to work once your surgical pain has resolved.  If you perform duties that require lifting, pushing or pulling anything greater than 15 pounds, you should be on light duty for the immediate 2 weeks after your surgery (if your work allows light duty).  After 2 weeks, you can return to work without restrictions.    Wound care: Your wounds are covered with Dermabond which is a waterproof skin glue. This will peel off on its own after 14 days.  It is normal to have a small rim of red present around the incisions. This should not, however, extend beyond 1/4 inch from the incision.  If your incisions become  increasingly tender, red, or draining, please contact us.       Bathing: You may shower after 24 hours from surgery.  It is ok to get your incisions wet, but avoid rubbing them.  Avoid soaking in bath tubs or swimming in lakes, pools, or streams for 2 weeks following surgery.

## 2024-04-17 NOTE — PROGRESS NOTES
Care Management Discharge Note    Discharge Date: 04/17/2024       Discharge Disposition: Home Care    Discharge Services: None    Discharge DME: None    Discharge Transportation:      Private pay costs discussed: Not applicable    Education Provided on the Discharge Plan: Yes  Persons Notified of Discharge Plans: yes  Patient/Family in Agreement with the Plan: yes    Handoff Referral Completed: Yes    Additional Information:  SW met with pt to discuss, and confirm discharge plan to home with home care services; RN PT. All parties aware, and agreeable to discharge plan. [facility] intake notified of discharge. No other needs from  at this time. Boyfriend to transport.  10:19 AM    Brenna Kjellberg, BSW LSW  4/17/2024

## 2024-04-17 NOTE — PROGRESS NOTES
General Surgery Progress Note:    Hospital Day # 0    ASSESSMENT:  1. Acute cholecystitis    2. Right upper quadrant abdominal pain    3. Elevated LFTs    4. Hepatic fibrosis    5. Portal hypertension (H)    6. Symptomatic cholelithiasis    7. Dilation of common bile duct        Odette Escobar is a 68 year old female who is s/p robot-assisted laparoscopic cholecystectomy with primary repair of umbilical hernia on 4/14/2024 with subsequent MRCP same day without signs of choledocholithiasis.  Uptrending total bilirubin with concerns over common bile duct obstruction, ERCP 4/16/2024 demonstrating choledocholithiasis, stent placement.  Patient afebrile, low blood pressures but now resolving, generally stable vital signs, no leukocytosis, generally stable hemoglobin, normalized total bilirubin and downtrending liver enzymes.  Okay to discharge today per surgical standpoint if GI also clears patient.    PLAN:  -Okay for clear liquids for 24 hours, then advance diet as tolerated  -Continue activity as tolerated  -Continue bowel regimen, hold if diarrhea  -No further surgical intervention planned at this time  -Okay to discharge from surgical standpoint if deemed medically appropriate.  -Discharge recommendations involve instructions placed in AVS.  -Follow up phone call in 1 to 2 weeks  -Medical management per primary team    SUBJECTIVE:   Odette Escobar was seen on rounds.  Patient states she is doing well overnight and feels better today than yesterday.  Patient is tolerating clear liquid diet without nausea or vomiting.  Patient denies fever or chills overnight.  Patient states she is passing flatus regularly.  Denies intolerable abdominal pain.  Patient is having bowel movements, states yesterday she had a looser 1 and discussion followed over when to use MiraLAX versus stool softener versus both.      Patient Vitals for the past 24 hrs:   BP Temp Temp src Pulse Resp SpO2   04/17/24 0749 100/51 98.4  F (36.9  C) Oral 70  18 97 %   04/17/24 0328 (!) 83/47 98.1  F (36.7  C) Oral 63 18 95 %   04/17/24 0220 (!) 83/49 97.6  F (36.4  C) Oral 60 18 98 %   04/16/24 2329 93/54 98.2  F (36.8  C) Oral 59 18 95 %   04/16/24 1930 110/53 98.1  F (36.7  C) Oral 66 18 97 %   04/16/24 1700 102/51 97.6  F (36.4  C) Oral 59 16 96 %   04/16/24 1601 109/53 97.7  F (36.5  C) Oral 56 16 98 %   04/16/24 1530 107/53 97.6  F (36.4  C) Oral (!) 48 16 98 %   04/16/24 1504 108/53 98.5  F (36.9  C) Oral 50 18 97 %   04/16/24 1447 110/78 98.7  F (37.1  C) Oral 54 18 99 %   04/16/24 1100 107/59 98.5  F (36.9  C) Oral 61 16 100 %         PHYSICAL EXAM:  General: patient seen resting in bed in no acute distress  Resp: no increased work of breathing, breathing comfortably on room air  Abdomen: Generally soft to appropriately tender abdomen postoperatively.  Incisions clean, dry, intact and covered with Dermabond glue.  Ecchymosis forming over the left lower incision.  No peritoneal signs or guarding on exam  Extremities: No edema or cyanosis visualized on exam, no obvious deformities    04/16 0700 - 04/17 0659  In: 2029 [I.V.:2029]  Out: 230 [Urine:230]    Admission on 04/14/2024   Component Date Value    Sodium 04/14/2024 141     Potassium 04/14/2024 3.4     Carbon Dioxide (CO2) 04/14/2024 25     Anion Gap 04/14/2024 11     Urea Nitrogen 04/14/2024 11.4     Creatinine 04/14/2024 0.62     GFR Estimate 04/14/2024 >90     Calcium 04/14/2024 9.4     Chloride 04/14/2024 105     Glucose 04/14/2024 207 (H)     Alkaline Phosphatase 04/14/2024 156 (H)     AST 04/14/2024 499 (H)     ALT 04/14/2024 254 (H)     Protein Total 04/14/2024 6.9     Albumin 04/14/2024 4.0     Bilirubin Total 04/14/2024 1.0     Lipase 04/14/2024 43     Ventricular Rate 04/14/2024 63     Atrial Rate 04/14/2024 63     IN Interval 04/14/2024 170     QRS Duration 04/14/2024 78     QT 04/14/2024 432     QTc 04/14/2024 442     P Axis 04/14/2024 74     R AXIS 04/14/2024 75     T Axis 04/14/2024 70      Interpretation ECG 04/14/2024                      Value:Sinus rhythm  Normal ECG  When compared with ECG of 11-FEB-2019 07:02,  Questionable change in QRS axis  Nonspecific T wave abnormality now evident in Lateral leads  Confirmed by SEE ED PROVIDER NOTE FOR, ECG INTERPRETATION ([id number]),  GÓMEZ PEREZ ([id number]) on 4/15/2024 4:21:46 AM      Troponin T, High Sensiti* 04/14/2024 6     WBC Count 04/14/2024 8.8     RBC Count 04/14/2024 4.60     Hemoglobin 04/14/2024 13.9     Hematocrit 04/14/2024 42.0     MCV 04/14/2024 91     MCH 04/14/2024 30.2     MCHC 04/14/2024 33.1     RDW 04/14/2024 13.3     Platelet Count 04/14/2024 217     % Neutrophils 04/14/2024 75     % Lymphocytes 04/14/2024 18     % Monocytes 04/14/2024 7     % Eosinophils 04/14/2024 0     % Basophils 04/14/2024 0     % Immature Granulocytes 04/14/2024 0     NRBCs per 100 WBC 04/14/2024 0     Absolute Neutrophils 04/14/2024 6.6     Absolute Lymphocytes 04/14/2024 1.6     Absolute Monocytes 04/14/2024 0.6     Absolute Eosinophils 04/14/2024 0.0     Absolute Basophils 04/14/2024 0.0     Absolute Immature Granul* 04/14/2024 0.0     Absolute NRBCs 04/14/2024 0.0     Alcohol ethyl 04/14/2024 <0.01     Acetaminophen 04/14/2024 <5.0 (L)     Hemoglobin A1C 04/14/2024 5.4     GLUCOSE BY METER POCT 04/14/2024 175 (H)     Platelet Count 04/14/2024 184     GLUCOSE BY METER POCT 04/14/2024 178 (H)     GLUCOSE BY METER POCT 04/14/2024 172 (H)     Protein Total 04/15/2024 5.8 (L)     Albumin 04/15/2024 3.3 (L)     Bilirubin Total 04/15/2024 2.9 (H)     Alkaline Phosphatase 04/15/2024 162 (H)     AST 04/15/2024 399 (H)     ALT 04/15/2024 393 (H)     Bilirubin Direct 04/15/2024 2.56 (H)     Sodium 04/15/2024 140     Potassium 04/15/2024 3.8     Chloride 04/15/2024 108 (H)     Carbon Dioxide (CO2) 04/15/2024 24     Anion Gap 04/15/2024 8     Urea Nitrogen 04/15/2024 7.0 (L)     Creatinine 04/15/2024 0.53     GFR Estimate 04/15/2024 >90     Calcium 04/15/2024 9.1      Glucose 04/15/2024 125 (H)     Magnesium 04/15/2024 1.9     Phosphorus 04/15/2024 4.0     WBC Count 04/15/2024 6.1     RBC Count 04/15/2024 4.08     Hemoglobin 04/15/2024 12.3     Hematocrit 04/15/2024 37.8     MCV 04/15/2024 93     MCH 04/15/2024 30.1     MCHC 04/15/2024 32.5     RDW 04/15/2024 13.5     Platelet Count 04/15/2024 181     GLUCOSE BY METER POCT 04/15/2024 146 (H)     Lipase 04/15/2024 28     GLUCOSE BY METER POCT 04/15/2024 123 (H)     GLUCOSE BY METER POCT 04/15/2024 89     GLUCOSE BY METER POCT 04/15/2024 76     GLUCOSE BY METER POCT 04/15/2024 87     Protein Total 04/16/2024 5.4 (L)     Albumin 04/16/2024 3.0 (L)     Bilirubin Total 04/16/2024 1.5 (H)     Alkaline Phosphatase 04/16/2024 147     AST 04/16/2024 187 (H)     ALT 04/16/2024 281 (H)     Bilirubin Direct 04/16/2024 0.90 (H)     Sodium 04/16/2024 145     Potassium 04/16/2024 3.7     Chloride 04/16/2024 108 (H)     Carbon Dioxide (CO2) 04/16/2024 30 (H)     Anion Gap 04/16/2024 7     Urea Nitrogen 04/16/2024 7.2 (L)     Creatinine 04/16/2024 0.58     GFR Estimate 04/16/2024 >90     Calcium 04/16/2024 9.0     Glucose 04/16/2024 83     WBC Count 04/16/2024 5.9     RBC Count 04/16/2024 3.96     Hemoglobin 04/16/2024 12.0     Hematocrit 04/16/2024 37.1     MCV 04/16/2024 94     MCH 04/16/2024 30.3     MCHC 04/16/2024 32.3     RDW 04/16/2024 13.7     Platelet Count 04/16/2024 158     GLUCOSE BY METER POCT 04/16/2024 79     GLUCOSE BY METER POCT 04/16/2024 74     GLUCOSE BY METER POCT 04/16/2024 79     GLUCOSE BY METER POCT 04/16/2024 67 (L)     GLUCOSE BY METER POCT 04/16/2024 73     Upper EUS 04/16/2024                      Value:[facility]  [address] [facility] [place] [address]  _______________________________________________________________________________  Patient Name: Odette Luz             Procedure Date: 4/16/2024 11:49 AM  MRN: [id number]                       Account Number: [id number]  YOB: 1955               Admit Type: Outpatient  Age: 68                               Room: [place] [address]  Note Status: Finalized                Attending MD: JOBY ANTUNEZ MD,   Instrument Name: EUS Linear Scope [id number]   _______________________________________________________________________________     Procedure:             Upper EUS  Indications:           R/O Choledocholithiasis  Providers:             JOBY ANTUNEZ MD  Patient Profile:       This is a 68 year old female.  Referring MD:            Medicines:             Propofol per Anesthesia  Complications:         No immediate complications.  _______________________________________________________________________________                            Procedure:             Pre-Anesthesia Assessment:                         - Prior to the procedure, a History and Physical was                          performed, and patient medications, allergies and                          sensitivities were reviewed. The patient's tolerance                          of previous anesthesia was reviewed.                         After obtaining informed consent, the endoscope was                          passed under direct vision. Throughout the procedure,                          the patient's blood pressure, pulse, and oxygen                          saturations were monitored continuously. The EUS                          linear scope was introduced through the mouth, and                          advanced to the second part of duodenum. The upper EUS                          was accomplished without difficulty. The patient                          tolerated the procedure well.                                                                                                             Findings:       ENDOSONOGRAPHIC FINDING: :       One stone was visualized endosonographically in the common bile duct.        The stone measured 5 mm in greatest dimension. The stone was oval. It        was  hyperechoic.                                                                                   Moderate Sedation:       MAC  Impression:            - One stone was visualized endosonographically in the                          common bile duct.                         - No specimens collected.  Recommendation:        - Perform an ERCP today.                                                                                     Joby Do MD  ____________________  JOBY DO MD  4/16/2024 2:18:53 PM  I was physically present for the entire viewing portion of the exam.  __________________________  Signature of teaching physician  Radha/Vika DO MD  Number of Addenda: 0    Note Initiated On: 4/16/2024 11:49 AM  Scope In: 12:09:42 PM  Scope Out: 12:11:50 PM      ERCP 04/16/2024                      Value:[facility]  [address] [place] [address]  _______________________________________________________________________________  Patient Name: Odette Escobar             Procedure Date: 4/16/2024 12:39 PM  MRN: [id number]                       Account Number: [id number]  YOB: 1955              Admit Type: Outpatient  Age: 68                               Room: [place] [address]  Note Status: Finalized                Attending MD: JOBY DO MD,   Instrument Name: ERCP Scope [id number]        _______________________________________________________________________________     Procedure:             ERCP  Indications:           Common bile duct stone(s)  Providers:             JOBY DO MD  Patient Profile:       This is a 68 year old female.  Referring MD:            Medicines:             Propofol per Anesthesia  Complications:         No immediate complications.  _______________________________________________________________________________  Proc                          edure:             Pre-Anesthesia Assessment:                         - Prior to the procedure, a  History and Physical was                          performed, and patient medications, allergies and                          sensitivities were reviewed. The patient's tolerance                          of previous anesthesia was reviewed.                         - The risks and benefits of the procedure and the                          sedation options and risks were discussed with the                          patient. All questions were answered and informed                          consent was obtained.                         After obtaining informed consent, the scope was passed                          under direct vision. Throughout the procedure, the                          patient's blood pressure, pulse, and oxygen                          saturations were monitored continuously. The ERCP                          scope was introduced through the mouth, and used to                                                    inject contrast into and used to inject contrast into                          the bile duct. The ERCP was accomplished without                          difficulty. The patient tolerated the procedure well.                                                                                   Findings:       The  film was normal. The major papilla was located entirely within        a diverticulum. A needle-knife was used to open the biliary orifice. A        wire was then passed into the biliary tree. An 8 mm biliary        sphincterotomy was made with a traction (standard) sphincterotome using        ERBE electrocautery. There was no post-sphincterotomy bleeding. The        biliary tree was swept with an 8.5 mm balloon starting at the        bifurcation. All stones were removed. One 10 Fr by 5 cm temporary stent        with a single external flap and a single internal flap was placed 5 cm        into the common bile duct. Bile flowed through the stent. The stent was                                   in good position.                                                                                   Moderate Sedation:       MAC  Impression:            - The major papilla was located entirely within a                          diverticulum.                         - Choledocholithiasis was found. Complete removal was                          accomplished by biliary sphincterotomy and balloon                          extraction.                         - A biliary sphincterotomy was performed.                         - The biliary tree was swept.                         - One temporary stent was placed into the common bile                          duct.  Recommendation:        - Return patient to hospital [facility] for ongoing care.                         - No anticoagulation for 72 hours.                         - Clear liquid diet for 24 hours.                         - If pain free after 24 hours advance diet slowly as                          tolerated.                         - ERCP i                          n 8-10 weeks for stent removal.                                                                                     Joby Do MD  ____________________  JOBY DO MD  4/16/2024 2:23:11 PM  I was physically present for the entire viewing portion of the exam.  __________________________  Signature of teaching physician  [id number] DO MD  Number of Addenda: 0    Note Initiated On: 4/16/2024 12:39 PM  Scope In: 12:40:09 PM  Scope Out: 2:13:10 PM      GLUCOSE BY METER POCT 04/16/2024 73     GLUCOSE BY METER POCT 04/16/2024 72     Protein Total 04/17/2024 4.9 (L)     Albumin 04/17/2024 2.6 (L)     Bilirubin Total 04/17/2024 0.9     Alkaline Phosphatase 04/17/2024 131     AST 04/17/2024 86 (H)     ALT 04/17/2024 176 (H)     Bilirubin Direct 04/17/2024 0.46 (H)     Sodium 04/17/2024 141     Potassium 04/17/2024 3.3 (L)     Chloride 04/17/2024 106     Carbon Dioxide (CO2) 04/17/2024 28     Anion  Gap 04/17/2024 7     Urea Nitrogen 04/17/2024 10.9     Creatinine 04/17/2024 0.54     GFR Estimate 04/17/2024 >90     Calcium 04/17/2024 8.3 (L)     Glucose 04/17/2024 120 (H)     WBC Count 04/17/2024 9.2     RBC Count 04/17/2024 3.75 (L)     Hemoglobin 04/17/2024 11.4 (L)     Hematocrit 04/17/2024 35.3     MCV 04/17/2024 94     MCH 04/17/2024 30.4     MCHC 04/17/2024 32.3     RDW 04/17/2024 13.3     Platelet Count 04/17/2024 186     GLUCOSE BY METER POCT 04/17/2024 71     GLUCOSE BY METER POCT 04/17/2024 127 (H)     GLUCOSE BY METER POCT 04/17/2024 97         [facility]  [address] [phone]

## 2024-04-17 NOTE — PROVIDER NOTIFICATION
Notified Luann Davidson NP on 4/17/24 at 0750:     Potassium is 3.3 this morning. Do you want her on the potassium protocol?    Provider response: New orders for potassium protocol placed.

## 2024-04-17 NOTE — PLAN OF CARE
Patient discharged to home at 1700 via Private Car.  Accompanied by spouse and staff.  Discharge instructions were reviewed with patient and spouse, opportunity offered to ask questions.    Prescriptions filled and given to patient/family and e-prescribed to pharmacy.  Access discontinued: Yes  Care plan and education discontinued: Yes  Belongings were sent home with patient/family:  cellphone, glasses, clothing, sheso

## 2024-04-17 NOTE — PLAN OF CARE
PRIMARY DIAGNOSIS: Elevated LFTs  OUTPATIENT/OBSERVATION GOALS TO BE MET BEFORE DISCHARGE:  ADLs back to baseline: No    Activity and level of assistance: Up with standby assistance.    Pain status: Pain free.    Return to near baseline physical activity: No     Discharge Planner Nurse   Safe discharge environment identified: Yes  Barriers to discharge: Yes       Entered by: Nikky Kennedy RN 04/17/2024 10:58 AM    Pt alert and oriented x4. Lungs are clear. NSR On telemetry. Dermabond clean, dry and intact on abd. Denies pain at this time. Losartan and propanolol held this morning, BP was 100/51.  Orthostatic BP completed.

## 2024-04-17 NOTE — PLAN OF CARE
Problem: Adult Inpatient Plan of Care  Goal: Readiness for Transition of Care  4/16/2024 2304 by Da Sheehan, RN  Outcome: Not Progressing     Problem: Dysrhythmia  Goal: Normalized Cardiac Rhythm  4/16/2024 2304 by Da Sheehan, RN  Outcome: Not Progressing  4/16/2024 1845 by Da Sheehan, RN  Outcome: Not Progressing   Goal Outcome Evaluation:       Heart rate beginning to normalize. Pt's heart rhythm was normal sinus rhythm the second half of the PM shift. Pt's BG was 88 mg/dL at HS. RN encouraged the Pt to consume clear liquids. Pt was incontinent of urine. Perineal area cleansed and bedding changed. Pt is tolerating continuous normal saline infusion well.   Da Sheehan RN  4/16/2024  11:10 PM

## 2024-04-17 NOTE — PROVIDER NOTIFICATION
Orthostatic BP completed. Humaira Davidson NP notified on 4/17/24 at 1102:       04/17/24 1055   Lying Orthostatic BP   Lying Orthostatic /55   Lying Orthostatic Pulse 64 bpm   Sitting Orthostatic BP   Sitting Orthostatic /57   Sitting Orthostatic Pulse 69 bpm   Standing Orthostatic BP   Standing Orthostatic BP 95/54   Standing Orthostatic Pulse 75 bpm

## 2024-04-17 NOTE — DISCHARGE SUMMARY
[facility]  Hospitalist Discharge Summary      Date of Admission:  4/14/2024  Date of Discharge:  4/17/2024  Discharging Provider: Humaira Davidson NP  Discharge Service: Hospitalist Service    Discharge Diagnoses   Cholelithiasis  Choledocholithiasis  ERCP with stent place  Hypotension- resolved      Clinically Significant Risk Factors          Follow-ups Needed After Discharge   Follow-up Appointments     Follow-up and recommended labs and tests       Follow up with primary care provider, Allie Johnson, within 7 days for   hospital follow- up.  The following labs/tests are recommended: Recheck   LFT levels in 5-7 days with primary care doctor.      Follow up with [facility] outpatient in 8-10 weeks for stent removal            Unresulted Labs Ordered in the Past 30 Days of this Admission       Date and Time Order Name Status Description    4/14/2024  4:05 PM Surgical Pathology Exam In process             Discharge Disposition   Discharged to home  Condition at discharge: Stable    Hospital Course    Assessment & Plan  Odette Escobar is a 68 year old female admitted on 4/14/2024 for acute cholecystitis. Patient had MRCP that showed mild biliary dilatation but no choledocholithiasis or source of obstruction. Patient underwent laparoscopic cholecystectomy 4/14/2024. Post procedurally patient had elevated LFT and bilirubin. S/p ERCP 4/16/2024 which showed choledocholithiasis.  Had complete removal of stone with balloon extraction and biliary sphincterotomy, temporary stent was placed in the common bile duct. Patient feels well today. Tolerated full lquid diet without difficulty, no nausea or vomiting. Patient and family agree with plan to discharge to home and follow up with [facility] in 8 to 10 weeks for stent removal.     Alcoholic liver cirrhosis:  Her LFT baseline is normal. She normally follows up in the liver clinic. Patient reports that her last visit was just a few days prior to this admission and  she was told that her liver cirrhosis is stable and can follow up in 6 months. 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      Essential hypertension: Stable.   -- Did continue PTA losartan, propanolol; add hold parameters given post procedural bradycardia   -Patient was mildly hypotensive this morning 530 cc fluid bolus was given blood pressure became stable last BP 95/54 patient denies dizziness.   -Will Hold Blood pressure medication until Friday       Consultations This Hospital Stay   CARE MANAGEMENT / SOCIAL WORK IP CONSULT  GASTROENTEROLOGY IP CONSULT    Code Status   Full Code    Time Spent on this Encounter   IHumaira NP, personally saw the patient today and spent greater than 30 minutes discharging this patient.       Humaira Davidson NP  [facility]TENDED [facility]OV[facility] AND SHORT STAY  [address] [place] [id number]  Phone: [phone]  Fax: [phone]  ______________________________________________________________________    Physical Exam   Vital Signs: Temp: 97.7  F (36.5  C) Temp src: Oral BP: 95/54 Pulse: 70   Resp: 18 SpO2: 98 % O2 Device: None (Room air)    Weight: 134 lbs 1.6 oz    Constitutional: awake, alert, cooperative, no apparent distress, and appears stated age  Hematologic / Lymphatic: no cervical lymphadenopathy and no supraclavicular lymphadenopathy  Respiratory: No increased work of breathing, good air exchange, clear to auscultation bilaterally, no crackles or wheezing  Cardiovascular: Normal apical impulse, regular rate and rhythm, normal S1 and S2, no S3 or S4, and no murmur noted  GI: No scars, normal bowel sounds, soft, non-distended, non-tender, no masses palpated, no hepatosplenomegally  Skin: no bruising or bleeding, normal skin color, texture, turgor, no redness, warmth, or swelling, and no rashes  Musculoskeletal: There is no redness, warmth, or swelling of the joints.  Full range of motion noted.  Motor strength is 5 out of 5 all extremities  bilaterally.    Neurologic: Awake, alert, oriented to name, place and time.    Neuropsychiatric: General: normal, calm, and normal eye contact       Primary Care Physician   Allie Johnson    Discharge Orders      Home Care Referral      Reason for your hospital stay    Cholecystomy  ERCP stent placed  Elevated LFTs     Follow-up and recommended labs and tests     Follow up with primary care provider, Allie Johnson, within 7 days for hospital follow- up.  The following labs/tests are recommended: Recheck LFT levels in 5-7 days with primary care doctor.      Follow up with [facility] outpatient in 8-10 weeks for stent removal     Activity    Your activity upon discharge: activity as tolerated     Diet    Follow this diet upon discharge: Orders Placed This Encounter      Full Liquid Diet       Significant Results and Procedures   Most Recent 3 CBC's:  Recent Labs   Lab Test 04/17/24  0718 04/16/24  0700 04/15/24  0732   WBC 9.2 5.9 6.1   HGB 11.4* 12.0 12.3   MCV 94 94 93    158 181     Most Recent 3 BMP's:  Recent Labs   Lab Test 04/17/24  1300 04/17/24  1118 04/17/24  0718 04/17/24  0603 04/16/24  0751 04/16/24  0700 04/15/24  0745 04/15/24  0732   NA  --   --  141  --   --  145  --  140   POTASSIUM 3.3*  --  3.3*  --   --  3.7  --  3.8   CHLORIDE  --   --  106  --   --  108*  --  108*   CO2  --   --  28  --   --  30*  --  24   BUN  --   --  10.9  --   --  7.2*  --  7.0*   CR  --   --  0.54  --   --  0.58  --  0.53   ANIONGAP  --   --  7  --   --  7  --  8   JOVON  --   --  8.3*  --   --  9.0  --  9.1   GLC  --  111* 120* 97   < > 83   < > 125*    < > = values in this interval not displayed.     Most Recent 2 LFT's:  Recent Labs   Lab Test 04/17/24  0718 04/16/24  0700   AST 86* 187*   * 281*   ALKPHOS 131 147   BILITOTAL 0.9 1.5*   ,   Results for orders placed or performed during the hospital encounter of 04/14/24   US Abdomen Limited    Narrative    EXAM: US ABDOMEN LIMITED  LOCATION: [facility]  DATE: 4/14/2024    INDICATION: pain  COMPARISON: Ultrasound 10/18/2023, CT abdomen pelvis 03/07/2024  TECHNIQUE: Limited abdominal ultrasound.    FINDINGS:    GALLBLADDER: Cholelithiasis. Mild gallbladder wall thickening (4 mm). Trace pericholecystic fluid. Negative sonographic Fontanez's sign.    BILE DUCTS: The common duct is dilated and measures 11 mm.     LIVER: Coarsened echotexture, suggesting underlying fibrosis. Smooth contour. No focal mass.    RIGHT KIDNEY: Mild pelviectasis.    PANCREAS: The pancreas is obscured by overlying gas.    No ascites.      Impression    IMPRESSION:  1.  Cholelithiasis with mild gallbladder wall thickening and trace pericholecystic fluid but negative sonographic Fontanez's sign. Findings are equivocal for acute cholecystitis with wall thickening potentially related to intrinsic hepatic disease.  2.  Dilated common bile duct up to 11 mm. Recommend MRCP to evaluate for potential choledocholithiasis which is not seen sonographically.  3.  Coarsened hepatic echotexture suggesting fibrosis. No focal mass.  4.   Mild right renal pelviectasis.   CTA Chest Abdomen Pelvis w Contrast    Narrative    EXAM: CTA CHEST ABDOMEN PELVIS W CONTRAST  LOCATION: [facility]  DATE: 4/14/2024    INDICATION: Right sided chest and abdominal pain.  Supple hematemesis.  Evaluate for active bleeding Helga Guerrero tear cholecystitis  COMPARISON: Ultrasound abdomen 04/14/2024, CT chest 03/18/2024, CT abdomen pelvis 03/07/2024  TECHNIQUE: CT angiogram chest abdomen pelvis during arterial phase of injection of IV contrast. 2D and 3D MIP reconstructions were performed by the CT technologist. Dose reduction techniques were used.   CONTRAST: ISOVUE 370 65ML    FINDINGS:   CT ANGIOGRAM CHEST, ABDOMEN, AND PELVIS: Normal caliber thoracic aorta which is negative for intramural hematoma, dissection, or aneurysm. The proximal aortic arch branch vessels are patent. Normal caliber  abdominal aorta which is negative for dissection   or aneurysm. The celiac, superior mesenteric, bilateral renal, inferior mesenteric, bilateral common, internal and external iliac, common femoral and upper femoral and profunda femoral arteries are patent without significant stenosis. No active   extravasation. Mild scattered atherosclerotic plaque. Lower paraesophageal varices.    LUNGS AND PLEURA: Lungs are clear. No pleural effusions.    MEDIASTINUM/AXILLAE: Normal.    CORONARY ARTERY CALCIFICATION: Mild.    HEPATOBILIARY: Hypertrophy of the caudate lobe suggesting fibrosis. No mass. Cholelithiasis with mild gallbladder wall thickening. Dilated upper common bile duct up to 9 mm.    PANCREAS: Normal.    SPLEEN: Normal.    ADRENAL GLANDS: Normal.    KIDNEYS/BLADDER: Mild right renal pelviectasis. No urinary calculi or hydronephrosis.    BOWEL: No obstruction. Mild diffuse hyperenhancement of the gastric mucosa suggesting possible gastritis. Duodenal diverticulum. Distal colonic diverticulosis.    LYMPH NODES: Normal.    PELVIC ORGANS: Pelvic phleboliths.    MUSCULOSKELETAL: Small fat-containing periumbilical hernia. Chronic bilateral rib fractures. Mild multilevel degenerative changes in the thoracolumbar spine.      Impression    IMPRESSION:  1.  No active extravasation identified.   2.  Mild diffuse hyperenhancement of the gastric mucosa suggesting possible gastritis.   3.  Morphologic changes of hepatic fibrosis with paraesophageal varices suggesting portal hypertension.  4.  Mildly dilated upper common bile duct as seen on the same day ultrasound. MRCP is recommended for further evaluation.  5.  Distal colonic diverticulosis without acute diverticulitis.  6.  Cholelithiasis with mild gallbladder wall thickening, better evaluated on the same day ultrasound.     MR Abdomen MRCP w/o & w Contrast    Narrative    EXAM: MR ABDOMEN MRCP W/O and W CONTRAST  LOCATION: [facility]  DATE:  4/14/2024    INDICATION: Right upper quadrant abdominal pain elevated liver function test this was requested by gastroenterologist.  COMPARISON: CT and ultrasound 4/14/2024  TECHNIQUE: Routine MR liver/pancreas protocol including axial and coronal MRCP sequences. 2D and 3D reconstruction performed by MR technologist including MIP reconstruction and slab cholangiograms. If performed with contrast, additional dynamic T1 post   IV contrast images.  CONTRAST: 6ml gadavist     FINDINGS:     MRCP: Mild biliary dilation with the common duct measuring up to 0.8 cm and tapering smoothly at the ampulla. No biliary wall thickening, enhancement, or filling defects. Normal pancreatic duct anatomy. The gallbladder is distended with tiny stones,   pericholecystic fluid, and mild wall thickening.    LIVER: No significant hepatic abnormality. No focal masses. No evidence of cirrhosis or significant fat or iron deposition.    PANCREAS: No evidence of mass or pancreatitis.    ADDITIONAL FINDINGS: No significant abnormality in the spleen, kidneys, and adrenal glands. No adenopathy. No ascites.      Impression    IMPRESSION:  1.  Mild biliary dilation, but no choledocholithiasis or other source of obstruction.    2.  Tiny gallstones with mild wall thickening and pericholecystic fluid raising concern for cholecystitis.   XR Surgery ABIMAEL Fluoro L/T 5 Min    Narrative    This exam was marked as non-reportable because it will not be read by a   radiologist or a [place] non-radiologist provider.             Discharge Medications   Current Discharge Medication List        CONTINUE these medications which have CHANGED    Details   losartan (COZAAR) 25 MG tablet Take 1 tablet (25 mg) by mouth daily    Associated Diagnoses: Essential hypertension      oxyCODONE (ROXICODONE) 5 MG tablet Take 1 tablet (5 mg) by mouth every 6 hours as needed for severe pain T  Qty: 10 tablet, Refills: 0    Associated Diagnoses: Acute cholecystitis      propranolol  ER (INDERAL LA) 120 MG 24 hr capsule Take 1 capsule (120 mg) by mouth daily    Associated Diagnoses: Essential hypertension           CONTINUE these medications which have NOT CHANGED    Details   acetaminophen (TYLENOL) 500 MG tablet Take 1,000 mg by mouth daily as needed for pain      albuterol (PROAIR HFA/PROVENTIL HFA/VENTOLIN HFA) 108 (90 Base) MCG/ACT inhaler Inhale 2 puffs into the lungs every 6 hours as needed for shortness of breath, wheezing or cough      amitriptyline (ELAVIL) 10 MG tablet Take 30 mg by mouth at bedtime      ammonium lactate (AMLACTIN) 12 % external cream Apply topically daily as needed for dry skin      atorvastatin (LIPITOR) 80 MG tablet Take 80 mg by mouth daily      bacitracin 500 UNIT/GM external ointment Apply topically 2 times daily      Baclofen (LIORESAL) 5 MG tablet Take 5-10 mg by mouth 2 times daily as needed for muscle spasms or other (or sleep)      BELSOMRA 10 MG tablet Take 1 tablet by mouth nightly as needed for sleep      benzonatate (TESSALON) 100 MG capsule Take 200 mg by mouth 3 times daily as needed for cough      budesonide-formoterol (SYMBICORT) 160-4.5 MCG/ACT Inhaler Inhale 2 puffs into the lungs two times daily Rinse mouth/gargle after use      celecoxib (CELEBREX) 200 MG capsule Take 200 mg by mouth daily      cetirizine (ZYRTEC) 10 MG tablet Take 10 mg by mouth daily      cholecalciferol (VITAMIN D3) 50 MCG (2000 UT) CAPS Take 6,000 Units by mouth daily      estradiol (ESTRACE) 0.1 MG/GM vaginal cream Place vaginally twice a week      famotidine (PEPCID) 20 MG tablet Take 20 mg by mouth 2 times daily      ferrous gluconate (FERGON) 324 (38 Fe) MG tablet Take 324 mg by mouth daily (with breakfast)      fluticasone (FLONASE) 50 MCG/ACT nasal spray Spray 2 sprays into both nostrils daily as needed for rhinitis or allergies      gabapentin (NEURONTIN) 300 MG capsule Take 600 mg by mouth 3 times daily      hydrocortisone 2.5 % ointment Apply topically 2 times daily  as needed for other (Eczema)      ibuprofen (ADVIL/MOTRIN) 200 MG tablet Take 600 mg by mouth 2 times daily as needed for pain      ipratropium - albuterol 0.5 mg/2.5 mg/3 mL (DUONEB) 0.5-2.5 (3) MG/3ML neb solution Take 1 vial by nebulization 3 times daily as needed for shortness of breath, wheezing or cough      JARDIANCE 10 MG TABS tablet Take 10 mg by mouth daily      loperamide (IMODIUM) 2 mg capsule Take 2 mg by mouth 4 times daily as needed for diarrhea      Magnesium Oxide -Mg Supplement 500 MG TABS Take 1 tablet by mouth daily      montelukast (SINGULAIR) 10 MG tablet Take 1 tablet by mouth at bedtime      Multiple Vitamins-Minerals (MULTIVITAMIN WOMEN 50+) TABS Take 1 tablet by mouth daily      omeprazole (PRILOSEC) 20 MG capsule Take 1 capsule by mouth daily      OZEMPIC, 0.25 OR 0.5 MG/DOSE, 2 MG/3ML pen Inject 0.5 mg Subcutaneous every 7 days Tuesdays      polyethylene glycol-propylene glycol (SYSTANE ULTRA) 0.4-0.3 % SOLN ophthalmic solution Place 1 drop into both eyes 4 times daily as needed for dry eyes      sertraline (ZOLOFT) 100 MG tablet Take 100 mg by mouth daily      TYRVAYA 0.03 MG/ACT nasal spray Spray 1 spray into both nostrils 2 times daily           Allergies   Allergies   Allergen Reactions    Aspirin Hives    Diflunisal Nausea and Vomiting     (Dolobid) Occurred approximately 20 years ago    Occurred approximately 20 years ago   Occurred approximately 20 years ago   (Dolobid) Occurred approximately 20 years ago    Fd&C Yellow #5 (Tartrazine) Nausea and Vomiting and Hives

## 2024-04-17 NOTE — PLAN OF CARE
"  Problem: Adult Inpatient Plan of Care  Goal: Plan of Care Review  Description: The Plan of Care Review/Shift note should be completed every shift.  The Outcome Evaluation is a brief statement about your assessment that the patient is improving, declining, or no change.  This information will be displayed automatically on your shift  note.  Outcome: Progressing  Goal: Patient-Specific Goal (Individualized)  Description: You can add care plan individualizations to a care plan. Examples of Individualization might be:  \"Parent requests to be called daily at 9am for status\", \"I have a hard time hearing out of my right ear\", or \"Do not touch me to wake me up as it startles  me\".  Outcome: Progressing  Goal: Absence of Hospital-Acquired Illness or Injury  Outcome: Progressing  Intervention: Identify and Manage Fall Risk  Recent Flowsheet Documentation  Taken 4/17/2024 0002 by Jody Crane RN  Safety Promotion/Fall Prevention: activity supervised  Intervention: Prevent Skin Injury  Recent Flowsheet Documentation  Taken 4/17/2024 0002 by Jody Crane RN  Body Position:   position changed independently   supine, head elevated  Intervention: Prevent and Manage VTE (Venous Thromboembolism) Risk  Recent Flowsheet Documentation  Taken 4/17/2024 0002 by Jody Crane RN  VTE Prevention/Management: SCDs (sequential compression devices) on  Intervention: Prevent Infection  Recent Flowsheet Documentation  Taken 4/17/2024 0002 by Jody Crane RN  Infection Prevention:   rest/sleep promoted   hand hygiene promoted  Goal: Optimal Comfort and Wellbeing  Outcome: Progressing  Goal: Readiness for Transition of Care  Outcome: Progressing     Problem: Dysrhythmia  Goal: Normalized Cardiac Rhythm  Outcome: Progressing  Intervention: Monitor and Manage Cardiac Rhythm Effect  Recent Flowsheet Documentation  Taken 4/17/2024 0002 by Jody Crane RN  VTE Prevention/Management: SCDs (sequential " compression devices) on   Goal Outcome Evaluation:       Pt is A&Ox4, VSS, except soft Bp. On RA. Pt's Last BG was 127 mg/dL Tele: NSR. Pt was able to use the commode with SBA. New R_PIV IVF at 75 mL/hr. place and L-PIV removed due to large leakage that soaked the blankets.Replaced wet blankets and linen. Clear liquid diet. Continue to monitor pt.

## 2024-04-17 NOTE — PLAN OF CARE
"PRIMARY DIAGNOSIS: \"GENERIC\" NURSING  OUTPATIENT/OBSERVATION GOALS TO BE MET BEFORE DISCHARGE:  ADLs back to baseline: Yes    Activity and level of assistance: Up with standby assistance.    Pain status: Pain free.    Return to near baseline physical activity: Yes     Discharge Planner Nurse   Safe discharge environment identified: Yes  Barriers to discharge: No       Entered by: Jody Crane RN 04/17/2024 1:38 AM     Please review provider order for any additional goals.   Nurse to notify provider when observation goals have been met and patient is ready for discharge.Goal Outcome Evaluation:                        " [de-identified] : Right shoulder positive mild terminal impingement some weakness with forward elevation and abduction with resistance.  He is neurovascular intact distally.

## 2024-04-18 ENCOUNTER — HOSPITAL ENCOUNTER (EMERGENCY)
Facility: HOSPITAL | Age: 69
Discharge: HOME | End: 2024-04-18
Attending: EMERGENCY MEDICINE | Admitting: EMERGENCY MEDICINE
Payer: COMMERCIAL

## 2024-04-18 VITALS
WEIGHT: 139.4 LBS | DIASTOLIC BLOOD PRESSURE: 52 MMHG | HEART RATE: 98 BPM | HEIGHT: 64 IN | OXYGEN SATURATION: 99 % | BODY MASS INDEX: 23.8 KG/M2 | SYSTOLIC BLOOD PRESSURE: 96 MMHG | RESPIRATION RATE: 20 BRPM | TEMPERATURE: 98.4 F

## 2024-04-18 DIAGNOSIS — Z09 HOSPITAL DISCHARGE FOLLOW-UP: ICD-10-CM

## 2024-04-18 DIAGNOSIS — G89.18 POST-OP PAIN: ICD-10-CM

## 2024-04-18 LAB
ALBUMIN SERPL BCG-MCNC: 3.1 G/DL (ref 3.5–5.2)
ALBUMIN UR-MCNC: NEGATIVE MG/DL
ALP SERPL-CCNC: 130 U/L (ref 40–150)
ALT SERPL W P-5'-P-CCNC: 117 U/L (ref 0–50)
ANION GAP SERPL CALCULATED.3IONS-SCNC: 6 MMOL/L (ref 7–15)
APPEARANCE UR: CLEAR
AST SERPL W P-5'-P-CCNC: 35 U/L (ref 0–45)
BASOPHILS # BLD AUTO: 0 10E3/UL (ref 0–0.2)
BASOPHILS NFR BLD AUTO: 0 %
BILIRUB DIRECT SERPL-MCNC: 0.31 MG/DL (ref 0–0.3)
BILIRUB SERPL-MCNC: 0.7 MG/DL
BILIRUB UR QL STRIP: NEGATIVE
BUN SERPL-MCNC: 7.5 MG/DL (ref 8–23)
CALCIUM SERPL-MCNC: 8.6 MG/DL (ref 8.8–10.2)
CHLORIDE SERPL-SCNC: 107 MMOL/L (ref 98–107)
COLOR UR AUTO: COLORLESS
CREAT SERPL-MCNC: 0.51 MG/DL (ref 0.51–0.95)
CRP SERPL-MCNC: 45.5 MG/L
DEPRECATED HCO3 PLAS-SCNC: 30 MMOL/L (ref 22–29)
EGFRCR SERPLBLD CKD-EPI 2021: >90 ML/MIN/1.73M2
EOSINOPHIL # BLD AUTO: 0.2 10E3/UL (ref 0–0.7)
EOSINOPHIL NFR BLD AUTO: 2 %
ERYTHROCYTE [DISTWIDTH] IN BLOOD BY AUTOMATED COUNT: 13.5 % (ref 10–15)
GLUCOSE SERPL-MCNC: 129 MG/DL (ref 70–99)
GLUCOSE UR STRIP-MCNC: NEGATIVE MG/DL
HCT VFR BLD AUTO: 34.6 % (ref 35–47)
HGB BLD-MCNC: 11.2 G/DL (ref 11.7–15.7)
HGB UR QL STRIP: NEGATIVE
IMM GRANULOCYTES # BLD: 0 10E3/UL
IMM GRANULOCYTES NFR BLD: 0 %
KETONES UR STRIP-MCNC: NEGATIVE MG/DL
LEUKOCYTE ESTERASE UR QL STRIP: NEGATIVE
LIPASE SERPL-CCNC: 53 U/L (ref 13–60)
LYMPHOCYTES # BLD AUTO: 2.2 10E3/UL (ref 0.8–5.3)
LYMPHOCYTES NFR BLD AUTO: 22 %
MCH RBC QN AUTO: 30.2 PG (ref 26.5–33)
MCHC RBC AUTO-ENTMCNC: 32.4 G/DL (ref 31.5–36.5)
MCV RBC AUTO: 93 FL (ref 78–100)
MONOCYTES # BLD AUTO: 0.8 10E3/UL (ref 0–1.3)
MONOCYTES NFR BLD AUTO: 7 %
NEUTROPHILS # BLD AUTO: 7 10E3/UL (ref 1.6–8.3)
NEUTROPHILS NFR BLD AUTO: 69 %
NITRATE UR QL: NEGATIVE
NRBC # BLD AUTO: 0 10E3/UL
NRBC BLD AUTO-RTO: 0 /100
PATH REPORT.COMMENTS IMP SPEC: NORMAL
PATH REPORT.COMMENTS IMP SPEC: NORMAL
PATH REPORT.FINAL DX SPEC: NORMAL
PATH REPORT.GROSS SPEC: NORMAL
PATH REPORT.MICROSCOPIC SPEC OTHER STN: NORMAL
PATH REPORT.RELEVANT HX SPEC: NORMAL
PH UR STRIP: 7.5 [PH] (ref 5–7)
PHOTO IMAGE: NORMAL
PLATELET # BLD AUTO: 199 10E3/UL (ref 150–450)
POTASSIUM SERPL-SCNC: 3.4 MMOL/L (ref 3.4–5.3)
PROT SERPL-MCNC: 5.7 G/DL (ref 6.4–8.3)
RBC # BLD AUTO: 3.71 10E6/UL (ref 3.8–5.2)
RBC URINE: 0 /HPF
SODIUM SERPL-SCNC: 143 MMOL/L (ref 135–145)
SP GR UR STRIP: 1 (ref 1–1.03)
SQUAMOUS EPITHELIAL: <1 /HPF
UROBILINOGEN UR STRIP-MCNC: <2 MG/DL
WBC # BLD AUTO: 10.2 10E3/UL (ref 4–11)
WBC URINE: <1 /HPF

## 2024-04-18 PROCEDURE — 83690 ASSAY OF LIPASE: CPT

## 2024-04-18 PROCEDURE — 85025 COMPLETE CBC W/AUTO DIFF WBC: CPT

## 2024-04-18 PROCEDURE — 36415 COLL VENOUS BLD VENIPUNCTURE: CPT

## 2024-04-18 PROCEDURE — 82310 ASSAY OF CALCIUM: CPT

## 2024-04-18 PROCEDURE — 86140 C-REACTIVE PROTEIN: CPT

## 2024-04-18 PROCEDURE — 81001 URINALYSIS AUTO W/SCOPE: CPT

## 2024-04-18 PROCEDURE — 88304 TISSUE EXAM BY PATHOLOGIST: CPT | Mod: 26 | Performed by: PATHOLOGY

## 2024-04-18 PROCEDURE — 250N000013 HC RX MED GY IP 250 OP 250 PS 637

## 2024-04-18 PROCEDURE — 99283 EMERGENCY DEPT VISIT LOW MDM: CPT

## 2024-04-18 PROCEDURE — 80053 COMPREHEN METABOLIC PANEL: CPT

## 2024-04-18 RX ORDER — POLYETHYLENE GLYCOL 3350 17 G/17G
1 POWDER, FOR SOLUTION ORAL DAILY
Qty: 225 G | Refills: 0 | Status: SHIPPED | OUTPATIENT
Start: 2024-04-18

## 2024-04-18 RX ORDER — OXYCODONE HYDROCHLORIDE 5 MG/1
5 TABLET ORAL ONCE
Status: COMPLETED | OUTPATIENT
Start: 2024-04-18 | End: 2024-04-18

## 2024-04-18 RX ADMIN — OXYCODONE HYDROCHLORIDE 5 MG: 5 TABLET ORAL at 09:41

## 2024-04-18 ASSESSMENT — COLUMBIA-SUICIDE SEVERITY RATING SCALE - C-SSRS
1. IN THE PAST MONTH, HAVE YOU WISHED YOU WERE DEAD OR WISHED YOU COULD GO TO SLEEP AND NOT WAKE UP?: NO
2. HAVE YOU ACTUALLY HAD ANY THOUGHTS OF KILLING YOURSELF IN THE PAST MONTH?: NO
6. HAVE YOU EVER DONE ANYTHING, STARTED TO DO ANYTHING, OR PREPARED TO DO ANYTHING TO END YOUR LIFE?: NO

## 2024-04-18 ASSESSMENT — ACTIVITIES OF DAILY LIVING (ADL)
ADLS_ACUITY_SCORE: 38
ADLS_ACUITY_SCORE: 38

## 2024-04-18 NOTE — ED TRIAGE NOTES
Amb to triage.  Pt states had galbladder out last weekend and then complication with elevated liver fxn and back into surgery.  Was released home yesterday and was doing okay but this morning was having increased pain and not able to pass gas.  Reports has not taken any pain meds since d/c'd from hospital yesterday.     Triage Assessment (Adult)       Row Name 04/18/24 0903          Triage Assessment    Airway WDL WDL        Respiratory WDL    Respiratory WDL WDL        Skin Circulation/Temperature WDL    Skin Circulation/Temperature WDL WDL        Cardiac WDL    Cardiac WDL WDL        Peripheral/Neurovascular WDL    Peripheral Neurovascular WDL WDL        Cognitive/Neuro/Behavioral WDL    Cognitive/Neuro/Behavioral WDL WDL

## 2024-04-18 NOTE — DISCHARGE INSTRUCTIONS
You were seen here in the ER for some post-op pain from a cholecystectomy. Your labs here are reassuring with your liver enzymes continuing to trend down, no elevated white blood cell count, and your urine test was clean without infection. Your surgical incisions look well.     It is normal to have some pain post operatively. It is hard to achieve a 0 out of 10 for pain after a surgery. It is good to establish pain goals and take your pain medications as prescribed to try to lessen pain and achieve your pain goal.     You can try a clear liquid/soft diet for the next few days until you have a bowel movement. If after a couple days of a clear diet, you still have not had a bowel movement, you could try a medication like Miralax to help encourage bowel movements.      If you start to develop fevers, vomiting, abdominal distension, bad abdominal pain despite taking the medications, then you would want to be reevaluated at that point.

## 2024-04-18 NOTE — ED PROVIDER NOTES
Emergency Department Encounter   NAME: Odette Escobar ; AGE: 68 year old female ; YOB: 1955 ; MRN: [id number] ; PCP: Allie Johnson   ED PROVIDER: Oriana Barrett PA-C    Evaluation Date & Time:   4/18/2024  9:06 AM    CHIEF COMPLAINT:  Abdominal Pain and Post-op Problem      Impression and Plan   Medical Decision Making    Odette Escobar is a 68 year old female who presents for evaluation of abdominal pain, post op day 4 from [facility].  Vitals unremarkable, afebrile, not tachycardic/hypoxic/tachypneic. On exam well appearing in no acute distress. Abdomen with mild tenderness to palpation along the left lower abdomen without guarding or rebound. Incisions look good with minimal pink around the incision site and surgical glue in place over top. Cardiopulmonary exam unremarkable.     Patient has not taken any of her pain medication post op. Discussed how having a pain goal of 0 may not be realistic given she just had surgery but finding a goal that works for her (example 2) and taking the medications to try to achieve that goal. Will order labs to assess LFTs, WBC, and electrolytes and give her a dose of her oral pain medications. Urinalysis unremarkable. CRP 45.50 which can be elevated after a surgical procedure but not extremely high like 200 which would make me concerned about a secondary infection/inflammatory response. Lipase 53. AST 35,  which are down from discharge. No electrolyte abnormalities. Her lipase is not elevated so acute pancreatitis is unlikely. Her LFTs look improved. No leukocytosis to make me infer a post op infection. No red flag symptoms like fevers, melena, hematochezia, rebound tenderness, or chest pain/SOB. Pain relieved after one dose of oxycodone. I do believe patient is having routine post op pain, called the triage RN, and they recommended she be seen. I recommended she take her medications and have an realistic pain goal.     Patient was discharged in stable  condition but instructed to return to the emergency department with any new or worsening of symptoms. Patient expressed understanding, feels comfortable, and is in agreement with this plan. All questions addressed prior to discharge.    Medical Decision Making  Obtained supplemental history:Supplemental history obtained?: No  Reviewed external records: External records reviewed?: No  Care impacted by chronic illness:N/A  Care significantly affected by social determinants of health:N/A  Did you consider but not order tests?: Work up considered but not performed and documented in chart, if applicable  Did you interpret images independently?: Independent interpretation of ECG and images noted in documentation, when applicable.  Consultation discussion with other provider:Did you involve another provider (consultant, MH, pharmacy, etc.)?: No  Discharge. I recommended the patient continue their current prescription strength medication(s): oxy. See documentation for any additional details.      ED COURSE:  0910 I met and introduced myself to the patient. I gathered initial history and performed my physical exam. We discussed plan for initial workup.   0945 I have staffed the patient with Dr. Arash Peres, ED MD, who has evaluated the patient and agrees with all aspects of today's care.   1045 We discussed the plan for discharge and the patient is agreeable. Reviewed supportive cares, symptomatic treatment, outpatient follow up, and reasons to return to the Emergency Department. Patient to be discharged by ED RN.           FINAL IMPRESSION:    ICD-10-CM    1. Post-op pain  G89.18           MEDICATIONS GIVEN IN THE EMERGENCY DEPARTMENT:  Medications   oxyCODONE (ROXICODONE) tablet 5 mg (5 mg Oral $Given 4/18/24 0972)       NEW PRESCRIPTIONS STARTED AT TODAY'S ED VISIT:  Discharge Medication List as of 4/18/2024 10:48 AM        START taking these medications    Details   polyethylene glycol (MIRALAX) 17 GM/Dose powder Take  "17 g (1 Capful) by mouth daily, Disp-225 g, R-0, E-Prescribe             HPI   Patient information was obtained from: patient   Use of Intrepreter: N/A     Odette Escobar is a 68 year old female who presents for evaluation of abdominal pain, post op day 4 from lap denia. Had to have a second procedure Tuesday to have a stent placed in the common bile duct. Was sent home yesterday at 5pm. Since then, has not taken any medications. Reports this AM felt lower abdominal pain at a 6/10 and \"not passing any more gas\". Has not had a BM since Tuesday before the stent placement. Denies fevers, vomiting, diarrhea, melena, hematochezia, hematemesis, chest pain, dyspnea. Still tolerating PO.       Medical History     Past Medical History:   Diagnosis Date    Alcoholic cirrhosis of liver (H)     Asthma     DM2 (diabetes mellitus, type 2) (H)     Fibromyalgia     History of skin cancer     Hypertension     Migraine     Motion sickness     CASTRO on CPAP     Vertigo        Past Surgical History:   Procedure Laterality Date    ENDOMETRIAL ABLATION      ENDOSCOPIC RETROGRADE CHOLANGIOPANCREATOGRAM N/A 4/16/2024    Procedure: ENDOSCOPIC RETROGRADE CHOLANGIOPANCEATOGRAPHY, WITH SPHINCTEROTOMY;  Surgeon: Eduin Do MD;  Location: [facility] OR    ENDOSCOPIC RETROGRADE CHOLANGIOPANCREATOGRAM N/A 4/16/2024    Procedure: STONE EXTRACTION,;  Surgeon: Eduin Do MD;  Location: [facility] OR    ENDOSCOPIC RETROGRADE CHOLANGIOPANCREATOGRAM N/A 4/16/2024    Procedure: BILIARY STENT PLACEMENT;  Surgeon: Eduin Do MD;  Location: [facility] OR    ESOPHAGOSCOPY, GASTROSCOPY, DUODENOSCOPY (EGD), COMBINED N/A 4/16/2024    Procedure: ESOPHAGOGASTRODUODENOSCOPY, WITH ENDOSCOPIC ULTRASOUND GUIDANCE;  Surgeon: Eduin Do MD;  Location: [facility] OR    HERNIORRHAPHY, UMBILICAL, ROBOT-ASSISTED, LAPAROSCOPIC, USING DA ROCIO XI N/A 04/14/2024    Procedure: PRIMARY REPAIR OF  UMBILICAL HERNIA;  Surgeon: Christiano Cruz, " DO;  Location: [facility] OR    HYSTERECTOMY      LAPAROSCOPIC CHOLECYSTECTOMY N/A 04/14/2024    Procedure: CHOLECYSTECTOMY, ROBOT-ASSISTED, LAPAROSCOPIC, USING DA ROCIO XI;  Surgeon: Christiano Cruz DO;  Location: [facility] OR    TONSILLECTOMY         Family History   Problem Relation Age of Onset    Heart Failure Mother     Heart Failure Father     Heart Failure Sister     Coronary Artery Disease Brother     Coronary Artery Disease Brother     Coronary Artery Disease Brother     Cerebrovascular Disease Sister        Social History     Tobacco Use    Smoking status: Never    Smokeless tobacco: Never   Substance Use Topics    Alcohol use: Yes     Comment: Alcoholic Drinks/day: Occasional usage    Drug use: No       polyethylene glycol (MIRALAX) 17 GM/Dose powder  acetaminophen (TYLENOL) 500 MG tablet  albuterol (PROAIR HFA/PROVENTIL HFA/VENTOLIN HFA) 108 (90 Base) MCG/ACT inhaler  amitriptyline (ELAVIL) 10 MG tablet  ammonium lactate (AMLACTIN) 12 % external cream  bacitracin 500 UNIT/GM external ointment  Baclofen (LIORESAL) 5 MG tablet  BELSOMRA 10 MG tablet  benzonatate (TESSALON) 100 MG capsule  budesonide-formoterol (SYMBICORT) 160-4.5 MCG/ACT Inhaler  cetirizine (ZYRTEC) 10 MG tablet  cholecalciferol (VITAMIN D3) 50 MCG (2000 UT) CAPS  estradiol (ESTRACE) 0.1 MG/GM vaginal cream  famotidine (PEPCID) 20 MG tablet  ferrous gluconate (FERGON) 324 (38 Fe) MG tablet  fluticasone (FLONASE) 50 MCG/ACT nasal spray  gabapentin (NEURONTIN) 300 MG capsule  hydrocortisone 2.5 % ointment  ibuprofen (ADVIL/MOTRIN) 200 MG tablet  ipratropium - albuterol 0.5 mg/2.5 mg/3 mL (DUONEB) 0.5-2.5 (3) MG/3ML neb solution  JARDIANCE 10 MG TABS tablet  loperamide (IMODIUM) 2 mg capsule  losartan (COZAAR) 25 MG tablet  Magnesium Oxide -Mg Supplement 500 MG TABS  montelukast (SINGULAIR) 10 MG tablet  Multiple Vitamins-Minerals (MULTIVITAMIN WOMEN 50+) TABS  omeprazole (PRILOSEC) 20 MG DR capsule  oxyCODONE (ROXICODONE) 5 MG  tablet  OZEMPIC, 0.25 OR 0.5 MG/DOSE, 2 MG/3ML pen  polyethylene glycol-propylene glycol (SYSTANE ULTRA) 0.4-0.3 % SOLN ophthalmic solution  propranolol ER (INDERAL LA) 120 MG 24 hr capsule  sertraline (ZOLOFT) 100 MG tablet  TYRVAYA 0.03 MG/ACT nasal spray        Physical Exam     First Vitals:  No data found.      PHYSICAL EXAM:   Physical Exam  Constitutional:       General: She is not in acute distress.     Appearance: She is well-developed. She is not ill-appearing.   Cardiovascular:      Rate and Rhythm: Normal rate and regular rhythm.   Pulmonary:      Effort: Pulmonary effort is normal.      Breath sounds: Normal breath sounds.   Abdominal:      General: Abdomen is flat. Bowel sounds are normal.      Palpations: Abdomen is soft.      Tenderness: There is abdominal tenderness in the left lower quadrant. There is guarding. There is no right CVA tenderness, left CVA tenderness or rebound.   Skin:     Capillary Refill: Capillary refill takes less than 2 seconds.   Neurological:      General: No focal deficit present.      Mental Status: She is alert.   Psychiatric:         Mood and Affect: Mood is anxious.           Results     LAB:  All pertinent labs reviewed and interpreted  Labs Ordered and Resulted from Time of ED Arrival to Time of ED Departure   BASIC METABOLIC PANEL - Abnormal       Result Value    Sodium 143      Potassium 3.4      Chloride 107      Carbon Dioxide (CO2) 30 (*)     Anion Gap 6 (*)     Urea Nitrogen 7.5 (*)     Creatinine 0.51      GFR Estimate >90      Calcium 8.6 (*)     Glucose 129 (*)    HEPATIC FUNCTION PANEL - Abnormal    Protein Total 5.7 (*)     Albumin 3.1 (*)     Bilirubin Total 0.7      Alkaline Phosphatase 130      AST 35       (*)     Bilirubin Direct 0.31 (*)    CRP INFLAMMATION - Abnormal    CRP Inflammation 45.50 (*)    ROUTINE UA WITH MICROSCOPIC REFLEX TO CULTURE - Abnormal    Color Urine Colorless      Appearance Urine Clear      Glucose Urine Negative       Bilirubin Urine Negative      Ketones Urine Negative      Specific Gravity Urine 1.005      Blood Urine Negative      pH Urine 7.5 (*)     Protein Albumin Urine Negative      Urobilinogen Urine <2.0      Nitrite Urine Negative      Leukocyte Esterase Urine Negative      RBC Urine 0      WBC Urine <1      Squamous Epithelials Urine <1     CBC WITH PLATELETS AND DIFFERENTIAL - Abnormal    WBC Count 10.2      RBC Count 3.71 (*)     Hemoglobin 11.2 (*)     Hematocrit 34.6 (*)     MCV 93      MCH 30.2      MCHC 32.4      RDW 13.5      Platelet Count 199      % Neutrophils 69      % Lymphocytes 22      % Monocytes 7      % Eosinophils 2      % Basophils 0      % Immature Granulocytes 0      NRBCs per 100 WBC 0      Absolute Neutrophils 7.0      Absolute Lymphocytes 2.2      Absolute Monocytes 0.8      Absolute Eosinophils 0.2      Absolute Basophils 0.0      Absolute Immature Granulocytes 0.0      Absolute NRBCs 0.0     LIPASE - Normal    Lipase 53         RADIOLOGY:  No orders to display         Oriana Barrett PA-C  Emergency Medicine   [facility] EMERGENCY DEPARTMENT       Oriana Barrett PA-C  04/22/24 0985

## 2024-04-18 NOTE — ED NOTES
Pt reports didn't know where meds were to take them so called nurse line and told just to come in. Pt appears uncomfortable but tolerable pain at this time.  at bedside.

## 2024-04-18 NOTE — ED PROVIDER NOTES
Emergency Department Midlevel Supervisory Note     I personally saw the patient and performed a substantive portion of the visit including all aspects of the medical decision making.    ED Course:  9:23 AM  Oriana Barrett PA-C staffed patient with me. I agree with their assessment and plan of management, and I will see the patient.  10:16 AM I met with the patient to introduce myself, gather additional history, perform my initial exam, and discuss the plan.     Brief HPI:     Odette Escobar is a 68 year old female who presents for evaluation of lower abdominal pain. Patient had a lap denia performed on 4/14/24. On 4/15/24, they noticed an increase in the patient's LFTs and subsequently placed a stent on 4/16/24. Since discharge, patient has not taken any of her prescribed medications. Patient denies abnormal urinary symptoms, nausea, vomiting. No BM since 4/16/24.     1 of patient's primary concerns is that she had not passed gas this morning and she thinks that that is the causative factor in her symptoms that she feels a little bit bloated.  Please see MAGALY note for further details.    I, Enrico Rod, am serving as a scribe to document services personally performed by Arash Peres MD based on my observation and the provider's statements to me. I, Arash Peres MD, attest that Enrico Rod is acting in a scribe capacity, has observed my performance of the services and has documented them in accordance with my direction.    Brief Physical Exam:  Constitutional:  Alert, in no acute distress  EYES: Conjunctivae clear  HENT:  Atraumatic, normocephalic  Respiratory:  Respirations even, unlabored, in no acute respiratory distress  Cardiovascular:  Regular rate and rhythm, good peripheral perfusion  GI: Soft, nondistended, nontender, no palpable masses, no rebound, no guarding   Musculoskeletal:  No edema. No cyanosis. Range of motion major extremities intact.    Integument: Warm, Dry, No erythema, No rash.    Neurologic:  Alert & oriented, no focal deficits noted  Psych: Normal mood and affect     MDM:  Patient presenting with abdominal discomfort post discharged yesterday from the hospital.  Has not stooled since her surgery and did not pass gas this morning.  No nausea no vomiting.  On my initial examination her abdominal examination was benign in the sense that she had no reproducible tenderness she was not distended her surgical incision sites appear to be all healing appropriately without dehiscence or evidence of infection.  Overall her laboratory testing was appropriately trending in terms of her LFTs blood cell count was negative.  Please see MAGALY note for further details and laboratory testing.  I had a discussion with the patient and offered CT scan imaging at this point the patient is declining she is feeling improved and wants to be discharged.  I suspect that this is a mild postop ileus that has developed postsurgical.  She is tolerating oral intake and not vomiting so I think that since she is okay for discharge home and counseled the patient that she should institute a clear liquid diet with increased efforts on hydration and plan to advance her diet as her symptoms improved and she starts stooling.  Will also add on a stool regimen for the patient.  Patient comfortable this plan of care reviewed return precaution prior to discharge.       1. Post-op pain        Labs and Imaging:  Results for orders placed or performed during the hospital encounter of 04/18/24   Basic metabolic panel   Result Value Ref Range    Sodium 143 135 - 145 mmol/L    Potassium 3.4 3.4 - 5.3 mmol/L    Chloride 107 98 - 107 mmol/L    Carbon Dioxide (CO2) 30 (H) 22 - 29 mmol/L    Anion Gap 6 (L) 7 - 15 mmol/L    Urea Nitrogen 7.5 (L) 8.0 - 23.0 mg/dL    Creatinine 0.51 0.51 - 0.95 mg/dL    GFR Estimate >90 >60 mL/min/1.73m2    Calcium 8.6 (L) 8.8 - 10.2 mg/dL    Glucose 129 (H) 70 - 99 mg/dL   Hepatic function panel   Result Value  Ref Range    Protein Total 5.7 (L) 6.4 - 8.3 g/dL    Albumin 3.1 (L) 3.5 - 5.2 g/dL    Bilirubin Total 0.7 <=1.2 mg/dL    Alkaline Phosphatase 130 40 - 150 U/L    AST 35 0 - 45 U/L     (H) 0 - 50 U/L    Bilirubin Direct 0.31 (H) 0.00 - 0.30 mg/dL   Result Value Ref Range    Lipase 53 13 - 60 U/L   Result Value Ref Range    CRP Inflammation 45.50 (H) <5.00 mg/L   UA with Microscopic reflex to Culture    Specimen: Urine, Midstream   Result Value Ref Range    Color Urine Colorless Colorless, Straw, Light Yellow, Yellow    Appearance Urine Clear Clear    Glucose Urine Negative Negative mg/dL    Bilirubin Urine Negative Negative    Ketones Urine Negative Negative mg/dL    Specific Gravity Urine 1.005 1.001 - 1.030    Blood Urine Negative Negative    pH Urine 7.5 (H) 5.0 - 7.0    Protein Albumin Urine Negative Negative mg/dL    Urobilinogen Urine <2.0 <2.0 mg/dL    Nitrite Urine Negative Negative    Leukocyte Esterase Urine Negative Negative    RBC Urine 0 <=2 /HPF    WBC Urine <1 <=5 /HPF    Squamous Epithelials Urine <1 <=1 /HPF   CBC with platelets and differential   Result Value Ref Range    WBC Count 10.2 4.0 - 11.0 10e3/uL    RBC Count 3.71 (L) 3.80 - 5.20 10e6/uL    Hemoglobin 11.2 (L) 11.7 - 15.7 g/dL    Hematocrit 34.6 (L) 35.0 - 47.0 %    MCV 93 78 - 100 fL    MCH 30.2 26.5 - 33.0 pg    MCHC 32.4 31.5 - 36.5 g/dL    RDW 13.5 10.0 - 15.0 %    Platelet Count 199 150 - 450 10e3/uL    % Neutrophils 69 %    % Lymphocytes 22 %    % Monocytes 7 %    % Eosinophils 2 %    % Basophils 0 %    % Immature Granulocytes 0 %    NRBCs per 100 WBC 0 <1 /100    Absolute Neutrophils 7.0 1.6 - 8.3 10e3/uL    Absolute Lymphocytes 2.2 0.8 - 5.3 10e3/uL    Absolute Monocytes 0.8 0.0 - 1.3 10e3/uL    Absolute Eosinophils 0.2 0.0 - 0.7 10e3/uL    Absolute Basophils 0.0 0.0 - 0.2 10e3/uL    Absolute Immature Granulocytes 0.0 <=0.4 10e3/uL    Absolute NRBCs 0.0 10e3/uL     I have reviewed the relevant laboratory and radiology  studies      KAIDEN [facility] EMERGENCY DEPARTMENT  [facility] [facility] [id number] [phone]     Arash Peres MD  04/18/24 1116

## 2024-04-19 ENCOUNTER — APPOINTMENT (OUTPATIENT)
Dept: CT IMAGING | Facility: HOSPITAL | Age: 69
End: 2024-04-19
Attending: STUDENT IN AN ORGANIZED HEALTH CARE EDUCATION/TRAINING PROGRAM
Payer: COMMERCIAL

## 2024-04-19 ENCOUNTER — HOSPITAL ENCOUNTER (OUTPATIENT)
Facility: HOSPITAL | Age: 69
Setting detail: OBSERVATION
Discharge: HOME | End: 2024-04-20
Attending: STUDENT IN AN ORGANIZED HEALTH CARE EDUCATION/TRAINING PROGRAM | Admitting: STUDENT IN AN ORGANIZED HEALTH CARE EDUCATION/TRAINING PROGRAM
Payer: COMMERCIAL

## 2024-04-19 ENCOUNTER — PATIENT OUTREACH (OUTPATIENT)
Dept: CARE COORDINATION | Facility: CLINIC | Age: 69
End: 2024-04-19
Payer: COMMERCIAL

## 2024-04-19 DIAGNOSIS — K62.5 RECTAL BLEEDING: ICD-10-CM

## 2024-04-19 DIAGNOSIS — R10.11 RIGHT UPPER QUADRANT ABDOMINAL PAIN: Primary | ICD-10-CM

## 2024-04-19 PROBLEM — E11.9 DIABETES MELLITUS (H): Status: ACTIVE | Noted: 2018-05-24

## 2024-04-19 LAB
ABO/RH(D): NORMAL
ALBUMIN SERPL BCG-MCNC: 3.7 G/DL (ref 3.5–5.2)
ALP SERPL-CCNC: 144 U/L (ref 40–150)
ALT SERPL W P-5'-P-CCNC: 107 U/L (ref 0–50)
ANION GAP SERPL CALCULATED.3IONS-SCNC: 10 MMOL/L (ref 7–15)
ANTIBODY SCREEN: NEGATIVE
AST SERPL W P-5'-P-CCNC: 29 U/L (ref 0–45)
BASOPHILS # BLD AUTO: 0 10E3/UL (ref 0–0.2)
BASOPHILS NFR BLD AUTO: 1 %
BILIRUB DIRECT SERPL-MCNC: 0.31 MG/DL (ref 0–0.3)
BILIRUB SERPL-MCNC: 0.7 MG/DL
BUN SERPL-MCNC: 4.9 MG/DL (ref 8–23)
CALCIUM SERPL-MCNC: 9.6 MG/DL (ref 8.8–10.2)
CHLORIDE SERPL-SCNC: 103 MMOL/L (ref 98–107)
CREAT SERPL-MCNC: 0.47 MG/DL (ref 0.51–0.95)
DEPRECATED HCO3 PLAS-SCNC: 29 MMOL/L (ref 22–29)
EGFRCR SERPLBLD CKD-EPI 2021: >90 ML/MIN/1.73M2
EOSINOPHIL # BLD AUTO: 0.3 10E3/UL (ref 0–0.7)
EOSINOPHIL NFR BLD AUTO: 5 %
ERYTHROCYTE [DISTWIDTH] IN BLOOD BY AUTOMATED COUNT: 13.5 % (ref 10–15)
GLUCOSE SERPL-MCNC: 96 MG/DL (ref 70–99)
HCT VFR BLD AUTO: 36.6 % (ref 35–47)
HGB BLD-MCNC: 11.9 G/DL (ref 11.7–15.7)
IMM GRANULOCYTES # BLD: 0 10E3/UL
IMM GRANULOCYTES NFR BLD: 0 %
INR PPP: 0.99 (ref 0.85–1.15)
LIPASE SERPL-CCNC: 42 U/L (ref 13–60)
LYMPHOCYTES # BLD AUTO: 2.4 10E3/UL (ref 0.8–5.3)
LYMPHOCYTES NFR BLD AUTO: 33 %
MAGNESIUM SERPL-MCNC: 2 MG/DL (ref 1.7–2.3)
MCH RBC QN AUTO: 30.4 PG (ref 26.5–33)
MCHC RBC AUTO-ENTMCNC: 32.5 G/DL (ref 31.5–36.5)
MCV RBC AUTO: 94 FL (ref 78–100)
MONOCYTES # BLD AUTO: 0.6 10E3/UL (ref 0–1.3)
MONOCYTES NFR BLD AUTO: 9 %
NEUTROPHILS # BLD AUTO: 3.8 10E3/UL (ref 1.6–8.3)
NEUTROPHILS NFR BLD AUTO: 52 %
NRBC # BLD AUTO: 0 10E3/UL
NRBC BLD AUTO-RTO: 0 /100
PLATELET # BLD AUTO: 247 10E3/UL (ref 150–450)
POTASSIUM SERPL-SCNC: 3.2 MMOL/L (ref 3.4–5.3)
PROT SERPL-MCNC: 7.1 G/DL (ref 6.4–8.3)
RBC # BLD AUTO: 3.91 10E6/UL (ref 3.8–5.2)
SODIUM SERPL-SCNC: 142 MMOL/L (ref 135–145)
SPECIMEN EXPIRATION DATE: NORMAL
WBC # BLD AUTO: 7.2 10E3/UL (ref 4–11)

## 2024-04-19 PROCEDURE — 36415 COLL VENOUS BLD VENIPUNCTURE: CPT | Performed by: STUDENT IN AN ORGANIZED HEALTH CARE EDUCATION/TRAINING PROGRAM

## 2024-04-19 PROCEDURE — 86900 BLOOD TYPING SEROLOGIC ABO: CPT | Performed by: STUDENT IN AN ORGANIZED HEALTH CARE EDUCATION/TRAINING PROGRAM

## 2024-04-19 PROCEDURE — 250N000013 HC RX MED GY IP 250 OP 250 PS 637: Performed by: STUDENT IN AN ORGANIZED HEALTH CARE EDUCATION/TRAINING PROGRAM

## 2024-04-19 PROCEDURE — 82310 ASSAY OF CALCIUM: CPT | Performed by: STUDENT IN AN ORGANIZED HEALTH CARE EDUCATION/TRAINING PROGRAM

## 2024-04-19 PROCEDURE — G0378 HOSPITAL OBSERVATION PER HR: HCPCS

## 2024-04-19 PROCEDURE — 80053 COMPREHEN METABOLIC PANEL: CPT | Performed by: STUDENT IN AN ORGANIZED HEALTH CARE EDUCATION/TRAINING PROGRAM

## 2024-04-19 PROCEDURE — 83735 ASSAY OF MAGNESIUM: CPT | Performed by: STUDENT IN AN ORGANIZED HEALTH CARE EDUCATION/TRAINING PROGRAM

## 2024-04-19 PROCEDURE — 74174 CTA ABD&PLVS W/CONTRAST: CPT

## 2024-04-19 PROCEDURE — 99222 1ST HOSP IP/OBS MODERATE 55: CPT | Performed by: HOSPITALIST

## 2024-04-19 PROCEDURE — C9113 INJ PANTOPRAZOLE SODIUM, VIA: HCPCS | Performed by: STUDENT IN AN ORGANIZED HEALTH CARE EDUCATION/TRAINING PROGRAM

## 2024-04-19 PROCEDURE — 96374 THER/PROPH/DIAG INJ IV PUSH: CPT | Mod: 59

## 2024-04-19 PROCEDURE — 250N000011 HC RX IP 250 OP 636: Performed by: STUDENT IN AN ORGANIZED HEALTH CARE EDUCATION/TRAINING PROGRAM

## 2024-04-19 PROCEDURE — 99285 EMERGENCY DEPT VISIT HI MDM: CPT | Mod: 25

## 2024-04-19 PROCEDURE — 85025 COMPLETE CBC W/AUTO DIFF WBC: CPT | Performed by: STUDENT IN AN ORGANIZED HEALTH CARE EDUCATION/TRAINING PROGRAM

## 2024-04-19 PROCEDURE — 83690 ASSAY OF LIPASE: CPT | Performed by: STUDENT IN AN ORGANIZED HEALTH CARE EDUCATION/TRAINING PROGRAM

## 2024-04-19 PROCEDURE — 85610 PROTHROMBIN TIME: CPT | Performed by: STUDENT IN AN ORGANIZED HEALTH CARE EDUCATION/TRAINING PROGRAM

## 2024-04-19 RX ORDER — ONDANSETRON 4 MG/1
4 TABLET, ORALLY DISINTEGRATING ORAL EVERY 6 HOURS PRN
Status: DISCONTINUED | OUTPATIENT
Start: 2024-04-19 | End: 2024-04-20 | Stop reason: HOSPADM

## 2024-04-19 RX ORDER — ACETAMINOPHEN 325 MG/1
650 TABLET ORAL EVERY 4 HOURS PRN
Status: DISCONTINUED | OUTPATIENT
Start: 2024-04-19 | End: 2024-04-20 | Stop reason: HOSPADM

## 2024-04-19 RX ORDER — NALOXONE HYDROCHLORIDE 0.4 MG/ML
0.2 INJECTION, SOLUTION INTRAMUSCULAR; INTRAVENOUS; SUBCUTANEOUS
Status: DISCONTINUED | OUTPATIENT
Start: 2024-04-19 | End: 2024-04-20 | Stop reason: HOSPADM

## 2024-04-19 RX ORDER — BENZONATATE 100 MG/1
200 CAPSULE ORAL 3 TIMES DAILY PRN
Status: DISCONTINUED | OUTPATIENT
Start: 2024-04-19 | End: 2024-04-20 | Stop reason: HOSPADM

## 2024-04-19 RX ORDER — FLUTICASONE FUROATE AND VILANTEROL 200; 25 UG/1; UG/1
1 POWDER RESPIRATORY (INHALATION) DAILY
Status: DISCONTINUED | OUTPATIENT
Start: 2024-04-20 | End: 2024-04-20 | Stop reason: HOSPADM

## 2024-04-19 RX ORDER — ACETAMINOPHEN 650 MG/1
650 SUPPOSITORY RECTAL EVERY 4 HOURS PRN
Status: DISCONTINUED | OUTPATIENT
Start: 2024-04-19 | End: 2024-04-20 | Stop reason: HOSPADM

## 2024-04-19 RX ORDER — LOSARTAN POTASSIUM 25 MG/1
25 TABLET ORAL DAILY
Status: DISCONTINUED | OUTPATIENT
Start: 2024-04-20 | End: 2024-04-20 | Stop reason: HOSPADM

## 2024-04-19 RX ORDER — PROPRANOLOL HCL 60 MG
120 CAPSULE, EXTENDED RELEASE 24HR ORAL DAILY
Status: DISCONTINUED | OUTPATIENT
Start: 2024-04-20 | End: 2024-04-20 | Stop reason: HOSPADM

## 2024-04-19 RX ORDER — GABAPENTIN 300 MG/1
600 CAPSULE ORAL 3 TIMES DAILY
Status: DISCONTINUED | OUTPATIENT
Start: 2024-04-20 | End: 2024-04-20 | Stop reason: HOSPADM

## 2024-04-19 RX ORDER — PROCHLORPERAZINE MALEATE 5 MG
5 TABLET ORAL EVERY 6 HOURS PRN
Status: DISCONTINUED | OUTPATIENT
Start: 2024-04-19 | End: 2024-04-20 | Stop reason: HOSPADM

## 2024-04-19 RX ORDER — PROCHLORPERAZINE 25 MG
12.5 SUPPOSITORY, RECTAL RECTAL EVERY 12 HOURS PRN
Status: DISCONTINUED | OUTPATIENT
Start: 2024-04-19 | End: 2024-04-20 | Stop reason: HOSPADM

## 2024-04-19 RX ORDER — MAGNESIUM OXIDE 400 MG/1
400 TABLET ORAL DAILY
Status: DISCONTINUED | OUTPATIENT
Start: 2024-04-20 | End: 2024-04-20 | Stop reason: HOSPADM

## 2024-04-19 RX ORDER — ONDANSETRON 2 MG/ML
4 INJECTION INTRAMUSCULAR; INTRAVENOUS EVERY 6 HOURS PRN
Status: DISCONTINUED | OUTPATIENT
Start: 2024-04-19 | End: 2024-04-20 | Stop reason: HOSPADM

## 2024-04-19 RX ORDER — AMITRIPTYLINE HYDROCHLORIDE 10 MG/1
30 TABLET ORAL AT BEDTIME
Status: DISCONTINUED | OUTPATIENT
Start: 2024-04-20 | End: 2024-04-20 | Stop reason: HOSPADM

## 2024-04-19 RX ORDER — IOPAMIDOL 755 MG/ML
90 INJECTION, SOLUTION INTRAVASCULAR ONCE
Status: COMPLETED | OUTPATIENT
Start: 2024-04-19 | End: 2024-04-19

## 2024-04-19 RX ORDER — POLYETHYLENE GLYCOL 3350 17 G/17G
17 POWDER, FOR SOLUTION ORAL DAILY
Status: DISCONTINUED | OUTPATIENT
Start: 2024-04-20 | End: 2024-04-20 | Stop reason: HOSPADM

## 2024-04-19 RX ORDER — NICOTINE POLACRILEX 4 MG
15-30 LOZENGE BUCCAL
Status: DISCONTINUED | OUTPATIENT
Start: 2024-04-19 | End: 2024-04-20 | Stop reason: HOSPADM

## 2024-04-19 RX ORDER — NALOXONE HYDROCHLORIDE 0.4 MG/ML
0.4 INJECTION, SOLUTION INTRAMUSCULAR; INTRAVENOUS; SUBCUTANEOUS
Status: DISCONTINUED | OUTPATIENT
Start: 2024-04-19 | End: 2024-04-20 | Stop reason: HOSPADM

## 2024-04-19 RX ORDER — CARBOXYMETHYLCELLULOSE SODIUM 5 MG/ML
1 SOLUTION/ DROPS OPHTHALMIC 4 TIMES DAILY PRN
Status: DISCONTINUED | OUTPATIENT
Start: 2024-04-19 | End: 2024-04-20 | Stop reason: HOSPADM

## 2024-04-19 RX ORDER — ATORVASTATIN CALCIUM 40 MG/1
80 TABLET, FILM COATED ORAL DAILY
Status: DISCONTINUED | OUTPATIENT
Start: 2024-04-20 | End: 2024-04-20 | Stop reason: HOSPADM

## 2024-04-19 RX ORDER — BACLOFEN 5 MG/1
5-10 TABLET ORAL 2 TIMES DAILY PRN
Status: DISCONTINUED | OUTPATIENT
Start: 2024-04-19 | End: 2024-04-20 | Stop reason: HOSPADM

## 2024-04-19 RX ORDER — IPRATROPIUM BROMIDE AND ALBUTEROL SULFATE 2.5; .5 MG/3ML; MG/3ML
1 SOLUTION RESPIRATORY (INHALATION) 3 TIMES DAILY PRN
Status: DISCONTINUED | OUTPATIENT
Start: 2024-04-19 | End: 2024-04-20 | Stop reason: HOSPADM

## 2024-04-19 RX ORDER — OXYCODONE HYDROCHLORIDE 5 MG/1
5 TABLET ORAL EVERY 6 HOURS PRN
Status: DISCONTINUED | OUTPATIENT
Start: 2024-04-19 | End: 2024-04-20 | Stop reason: HOSPADM

## 2024-04-19 RX ORDER — MONTELUKAST SODIUM 10 MG/1
10 TABLET ORAL AT BEDTIME
Status: DISCONTINUED | OUTPATIENT
Start: 2024-04-20 | End: 2024-04-20 | Stop reason: HOSPADM

## 2024-04-19 RX ORDER — SODIUM CHLORIDE AND POTASSIUM CHLORIDE 150; 900 MG/100ML; MG/100ML
INJECTION, SOLUTION INTRAVENOUS CONTINUOUS
Status: DISPENSED | OUTPATIENT
Start: 2024-04-20 | End: 2024-04-20

## 2024-04-19 RX ORDER — DEXTROSE MONOHYDRATE 25 G/50ML
25-50 INJECTION, SOLUTION INTRAVENOUS
Status: DISCONTINUED | OUTPATIENT
Start: 2024-04-19 | End: 2024-04-20 | Stop reason: HOSPADM

## 2024-04-19 RX ORDER — POTASSIUM CHLORIDE 1.5 G/1.58G
40 POWDER, FOR SOLUTION ORAL ONCE
Status: COMPLETED | OUTPATIENT
Start: 2024-04-19 | End: 2024-04-19

## 2024-04-19 RX ORDER — FERROUS GLUCONATE 324(38)MG
324 TABLET ORAL
Status: DISCONTINUED | OUTPATIENT
Start: 2024-04-20 | End: 2024-04-20 | Stop reason: HOSPADM

## 2024-04-19 RX ORDER — ALBUTEROL SULFATE 90 UG/1
2 AEROSOL, METERED RESPIRATORY (INHALATION) EVERY 6 HOURS PRN
Status: DISCONTINUED | OUTPATIENT
Start: 2024-04-19 | End: 2024-04-20 | Stop reason: HOSPADM

## 2024-04-19 RX ORDER — SERTRALINE HYDROCHLORIDE 100 MG/1
100 TABLET, FILM COATED ORAL DAILY
Status: DISCONTINUED | OUTPATIENT
Start: 2024-04-20 | End: 2024-04-20 | Stop reason: HOSPADM

## 2024-04-19 RX ORDER — CETIRIZINE HYDROCHLORIDE 10 MG/1
10 TABLET ORAL DAILY
Status: DISCONTINUED | OUTPATIENT
Start: 2024-04-20 | End: 2024-04-20 | Stop reason: HOSPADM

## 2024-04-19 RX ADMIN — IOPAMIDOL 90 ML: 755 INJECTION, SOLUTION INTRAVENOUS at 18:56

## 2024-04-19 RX ADMIN — POTASSIUM CHLORIDE 40 MEQ: 1.5 POWDER, FOR SOLUTION ORAL at 17:47

## 2024-04-19 RX ADMIN — PANTOPRAZOLE SODIUM 40 MG: 40 INJECTION, POWDER, FOR SOLUTION INTRAVENOUS at 15:46

## 2024-04-19 ASSESSMENT — ACTIVITIES OF DAILY LIVING (ADL)
ADLS_ACUITY_SCORE: 38

## 2024-04-19 ASSESSMENT — COLUMBIA-SUICIDE SEVERITY RATING SCALE - C-SSRS
6. HAVE YOU EVER DONE ANYTHING, STARTED TO DO ANYTHING, OR PREPARED TO DO ANYTHING TO END YOUR LIFE?: NO
2. HAVE YOU ACTUALLY HAD ANY THOUGHTS OF KILLING YOURSELF IN THE PAST MONTH?: NO
1. IN THE PAST MONTH, HAVE YOU WISHED YOU WERE DEAD OR WISHED YOU COULD GO TO SLEEP AND NOT WAKE UP?: NO

## 2024-04-19 NOTE — ED NOTES
Expected Patient Referral to ED  2:06 PM    Referring Clinic/Provider:  PMD office     Reason for referral/Clinical facts:  Black tarry stool    Recommendations provided:  Send to ED for further evaluation    Caller was informed that this institution does possess the capabilities and/or resources to provide for patient and should be transferred to our facility.    Discussed that if direct admit is sought and any hurdles are encountered, this ED would be happy to see the patient and evaluate.    Informed caller that recommendations provided are recommendations based only on the facts provided and that they responsible to accept or reject the advice, or to seek a formal in person consultation as needed and that this ED will see/treat patient should they arrive.      Allie Roldan MD  [facility] EMERGENCY DEPARTMENT  [address] [place] [id number] [phone]       Allie Roldan MD  04/19/24 2323

## 2024-04-19 NOTE — ED TRIAGE NOTES
Patient states 5 days ago came into Er for NV and vomiting blood after many emesis. Found to have gallstones, lap denia on 4-14. On 4-16 stent was placed in common bile duct and discharged to home.    This am she had many tarry stools, denies being on blood thinners. Patient hx of ETOH in past, denies varices.

## 2024-04-19 NOTE — PROGRESS NOTES
[facility] Resource [facility]:   [facility] Contact  [facility]     Clinical Data: Post-Discharge Outreach     Outreach attempted x 2.  Left message on patient's voicemail, providing [facility]'s central phone number of [phone] ([phone]) for questions/concerns and/or to schedule an appt with an [facility] provider, if they do not have a PCP.      Plan:  [facility] will do no further outreaches at this time.       JP Johnson (she/her/hers)  Social Work Clinic Care Coordinator   [facility]  [email or web address]  [phone]      *Connected Care Resource Team does NOT follow patient ongoing. Referrals are identified based on internal discharge reports and the outreach is to ensure patient has an understanding of their discharge instructions.

## 2024-04-19 NOTE — ED PROVIDER NOTES
NAME: Odette Escobar  AGE: 68 year old female  YOB: 1955  MRN: [id number]  EVALUATION DATE & TIME: No admission date for patient encounter.    PCP: Allie Johnson  ED PROVIDER: Paris Dubois MD.    Chief Complaint   Patient presents with    Rectal Bleeding       FINAL IMPRESSION:  1. Rectal bleeding        MEDICAL DECISION MAKING:    3:17 PM I met with the patient, obtained history, performed an initial exam, and discussed options and plan for diagnostics and treatment here in the ED.  5:50 PM Rechecked patient.  8:02 PM Spoke to ERICK Buck, regarding patient's plan for care.  8:06 PM Rechecked patient, discussed plan for admission for observation. She is agreeable and comfortable with the plan.     67 y/o F with h/o T2DM, HTN, GERD, alcohol use, cirrhosis who presents with rectal bleeding. Patient was recently admitted 4/14/24 for choelcystitis/choledocolithasis s/p cholecystectomy and ERCP with stone removal and biliary sphincterotomy and temorary stent placedment int he CBD. She was seen in the ED yesterday for abodminal discomfort, she declined CT imaging, suspected to have post op ileus. Seen in clinic today after she ahd 2 bloody bowel movements with melena and sent to the ER. She is having some worsening abdominal pain as well. No fevers or hematemesis. Labs are generally reassuring, Hgb stable, LFTs downtrending. CTA abd/pelvis showed no active bleeding, ild stomach/duodneal thickening, likely from recent operative intervention vs duodenitis/pancreatitis, see full read below. Discussed with ERICK. Plan for NPO at midnight for possible scope tomorrow. Did get PPI here. Patient was accepted for admission by the hospitalist.     Medical Decision Making  Obtained supplemental history:Supplemental history obtained?: No  Reviewed external records: External records reviewed?: Outpatient Record:  [facility] visit on 4/19/24 for bloody stools. Hgb 11.4 instructed to come into  "the ED + Domínguez' admission from earlier in the month, see HPI for more details  Care impacted by chronic illness:Chronic Lung Disease, Chronic Pain, Diabetes, and Hypertension  Care significantly affected by social determinants of health:Alcohol Abuse and/or Recreational Drug Use  Did you consider but not order tests?: Work up considered but not performed and documented in chart, if applicable  Did you interpret images independently?: Independent interpretation of ECG and images noted in documentation, when applicable.  Consultation discussion with other provider:Did you involve another provider (consultant, , pharmacy, etc.)?: I discussed the care with another health care provider, see documentation for details.  Admit.      MEDICATIONS GIVEN IN THE EMERGENCY:  Medications   pantoprazole (PROTONIX) IV push injection 40 mg (40 mg Intravenous $Given 4/19/24 4657)   potassium chloride (KLOR-CON) Packet 40 mEq (40 mEq Oral $Given 4/19/24 8947)   iopamidol (ISOVUE-370) solution 90 mL (90 mLs Intravenous $Given 4/19/24 8896)     =================================================================  [facility]    Patient information was obtained from: Patient  Use of : N/A       Odette Escobar is a 68 year old female with a past medical history of DM II, HTN, GERD, asthma, anxiety, chronic pain, and EtOH abuse, who presents to this ED for evaluation of melena.    Patient reports that she began having black tarry stools around 2:30 AM this morning that persisted to about 7 AM. Had larger stool around 9 AM which did have some bright red blood, but her BM have ceased since. Also complaining of diffuse abdominal pain, worse in LLQ + RUQ, described as \"spasms.\" No food today, only clear liquids. Took Zofran due to mild nausea with relief, no emesis. At present, mentions chills, no fever. Denies shortness of breath. No EtOH use since June 2023.     Per chart review, patient admitted to this hospital from 4/14-4/17 for acute " cholecystitis. MRCP showed mild biliary dilatation but no choledocholithiasis or source of obstruction. Underwent laparoscopic cholecystectomy. Post procedurally, she had elevated LFT and bilirubin. S/p ERCP 4/16/2024 which showed choledocholithiasis.  Had complete removal of stone with balloon extraction and biliary sphincterotomy, temporary stent was placed in the common bile duct. Discharged home in stable condition with follow up with [facility] in 8 to 10 weeks for stent removal.     Seen at this ED yesterday for abdominal pain. Labs appropriately trending. Surgical wounds well healed. CT abdomen offered, patient declined. Discharged in stable condition with instructions for clear liquid diet and hydration.     PAST MEDICAL HISTORY:  Past Medical History:   Diagnosis Date    Alcoholic cirrhosis of liver (H)     Asthma     DM2 (diabetes mellitus, type 2) (H)     Fibromyalgia     History of skin cancer     Hypertension     Migraine     Motion sickness     CASTRO on CPAP     Vertigo        PAST SURGICAL HISTORY:  Past Surgical History:   Procedure Laterality Date    ENDOMETRIAL ABLATION      ENDOSCOPIC RETROGRADE CHOLANGIOPANCREATOGRAM N/A 4/16/2024    Procedure: ENDOSCOPIC RETROGRADE CHOLANGIOPANCEATOGRAPHY, WITH SPHINCTEROTOMY;  Surgeon: Eduin Do MD;  Location: [facility] OR    ENDOSCOPIC RETROGRADE CHOLANGIOPANCREATOGRAM N/A 4/16/2024    Procedure: STONE EXTRACTION,;  Surgeon: Eduin Do MD;  Location: [facility] OR    ENDOSCOPIC RETROGRADE CHOLANGIOPANCREATOGRAM N/A 4/16/2024    Procedure: BILIARY STENT PLACEMENT;  Surgeon: Eduin Do MD;  Location: [facility] OR    ESOPHAGOSCOPY, GASTROSCOPY, DUODENOSCOPY (EGD), COMBINED N/A 4/16/2024    Procedure: ESOPHAGOGASTRODUODENOSCOPY, WITH ENDOSCOPIC ULTRASOUND GUIDANCE;  Surgeon: Eduin Do MD;  Location: [facility] OR    HERNIORRHAPHY, UMBILICAL, ROBOT-ASSISTED, LAPAROSCOPIC, USING DA ROCIO XI N/A 04/14/2024    Procedure: PRIMARY  REPAIR OF  UMBILICAL HERNIA;  Surgeon: Christiano Cruz DO;  Location: [facility] OR    HYSTERECTOMY      LAPAROSCOPIC CHOLECYSTECTOMY N/A 04/14/2024    Procedure: CHOLECYSTECTOMY, ROBOT-ASSISTED, LAPAROSCOPIC, USING DA ROCIO XI;  Surgeon: Christiano Cruz DO;  Location: [facility] OR    TONSILLECTOMY         CURRENT MEDICATIONS:    No current facility-administered medications for this encounter.    Current Outpatient Medications:     acetaminophen (TYLENOL) 500 MG tablet, Take 1,000 mg by mouth daily as needed for pain, Disp: , Rfl:     albuterol (PROAIR HFA/PROVENTIL HFA/VENTOLIN HFA) 108 (90 Base) MCG/ACT inhaler, Inhale 2 puffs into the lungs every 6 hours as needed for shortness of breath, wheezing or cough, Disp: , Rfl:     amitriptyline (ELAVIL) 10 MG tablet, Take 30 mg by mouth at bedtime, Disp: , Rfl:     ammonium lactate (AMLACTIN) 12 % external cream, Apply topically daily as needed for dry skin, Disp: , Rfl:     atorvastatin (LIPITOR) 80 MG tablet, Take 80 mg by mouth daily, Disp: , Rfl:     bacitracin 500 UNIT/GM external ointment, Apply topically 2 times daily, Disp: , Rfl:     Baclofen (LIORESAL) 5 MG tablet, Take 5-10 mg by mouth 2 times daily as needed for muscle spasms or other (or sleep), Disp: , Rfl:     BELSOMRA 10 MG tablet, Take 1 tablet by mouth nightly as needed for sleep, Disp: , Rfl:     benzonatate (TESSALON) 100 MG capsule, Take 200 mg by mouth 3 times daily as needed for cough, Disp: , Rfl:     budesonide-formoterol (SYMBICORT) 160-4.5 MCG/ACT Inhaler, Inhale 2 puffs into the lungs two times daily Rinse mouth/gargle after use, Disp: , Rfl:     celecoxib (CELEBREX) 200 MG capsule, Take 200 mg by mouth daily, Disp: , Rfl:     cetirizine (ZYRTEC) 10 MG tablet, Take 10 mg by mouth daily, Disp: , Rfl:     cholecalciferol (VITAMIN D3) 50 MCG (2000 UT) CAPS, Take 6,000 Units by mouth daily, Disp: , Rfl:     estradiol (ESTRACE) 0.1 MG/GM vaginal cream, Place vaginally twice a week, Disp: , Rfl:      famotidine (PEPCID) 20 MG tablet, Take 20 mg by mouth 2 times daily, Disp: , Rfl:     ferrous gluconate (FERGON) 324 (38 Fe) MG tablet, Take 324 mg by mouth daily (with breakfast), Disp: , Rfl:     fluticasone (FLONASE) 50 MCG/ACT nasal spray, Spray 2 sprays into both nostrils daily as needed for rhinitis or allergies, Disp: , Rfl:     gabapentin (NEURONTIN) 300 MG capsule, Take 600 mg by mouth 3 times daily, Disp: , Rfl:     hydrocortisone 2.5 % ointment, Apply topically 2 times daily as needed for other (Eczema), Disp: , Rfl:     ibuprofen (ADVIL/MOTRIN) 200 MG tablet, Take 600 mg by mouth 2 times daily as needed for pain, Disp: , Rfl:     ipratropium - albuterol 0.5 mg/2.5 mg/3 mL (DUONEB) 0.5-2.5 (3) MG/3ML neb solution, Take 1 vial by nebulization 3 times daily as needed for shortness of breath, wheezing or cough, Disp: , Rfl:     JARDIANCE 10 MG TABS tablet, Take 10 mg by mouth daily, Disp: , Rfl:     loperamide (IMODIUM) 2 mg capsule, Take 2 mg by mouth 4 times daily as needed for diarrhea, Disp: , Rfl:     losartan (COZAAR) 25 MG tablet, Take 1 tablet (25 mg) by mouth daily, Disp: , Rfl:     Magnesium Oxide -Mg Supplement 500 MG TABS, Take 1 tablet by mouth daily, Disp: , Rfl:     montelukast (SINGULAIR) 10 MG tablet, Take 1 tablet by mouth at bedtime, Disp: , Rfl:     Multiple Vitamins-Minerals (MULTIVITAMIN WOMEN 50+) TABS, Take 1 tablet by mouth daily, Disp: , Rfl:     omeprazole (PRILOSEC) 20 MG capsule, Take 1 capsule by mouth daily, Disp: , Rfl:     oxyCODONE (ROXICODONE) 5 MG tablet, Take 1 tablet (5 mg) by mouth every 6 hours as needed for severe pain T, Disp: 10 tablet, Rfl: 0    OZEMPIC, 0.25 OR 0.5 MG/DOSE, 2 MG/3ML pen, Inject 0.5 mg Subcutaneous every 7 days Tuesdays, Disp: , Rfl:     polyethylene glycol (MIRALAX) 17 GM/Dose powder, Take 17 g (1 Capful) by mouth daily, Disp: 225 g, Rfl: 0    polyethylene glycol-propylene glycol (SYSTANE ULTRA) 0.4-0.3 % SOLN ophthalmic solution, Place 1 drop  into both eyes 4 times daily as needed for dry eyes, Disp: , Rfl:     propranolol ER (INDERAL LA) 120 MG 24 hr capsule, Take 1 capsule (120 mg) by mouth daily, Disp: , Rfl:     sertraline (ZOLOFT) 100 MG tablet, Take 100 mg by mouth daily, Disp: , Rfl:     TYRVAYA 0.03 MG/ACT nasal spray, Spray 1 spray into both nostrils 2 times daily, Disp: , Rfl:     ALLERGIES:  Allergies   Allergen Reactions    Aspirin Hives    Diflunisal Nausea and Vomiting     (Dolobid) Occurred approximately 20 years ago    Occurred approximately 20 years ago   Occurred approximately 20 years ago   (Dolobid) Occurred approximately 20 years ago    Fd&C Yellow #5 (Tartrazine) Nausea and Vomiting and Hives       FAMILY HISTORY:  Family History   Problem Relation Age of Onset    Heart Failure Mother     Heart Failure Father     Heart Failure Sister     Coronary Artery Disease Brother     Coronary Artery Disease Brother     Coronary Artery Disease Brother     Cerebrovascular Disease Sister        SOCIAL HISTORY:   Social History     Socioeconomic History    Marital status: Single   Tobacco Use    Smoking status: Never    Smokeless tobacco: Never   Substance and Sexual Activity    Alcohol use: Yes     Comment: Alcoholic Drinks/day: Occasional usage    Drug use: No     Social Determinants of Health     Financial Resource Strain: At Risk (3/21/2024)    [facility]    Financial Resource Strain     Is it hard for you to pay for the very basics like food, housing, medical care or heating?: Yes   Food Insecurity: Not At Risk (3/21/2024)    [facility]    Food Insecurity     Does your food run out before you have the money to buy more?: No   Transportation Needs: Not At Risk (3/21/2024)    [facility]    Transportation Needs     Does a lack of transportation keep you from your medical appointments or from getting your medications?: No   Interpersonal Safety:  "Unknown (2/21/2024)    [facility]    Humiliation, Afraid, Rape, and Kick questionnaire     Physically Abused: No     Sexually Abused: No     PHYSICAL EXAM:    Vitals: /54   Pulse 76   Temp 97.5  F (36.4  C)   Resp 20   Ht 1.645 m (5' 4.75\")   Wt 61.7 kg (136 lb)   SpO2 99%   BMI 22.81 kg/m     Constitutional: Well developed, well nourished. No acute distress.  HENT: Normocephalic, atraumatic, mucous membranes moist. Neck-gross ROM intact.   Eyes: Pupils mid-range, sclera white, no discharge  Respiratory: CTAB, no respiratory distress, no wheezing, speaks full sentences easily.  Cardiovascular: Normal heart rate, regular rhythm  GI: Soft, not distended, not tender to palpation, no palpable masses  Musculoskeletal: Moving all 4 extremities intentionally and without pain. No obvious deformity.  Skin: Warm, dry, no rash.  Neurologic: Alert & oriented x 3, speech clear, moving all extremities spontaneously   Psychiatric: Affect normal, cooperative.     LAB:  All pertinent labs reviewed and interpreted.  Labs Ordered and Resulted from Time of ED Arrival to Time of ED Departure   BASIC METABOLIC PANEL - Abnormal       Result Value    Sodium 142      Potassium 3.2 (*)     Chloride 103      Carbon Dioxide (CO2) 29      Anion Gap 10      Urea Nitrogen 4.9 (*)     Creatinine 0.47 (*)     GFR Estimate >90      Calcium 9.6      Glucose 96     HEPATIC FUNCTION PANEL - Abnormal    Protein Total 7.1      Albumin 3.7      Bilirubin Total 0.7      Alkaline Phosphatase 144      AST 29       (*)     Bilirubin Direct 0.31 (*)    INR - Normal    INR 0.99     LIPASE - Normal    Lipase 42     MAGNESIUM - Normal    Magnesium 2.0     CBC WITH PLATELETS AND DIFFERENTIAL    WBC Count 7.2      RBC Count 3.91      Hemoglobin 11.9      Hematocrit 36.6      MCV 94      MCH 30.4      MCHC 32.5      RDW 13.5      Platelet Count 247      % Neutrophils 52      % Lymphocytes 33      % Monocytes 9   "    % Eosinophils 5      % Basophils 1      % Immature Granulocytes 0      NRBCs per 100 WBC 0      Absolute Neutrophils 3.8      Absolute Lymphocytes 2.4      Absolute Monocytes 0.6      Absolute Eosinophils 0.3      Absolute Basophils 0.0      Absolute Immature Granulocytes 0.0      Absolute NRBCs 0.0     TYPE AND SCREEN, ADULT    ABO/RH(D) O POS      Antibody Screen Negative      SPECIMEN EXPIRATION DATE [id number]     ABO/RH TYPE AND SCREEN       RADIOLOGY:  CTA Abdomen Pelvis with Contrast   Final Result   IMPRESSION:   1.  Interval cholecystectomy and biliary stent placement. No active extravasation.   2.  Mild wall thickening of the distal stomach and second portion the duodenum with mucosal hyperenhancement and surrounding fat stranding. In addition, there is mild fat stranding around the head and uncinate process of pancreas. The findings are likely    related to reactive changes from recent operative intervention. Duodenitis and/or pancreatitis remain in the differential in the appropriate clinical context.   3.  No drainable collection.   4.  Trace free fluid in the pelvis and small amount of free air seen throughout the abdomen and pelvis are likely related to recent operative intervention.   5.  The liver is mildly dysmorphic suggestive of fibrosis and/or cirrhosis.        I, Abram Wilkins, am serving as a scribe to document services personally performed by Dr. Paris Dubois based on my observation and the provider's statements to me. I, Paris Dubois MD attest that Abram Wilkins is acting in a scribe capacity, has observed my performance of the services and has documented them in accordance with my direction.    Paris Dubois M.D.  Emergency Medicine  [facility] EMERGENCY DEPARTMENT  [address] [place] [id number]  [phone]  Dept: [phone]     Paris Dubois MD  04/19/24 2043

## 2024-04-20 VITALS
TEMPERATURE: 97.8 F | HEIGHT: 65 IN | DIASTOLIC BLOOD PRESSURE: 65 MMHG | RESPIRATION RATE: 20 BRPM | BODY MASS INDEX: 22.66 KG/M2 | OXYGEN SATURATION: 100 % | WEIGHT: 136 LBS | HEART RATE: 71 BPM | SYSTOLIC BLOOD PRESSURE: 131 MMHG

## 2024-04-20 LAB
GLUCOSE BLDC GLUCOMTR-MCNC: 116 MG/DL (ref 70–99)
GLUCOSE BLDC GLUCOMTR-MCNC: 86 MG/DL (ref 70–99)
GLUCOSE BLDC GLUCOMTR-MCNC: 87 MG/DL (ref 70–99)
GLUCOSE BLDC GLUCOMTR-MCNC: 92 MG/DL (ref 70–99)
HGB BLD-MCNC: 10.7 G/DL (ref 11.7–15.7)
HGB BLD-MCNC: 11.1 G/DL (ref 11.7–15.7)

## 2024-04-20 PROCEDURE — 85018 HEMOGLOBIN: CPT | Mod: 91 | Performed by: HOSPITALIST

## 2024-04-20 PROCEDURE — 99207 PR APP CREDIT; MD BILLING SHARED VISIT: CPT | Performed by: STUDENT IN AN ORGANIZED HEALTH CARE EDUCATION/TRAINING PROGRAM

## 2024-04-20 PROCEDURE — 99239 HOSP IP/OBS DSCHRG MGMT >30: CPT | Mod: FS

## 2024-04-20 PROCEDURE — 96361 HYDRATE IV INFUSION ADD-ON: CPT

## 2024-04-20 PROCEDURE — 36415 COLL VENOUS BLD VENIPUNCTURE: CPT | Performed by: HOSPITALIST

## 2024-04-20 PROCEDURE — 85018 HEMOGLOBIN: CPT | Performed by: HOSPITALIST

## 2024-04-20 PROCEDURE — 82962 GLUCOSE BLOOD TEST: CPT

## 2024-04-20 PROCEDURE — 250N000011 HC RX IP 250 OP 636: Performed by: STUDENT IN AN ORGANIZED HEALTH CARE EDUCATION/TRAINING PROGRAM

## 2024-04-20 PROCEDURE — 250N000011 HC RX IP 250 OP 636: Performed by: HOSPITALIST

## 2024-04-20 PROCEDURE — 250N000013 HC RX MED GY IP 250 OP 250 PS 637: Performed by: HOSPITALIST

## 2024-04-20 PROCEDURE — G0378 HOSPITAL OBSERVATION PER HR: HCPCS

## 2024-04-20 PROCEDURE — 87086 URINE CULTURE/COLONY COUNT: CPT

## 2024-04-20 RX ORDER — SODIUM CHLORIDE AND POTASSIUM CHLORIDE 150; 900 MG/100ML; MG/100ML
INJECTION, SOLUTION INTRAVENOUS CONTINUOUS
Status: DISCONTINUED | OUTPATIENT
Start: 2024-04-20 | End: 2024-04-20

## 2024-04-20 RX ORDER — PANTOPRAZOLE SODIUM 40 MG/1
40 TABLET, DELAYED RELEASE ORAL
Status: DISCONTINUED | OUTPATIENT
Start: 2024-04-20 | End: 2024-04-20 | Stop reason: HOSPADM

## 2024-04-20 RX ADMIN — CETIRIZINE HYDROCHLORIDE 10 MG: 10 TABLET, FILM COATED ORAL at 08:23

## 2024-04-20 RX ADMIN — GABAPENTIN 600 MG: 300 CAPSULE ORAL at 08:23

## 2024-04-20 RX ADMIN — FLUTICASONE FUROATE AND VILANTEROL TRIFENATATE 1 PUFF: 200; 25 POWDER RESPIRATORY (INHALATION) at 08:36

## 2024-04-20 RX ADMIN — POTASSIUM CHLORIDE AND SODIUM CHLORIDE: 900; 150 INJECTION, SOLUTION INTRAVENOUS at 01:45

## 2024-04-20 RX ADMIN — POTASSIUM CHLORIDE AND SODIUM CHLORIDE: 900; 150 INJECTION, SOLUTION INTRAVENOUS at 10:12

## 2024-04-20 RX ADMIN — PROPRANOLOL HYDROCHLORIDE 120 MG: 60 CAPSULE, EXTENDED RELEASE ORAL at 08:36

## 2024-04-20 RX ADMIN — PANTOPRAZOLE SODIUM 40 MG: 40 TABLET, DELAYED RELEASE ORAL at 08:23

## 2024-04-20 RX ADMIN — SERTRALINE HYDROCHLORIDE 100 MG: 100 TABLET ORAL at 08:23

## 2024-04-20 RX ADMIN — LOSARTAN POTASSIUM 25 MG: 25 TABLET, FILM COATED ORAL at 08:23

## 2024-04-20 RX ADMIN — OXYCODONE HYDROCHLORIDE 5 MG: 5 TABLET ORAL at 08:23

## 2024-04-20 RX ADMIN — MAGNESIUM OXIDE TAB 400 MG (241.3 MG ELEMENTAL MG) 400 MG: 400 (241.3 MG) TAB at 08:23

## 2024-04-20 RX ADMIN — FERROUS GLUCONATE 324 MG: 324 TABLET ORAL at 08:36

## 2024-04-20 RX ADMIN — ACETAMINOPHEN 650 MG: 325 TABLET ORAL at 08:23

## 2024-04-20 ASSESSMENT — ACTIVITIES OF DAILY LIVING (ADL)
ADLS_ACUITY_SCORE: 38
ADLS_ACUITY_SCORE: 28
DEPENDENT_IADLS:: INDEPENDENT
ADLS_ACUITY_SCORE: 28
ADLS_ACUITY_SCORE: 38
ADLS_ACUITY_SCORE: 28
ADLS_ACUITY_SCORE: 26
ADLS_ACUITY_SCORE: 28
ADLS_ACUITY_SCORE: 38
ADLS_ACUITY_SCORE: 38

## 2024-04-20 NOTE — CONSULTS
Care Management Initial Consult    General Information  Assessment completed with: Patient, pt  Type of CM/SW Visit: Initial Assessment    Primary Care Provider verified and updated as needed: Yes   Readmission within the last 30 days: previous discharge plan unsuccessful   Return Category: Post-op/Post-procedure complication  Reason for Consult: discharge planning, length of stay  Advance Care Planning: Advance Care Planning Reviewed: verified with patient, no concerns identified     General Information Comments: lives w/SO and due to start with [facility] , has not been able to be home long enough to start    Communication Assessment  Patient's communication style: spoken language (English or Bilingual)    Hearing Difficulty or Deaf: no   Wear Glasses or Blind: yes    Cognitive  Cognitive/Neuro/Behavioral: WDL                      Living Environment:   People in home: significant other     Current living Arrangements: apartment      Able to return to prior arrangements: yes       Family/Social Support:  Care provided by: self  Provides care for: no one, unable/limited ability to care for self  Marital Status: Lives with Significant Other  Significant Other          Description of Support System: Supportive, Involved    Support Assessment: Adequate social supports, Adequate family and caregiver support    Current Resources:   Patient receiving home care services: No     Community Resources: None  Equipment currently used at home: none  Supplies currently used at home: None    Employment/Financial:  Employment Status:          Financial Concerns: none   Referral to Financial Worker: No       Does the patient's insurance plan have a 3 day qualifying hospital stay waiver?  No    Lifestyle & Psychosocial Needs:  Social Determinants of Health     Food Insecurity: Not At Risk (3/21/2024)    Received from [employer]    Food Insecurity     Does your food run out before you have the money to buy  more?: No   Depression: Not at risk (1/4/2024)    Received from [facility]    PHQ-2     PHQ-2 Score: 1   Housing Stability: Not on file   Tobacco Use: Low Risk  (4/14/2024)    Patient History     Smoking Tobacco Use: Never     Smokeless Tobacco Use: Never     Passive Exposure: Not on file   Financial Resource Strain: At Risk (3/21/2024)    Received from [facility]    Financial Resource Strain     Is it hard for you to pay for the very basics like food, housing, medical care or heating?: Yes   Alcohol Use: Unknown (1/6/2021)    Received from [facility]    AUDIT-C     Frequency of Alcohol Consumption: Not on file     Average Number of Drinks: 1 or 2     Frequency of Binge Drinking: Not on file   Transportation Needs: Not At Risk (3/21/2024)    Received from [facility]    Transportation Needs     Does a lack of transportation keep you from your medical appointments or from getting your medications?: No   Physical Activity: Not on file   Interpersonal Safety: Unknown (2/21/2024)    Received from [facility]    Humiliation, Afraid, Rape, and Kick questionnaire     Fear of Current or Ex-Partner: Not on file     Emotionally Abused: Not on file     Physically Abused: No     Sexually Abused: No   Stress: Not on file   Social Connections: Not on file   Health Literacy: Not on file       Functional Status:  Prior to admission patient needed assistance:   Dependent ADLs:: Independent  Dependent IADLs:: Independent  Assesssment of Functional Status: Not at  functional baseline    Mental Health Status:  Mental Health Status: No Current Concerns       Chemical Dependency Status:                Values/Beliefs:  Spiritual, Cultural Beliefs, Taoism Practices, Values that affect care:                 Additional Information:  Assessed, discussed WILDE, recent admit and will need to SOC w/[facility] at discharge, lives w/SO and  independent at baseline. CM to follow for discharge needs pending GI Consult.    Recently had [facility] 4/14 and discharged 4/17, returned to ER 4/18 and discharged home due to start with [facility]. Pt has then returned 4/19 and has yet to start w/HC.     Lars Ritter RN

## 2024-04-20 NOTE — H&P
[facility]    History and Physical - Hospitalist Service       Date of Admission:  4/19/2024    Assessment & Plan      Odette Escobar is a 68 year old female admitted on 4/19/2024. She was sent to the ED from primary clinic for evaluation of melena    #Melena  -CTA showed no active extravasation  -Hemoglobin is stable, recheck overnight  -Probable related to recent hematemesis versus sphincterotomy versus Pepto-Bismol, however history of cirrhosis with portal hypertension at risk for varices  -Hold PTA celecoxib  -Blood transfusion consent signed  -GI consult    #Alcohol-related cirrhosis  #Portal hypertension  #Abnormal LFTs  -LFTs are stable and improving  -Self-reported alcohol abstinence since 6/1/2024  -Avoid hepatotoxins    #Type 2 diabetes mellitus with neuropathy without long-term insulin use  -Hold PTA Jardiance and Ozempic until diet is resumed  -Low insulin resistance scale    #Epigastric abdominal pain  -CTA showed mild wall thickening of the distal stomach and second portion of the duodenum with mucosal hyperenhancement and surrounding fat stranding; mild fat stranding around the head and uncinate process of the pancreas  -Lipase is not elevated    #Mild persistent asthma  #Chronic rhinitis  -No bronchospasms or signs of acute exacerbation  -Continue PTA albuterol HFA, Symbicort, cetirizine, fluticasone, DuoNebs, montelukast    #Essential hypertension  -Continue PTA losartan    #Fibromyalgia  #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  Diet:  NPO per Anesthesia Guidelines for Procedure/Surgery Except for: Meds    DVT Prophylaxis: Pneumatic Compression Devices  [place] Catheter: Not present  Lines: None     Cardiac Monitoring: None  Code Status: Full Code          Disposition Plan     Medically Ready for Discharge: Anticipated Tomorrow           Luis Alberto Herman MD  Hospitalist Service  [facility]  Securely message with [employer] (more info)  Text page via [employer] Paging/Directory     ______________________________________________________________________    Chief Complaint   Melena, abdominal discomfort    History is obtained from the patient, electronic health record, and emergency department physician    History of Present Illness   Odette Escobar is a 68 year old female who was sent to the ED from the primary clinic for evaluation of melena.  Past medical history of alcohol liver disease, type 2 diabetes, asthma, fibromyalgia, hypertension, CASTRO, vertigo, migraine, GERD, adjustment disorder, anxiety, tremors, hyperlipidemia, lumbar radiculopathy.  Patient was admitted here from 4/14 through 4/17/2024 due to hematemesis, acute cholecystitis with choledocholithiasis.  She had a laparoscopic cholecystectomy on 4/14/2024 and subsequently needed an ERCP with sphincterotomy and stent placement on 4/16/2024.  Patient came back to the ED on 4/18/2024 due to increased abdominal pain and unable to pass gas.  Her symptoms improved and she declined further workup with a CT abdomen.  Her first bowel movement since after surgery was on 4/19/2024.  She had taken a Pepto-Bismol tablet earlier.  She denies use of aspirin (allergic) or ibuprofen.  She was never smoker, quit alcohol in 6/1/2023 and denies illicit drugs.  Patient noted some tarry stool and upper abdominal discomfort.  She denied fevers, chills, nausea or vomiting.      Past Medical History    Past Medical History:   Diagnosis Date    Alcoholic cirrhosis of liver (H)     Asthma     DM2  (diabetes mellitus, type 2) (H)     Fibromyalgia     History of skin cancer     Hypertension     Migraine     Motion sickness     CASTRO on CPAP     Vertigo        Past Surgical History   Past Surgical History:   Procedure Laterality Date    ENDOMETRIAL ABLATION      ENDOSCOPIC RETROGRADE CHOLANGIOPANCREATOGRAM N/A 4/16/2024    Procedure: ENDOSCOPIC RETROGRADE CHOLANGIOPANCEATOGRAPHY, WITH SPHINCTEROTOMY;  Surgeon: Eduin Do MD;  Location: [facility] [place]    ENDOSCOPIC RETROGRADE CHOLANGIOPANCREATOGRAM N/A 4/16/2024    Procedure: STONE EXTRACTION,;  Surgeon: Eduin Do MD;  Location: [facility] [place]    ENDOSCOPIC RETROGRADE CHOLANGIOPANCREATOGRAM N/A 4/16/2024    Procedure: BILIARY STENT PLACEMENT;  Surgeon: Eduin Do MD;  Location: [facility] [place]    ESOPHAGOSCOPY, GASTROSCOPY, DUODENOSCOPY (EGD), COMBINED N/A 4/16/2024    Procedure: ESOPHAGOGASTRODUODENOSCOPY, WITH ENDOSCOPIC ULTRASOUND GUIDANCE;  Surgeon: Eduin Do MD;  Location: [facility] [place]    HERNIORRHAPHY, UMBILICAL, ROBOT-ASSISTED, LAPAROSCOPIC, USING DA ROCIO XI N/A 04/14/2024    Procedure: PRIMARY REPAIR OF  UMBILICAL HERNIA;  Surgeon: Christiano Cruz DO;  Location: [facility] [place]    HYSTERECTOMY      LAPAROSCOPIC CHOLECYSTECTOMY N/A 04/14/2024    Procedure: CHOLECYSTECTOMY, ROBOT-ASSISTED, LAPAROSCOPIC, USING DA ROCIO XI;  Surgeon: Christiano Cruz DO;  Location: [facility] [place]    TONSILLECTOMY         Prior to Admission Medications   Prior to Admission Medications   Prescriptions Last Dose Informant Patient Reported? Taking?   BELSOMRA 10 MG tablet 4/18/2024 at prn  Yes Yes   Sig: Take 1 tablet by mouth nightly as needed for sleep   Baclofen (LIORESAL) 5 MG tablet 4/18/2024 at pm  Yes Yes   Sig: Take 5-10 mg by mouth 2 times daily as needed for muscle spasms or other (or sleep)   JARDIANCE 10 MG TABS tablet 4/18/2024 at pm  Yes Yes   Sig: Take 10 mg by mouth daily   Magnesium Oxide -Mg Supplement 500 MG TABS 4/18/2024  at am  Yes Yes   Sig: Take 1 tablet by mouth daily   Multiple Vitamins-Minerals (MULTIVITAMIN WOMEN 50+) TABS 4/18/2024 at am  Yes Yes   Sig: Take 1 tablet by mouth daily   OZEMPIC, 0.25 OR 0.5 MG/DOSE, 2 MG/3ML pen Past Week at past 2 weeks per pt  Yes Yes   Sig: Inject 0.5 mg Subcutaneous every 7 days Tuesdays   TYRVAYA 0.03 MG/ACT nasal spray 4/18/2024 at pm  Yes Yes   Sig: Spray 1 spray into both nostrils 2 times daily   acetaminophen (TYLENOL) 500 MG tablet Past Week at prn  Yes Yes   Sig: Take 1,000 mg by mouth daily as needed for pain   albuterol (PROAIR HFA/PROVENTIL HFA/VENTOLIN HFA) 108 (90 Base) MCG/ACT inhaler Past Week at prn  Yes Yes   Sig: Inhale 2 puffs into the lungs every 6 hours as needed for shortness of breath, wheezing or cough   amitriptyline (ELAVIL) 10 MG tablet 4/18/2024 at pm  Yes Yes   Sig: Take 30 mg by mouth at bedtime   ammonium lactate (AMLACTIN) 12 % external cream Unknown at prn  Yes Yes   Sig: Apply topically daily as needed for dry skin   atorvastatin (LIPITOR) 80 MG tablet 4/18/2024 at am  Yes Yes   Sig: Take 80 mg by mouth daily   bacitracin 500 UNIT/GM external ointment 4/18/2024 at pm  Yes Yes   Sig: Apply topically 2 times daily   benzonatate (TESSALON) 100 MG capsule Past Week at prn  Yes Yes   Sig: Take 200 mg by mouth 3 times daily as needed for cough   budesonide-formoterol (SYMBICORT) 160-4.5 MCG/ACT Inhaler 4/19/2024 at am  Yes Yes   Sig: Inhale 2 puffs into the lungs two times daily Rinse mouth/gargle after use   celecoxib (CELEBREX) 200 MG capsule 4/18/2024 at am  Yes Yes   Sig: Take 200 mg by mouth daily   cetirizine (ZYRTEC) 10 MG tablet 4/18/2024 at am  Yes Yes   Sig: Take 10 mg by mouth daily   cholecalciferol (VITAMIN D3) 50 MCG (2000 UT) CAPS 4/18/2024 at am  Yes Yes   Sig: Take 6,000 Units by mouth daily   estradiol (ESTRACE) 0.1 MG/GM vaginal cream Past Week  Yes Yes   Sig: Place vaginally twice a week   famotidine (PEPCID) 20 MG tablet 4/18/2024 at pm  Yes  Yes   Sig: Take 20 mg by mouth 2 times daily   ferrous gluconate (FERGON) 324 (38 Fe) MG tablet 4/18/2024 at am  Yes Yes   Sig: Take 324 mg by mouth daily (with breakfast)   fluticasone (FLONASE) 50 MCG/ACT nasal spray Past Week at prn  Yes Yes   Sig: Spray 2 sprays into both nostrils daily as needed for rhinitis or allergies   gabapentin (NEURONTIN) 300 MG capsule 4/18/2024 at pm  Yes Yes   Sig: Take 600 mg by mouth 3 times daily   hydrocortisone 2.5 % ointment 4/18/2024 at pm  Yes Yes   Sig: Apply topically 2 times daily as needed for other (Eczema)   ibuprofen (ADVIL/MOTRIN) 200 MG tablet Past Week at prn  Yes Yes   Sig: Take 600 mg by mouth 2 times daily as needed for pain   ipratropium - albuterol 0.5 mg/2.5 mg/3 mL (DUONEB) 0.5-2.5 (3) MG/3ML neb solution Past Week  Yes Yes   Sig: Take 1 vial by nebulization 3 times daily as needed for shortness of breath, wheezing or cough   loperamide (IMODIUM) 2 mg capsule Past Month at prn  Yes Yes   Sig: Take 2 mg by mouth 4 times daily as needed for diarrhea   losartan (COZAAR) 25 MG tablet 4/18/2024 at am  No Yes   Sig: Take 1 tablet (25 mg) by mouth daily   montelukast (SINGULAIR) 10 MG tablet 4/18/2024 at pm  Yes Yes   Sig: Take 1 tablet by mouth at bedtime   omeprazole (PRILOSEC) 20 MG capsule 4/18/2024 at am  Yes Yes   Sig: Take 1 capsule by mouth daily   oxyCODONE (ROXICODONE) 5 MG tablet 4/19/2024 at pm  No Yes   Sig: Take 1 tablet (5 mg) by mouth every 6 hours as needed for severe pain T   polyethylene glycol (MIRALAX) 17 GM/Dose powder 4/18/2024 at am  No Yes   Sig: Take 17 g (1 Capful) by mouth daily   polyethylene glycol-propylene glycol (SYSTANE ULTRA) 0.4-0.3 % SOLN ophthalmic solution 4/19/2024 at am  Yes Yes   Sig: Place 1 drop into both eyes 4 times daily as needed for dry eyes   propranolol ER (INDERAL LA) 120 MG 24 hr capsule 4/18/2024 at am  No Yes   Sig: Take 1 capsule (120 mg) by mouth daily   sertraline (ZOLOFT) 100 MG tablet 4/18/2024 at am  Yes  Yes   Sig: Take 100 mg by mouth daily      Facility-Administered Medications: None           Physical Exam   Vital Signs: Temp: 97.5  F (36.4  C)   BP: 116/54 Pulse: 76   Resp: 20 SpO2: 99 %      Weight: 136 lbs 0 oz    Constitutional: no apparent distress  Respiratory: no increased work of breathing and clear to auscultation  Cardiovascular: regular rate and rhythm  GI: normal bowel sounds and tenderness noted in the epigastric region and in the right upper quadrant Fontanez's sign is absent  Skin: no bruising or bleeding  Musculoskeletal: no lower extremity pitting edema present  Neurologic: Mental Status Exam:  Level of Alertness:   awake  Orientation:   person, place, time    Medical Decision Making       MANAGEMENT DISCUSSED with the following over the past 24 hours: Patient       Data     I have personally reviewed the following data over the past 24 hrs:    7.2  \   11.9   / 247     142 103 4.9 (L) /  96   3.2 (L) 29 0.47 (L) \     ALT: 107 (H) AST: 29 AP: 144 TBILI: 0.7   ALB: 3.7 TOT PROTEIN: 7.1 LIPASE: 42     INR:  0.99 PTT:  N/A   D-dimer:  N/A Fibrinogen:  N/A       Imaging results reviewed over the past 24 hrs:   Recent Results (from the past 24 hour(s))   CTA Abdomen Pelvis with Contrast    Narrative    EXAM: CTA ABDOMEN PELVIS WITH CONTRAST  LOCATION: [facility]  DATE: 4/19/2024    INDICATION: recent cholecysteomy and ERCP. H o cirrhosis. Now with bloody stools melena and worsening abdominal pain  COMPARISON: CT chest and abdomen, pelvis from 04/14/2024, MRI abdomen from 04/14/2024  TECHNIQUE: CT angiogram abdomen pelvis during arterial phase of injection of IV contrast. 2D and 3D MIP reconstructions were performed by the CT technologist. Dose reduction techniques were used.  CONTRAST: 90 mL Isovue 370    FINDINGS:  ANGIOGRAM ABDOMEN/PELVIS: The aorta is normal in caliber. Mild atherosclerosis without high-grade stenosis. No active extravasation or pseudoaneurysm. There are a  few paraesophageal varices.    LOWER CHEST: Patchy bibasilar atelectasis. Tiny hiatal hernia.    HEPATOBILIARY: Interval cholecystectomy. New biliary stent. Expected small volume pneumobilia. No pathologic biliary ductal dilation. No gallbladder fossa fluid collection. The liver is mildly dysmorphic.    PANCREAS: Minimal fat stranding and edema around the head and uncinate process of the pancreas. No pancreatic ductal dilation.    SPLEEN: Normal.    ADRENAL GLANDS: Normal.    KIDNEYS/BLADDER: No significant mass, stone, or hydronephrosis.    BOWEL: Diverticulosis. There is mild wall thickening of the distal stomach and second portion of the duodenum with mucosal hyperenhancement and surrounding fat stranding. Small amount of free air seen throughout the abdomen and pelvis is within normal   limits given recent operative intervention. Trace free fluid in the pelvis. No drainable collection. Tiny periampullary duodenal diverticulum.    LYMPH NODES: Normal.    PELVIC ORGANS: Hysterectomy.    MUSCULOSKELETAL: Degenerative changes of the spine and bilateral hips.      Impression    IMPRESSION:  1.  Interval cholecystectomy and biliary stent placement. No active extravasation.  2.  Mild wall thickening of the distal stomach and second portion the duodenum with mucosal hyperenhancement and surrounding fat stranding. In addition, there is mild fat stranding around the head and uncinate process of pancreas. The findings are likely   related to reactive changes from recent operative intervention. Duodenitis and/or pancreatitis remain in the differential in the appropriate clinical context.  3.  No drainable collection.  4.  Trace free fluid in the pelvis and small amount of free air seen throughout the abdomen and pelvis are likely related to recent operative intervention.  5.  The liver is mildly dysmorphic suggestive of fibrosis and/or cirrhosis.

## 2024-04-20 NOTE — PROGRESS NOTES
[facility] DISCHARGE NOTE    Patient discharged to home at 2:08 PM via wheelchair. Accompanied by significant other and staff. Discharge instructions reviewed with patient, opportunity offered to ask questions. Prescriptions filled and sent with patient upon discharge. All belongings sent with patient.    Ann Ferguson RN

## 2024-04-20 NOTE — DISCHARGE SUMMARY
[facility]  Hospitalist Discharge Summary      Date of Admission:  4/19/2024  Date of Discharge:  4/20/2024  Discharging Provider: Humaira Davidson NP  Discharge Service: Hospitalist Service    Discharge Diagnoses   Abdominal pain  Elevated liver function tests    Clinically Significant Risk Factors          Follow-ups Needed After Discharge   Follow-up Appointments     Follow-up and recommended labs and tests       Follow up with primary care provider, Allie Johnson, within 7 days for   hospital follow- up.  The following labs/tests are recommended: Follow-up   in 5 to 7 days for recheck of CMP and HGB with primary care doctor.    Primary care provider to restart Lipitor when appropriate  Follow up with GI outpatient in 1 week.          Discharge Disposition   Discharged to home  Condition at discharge: Stable    Hospital Course      Odette Escobar is a 68 year old female admitted on 4/19/2024. She was sent to the ED from primary clinic for evaluation of melena. Patient states she has had abdominal pain intermittently since surgery 4 days post op lap denia. Patient states she has passed this morning and abdominal pain has improved.No episodes of blood in stool since she was admitted yesterday. Patient states nausea has improved and she was able to eat a regular diet for lunch without difficulty. Patient states she feels well and would like to go home. Patient and family agree with plan to discharge today and follow up with GI and primary care doctor outpatient.    #Melena  -CTA showed no active extravasation  -Hemoglobin is stable  -Probable related to recent hematemesis versus sphincterotomy versus Pepto-Bismol, however history of cirrhosis with portal hypertension at risk for varices  -Hold PTA celecoxib  -Blood transfusion consent signed  -GI consult:Melena, Likely low-grade bleeding from sphincterotomy site which is expected.  Advised her to take omeprazole twice daily at home (she takes  1 daily typically).Avoid NSAIDs  No plan for endoscopy. Abdominal pain-Benign exam, subjective pain expected after her surgical and endoscopic interventions.  Imaging is reassuring. Okay to advance diet and discharge home from a GI perspective today.She follows with [facility] and says she will see them as well as her primary care provider in the upcoming weeks     #Alcohol-related cirrhosis  #Portal hypertension  #Abnormal LFTs  -LFTs are stable and improving  -Avoid hepatotoxins    #Type 2 diabetes mellitus with neuropathy without long-term insulin use  -Hold PTA Jardiance and Ozempic until diet is resumed  -Low insulin resistance scale    #Epigastric abdominal pain  -CTA showed mild wall thickening of the distal stomach and second portion of the duodenum with mucosal hyperenhancement and surrounding fat stranding; mild fat stranding around the head and uncinate process of the pancreas  -Lipase is not elevated      Consultations This Hospital Stay   GASTROENTEROLOGY IP CONSULT  CARE MANAGEMENT / SOCIAL WORK IP CONSULT    Code Status   Full Code    Time Spent on this Encounter   IHumaira NP, personally saw the patient today and spent greater than 30 minutes discharging this patient.       Humaira Davidson NP  [facility] EXTENDED RECOVERY AND SHORT STAY  [address] [place], [place] [id number]  Phone: [phone]  Fax: [phone]  ______________________________________________________________________    Physical Exam   Vital Signs: Temp: 97.8  F (36.6  C) Temp src: Oral BP: 131/65 Pulse: 71   Resp: 20 SpO2: 100 % O2 Device: None (Room air)    Weight: 136 lbs 0 oz  Constitutional: awake, alert, cooperative, no apparent distress, and appears stated age  Hematologic / Lymphatic: no cervical lymphadenopathy and no supraclavicular lymphadenopathy  Respiratory: No increased work of breathing, good air exchange, clear to auscultation bilaterally, no crackles or  wheezing  Cardiovascular: Normal apical impulse, regular rate and rhythm, normal S1 and S2, no S3 or S4, and no murmur noted  GI: No scars, normal bowel sounds, soft, non-distended, non-tender, no masses palpated, no hepatosplenomegally  Skin: no bruising or bleeding, normal skin color, texture, turgor, no redness, warmth, or swelling, and no rashes  Musculoskeletal: There is no redness, warmth, or swelling of the joints.  Full range of motion noted.  Motor strength is 5 out of 5 all extremities bilaterally.  Tone is normal.  Neurologic: Awake, alert, oriented to name, place and time.   Neuropsychiatric: General: normal, calm, and normal eye contact       Primary Care Physician   Allie Johnson    Discharge Orders      Reason for your hospital stay    Abdominal Pain  Elevated liver function test     Follow-up and recommended labs and tests     Follow up with primary care provider, Allie Johnson, within 7 days for hospital follow- up.  The following labs/tests are recommended: Follow-up in 5 to 7 days for recheck of CMP with primary care doctor.    Primary care provider to restart Lipitor when appropriate     Activity    Your activity upon discharge: activity as tolerated     Diet    Follow this diet upon discharge: Orders Placed This Encounter      Regular Diet Adult       Significant Results and Procedures   Most Recent 3 CBC's:  Recent Labs   Lab Test 04/20/24  0734 04/20/24  0113 04/19/24  1541 04/18/24  0940 04/17/24  0718   WBC  --   --  7.2 10.2 9.2   HGB 11.1* 10.7* 11.9 11.2* 11.4*   MCV  --   --  94 93 94   PLT  --   --  247 199 186     Most Recent 3 BMP's:  Recent Labs   Lab Test 04/20/24  0757 04/20/24  0606 04/20/24  0205 04/19/24  1541 04/18/24  0940 04/17/24  1615 04/17/24  1300 04/17/24  1118 04/17/24  0718   NA  --   --   --  142 143  --   --   --  141   POTASSIUM  --   --   --  3.2* 3.4  --  3.3*  --  3.3*   CHLORIDE  --   --   --  103 107  --   --   --  106   CO2  --   --   --  29 30*  --   --    --  28   BUN  --   --   --  4.9* 7.5*  --   --   --  10.9   CR  --   --   --  0.47* 0.51  --   --   --  0.54   ANIONGAP  --   --   --  10 6*  --   --   --  7   JOVON  --   --   --  9.6 8.6*  --   --   --  8.3*   GLC 92 86 116* 96 129*   < >  --    < > 120*    < > = values in this interval not displayed.     Most Recent 2 LFT's:  Recent Labs   Lab Test 04/19/24  1541 04/18/24  0940   AST 29 35   * 117*   ALKPHOS 144 130   BILITOTAL 0.7 0.7   ,   Results for orders placed or performed during the hospital encounter of 04/19/24   CTA Abdomen Pelvis with Contrast    Narrative    EXAM: CTA ABDOMEN PELVIS WITH CONTRAST  LOCATION: [facility]  DATE: 4/19/2024    INDICATION: recent cholecysteomy and ERCP. H o cirrhosis. Now with bloody stools melena and worsening abdominal pain  COMPARISON: CT chest and abdomen, pelvis from 04/14/2024, MRI abdomen from 04/14/2024  TECHNIQUE: CT angiogram abdomen pelvis during arterial phase of injection of IV contrast. 2D and 3D MIP reconstructions were performed by the CT technologist. Dose reduction techniques were used.  CONTRAST: 90 mL Isovue 370    FINDINGS:  ANGIOGRAM ABDOMEN/PELVIS: The aorta is normal in caliber. Mild atherosclerosis without high-grade stenosis. No active extravasation or pseudoaneurysm. There are a few paraesophageal varices.    LOWER CHEST: Patchy bibasilar atelectasis. Tiny hiatal hernia.    HEPATOBILIARY: Interval cholecystectomy. New biliary stent. Expected small volume pneumobilia. No pathologic biliary ductal dilation. No gallbladder fossa fluid collection. The liver is mildly dysmorphic.    PANCREAS: Minimal fat stranding and edema around the head and uncinate process of the pancreas. No pancreatic ductal dilation.    SPLEEN: Normal.    ADRENAL GLANDS: Normal.    KIDNEYS/BLADDER: No significant mass, stone, or hydronephrosis.    BOWEL: Diverticulosis. There is mild wall thickening of the distal stomach and second portion of the  duodenum with mucosal hyperenhancement and surrounding fat stranding. Small amount of free air seen throughout the abdomen and pelvis is within normal   limits given recent operative intervention. Trace free fluid in the pelvis. No drainable collection. Tiny periampullary duodenal diverticulum.    LYMPH NODES: Normal.    PELVIC ORGANS: Hysterectomy.    MUSCULOSKELETAL: Degenerative changes of the spine and bilateral hips.      Impression    IMPRESSION:  1.  Interval cholecystectomy and biliary stent placement. No active extravasation.  2.  Mild wall thickening of the distal stomach and second portion the duodenum with mucosal hyperenhancement and surrounding fat stranding. In addition, there is mild fat stranding around the head and uncinate process of pancreas. The findings are likely   related to reactive changes from recent operative intervention. Duodenitis and/or pancreatitis remain in the differential in the appropriate clinical context.  3.  No drainable collection.  4.  Trace free fluid in the pelvis and small amount of free air seen throughout the abdomen and pelvis are likely related to recent operative intervention.  5.  The liver is mildly dysmorphic suggestive of fibrosis and/or cirrhosis.       Discharge Medications   Current Discharge Medication List        CONTINUE these medications which have CHANGED    Details   omeprazole (PRILOSEC) 20 MG DR capsule Take 1 capsule (20 mg) by mouth 2 times daily for 30 days  Qty: 60 capsule, Refills: 0    Associated Diagnoses: Right upper quadrant abdominal pain           CONTINUE these medications which have NOT CHANGED    Details   acetaminophen (TYLENOL) 500 MG tablet Take 1,000 mg by mouth daily as needed for pain      albuterol (PROAIR HFA/PROVENTIL HFA/VENTOLIN HFA) 108 (90 Base) MCG/ACT inhaler Inhale 2 puffs into the lungs every 6 hours as needed for shortness of breath, wheezing or cough      amitriptyline (ELAVIL) 10 MG tablet Take 30 mg by mouth at  bedtime      ammonium lactate (AMLACTIN) 12 % external cream Apply topically daily as needed for dry skin      bacitracin 500 UNIT/GM external ointment Apply topically 2 times daily      Baclofen (LIORESAL) 5 MG tablet Take 5-10 mg by mouth 2 times daily as needed for muscle spasms or other (or sleep)      BELSOMRA 10 MG tablet Take 1 tablet by mouth nightly as needed for sleep      benzonatate (TESSALON) 100 MG capsule Take 200 mg by mouth 3 times daily as needed for cough      budesonide-formoterol (SYMBICORT) 160-4.5 MCG/ACT Inhaler Inhale 2 puffs into the lungs two times daily Rinse mouth/gargle after use      celecoxib (CELEBREX) 200 MG capsule Take 200 mg by mouth daily      cetirizine (ZYRTEC) 10 MG tablet Take 10 mg by mouth daily      cholecalciferol (VITAMIN D3) 50 MCG (2000 UT) CAPS Take 6,000 Units by mouth daily      estradiol (ESTRACE) 0.1 MG/GM vaginal cream Place vaginally twice a week      famotidine (PEPCID) 20 MG tablet Take 20 mg by mouth 2 times daily      ferrous gluconate (FERGON) 324 (38 Fe) MG tablet Take 324 mg by mouth daily (with breakfast)      fluticasone (FLONASE) 50 MCG/ACT nasal spray Spray 2 sprays into both nostrils daily as needed for rhinitis or allergies      gabapentin (NEURONTIN) 300 MG capsule Take 600 mg by mouth 3 times daily      hydrocortisone 2.5 % ointment Apply topically 2 times daily as needed for other (Eczema)      ibuprofen (ADVIL/MOTRIN) 200 MG tablet Take 600 mg by mouth 2 times daily as needed for pain      ipratropium - albuterol 0.5 mg/2.5 mg/3 mL (DUONEB) 0.5-2.5 (3) MG/3ML neb solution Take 1 vial by nebulization 3 times daily as needed for shortness of breath, wheezing or cough      JARDIANCE 10 MG TABS tablet Take 10 mg by mouth daily      loperamide (IMODIUM) 2 mg capsule Take 2 mg by mouth 4 times daily as needed for diarrhea      losartan (COZAAR) 25 MG tablet Take 1 tablet (25 mg) by mouth daily    Associated Diagnoses: Essential hypertension       Magnesium Oxide -Mg Supplement 500 MG TABS Take 1 tablet by mouth daily      montelukast (SINGULAIR) 10 MG tablet Take 1 tablet by mouth at bedtime      Multiple Vitamins-Minerals (MULTIVITAMIN WOMEN 50+) TABS Take 1 tablet by mouth daily      oxyCODONE (ROXICODONE) 5 MG tablet Take 1 tablet (5 mg) by mouth every 6 hours as needed for severe pain T  Qty: 10 tablet, Refills: 0    Associated Diagnoses: Acute cholecystitis      OZEMPIC, 0.25 OR 0.5 MG/DOSE, 2 MG/3ML pen Inject 0.5 mg Subcutaneous every 7 days Tuesdays      polyethylene glycol (MIRALAX) 17 GM/Dose powder Take 17 g (1 Capful) by mouth daily  Qty: 225 g, Refills: 0      polyethylene glycol-propylene glycol (SYSTANE ULTRA) 0.4-0.3 % SOLN ophthalmic solution Place 1 drop into both eyes 4 times daily as needed for dry eyes      propranolol ER (INDERAL LA) 120 MG 24 hr capsule Take 1 capsule (120 mg) by mouth daily    Associated Diagnoses: Essential hypertension      sertraline (ZOLOFT) 100 MG tablet Take 100 mg by mouth daily      TYRVAYA 0.03 MG/ACT nasal spray Spray 1 spray into both nostrils 2 times daily           STOP taking these medications       atorvastatin (LIPITOR) 80 MG tablet Comments:   Reason for Stopping:             Allergies   Allergies   Allergen Reactions    Aspirin Hives    Diflunisal Nausea and Vomiting     (Dolobid) Occurred approximately 20 years ago    Occurred approximately 20 years ago   Occurred approximately 20 years ago   (Dolobid) Occurred approximately 20 years ago    Fd&C Yellow #5 (Tartrazine) Nausea and Vomiting and Hives

## 2024-04-20 NOTE — ED NOTES
"[facility] ED Handoff Report    ED Chief Complaint: rectal bleed    ED Diagnosis:  (K62.5) Rectal bleeding  Comment: extensive GI issues  Plan: [facility] following scope tomorrow       PMH:    Past Medical History:   Diagnosis Date    Alcoholic cirrhosis of liver (H)     Asthma     DM2 (diabetes mellitus, type 2) (H)     Fibromyalgia     History of skin cancer     Hypertension     Migraine     Motion sickness     CASTRO on CPAP     Vertigo         Code Status:  Full Code     Falls Risk: No Band: Not applicable    Current Living Situation/Residence: lives in a house     Elimination Status: Continent: Yes     Activity Level: Independent    Patients Preferred Language:  English     Needed: No    Vital Signs:  /54   Pulse 76   Temp 97.5  F (36.4  C)   Resp 20   Ht 1.645 m (5' 4.75\")   Wt 61.7 kg (136 lb)   SpO2 99%   BMI 22.81 kg/m       Cardiac Rhythm: nsr    Pain Score: 2/10    Is the Patient Confused:  No    Last Food or Drink: 04/19/24 at 2300    Focused Assessment:  rectal bleed, ambulates independent    Tests Performed: Done: Labs and Imaging    Treatments Provided:  fluids    Family Dynamics/Concerns: No    Family Updated On Visitor Policy: Yes    Plan of Care Communicated to Family: Yes    Who Was Updated about Plan of Care: significant other was present in ER and aware of plan , patient called her children already    Belongings Checklist Done and Signed by Patient: Yes    Belongings Sent with Patient: clothes, purse, phone, backpack    Medications sent with patient: na    Covid: asymptomatic , not indicated    Additional Information: NPO 0000, [facility] following, alert x4, ambulates independent     RN: HARLAN REYES RN   4/20/2024 3:25 AM       "

## 2024-04-20 NOTE — ED NOTES
NPO at 0000 upset no room ready upstairs, upset kitchen closed. Just left unit to head up to vending machines 1st floor to grab a salad. Double checked with charge RN on policy leaving unit

## 2024-04-20 NOTE — PROGRESS NOTES
Patient admitted to room 08 at approximately 0400 via wheel chair from emergency room.  Reason for Admission: Blood in stool  Report received from: Written hand-off Gilda Ham RN   Patient was accompanied by Self.  Discharge transportation provided by:  Patient ambulated/transferred:  with stand-by assist. self.  Patient is alert and orientated x 04.  Outpatient Observation education provided to: Patient  Safety risks were identified during admission:  fall.   Yellow risk/fall band applied:  Yes  Detailed Belongings: Cloths, cellphone, cash, shoes, eyeglass

## 2024-04-20 NOTE — CONSULTS
[facility] CONSULTATION    Odette Escobar   [address] [facility] [address] [facility]  [place] [address]  68 year old female  Admission Date/Time: 4/19/2024  3:27 PM    Primary Care Provider:  Allie Johnson    Requesting Physician: Mian Contreras DO      CHIEF COMPLAINT:   Melena    REASON FOR THE CONSULT: I was asked to see Ms. Odette Escobar in consultation by Dr. Luis Alberto Herman for evaluation of melena    HPI:     Odette is a 68-year-old woman with Son cirrhosis, history of alcohol use though sober since June 2023, diabetes who presents to [facility] with complaint of melena and abdominal pain    Has had recent admission on April 14, 2024 with abdominal pain found to have cholecystitis and choledocholithiasis status post difficult robotic cholecystectomy and difficult ERCP where the ampulla was located within a diverticulum.  She was stable at discharge on April 17, but was having persistent abdominal pain since that time and presented to the emergency room on 2 separate occasions.  She passed some black stool and spoke to  At an urgent care who advised her to go to the hospital for admission.  In the emergency department her hemoglobin was a gram lower so they made the decision to admit her to the hospital.  Since admission she has not passed any more melena and her hemoglobin has risen.  She does endorse some abdominal pain rather diffusely with some radiation to the back    REVIEW OF SYSTEMS: 13 point review of systems is negative except that noted above.    PAST MEDICAL HISTORY:   Past Medical History:   Diagnosis Date    Alcoholic cirrhosis of liver (H)     Asthma     DM2 (diabetes mellitus, type 2) (H)     Fibromyalgia     History of skin cancer     Hypertension     Migraine     Motion sickness     CASTRO on CPAP     Vertigo        PAST SURGICAL HISTORY:   Past Surgical History:   Procedure Laterality Date    ENDOMETRIAL ABLATION      ENDOSCOPIC RETROGRADE CHOLANGIOPANCREATOGRAM N/A 4/16/2024    Procedure:  ENDOSCOPIC RETROGRADE CHOLANGIOPANCEATOGRAPHY, WITH SPHINCTEROTOMY;  Surgeon: Eduin Do MD;  Location: [facility] OR    ENDOSCOPIC RETROGRADE CHOLANGIOPANCREATOGRAM N/A 4/16/2024    Procedure: STONE EXTRACTION,;  Surgeon: Eduin Do MD;  Location: [facility] [place] OR    ENDOSCOPIC RETROGRADE CHOLANGIOPANCREATOGRAM N/A 4/16/2024    Procedure: BILIARY STENT PLACEMENT;  Surgeon: Eduin oD MD;  Location: [facility] [place] OR    ESOPHAGOSCOPY, GASTROSCOPY, DUODENOSCOPY (EGD), COMBINED N/A 4/16/2024    Procedure: ESOPHAGOGASTRODUODENOSCOPY, WITH ENDOSCOPIC ULTRASOUND GUIDANCE;  Surgeon: Eduin Do MD;  Location: [facility] [place] OR    HERNIORRHAPHY, UMBILICAL, ROBOT-ASSISTED, LAPAROSCOPIC, USING DA ROCIO XI N/A 04/14/2024    Procedure: PRIMARY REPAIR OF  UMBILICAL HERNIA;  Surgeon: Christiano Cruz DO;  Location: [facility] [place] OR    HYSTERECTOMY      LAPAROSCOPIC CHOLECYSTECTOMY N/A 04/14/2024    Procedure: CHOLECYSTECTOMY, ROBOT-ASSISTED, LAPAROSCOPIC, USING DA ROCIO XI;  Surgeon: Christiano Crzu DO;  Location: [facility] [place] OR    TONSILLECTOMY         FAMILY HISTORY:   Family History   Problem Relation Age of Onset    Heart Failure Mother     Heart Failure Father     Heart Failure Sister     Coronary Artery Disease Brother     Coronary Artery Disease Brother     Coronary Artery Disease Brother     Cerebrovascular Disease Sister        SOCIAL HISTORY:   Social History     Tobacco Use    Smoking status: Never    Smokeless tobacco: Never   Substance Use Topics    Alcohol use: Yes     Comment: Alcoholic Drinks/day: Occasional usage        MEDS:  Medications Prior to Admission   Medication Sig Dispense Refill Last Dose    acetaminophen (TYLENOL) 500 MG tablet Take 1,000 mg by mouth daily as needed for pain   Past Week at prn    albuterol (PROAIR HFA/PROVENTIL HFA/VENTOLIN HFA) 108 (90 Base) MCG/ACT inhaler Inhale 2 puffs into the lungs every 6 hours as needed for shortness of breath, wheezing or cough    Past Week at prn    amitriptyline (ELAVIL) 10 MG tablet Take 30 mg by mouth at bedtime   4/18/2024 at pm    ammonium lactate (AMLACTIN) 12 % external cream Apply topically daily as needed for dry skin   Unknown at prn    atorvastatin (LIPITOR) 80 MG tablet Take 80 mg by mouth daily   4/18/2024 at am    bacitracin 500 UNIT/GM external ointment Apply topically 2 times daily   4/18/2024 at pm    Baclofen (LIORESAL) 5 MG tablet Take 5-10 mg by mouth 2 times daily as needed for muscle spasms or other (or sleep)   4/18/2024 at pm    BELSOMRA 10 MG tablet Take 1 tablet by mouth nightly as needed for sleep   4/18/2024 at prn    benzonatate (TESSALON) 100 MG capsule Take 200 mg by mouth 3 times daily as needed for cough   Past Week at prn    budesonide-formoterol (SYMBICORT) 160-4.5 MCG/ACT Inhaler Inhale 2 puffs into the lungs two times daily Rinse mouth/gargle after use   4/19/2024 at am    celecoxib (CELEBREX) 200 MG capsule Take 200 mg by mouth daily   4/18/2024 at am    cetirizine (ZYRTEC) 10 MG tablet Take 10 mg by mouth daily   4/18/2024 at am    cholecalciferol (VITAMIN D3) 50 MCG (2000 UT) CAPS Take 6,000 Units by mouth daily   4/18/2024 at am    estradiol (ESTRACE) 0.1 MG/GM vaginal cream Place vaginally twice a week   Past Week    famotidine (PEPCID) 20 MG tablet Take 20 mg by mouth 2 times daily   4/18/2024 at pm    ferrous gluconate (FERGON) 324 (38 Fe) MG tablet Take 324 mg by mouth daily (with breakfast)   4/18/2024 at am    fluticasone (FLONASE) 50 MCG/ACT nasal spray Spray 2 sprays into both nostrils daily as needed for rhinitis or allergies   Past Week at prn    gabapentin (NEURONTIN) 300 MG capsule Take 600 mg by mouth 3 times daily   4/18/2024 at pm    hydrocortisone 2.5 % ointment Apply topically 2 times daily as needed for other (Eczema)   4/18/2024 at pm    ibuprofen (ADVIL/MOTRIN) 200 MG tablet Take 600 mg by mouth 2 times daily as needed for pain   Past Week at prn    ipratropium - albuterol 0.5  mg/2.5 mg/3 mL (DUONEB) 0.5-2.5 (3) MG/3ML neb solution Take 1 vial by nebulization 3 times daily as needed for shortness of breath, wheezing or cough   Past Week    JARDIANCE 10 MG TABS tablet Take 10 mg by mouth daily   4/18/2024 at pm    loperamide (IMODIUM) 2 mg capsule Take 2 mg by mouth 4 times daily as needed for diarrhea   Past Month at prn    losartan (COZAAR) 25 MG tablet Take 1 tablet (25 mg) by mouth daily   4/18/2024 at am    Magnesium Oxide -Mg Supplement 500 MG TABS Take 1 tablet by mouth daily   4/18/2024 at am    montelukast (SINGULAIR) 10 MG tablet Take 1 tablet by mouth at bedtime   4/18/2024 at pm    Multiple Vitamins-Minerals (MULTIVITAMIN WOMEN 50+) TABS Take 1 tablet by mouth daily   4/18/2024 at am    omeprazole (PRILOSEC) 20 MG capsule Take 1 capsule by mouth daily   4/18/2024 at am    oxyCODONE (ROXICODONE) 5 MG tablet Take 1 tablet (5 mg) by mouth every 6 hours as needed for severe pain T 10 tablet 0 4/19/2024 at pm    OZEMPIC, 0.25 OR 0.5 MG/DOSE, 2 MG/3ML pen Inject 0.5 mg Subcutaneous every 7 days Tuesdays   Past Week at past 2 weeks per pt    polyethylene glycol (MIRALAX) 17 GM/Dose powder Take 17 g (1 Capful) by mouth daily 225 g 0 4/18/2024 at am    polyethylene glycol-propylene glycol (SYSTANE ULTRA) 0.4-0.3 % SOLN ophthalmic solution Place 1 drop into both eyes 4 times daily as needed for dry eyes   4/19/2024 at am    propranolol ER (INDERAL LA) 120 MG 24 hr capsule Take 1 capsule (120 mg) by mouth daily   4/18/2024 at am    sertraline (ZOLOFT) 100 MG tablet Take 100 mg by mouth daily   4/18/2024 at am    TYRVAYA 0.03 MG/ACT nasal spray Spray 1 spray into both nostrils 2 times daily   4/18/2024 at pm       ALLERGIES/SENSITIVITIES: Aspirin, Diflunisal, and Fd&c yellow #5 (tartrazine)    MEDICATIONS:  No current outpatient medications on file.       PHYSICAL EXAM:  Temp:  [97.5  F (36.4  C)-98  F (36.7  C)] 97.8  F (36.6  C)  Pulse:  [71-77] 71  Resp:  [18-20] 20  BP:  (110-139)/(54-65) 131/65  SpO2:  [99 %-100 %] 100 %  Body mass index is 22.81 kg/m .  GEN: Well developed, well nourished 68 year old in no acute distress.  HEENT: sclera anicteric, moist mucous membranes.   LYMPH: No cervical lymphadenopathy  PULM: Nonlabored breathing. Breath sounds equal.   CARDIO: Regular rate  GI: Non-distended. Soft.  Non-tender to palpation.  No guarding. No rebound tenderness.  EXT: warm, no lower extremity edema  NEURO: Alert. No focal defects.   PSYCH: Mental status appropriate, mood and affect normal.    SKIN: No rashes  MSK: No joint abnormalities    LABORATORY DATA:  CBC RESULTS:   Recent Labs   Lab Test 04/20/24  0734 04/20/24  0113 04/19/24  1541   WBC  --   --  7.2   RBC  --   --  3.91   HGB 11.1*   < > 11.9   HCT  --   --  36.6   MCV  --   --  94   MCH  --   --  30.4   MCHC  --   --  32.5   RDW  --   --  13.5   PLT  --   --  247    < > = values in this interval not displayed.        CMP Results:   Recent Labs   Lab Test 04/20/24  0757 04/20/24  0205 04/19/24  1541   NA  --   --  142   POTASSIUM  --   --  3.2*   CHLORIDE  --   --  103   CO2  --   --  29   ANIONGAP  --   --  10   GLC 92   < > 96   BUN  --   --  4.9*   CR  --   --  0.47*   BILITOTAL  --   --  0.7   ALKPHOS  --   --  144   ALT  --   --  107*   AST  --   --  29    < > = values in this interval not displayed.        Lipase 42    INR Results:   Recent Labs   Lab Test 04/19/24  1541   INR 0.99          RELEVANT IMAGING:      EXAM: CTA ABDOMEN PELVIS WITH CONTRAST  LOCATION: [facility]  DATE: 4/19/2024     INDICATION: recent cholecysteomy and ERCP. H o cirrhosis. Now with bloody stools melena and worsening abdominal pain  COMPARISON: CT chest and abdomen, pelvis from 04/14/2024, MRI abdomen from 04/14/2024  TECHNIQUE: CT angiogram abdomen pelvis during arterial phase of injection of IV contrast. 2D and 3D MIP reconstructions were performed by the CT technologist. Dose reduction techniques were  used.  CONTRAST: 90 mL Isovue 370     FINDINGS:  ANGIOGRAM ABDOMEN/PELVIS: The aorta is normal in caliber. Mild atherosclerosis without high-grade stenosis. No active extravasation or pseudoaneurysm. There are a few paraesophageal varices.     LOWER CHEST: Patchy bibasilar atelectasis. Tiny hiatal hernia.     HEPATOBILIARY: Interval cholecystectomy. New biliary stent. Expected small volume pneumobilia. No pathologic biliary ductal dilation. No gallbladder fossa fluid collection. The liver is mildly dysmorphic.     PANCREAS: Minimal fat stranding and edema around the head and uncinate process of the pancreas. No pancreatic ductal dilation.     SPLEEN: Normal.     ADRENAL GLANDS: Normal.     KIDNEYS/BLADDER: No significant mass, stone, or hydronephrosis.     BOWEL: Diverticulosis. There is mild wall thickening of the distal stomach and second portion of the duodenum with mucosal hyperenhancement and surrounding fat stranding. Small amount of free air seen throughout the abdomen and pelvis is within normal   limits given recent operative intervention. Trace free fluid in the pelvis. No drainable collection. Tiny periampullary duodenal diverticulum.     LYMPH NODES: Normal.     PELVIC ORGANS: Hysterectomy.     MUSCULOSKELETAL: Degenerative changes of the spine and bilateral hips.                                                                      IMPRESSION:  1.  Interval cholecystectomy and biliary stent placement. No active extravasation.  2.  Mild wall thickening of the distal stomach and second portion the duodenum with mucosal hyperenhancement and surrounding fat stranding. In addition, there is mild fat stranding around the head and uncinate process of pancreas. The findings are likely   related to reactive changes from recent operative intervention. Duodenitis and/or pancreatitis remain in the differential in the appropriate clinical context.  3.  No drainable collection.  4.  Trace free fluid in the pelvis and  small amount of free air seen throughout the abdomen and pelvis are likely related to recent operative intervention.  5.  The liver is mildly dysmorphic suggestive of fibrosis and/or cirrhosis.    ASSESSMENT:     Odette is a 68-year-old woman with Son cirrhosis, history of alcohol use though sober since June 2023, diabetes who presents to [facility] with complaint of melena and abdominal pain  Recent hospitalization for cholecystectomy, hernia repair and complicated ERCP  Suspect she had some slight oozing of blood from her sphincterotomy.  No index of suspicion for active GI bleeding from anything like esophageal varices AVMs portal hypertensive gastropathy or peptic ulcer disease  Reassured by the stable hemoglobin and lack of active bleeding since admission    Her abdominal pain is frankly expected after having had cholecystectomy and hernia repair    PLAN:    Melena  -Likely low-grade bleeding from sphincterotomy site which is expected.  Advised her to take omeprazole twice daily at home (she takes 1 daily typically)  -Avoid NSAIDs  -No plan for endoscopy    Abdominal pain  -Benign exam, subjective pain expected after her surgical and endoscopic interventions.  Imaging is reassuring    Okay to advance diet and discharge home from a GI perspective today  She follows with [facility] and says she will see them as well as her primary care provider in the upcoming weeks               Mckinley Huang MD  Thank you for the opportunity to participate in the care of this patient.   Please feel free to call me with any questions or concerns.  Phone number [phone].        45 minutes spent with direct patient care, clinical decision making, education at the bedside and care team coordination.    CC: [facility], Allie Johnson   04-Feb-2023 09:59

## 2024-04-20 NOTE — PROGRESS NOTES
PRIMARY DIAGNOSIS: GI BLEED    OUTPATIENT/OBSERVATION GOALS TO BE MET BEFORE DISCHARGE  Orthostatic performed: N/A    Stable Hgb Yes.   Recent Labs   Lab Test 04/20/24  0734 04/20/24  0113 04/19/24  1541   HGB 11.1* 10.7* 11.9       Resolved or declined bleeding episodes: No,  without any bleeding Last episode: 04/20/23    Appropriate testing complete: Yes    Cleared for discharge by consultants (if involved): Yes    Safe discharge environment identified: Yes    Discharge Planner Nurse   Safe discharge environment identified: Yes  Barriers to discharge: No       Entered by: Ann Ferguson RN 04/20/2024 1:38 PM   Patient has met all observation goals and a discharge order has been received.   Please review provider order for any additional goals.   Nurse to notify provider when observation goals have been met and patient is ready for discharge.

## 2024-04-20 NOTE — PLAN OF CARE
Problem: Adult Inpatient Plan of Care  Goal: Absence of Hospital-Acquired Illness or Injury  Intervention: Prevent Skin Injury  Recent Flowsheet Documentation  Taken 4/20/2024 0410 by Katelyn Rees RN  Body Position: position changed independently     Problem: Adult Inpatient Plan of Care  Goal: Absence of Hospital-Acquired Illness or Injury  Intervention: Prevent Infection  Recent Flowsheet Documentation  Taken 4/20/2024 0410 by Katelyn Rees RN  Infection Prevention: rest/sleep promoted   Goal Outcome Evaluation:      Plan of Care Reviewed With: patient    Pt A&O. VSS in RA. Denies pain. No rectal bleeding noted. BG86 at 0600. NPO midnight. GI consult. IVF 0.9NaCL + [id number] running at 75ml/hr. Up with SBA to bathroom.

## 2024-04-20 NOTE — PROGRESS NOTES
"PRIMARY DIAGNOSIS: \"GENERIC\" NURSING  OUTPATIENT/OBSERVATION GOALS TO BE MET BEFORE DISCHARGE:  ADLs back to baseline: No    Activity and level of assistance: Up with standby assistance.    Pain status: Pain free.    Return to near baseline physical activity: Yes     Discharge Planner Nurse   Safe discharge environment identified: Yes  Barriers to discharge: Yes       Entered by: Katelyn Rees RN 04/20/2024 6:32 AM     Please review provider order for any additional goals.   Nurse to notify provider when observation goals have been met and patient is ready for discharge.  "

## 2024-04-20 NOTE — PROGRESS NOTES
Care Management Discharge Note    Discharge Date: 04/20/2024       Discharge Disposition:  home w/homecare    Discharge Services:  home care     Discharge DME:  none    Discharge Transportation:  family    Private pay costs discussed: Not applicable    Does the patient's insurance plan have a 3 day qualifying hospital stay waiver?  No    PAS Confirmation Code:  na  Patient/family educated on Medicare website which has current facility and service quality ratings:  na    Education Provided on the Discharge Plan:  yes  Persons Notified of Discharge Plans: pt and SO  Patient/Family in Agreement with the Plan:  yes    Handoff Referral Completed: Yes    Additional Information:  Pt will discharge home and have [facility] RN/PT.     Lars Ritter RN

## 2024-04-20 NOTE — MEDICATION SCRIBE - ADMISSION MEDICATION HISTORY
Medication Scribe Admission Medication History    Admission medication history is complete. The information provided in this note is only as accurate as the sources available at the time of the update.    Information Source(s): Patient and CareEverywhere/SureScripts via in-person    Pertinent Information: pt states no am med's tdy   Pt was recently discharged per med rec on 4/14 there were several changes to pt med's, confirmed with pt med list was accurate       Changes made to PTA medication list:  Added: None  Deleted: None  Changed: None    Allergies reviewed with patient and updates made in EHR: yes    Medication History Completed By: ELVIS RIOS 4/19/2024 8:34 PM    PTA Med List   Medication Sig Last Dose    acetaminophen (TYLENOL) 500 MG tablet Take 1,000 mg by mouth daily as needed for pain Past Week at prn    albuterol (PROAIR HFA/PROVENTIL HFA/VENTOLIN HFA) 108 (90 Base) MCG/ACT inhaler Inhale 2 puffs into the lungs every 6 hours as needed for shortness of breath, wheezing or cough Past Week at prn    amitriptyline (ELAVIL) 10 MG tablet Take 30 mg by mouth at bedtime 4/18/2024 at pm    ammonium lactate (AMLACTIN) 12 % external cream Apply topically daily as needed for dry skin Unknown at prn    atorvastatin (LIPITOR) 80 MG tablet Take 80 mg by mouth daily 4/18/2024 at am    bacitracin 500 UNIT/GM external ointment Apply topically 2 times daily 4/18/2024 at pm    Baclofen (LIORESAL) 5 MG tablet Take 5-10 mg by mouth 2 times daily as needed for muscle spasms or other (or sleep) 4/18/2024 at pm    BELSOMRA 10 MG tablet Take 1 tablet by mouth nightly as needed for sleep 4/18/2024 at prn    benzonatate (TESSALON) 100 MG capsule Take 200 mg by mouth 3 times daily as needed for cough Past Week at prn    budesonide-formoterol (SYMBICORT) 160-4.5 MCG/ACT Inhaler Inhale 2 puffs into the lungs two times daily Rinse mouth/gargle after use 4/19/2024 at am    celecoxib (CELEBREX) 200 MG capsule Take 200 mg by  mouth daily 4/18/2024 at am    cetirizine (ZYRTEC) 10 MG tablet Take 10 mg by mouth daily 4/18/2024 at am    cholecalciferol (VITAMIN D3) 50 MCG (2000 UT) CAPS Take 6,000 Units by mouth daily 4/18/2024 at am    estradiol (ESTRACE) 0.1 MG/GM vaginal cream Place vaginally twice a week Past Week    famotidine (PEPCID) 20 MG tablet Take 20 mg by mouth 2 times daily 4/18/2024 at pm    ferrous gluconate (FERGON) 324 (38 Fe) MG tablet Take 324 mg by mouth daily (with breakfast) 4/18/2024 at am    fluticasone (FLONASE) 50 MCG/ACT nasal spray Spray 2 sprays into both nostrils daily as needed for rhinitis or allergies Past Week at prn    gabapentin (NEURONTIN) 300 MG capsule Take 600 mg by mouth 3 times daily 4/18/2024 at pm    hydrocortisone 2.5 % ointment Apply topically 2 times daily as needed for other (Eczema) 4/18/2024 at pm    ibuprofen (ADVIL/MOTRIN) 200 MG tablet Take 600 mg by mouth 2 times daily as needed for pain Past Week at prn    ipratropium - albuterol 0.5 mg/2.5 mg/3 mL (DUONEB) 0.5-2.5 (3) MG/3ML neb solution Take 1 vial by nebulization 3 times daily as needed for shortness of breath, wheezing or cough Past Week    JARDIANCE 10 MG TABS tablet Take 10 mg by mouth daily 4/18/2024 at pm    loperamide (IMODIUM) 2 mg capsule Take 2 mg by mouth 4 times daily as needed for diarrhea Past Month at prn    losartan (COZAAR) 25 MG tablet Take 1 tablet (25 mg) by mouth daily 4/18/2024 at am    Magnesium Oxide -Mg Supplement 500 MG TABS Take 1 tablet by mouth daily 4/18/2024 at am    montelukast (SINGULAIR) 10 MG tablet Take 1 tablet by mouth at bedtime 4/18/2024 at pm    Multiple Vitamins-Minerals (MULTIVITAMIN WOMEN 50+) TABS Take 1 tablet by mouth daily 4/18/2024 at am    omeprazole (PRILOSEC) 20 MG capsule Take 1 capsule by mouth daily 4/18/2024 at am    oxyCODONE (ROXICODONE) 5 MG tablet Take 1 tablet (5 mg) by mouth every 6 hours as needed for severe pain T 4/19/2024 at pm    OZEMPIC, 0.25 OR 0.5 MG/DOSE, 2 MG/3ML  pen Inject 0.5 mg Subcutaneous every 7 days Tuesdays Past Week at past 2 weeks per pt    polyethylene glycol (MIRALAX) 17 GM/Dose powder Take 17 g (1 Capful) by mouth daily 4/18/2024 at am    polyethylene glycol-propylene glycol (SYSTANE ULTRA) 0.4-0.3 % SOLN ophthalmic solution Place 1 drop into both eyes 4 times daily as needed for dry eyes 4/19/2024 at am    propranolol ER (INDERAL LA) 120 MG 24 hr capsule Take 1 capsule (120 mg) by mouth daily 4/18/2024 at am    sertraline (ZOLOFT) 100 MG tablet Take 100 mg by mouth daily 4/18/2024 at am    TYRVAYA 0.03 MG/ACT nasal spray Spray 1 spray into both nostrils 2 times daily 4/18/2024 at pm

## 2024-04-21 LAB — BACTERIA UR CULT: NO GROWTH

## 2024-04-22 ENCOUNTER — TELEPHONE (OUTPATIENT)
Dept: PHARMACY | Facility: OTHER | Age: 69
End: 2024-04-22
Payer: COMMERCIAL

## 2024-04-22 NOTE — TELEPHONE ENCOUNTER
MTM referral from: Transitions of Care (recent hospital discharge or ED visit)    MTM referral outreach attempt #2 on April 22, 2024 at 9:51 AM      Outcome: Patient not reachable after several attempts, will route to MTM Pharmacist/Provider as an FYI.  MT scheduling number is .  Thank you for the referral.    Use [facility] med adv (leslye) for the carrier/Plan on the flowsheet          Iqra Awan CPhT  MTM

## 2024-04-23 ENCOUNTER — THERAPY VISIT (OUTPATIENT)
Dept: PHYSICAL THERAPY | Facility: REHABILITATION | Age: 69
End: 2024-04-23
Payer: COMMERCIAL

## 2024-04-23 DIAGNOSIS — M54.2 CHRONIC NECK PAIN: ICD-10-CM

## 2024-04-23 DIAGNOSIS — G89.29 CHRONIC NECK PAIN: ICD-10-CM

## 2024-04-23 DIAGNOSIS — M54.16 LUMBAR RADICULOPATHY: ICD-10-CM

## 2024-04-23 DIAGNOSIS — M54.50 CHRONIC BILATERAL LOW BACK PAIN WITHOUT SCIATICA: Primary | ICD-10-CM

## 2024-04-23 DIAGNOSIS — M51.369 DDD (DEGENERATIVE DISC DISEASE), LUMBAR: ICD-10-CM

## 2024-04-23 DIAGNOSIS — M47.816 LUMBAR FACET ARTHROPATHY: ICD-10-CM

## 2024-04-23 DIAGNOSIS — G89.29 CHRONIC BILATERAL LOW BACK PAIN WITHOUT SCIATICA: Primary | ICD-10-CM

## 2024-04-23 DIAGNOSIS — M79.7 FIBROMYALGIA: ICD-10-CM

## 2024-04-23 PROCEDURE — 97110 THERAPEUTIC EXERCISES: CPT | Mod: GP | Performed by: PHYSICAL THERAPIST

## 2024-04-24 ENCOUNTER — APPOINTMENT (OUTPATIENT)
Dept: CT IMAGING | Facility: HOSPITAL | Age: 69
DRG: 438 | End: 2024-04-24
Payer: COMMERCIAL

## 2024-04-24 ENCOUNTER — TELEPHONE (OUTPATIENT)
Dept: SURGERY | Facility: CLINIC | Age: 69
End: 2024-04-24
Payer: COMMERCIAL

## 2024-04-24 ENCOUNTER — HOSPITAL ENCOUNTER (INPATIENT)
Facility: HOSPITAL | Age: 69
LOS: 5 days | Discharge: HOME | DRG: 438 | End: 2024-04-29
Attending: EMERGENCY MEDICINE | Admitting: STUDENT IN AN ORGANIZED HEALTH CARE EDUCATION/TRAINING PROGRAM
Payer: COMMERCIAL

## 2024-04-24 DIAGNOSIS — Z90.49 S/P LAPAROSCOPIC CHOLECYSTECTOMY: ICD-10-CM

## 2024-04-24 DIAGNOSIS — K92.2 GASTROINTESTINAL HEMORRHAGE, UNSPECIFIED GASTROINTESTINAL HEMORRHAGE TYPE: Primary | ICD-10-CM

## 2024-04-24 DIAGNOSIS — R10.11 RIGHT UPPER QUADRANT ABDOMINAL PAIN: ICD-10-CM

## 2024-04-24 DIAGNOSIS — K85.90 ACUTE PANCREATITIS: ICD-10-CM

## 2024-04-24 DIAGNOSIS — K52.9 COLITIS: ICD-10-CM

## 2024-04-24 DIAGNOSIS — E11.649 TYPE 2 DIABETES MELLITUS WITH HYPOGLYCEMIA WITHOUT COMA (H): ICD-10-CM

## 2024-04-24 PROBLEM — G89.18 POST-OP PAIN: Status: ACTIVE | Noted: 2024-04-24

## 2024-04-24 LAB
ADV 40+41 DNA STL QL NAA+NON-PROBE: NEGATIVE
ALBUMIN SERPL BCG-MCNC: 3.7 G/DL (ref 3.5–5.2)
ALP SERPL-CCNC: 136 U/L (ref 40–150)
ALT SERPL W P-5'-P-CCNC: 39 U/L (ref 0–50)
ANION GAP SERPL CALCULATED.3IONS-SCNC: 11 MMOL/L (ref 7–15)
AST SERPL W P-5'-P-CCNC: 25 U/L (ref 0–45)
ASTRO TYP 1-8 RNA STL QL NAA+NON-PROBE: NEGATIVE
BASOPHILS # BLD AUTO: 0 10E3/UL (ref 0–0.2)
BASOPHILS NFR BLD AUTO: 0 %
BILIRUB DIRECT SERPL-MCNC: <0.2 MG/DL (ref 0–0.3)
BILIRUB SERPL-MCNC: 0.4 MG/DL
BUN SERPL-MCNC: 8.4 MG/DL (ref 8–23)
C CAYETANENSIS DNA STL QL NAA+NON-PROBE: NEGATIVE
C DIFF TOX B STL QL: NEGATIVE
CALCIUM SERPL-MCNC: 9.2 MG/DL (ref 8.8–10.2)
CAMPYLOBACTER DNA SPEC NAA+PROBE: NEGATIVE
CHLORIDE SERPL-SCNC: 105 MMOL/L (ref 98–107)
CREAT SERPL-MCNC: 0.53 MG/DL (ref 0.51–0.95)
CRYPTOSP DNA STL QL NAA+NON-PROBE: NEGATIVE
DEPRECATED HCO3 PLAS-SCNC: 25 MMOL/L (ref 22–29)
E COLI O157 DNA STL QL NAA+NON-PROBE: NORMAL
E HISTOLYT DNA STL QL NAA+NON-PROBE: NEGATIVE
EAEC ASTA GENE ISLT QL NAA+PROBE: NEGATIVE
EC STX1+STX2 GENES STL QL NAA+NON-PROBE: NEGATIVE
EGFRCR SERPLBLD CKD-EPI 2021: >90 ML/MIN/1.73M2
EOSINOPHIL # BLD AUTO: 0.2 10E3/UL (ref 0–0.7)
EOSINOPHIL NFR BLD AUTO: 2 %
EPEC EAE GENE STL QL NAA+NON-PROBE: NEGATIVE
ERYTHROCYTE [DISTWIDTH] IN BLOOD BY AUTOMATED COUNT: 13.2 % (ref 10–15)
ETEC LTA+ST1A+ST1B TOX ST NAA+NON-PROBE: NEGATIVE
G LAMBLIA DNA STL QL NAA+NON-PROBE: NEGATIVE
GLUCOSE BLDC GLUCOMTR-MCNC: 94 MG/DL (ref 70–99)
GLUCOSE SERPL-MCNC: 110 MG/DL (ref 70–99)
HCT VFR BLD AUTO: 37.5 % (ref 35–47)
HGB BLD-MCNC: 12 G/DL (ref 11.7–15.7)
IMM GRANULOCYTES # BLD: 0 10E3/UL
IMM GRANULOCYTES NFR BLD: 0 %
INR PPP: 1.07 (ref 0.85–1.15)
LIPASE SERPL-CCNC: 123 U/L (ref 13–60)
LYMPHOCYTES # BLD AUTO: 2.1 10E3/UL (ref 0.8–5.3)
LYMPHOCYTES NFR BLD AUTO: 27 %
MAGNESIUM SERPL-MCNC: 2 MG/DL (ref 1.7–2.3)
MCH RBC QN AUTO: 29.9 PG (ref 26.5–33)
MCHC RBC AUTO-ENTMCNC: 32 G/DL (ref 31.5–36.5)
MCV RBC AUTO: 94 FL (ref 78–100)
MONOCYTES # BLD AUTO: 0.7 10E3/UL (ref 0–1.3)
MONOCYTES NFR BLD AUTO: 9 %
NEUTROPHILS # BLD AUTO: 4.8 10E3/UL (ref 1.6–8.3)
NEUTROPHILS NFR BLD AUTO: 62 %
NOROVIRUS GI+II RNA STL QL NAA+NON-PROBE: NEGATIVE
NRBC # BLD AUTO: 0 10E3/UL
NRBC BLD AUTO-RTO: 0 /100
P SHIGELLOIDES DNA STL QL NAA+NON-PROBE: NEGATIVE
PHOSPHATE SERPL-MCNC: 4.5 MG/DL (ref 2.5–4.5)
PLATELET # BLD AUTO: 324 10E3/UL (ref 150–450)
POTASSIUM SERPL-SCNC: 3.6 MMOL/L (ref 3.4–5.3)
PROT SERPL-MCNC: 6.5 G/DL (ref 6.4–8.3)
RBC # BLD AUTO: 4.01 10E6/UL (ref 3.8–5.2)
RVA RNA STL QL NAA+NON-PROBE: NEGATIVE
SALMONELLA SP RPOD STL QL NAA+PROBE: NEGATIVE
SAPO I+II+IV+V RNA STL QL NAA+NON-PROBE: NEGATIVE
SHIGELLA SP+EIEC IPAH ST NAA+NON-PROBE: NEGATIVE
SODIUM SERPL-SCNC: 141 MMOL/L (ref 135–145)
V CHOLERAE DNA SPEC QL NAA+PROBE: NEGATIVE
VIBRIO DNA SPEC NAA+PROBE: NEGATIVE
WBC # BLD AUTO: 7.8 10E3/UL (ref 4–11)
Y ENTEROCOL DNA STL QL NAA+PROBE: NEGATIVE

## 2024-04-24 PROCEDURE — 96376 TX/PRO/DX INJ SAME DRUG ADON: CPT

## 2024-04-24 PROCEDURE — 85610 PROTHROMBIN TIME: CPT

## 2024-04-24 PROCEDURE — 258N000003 HC RX IP 258 OP 636

## 2024-04-24 PROCEDURE — 87507 IADNA-DNA/RNA PROBE TQ 12-25: CPT

## 2024-04-24 PROCEDURE — 87493 C DIFF AMPLIFIED PROBE: CPT

## 2024-04-24 PROCEDURE — 80053 COMPREHEN METABOLIC PANEL: CPT

## 2024-04-24 PROCEDURE — 83690 ASSAY OF LIPASE: CPT

## 2024-04-24 PROCEDURE — 84100 ASSAY OF PHOSPHORUS: CPT | Performed by: STUDENT IN AN ORGANIZED HEALTH CARE EDUCATION/TRAINING PROGRAM

## 2024-04-24 PROCEDURE — 96374 THER/PROPH/DIAG INJ IV PUSH: CPT | Mod: 59

## 2024-04-24 PROCEDURE — 250N000011 HC RX IP 250 OP 636

## 2024-04-24 PROCEDURE — 250N000013 HC RX MED GY IP 250 OP 250 PS 637: Performed by: STUDENT IN AN ORGANIZED HEALTH CARE EDUCATION/TRAINING PROGRAM

## 2024-04-24 PROCEDURE — 85025 COMPLETE CBC W/AUTO DIFF WBC: CPT

## 2024-04-24 PROCEDURE — 96361 HYDRATE IV INFUSION ADD-ON: CPT

## 2024-04-24 PROCEDURE — 99285 EMERGENCY DEPT VISIT HI MDM: CPT | Mod: 25

## 2024-04-24 PROCEDURE — 99223 1ST HOSP IP/OBS HIGH 75: CPT | Performed by: STUDENT IN AN ORGANIZED HEALTH CARE EDUCATION/TRAINING PROGRAM

## 2024-04-24 PROCEDURE — 82962 GLUCOSE BLOOD TEST: CPT

## 2024-04-24 PROCEDURE — 120N000001 HC R&B MED SURG/OB

## 2024-04-24 PROCEDURE — 83735 ASSAY OF MAGNESIUM: CPT

## 2024-04-24 PROCEDURE — 74177 CT ABD & PELVIS W/CONTRAST: CPT

## 2024-04-24 PROCEDURE — 36415 COLL VENOUS BLD VENIPUNCTURE: CPT

## 2024-04-24 PROCEDURE — 82248 BILIRUBIN DIRECT: CPT

## 2024-04-24 RX ORDER — AMITRIPTYLINE HYDROCHLORIDE 10 MG/1
30 TABLET ORAL AT BEDTIME
Status: DISCONTINUED | OUTPATIENT
Start: 2024-04-24 | End: 2024-04-29 | Stop reason: HOSPADM

## 2024-04-24 RX ORDER — NALOXONE HYDROCHLORIDE 0.4 MG/ML
0.4 INJECTION, SOLUTION INTRAMUSCULAR; INTRAVENOUS; SUBCUTANEOUS
Status: DISCONTINUED | OUTPATIENT
Start: 2024-04-24 | End: 2024-04-29 | Stop reason: HOSPADM

## 2024-04-24 RX ORDER — FERROUS GLUCONATE 324(38)MG
324 TABLET ORAL
Status: DISCONTINUED | OUTPATIENT
Start: 2024-04-25 | End: 2024-04-29 | Stop reason: HOSPADM

## 2024-04-24 RX ORDER — PROPRANOLOL HCL 60 MG
120 CAPSULE, EXTENDED RELEASE 24HR ORAL DAILY
Status: DISCONTINUED | OUTPATIENT
Start: 2024-04-24 | End: 2024-04-29 | Stop reason: HOSPADM

## 2024-04-24 RX ORDER — GABAPENTIN 300 MG/1
600 CAPSULE ORAL 3 TIMES DAILY
Status: DISCONTINUED | OUTPATIENT
Start: 2024-04-24 | End: 2024-04-29 | Stop reason: HOSPADM

## 2024-04-24 RX ORDER — OXYCODONE HYDROCHLORIDE 5 MG/1
5 TABLET ORAL EVERY 4 HOURS PRN
Status: DISCONTINUED | OUTPATIENT
Start: 2024-04-24 | End: 2024-04-29 | Stop reason: HOSPADM

## 2024-04-24 RX ORDER — LOSARTAN POTASSIUM 25 MG/1
25 TABLET ORAL DAILY
Status: DISCONTINUED | OUTPATIENT
Start: 2024-04-24 | End: 2024-04-29 | Stop reason: HOSPADM

## 2024-04-24 RX ORDER — IOPAMIDOL 755 MG/ML
64 INJECTION, SOLUTION INTRAVASCULAR ONCE
Status: COMPLETED | OUTPATIENT
Start: 2024-04-24 | End: 2024-04-24

## 2024-04-24 RX ORDER — HYDROMORPHONE HYDROCHLORIDE 1 MG/ML
0.5 INJECTION, SOLUTION INTRAMUSCULAR; INTRAVENOUS; SUBCUTANEOUS ONCE
Status: COMPLETED | OUTPATIENT
Start: 2024-04-24 | End: 2024-04-24

## 2024-04-24 RX ORDER — ONDANSETRON 2 MG/ML
4 INJECTION INTRAMUSCULAR; INTRAVENOUS EVERY 6 HOURS PRN
Status: DISCONTINUED | OUTPATIENT
Start: 2024-04-24 | End: 2024-04-29 | Stop reason: HOSPADM

## 2024-04-24 RX ORDER — LIDOCAINE 40 MG/G
CREAM TOPICAL
Status: DISCONTINUED | OUTPATIENT
Start: 2024-04-24 | End: 2024-04-26

## 2024-04-24 RX ORDER — NALOXONE HYDROCHLORIDE 0.4 MG/ML
0.2 INJECTION, SOLUTION INTRAMUSCULAR; INTRAVENOUS; SUBCUTANEOUS
Status: DISCONTINUED | OUTPATIENT
Start: 2024-04-24 | End: 2024-04-29 | Stop reason: HOSPADM

## 2024-04-24 RX ORDER — PANTOPRAZOLE SODIUM 40 MG/1
40 TABLET, DELAYED RELEASE ORAL
Status: DISCONTINUED | OUTPATIENT
Start: 2024-04-24 | End: 2024-04-29 | Stop reason: HOSPADM

## 2024-04-24 RX ORDER — ONDANSETRON 4 MG/1
4 TABLET, ORALLY DISINTEGRATING ORAL EVERY 6 HOURS PRN
Status: DISCONTINUED | OUTPATIENT
Start: 2024-04-24 | End: 2024-04-29 | Stop reason: HOSPADM

## 2024-04-24 RX ORDER — SERTRALINE HYDROCHLORIDE 100 MG/1
100 TABLET, FILM COATED ORAL DAILY
Status: DISCONTINUED | OUTPATIENT
Start: 2024-04-24 | End: 2024-04-29 | Stop reason: HOSPADM

## 2024-04-24 RX ORDER — HYDROMORPHONE HYDROCHLORIDE 1 MG/ML
0.5 INJECTION, SOLUTION INTRAMUSCULAR; INTRAVENOUS; SUBCUTANEOUS EVERY 4 HOURS PRN
Status: DISCONTINUED | OUTPATIENT
Start: 2024-04-24 | End: 2024-04-29 | Stop reason: HOSPADM

## 2024-04-24 RX ORDER — SODIUM CHLORIDE 9 MG/ML
INJECTION, SOLUTION INTRAVENOUS CONTINUOUS
Status: DISCONTINUED | OUTPATIENT
Start: 2024-04-24 | End: 2024-04-25

## 2024-04-24 RX ORDER — BACLOFEN 5 MG/1
5 TABLET ORAL 2 TIMES DAILY PRN
Status: DISCONTINUED | OUTPATIENT
Start: 2024-04-24 | End: 2024-04-29 | Stop reason: HOSPADM

## 2024-04-24 RX ORDER — ACETAMINOPHEN 500 MG
1000 TABLET ORAL DAILY PRN
Status: DISCONTINUED | OUTPATIENT
Start: 2024-04-24 | End: 2024-04-29 | Stop reason: HOSPADM

## 2024-04-24 RX ADMIN — HYDROMORPHONE HYDROCHLORIDE 0.5 MG: 1 INJECTION, SOLUTION INTRAMUSCULAR; INTRAVENOUS; SUBCUTANEOUS at 15:56

## 2024-04-24 RX ADMIN — SODIUM CHLORIDE: 9 INJECTION, SOLUTION INTRAVENOUS at 15:53

## 2024-04-24 RX ADMIN — AMITRIPTYLINE HYDROCHLORIDE 30 MG: 10 TABLET, FILM COATED ORAL at 21:45

## 2024-04-24 RX ADMIN — SERTRALINE HYDROCHLORIDE 100 MG: 100 TABLET ORAL at 19:22

## 2024-04-24 RX ADMIN — LOSARTAN POTASSIUM 25 MG: 25 TABLET, FILM COATED ORAL at 19:22

## 2024-04-24 RX ADMIN — SODIUM CHLORIDE 1000 ML: 9 INJECTION, SOLUTION INTRAVENOUS at 23:52

## 2024-04-24 RX ADMIN — EMPAGLIFLOZIN 10 MG: 10 TABLET, FILM COATED ORAL at 19:22

## 2024-04-24 RX ADMIN — HYDROMORPHONE HYDROCHLORIDE 0.5 MG: 1 INJECTION, SOLUTION INTRAMUSCULAR; INTRAVENOUS; SUBCUTANEOUS at 12:40

## 2024-04-24 RX ADMIN — IOPAMIDOL 64 ML: 755 INJECTION, SOLUTION INTRAVENOUS at 14:06

## 2024-04-24 RX ADMIN — PROPRANOLOL HYDROCHLORIDE 120 MG: 60 CAPSULE, EXTENDED RELEASE ORAL at 19:23

## 2024-04-24 RX ADMIN — GABAPENTIN 600 MG: 300 CAPSULE ORAL at 19:22

## 2024-04-24 RX ADMIN — PANTOPRAZOLE SODIUM 40 MG: 40 TABLET, DELAYED RELEASE ORAL at 19:22

## 2024-04-24 RX ADMIN — SODIUM CHLORIDE 1000 ML: 9 INJECTION, SOLUTION INTRAVENOUS at 12:40

## 2024-04-24 ASSESSMENT — ACTIVITIES OF DAILY LIVING (ADL)
ADLS_ACUITY_SCORE: 38
ADLS_ACUITY_SCORE: 38
ADLS_ACUITY_SCORE: 36
ADLS_ACUITY_SCORE: 38
DEPENDENT_IADLS:: INDEPENDENT
ADLS_ACUITY_SCORE: 38

## 2024-04-24 NOTE — ED PROVIDER NOTES
EMERGENCY DEPARTMENT ENCOUNTER      NAME: Odette Escobar  AGE: 68 year old female  YOB: 1955  MRN: [id number]  EVALUATION DATE & TIME: 2024 11:49 AM    PCP: Allie Johnson    ED PROVIDER: Marleny Esquivel PA-C      Chief Complaint   Patient presents with    Diarrhea         FINAL IMPRESSION:  1. Acute pancreatitis    2. Colitis    3. S/P laparoscopic cholecystectomy          ED COURSE & MEDICAL DECISION MAKIN:02 PM Met with patient for initial interview. Plan for care discussed.  1:35 PM Reevaluated and updated patient. Plan for CT scan given elevated lipase.  3:03 PM Staffed patient with Dr. Bob.  3:06 PM Reevaluated and updated patient. Plan for admission discussed with patient and patient is agreeable. All questions and concerns addressed.   4:12 PM Spoke with hospitalist, Dr. Gondal, who kindly accepts the admission.  4:22 PM Spoke with general surgery, Dr. Cardenas, who notes pancreatitis likely from partial obstruction of pancreatitic duct from common bile duct stent, no recommendation for HIDA or other imaging or surgical intervention at this time.     68 year old female with a history of HTN, HLD, DM II, alcoholic cirrhosis, CASTRO who presents to this ED for evaluation of diarrhea.  Per chart review, patient was evaluated in the ED on 2024 with abdominal pain and found to have cholecystitis underwent cholecystectomy and was ultimately discharged home.  She returned to the ED on  and 2024 for postop pain and melena.  She was admitted.  CTA negative for acute bleed and she was ultimately discharged home the following day on omeprazole twice daily with outpatient GI follow-up. Upon exam, patient is afebrile, hypertensive, but in no acute distress. RUQ and epigastric abdominal tenderness to palpation with voluntary guarding, otherwise abdomen soft and non-tender without rebound or peritoneal signs. Well healed laparoscopic surgical incisions CDI without evidence of  cellulitis or abscess. Differential diagnosis includes but not limited to viral vs bacterial gastroenteritis, gastritis, GERD, pancreatitis, hepatitis, pyelonephritis, IBS, intestinal colic/gas pains, electrolyte abnormality, anemia, dehydration. Low suspicion for mesenteric ischemia as pain not out of proportion to exam findings. Based on patient's presenting symptoms, laboratories and imaging were ordered.    CBC without leukocytosis or anemia. BMP with mild hyperglycemia at 110. HFP WNL. INR WNL. Mg WNL. Lipase elevated at 123. Due to increasing lipase and recent pancreatic inflammation on previous CT scan, plan for repeat CT which revealed pneumobilia, secondary to common bile duct stent placement, as well as findings consistent with acute edematous interstitial pancreatitis without complication in addition to mild thickening of descending and sigmoid colon.     Patient was treated with IV NS and Dilaudid upon arrival with improvement in symptoms. Symptoms and workup most consistent with acute pancreatitis secondary to common bile duct stent as well as acute colitis. Spoke with general surgery, Dr. Cardenas, who notes pancreatitis likely from partial obstruction of pancreatitic duct from common bile duct stent, no recommendation for HIDA or other imaging or surgical intervention at this time. Patient was made aware of the above findings. Due to acute pancreatitis and colitis with multiple stools in the ED, patient will require admission. I spoke with Dr. Gondal, hospitalist, who kindly accepts the admission. The patient was stable throughout ER visit and upon transfer to the floor.    Medical Decision Making  Obtained supplemental history:Supplemental history obtained?: Documented in chart and Family Member/Significant Other  Reviewed external records: External records reviewed?: Documented in chart  Care impacted by chronic illness:Diabetes, Hyperlipidemia, and Hypertension  Care significantly affected by  social determinants of health:N/A  Did you consider but not order tests?: Work up considered but not performed and documented in chart, if applicable  Did you interpret images independently?: Independent interpretation of ECG and images noted in documentation, when applicable.  Consultation discussion with other provider:Did you involve another provider (consultant, , pharmacy, etc.)?: No  Admit.    MEDICATIONS GIVEN IN THE EMERGENCY:  Medications   sodium chloride 0.9 % infusion ( Intravenous $New Bag 4/24/24 9137)   HYDROmorphone (PF) (DILAUDID) injection 0.5 mg (has no administration in time range)   lidocaine 1 % 0.1-1 mL (has no administration in time range)   lidocaine (LMX4) cream (has no administration in time range)   sodium chloride (PF) 0.9% PF flush 3 mL (has no administration in time range)   sodium chloride (PF) 0.9% PF flush 3 mL (has no administration in time range)   oxyCODONE IR (ROXICODONE) half-tab 2.5 mg (has no administration in time range)   oxyCODONE (ROXICODONE) tablet 5 mg (has no administration in time range)   ondansetron (ZOFRAN ODT) ODT tab 4 mg (has no administration in time range)     Or   ondansetron (ZOFRAN) injection 4 mg (has no administration in time range)   acetaminophen (TYLENOL) tablet 1,000 mg (has no administration in time range)   amitriptyline (ELAVIL) tablet 30 mg (has no administration in time range)   empagliflozin (JARDIANCE) tablet 10 mg (has no administration in time range)   losartan (COZAAR) tablet 25 mg (has no administration in time range)   propranolol ER (INDERAL LA) 24 hr capsule 120 mg (has no administration in time range)   sertraline (ZOLOFT) tablet 100 mg (has no administration in time range)   Baclofen (LIORESAL) tablet 5 mg (has no administration in time range)   suvorexant (BELSOMRA) half-tab 10 mg (has no administration in time range)   ferrous gluconate (FERGON) tablet 324 mg (has no administration in time range)   gabapentin (NEURONTIN) capsule  600 mg (has no administration in time range)   naloxone (NARCAN) injection 0.2 mg (has no administration in time range)     Or   naloxone (NARCAN) injection 0.4 mg (has no administration in time range)     Or   naloxone (NARCAN) injection 0.2 mg (has no administration in time range)     Or   naloxone (NARCAN) injection 0.4 mg (has no administration in time range)   pantoprazole (PROTONIX) EC tablet 40 mg (has no administration in time range)   sodium chloride 0.9% BOLUS 1,000 mL (0 mLs Intravenous Stopped 4/24/24 1357)   HYDROmorphone (PF) (DILAUDID) injection 0.5 mg (0.5 mg Intravenous $Given 4/24/24 1240)   iopamidol (ISOVUE-370) solution 64 mL (64 mLs Intravenous $Given 4/24/24 1406)   HYDROmorphone (PF) (DILAUDID) injection 0.5 mg (0.5 mg Intravenous $Given 4/24/24 1556)       NEW PRESCRIPTIONS STARTED AT TODAY'S ER VISIT  New Prescriptions    No medications on file          =================================================================    HPI    Patient information was obtained from: patient    Use of : N/A      Odette Escobar is a 68 year old female with a pertinent history of HTN, HLD, DM II, alcoholic cirrhosis, CASTRO who presents to this ED for evaluation of diarrhea.  Per chart review, patient was evaluated in the ED on 4/14/2024 with abdominal pain and found to have cholecystitis underwent cholecystectomy and was ultimately discharged home.  She returned to the ED on 4/18 and 4/19/2024 for postop pain and melena.  She was admitted.  CTA negative for acute bleed and she was ultimately discharged home the following day on omeprazole twice daily with outpatient GI follow-up.  Patient reports waking up around 3 AM this morning with diarrhea.  She reports she has had diarrhea about every 30 minutes since that time.  She reports 2 episodes of stooling while in the ED already.  She reports 1 episode of bright red blood on the outside of her stool with one of her previous stools, but states she has had  plain brown stools since that time without evidence of blood or black tarry/melena stools.  She reports associated 5 out of 10 abdominal pain and nausea.  She took Zofran at around 1030 this morning with good relief and denies any vomiting.  She denies any fevers or chills at home.  She states that she was previously constipated since she was discharged from the hospital on 4/20/2024.  This stool at 3 AM was her first bowel movement since discharge.  She denies a history of C. difficile in the past.  She is accompanied by her  who reports that he has intermittent constipation and diarrhea, and had diarrhea recently over the last couple of days.  Patient states that she typically cleans up after him.  She denies any associated chest pain or shortness of breath.  However, she does note a history of asthma and notes that she did not take her maintenance inhaler today.  She denies any urinary symptoms, dysuria, hematuria, urinary urgency or frequency, flank pain.  She reports she has not drank alcohol since last June 2023.  She otherwise denies any other drug use.  She states that she had a lab only appointment yesterday at [facility] for posthospitalization lab follow-up, but did not not see a provider.  She is otherwise not taken anything for her pain.      REVIEW OF SYSTEMS   Review of Systems see HPI    PAST MEDICAL HISTORY:  Past Medical History:   Diagnosis Date    Alcoholic cirrhosis of liver (H)     Asthma     DM2 (diabetes mellitus, type 2) (H)     Fibromyalgia     History of skin cancer     Hypertension     Migraine     Motion sickness     CASTRO on CPAP     Vertigo        PAST SURGICAL HISTORY:  Past Surgical History:   Procedure Laterality Date    ENDOMETRIAL ABLATION      ENDOSCOPIC RETROGRADE CHOLANGIOPANCREATOGRAM N/A 4/16/2024    Procedure: ENDOSCOPIC RETROGRADE CHOLANGIOPANCEATOGRAPHY, WITH SPHINCTEROTOMY;  Surgeon: Eduin Do MD;  [place] [facility] [place]    [facility]  RETROGRADE CHOLANGIOPANCREATOGRAM N/A 4/16/2024    Procedure: STONE EXTRACTION,;  Surgeon: Eduin Do MD;  Location: [facility] - [place]    ENDOSCOPIC RETROGRADE CHOLANGIOPANCREATOGRAM N/A 4/16/2024    Procedure: BILIARY STENT PLACEMENT;  Surgeon: Eduin Do MD;  Location: [facility] [place]    ESOPHAGOSCOPY, GASTROSCOPY, DUODENOSCOPY (EGD), COMBINED N/A 4/16/2024    Procedure: ESOPHAGOGASTRODUODENOSCOPY, WITH ENDOSCOPIC ULTRASOUND GUIDANCE;  Surgeon: Eduin Do MD;  Location: [facility] [place]    HERNIORRHAPHY, UMBILICAL, ROBOT-ASSISTED, LAPAROSCOPIC, USING DA ROCIO XI N/A 04/14/2024    Procedure: PRIMARY REPAIR OF  UMBILICAL HERNIA;  Surgeon: Christiano Cruz DO;  Location: [facility] [place]    HYSTERECTOMY      LAPAROSCOPIC CHOLECYSTECTOMY N/A 04/14/2024    Procedure: CHOLECYSTECTOMY, ROBOT-ASSISTED, LAPAROSCOPIC, USING DA ROCIO XI;  Surgeon: Christiano Cruz DO;  Location: [facility] [place]    TONSILLECTOMY             CURRENT MEDICATIONS:    acetaminophen (TYLENOL) 500 MG tablet  albuterol (PROAIR HFA/PROVENTIL HFA/VENTOLIN HFA) 108 (90 Base) MCG/ACT inhaler  amitriptyline (ELAVIL) 10 MG tablet  ammonium lactate (AMLACTIN) 12 % external cream  bacitracin 500 UNIT/GM external ointment  Baclofen (LIORESAL) 5 MG tablet  BELSOMRA 10 MG tablet  benzonatate (TESSALON) 100 MG capsule  budesonide-formoterol (SYMBICORT) 160-4.5 MCG/ACT Inhaler  cetirizine (ZYRTEC) 10 MG tablet  cholecalciferol (VITAMIN D3) 50 MCG (2000 UT) CAPS  estradiol (ESTRACE) 0.1 MG/GM vaginal cream  famotidine (PEPCID) 20 MG tablet  ferrous gluconate (FERGON) 324 (38 Fe) MG tablet  fluticasone (FLONASE) 50 MCG/ACT nasal spray  gabapentin (NEURONTIN) 300 MG capsule  hydrocortisone 2.5 % ointment  ibuprofen (ADVIL/MOTRIN) 200 MG tablet  ipratropium - albuterol 0.5 mg/2.5 mg/3 mL (DUONEB) 0.5-2.5 (3) MG/3ML neb solution  JARDIANCE 10 MG TABS tablet  loperamide (IMODIUM) 2 mg capsule  losartan (COZAAR) 25 MG tablet  Magnesium Oxide -Mg  Supplement 500 MG TABS  montelukast (SINGULAIR) 10 MG tablet  Multiple Vitamins-Minerals (MULTIVITAMIN WOMEN 50+) TABS  omeprazole (PRILOSEC) 20 MG DR capsule  oxyCODONE (ROXICODONE) 5 MG tablet  OZEMPIC, 0.25 OR 0.5 MG/DOSE, 2 MG/3ML pen  polyethylene glycol (MIRALAX) 17 GM/Dose powder  polyethylene glycol-propylene glycol (SYSTANE ULTRA) 0.4-0.3 % SOLN ophthalmic solution  propranolol ER (INDERAL LA) 120 MG 24 hr capsule  sertraline (ZOLOFT) 100 MG tablet  TYRVAYA 0.03 MG/ACT nasal spray        ALLERGIES:  Allergies   Allergen Reactions    Aspirin Hives    Diflunisal Nausea and Vomiting     (Dolobid) Occurred approximately 20 years ago    Occurred approximately 20 years ago   Occurred approximately 20 years ago   (Dolobid) Occurred approximately 20 years ago    Fd&C Yellow #5 (Tartrazine) Nausea and Vomiting and Hives       FAMILY HISTORY:  Family History   Problem Relation Age of Onset    Heart Failure Mother     Heart Failure Father     Heart Failure Sister     Coronary Artery Disease Brother     Coronary Artery Disease Brother     Coronary Artery Disease Brother     Cerebrovascular Disease Sister        SOCIAL HISTORY:   Social History     Socioeconomic History    Marital status: Single   Tobacco Use    Smoking status: Never    Smokeless tobacco: Never   Substance and Sexual Activity    Alcohol use: Yes     Comment: Alcoholic Drinks/day: Occasional usage    Drug use: No     Social Determinants of Health     Financial Resource Strain: At Risk (3/21/2024)    [facility]    Financial Resource Strain     Is it hard for you to pay for the very basics like food, housing, medical care or heating?: Yes   Food Insecurity: Not At Risk (3/21/2024)    [facility]    Food Insecurity     Does your food run out before you have the money to buy more?: No   Transportation Needs: Not At Risk (3/21/2024)    [facility] [facility]     Transportation Needs     Does a lack of transportation keep you from your medical appointments or from getting your medications?: No   Interpersonal Safety: Unknown (2/21/2024)    [facility]    Humiliation, Afraid, Rape, and Kick questionnaire     Physically Abused: No     Sexually Abused: No       VITALS:  /58   Pulse 78   Temp 98.9  F (37.2  C) (Oral)   Resp 18   Wt 58.4 kg (128 lb 12.8 oz)   SpO2 98%   BMI 21.60 kg/m      PHYSICAL EXAM    Constitutional:  Alert, in no acute distress. Cooperative.  EYES: Conjunctivae clear. No scleral icterus.  HENT:  Atraumatic, normocephalic.  Respiratory:  Respirations even, unlabored, in no acute respiratory distress. Lungs clear to auscultation bilaterally without wheeze, rhonchi, or rales. No cough. Speaks in full sentences easily.  Cardiovascular:  Regular rate and rhythm, good peripheral perfusion. No peripheral edema. No chest wall tenderness.  GI: Soft, flat, non-distended. Bowel sounds normal. RUQ and epigastric tenderness to palpation. No guarding, rebound, or other peritoneal signs. Well healed laparoscopic surgical incisions CDI without evidence of cellulitis or abscess.   Musculoskeletal:  No edema. No cyanosis. Range of motion major extremities intact.    Integument: Warm, Dry.   Neurologic:  Alert & oriented. No focal deficits noted.   Psych: Normal mood and affect.      LAB:  All pertinent labs reviewed and interpreted.  Results for orders placed or performed during the hospital encounter of 04/24/24   CT Abdomen Pelvis w Contrast    Impression    IMPRESSION:   1.  Pneumobilia, secondary to common bile duct stent placement  2.  mild peripancreatic soft tissue stranding, consistent with acute edematous interstitial pancreatitis, no evidence of pseudocyst or pancreatic necrosis is noted  3.  mild thickening of the descending and sigmoid colon, may be exaggerated under distention, correlate for symptoms of long segment  colitis   Basic metabolic panel   Result Value Ref Range    Sodium 141 135 - 145 mmol/L    Potassium 3.6 3.4 - 5.3 mmol/L    Chloride 105 98 - 107 mmol/L    Carbon Dioxide (CO2) 25 22 - 29 mmol/L    Anion Gap 11 7 - 15 mmol/L    Urea Nitrogen 8.4 8.0 - 23.0 mg/dL    Creatinine 0.53 0.51 - 0.95 mg/dL    GFR Estimate >90 >60 mL/min/1.73m2    Calcium 9.2 8.8 - 10.2 mg/dL    Glucose 110 (H) 70 - 99 mg/dL   Hepatic function panel   Result Value Ref Range    Protein Total 6.5 6.4 - 8.3 g/dL    Albumin 3.7 3.5 - 5.2 g/dL    Bilirubin Total 0.4 <=1.2 mg/dL    Alkaline Phosphatase 136 40 - 150 U/L    AST 25 0 - 45 U/L    ALT 39 0 - 50 U/L    Bilirubin Direct <0.20 0.00 - 0.30 mg/dL   Result Value Ref Range    Lipase 123 (H) 13 - 60 U/L   Result Value Ref Range    Magnesium 2.0 1.7 - 2.3 mg/dL   Result Value Ref Range    INR 1.07 0.85 - 1.15   CBC with platelets and differential   Result Value Ref Range    WBC Count 7.8 4.0 - 11.0 10e3/uL    RBC Count 4.01 3.80 - 5.20 10e6/uL    Hemoglobin 12.0 11.7 - 15.7 g/dL    Hematocrit 37.5 35.0 - 47.0 %    MCV 94 78 - 100 fL    MCH 29.9 26.5 - 33.0 pg    MCHC 32.0 31.5 - 36.5 g/dL    RDW 13.2 10.0 - 15.0 %    Platelet Count 324 150 - 450 10e3/uL    % Neutrophils 62 %    % Lymphocytes 27 %    % Monocytes 9 %    % Eosinophils 2 %    % Basophils 0 %    % Immature Granulocytes 0 %    NRBCs per 100 WBC 0 <1 /100    Absolute Neutrophils 4.8 1.6 - 8.3 10e3/uL    Absolute Lymphocytes 2.1 0.8 - 5.3 10e3/uL    Absolute Monocytes 0.7 0.0 - 1.3 10e3/uL    Absolute Eosinophils 0.2 0.0 - 0.7 10e3/uL    Absolute Basophils 0.0 0.0 - 0.2 10e3/uL    Absolute Immature Granulocytes 0.0 <=0.4 10e3/uL    Absolute NRBCs 0.0 10e3/uL       RADIOLOGY:  Reviewed all pertinent imaging. Please see official radiology report.  CT Abdomen Pelvis w Contrast   Final Result   IMPRESSION:    1.  Pneumobilia, secondary to common bile duct stent placement   2.  mild peripancreatic soft tissue stranding, consistent with  acute edematous interstitial pancreatitis, no evidence of pseudocyst or pancreatic necrosis is noted   3.  mild thickening of the descending and sigmoid colon, may be exaggerated under distention, correlate for symptoms of long segment colitis        Marleny Esquivel PA-C  [facility] EMERGENCY DEPARTMENT  [address] [place] [id number] [phone]      Marleny Esquivel PA-C  04/24/24 4097

## 2024-04-24 NOTE — H&P
[facility]    History and Physical - Hospitalist Service       Date of Admission:  4/24/2024    Assessment & Plan    Assessment:   Odette Escobar is a 68 year old female admitted on 4/24/2024. She presented to the ER with complaint of significant diarrhea with blood intermixed associated with right upper quadrant and epigastric abdominal pain and was found to have colitis and acute pancreatitis.  CT scan of the abdomen was performed which showed pneumobilia, secondary to common bile duct stent placement, mild peripancreatic soft tissue stranding, consistent with acute edematous interstitial pancreatitis, mild thickening of the descending and sigmoid colon, may be exaggerated under distention, correlate for symptoms of long segment colitis, patient received fluids and pain medication in the ED and is going to be admitted for acute pancreatitis and colitis    Problem list and plan:   Acute pancreatitis   Acute colitis  Presented with diarrhea with blood intermixed, abdominal pain  In the ER stool C. difficile and enteric bacterial virus panel was sent  Lipase level was 123, trend  CT scan of the abdomen showed  pneumobilia, secondary to common bile duct stent placement, mild peripancreatic soft tissue stranding, consistent with acute edematous interstitial pancreatitis, no evidence of pseudocyst or pancreatic necrosis is noted, mild thickening of the descending and sigmoid colon, may be exaggerated under distention, correlate for symptoms of long segment colitis  Patient received fluids and pain medication in the ED  GI consulted, follow-up recommendations  Pain management as per protocol  Continue with fluid resuscitation  Holding stool softeners  May start on loperamide home dose if infectious etiology of diarrhea is ruled out  As per ED provider note spoke to general surgery who notes pancreatitis likely from partial obstruction of pancreatic duct from common bile duct stent, no  recommendation for HIDA or other imaging or surgical intervention at this time as per note  Placed on Protonix 40 twice daily  Currently n.p.o., may advance diet as tolerated    Non-insulin-dependent type 2 diabetes mellitus  Hemoglobin A1c recently done was 5.4  Monitor blood sugar levels intermittently  Continue Jardiance  Holding Ozempic    History of mood disorder  Continue with amitriptyline, Belsomra, Zoloft    History of hypertension  Continue with losartan, propranolol    History of asthma not in exacerbation  May use bronchodilators if needed currently on hold    History of obstructive sleep apnea  On CPAP    Miscellaneous meds(fibromyalgia)  Continue gabapentin, baclofen    DVT PPx  Intermittent pneumatic compression    CODE STATUS  Full code as per discussion with the patient        Diet: NPO for Medical/Clinical Reasons Except for: No Exceptions    DVT Prophylaxis: Pneumatic Compression Devices  Quinonez Catheter: Not present  Lines: None     Cardiac Monitoring: None  Code Status: Full Code    Mental status: Patient is alert awake and oriented x 3, patient is a good historian most of the history was obtained from the patient, some history was obtained from chart review and the rest of the history was obtained from discussion with the ER physician.  Clinically Significant Risk Factors Present on Admission                  # Hypertension: Noted on problem list          # Financial/Environmental Concerns:    # Asthma: noted on problem list        Disposition Plan     Medically Ready for Discharge: Anticipated in 2-4 Days           Saad J. Gondal, MD  Hospitalist Service  [facility]  Securely message with [employer] (more info)  Text page via [employer] Paging/Directory     ______________________________________________________________________    Chief Complaint   Diarrhea with blood intermixed, abdominal pain    History is obtained from the patient    History of Present Illness   Odette Escobar  is a 68 year old female with a past medical history documented below presented to the ER with complaint of significant diarrhea with blood intermixed associated with right upper quadrant and epigastric abdominal pain and was found to have colitis and acute pancreatitis.  As per patient recently had cholecystectomy, stated that she initially started having hematemesis on Saturday she went to the hospital Sunday and was diagnosed with acute cholecystitis and had cholecystectomy on Sunday, she had a stone stuck in the biliary tract and had rising LFTs so she had a biliary stent placed on Tuesday, she was supposed to be discharged Thursday but as she was able to tolerate diet she was discharged Wednesday but then started having abdominal pain and gas and was not passing any gas so went back to the hospital Thursday where some tests were done and she was discharged Thursday, she started having bloody diarrhea Thursday night but as she was already discharged that day she did not go back to the hospital and scheduled appointment with [facility] who advised her to go to the hospital so she came back to the hospital Friday, they observed her in the hospital but did not do any further invasive intervention and discharged her on Saturday, she was fine from Saturday till Wednesday earlier in the morning she started having bad diarrhea, patient did complete a course of antibiotics recently, she again schedule an appointment with [facility] who recommended her to come to the ER for further evaluation, patient was also complaining of right upper quadrant and epigastric abdominal pain, in the ER vitals were fairly within normal limits, labs were also within normal limits other than mildly elevated lipase level at 123 and mild hyperglycemia, stool C. difficile and enteric bacteria and virus panel was sent, CT scan of the abdomen was performed which showed pneumobilia, secondary to common bile duct stent placement, mild  peripancreatic soft tissue stranding, consistent with acute edematous interstitial pancreatitis, mild thickening of the descending and sigmoid colon, may be exaggerated under distention, correlate for symptoms of long segment colitis, patient received fluids and pain medication in the ED and is going to be admitted for acute pancreatitis and colitis      Past Medical History    Past Medical History:   Diagnosis Date    Alcoholic cirrhosis of liver (H)     Asthma     DM2 (diabetes mellitus, type 2) (H)     Fibromyalgia     History of skin cancer     Hypertension     Migraine     Motion sickness     CASTRO on CPAP     Vertigo        Past Surgical History   Past Surgical History:   Procedure Laterality Date    ENDOMETRIAL ABLATION      ENDOSCOPIC RETROGRADE CHOLANGIOPANCREATOGRAM N/A 4/16/2024    Procedure: ENDOSCOPIC RETROGRADE CHOLANGIOPANCEATOGRAPHY, WITH SPHINCTEROTOMY;  Surgeon: Eduin Do MD;  Location: [facility] OR    ENDOSCOPIC RETROGRADE CHOLANGIOPANCREATOGRAM N/A 4/16/2024    Procedure: STONE EXTRACTION,;  Surgeon: Eduin Do MD;  Location: [facility] OR    ENDOSCOPIC RETROGRADE CHOLANGIOPANCREATOGRAM N/A 4/16/2024    Procedure: BILIARY STENT PLACEMENT;  Surgeon: Eduin Do MD;  Location: [facility] OR    ESOPHAGOSCOPY, GASTROSCOPY, DUODENOSCOPY (EGD), COMBINED N/A 4/16/2024    Procedure: ESOPHAGOGASTRODUODENOSCOPY, WITH ENDOSCOPIC ULTRASOUND GUIDANCE;  Surgeon: Eduin Do MD;  Location: [facility] OR    HERNIORRHAPHY, UMBILICAL, ROBOT-ASSISTED, LAPAROSCOPIC, USING DA ROCIO XI N/A 04/14/2024    Procedure: PRIMARY REPAIR OF  UMBILICAL HERNIA;  Surgeon: Christiano Cruz DO;  Location: [facility] OR    HYSTERECTOMY      LAPAROSCOPIC CHOLECYSTECTOMY N/A 04/14/2024    Procedure: CHOLECYSTECTOMY, ROBOT-ASSISTED, LAPAROSCOPIC, USING DA ROCIO XI;  Surgeon: Christiano Cruz DO;  Location: [facility] OR    TONSILLECTOMY         Prior to Admission Medications   Prior to Admission  Medications   Prescriptions Last Dose Informant Patient Reported? Taking?   BELSOMRA 10 MG tablet Past Month at prn  Yes Yes   Sig: Take 1 tablet by mouth nightly as needed for sleep   Baclofen (LIORESAL) 5 MG tablet Past Month at prn  Yes Yes   Sig: Take 5-10 mg by mouth 2 times daily as needed for muscle spasms or other (or sleep)   JARDIANCE 10 MG TABS tablet Past Week at unknown  Yes Yes   Sig: Take 10 mg by mouth daily   Magnesium Oxide -Mg Supplement 500 MG TABS Past Week at unknown  Yes Yes   Sig: Take 1 tablet by mouth daily   Multiple Vitamins-Minerals (MULTIVITAMIN WOMEN 50+) TABS Past Week at unknown  Yes Yes   Sig: Take 1 tablet by mouth daily   OZEMPIC, 0.25 OR 0.5 MG/DOSE, 2 MG/3ML pen Past Month at unknown  Yes Yes   Sig: Inject 0.5 mg Subcutaneous every 7 days Tuesdays   TYRVAYA 0.03 MG/ACT nasal spray Past Week at unknown  Yes Yes   Sig: Spray 1 spray into both nostrils 2 times daily   acetaminophen (TYLENOL) 500 MG tablet Past Month at prn  Yes Yes   Sig: Take 1,000 mg by mouth daily as needed for pain   albuterol (PROAIR HFA/PROVENTIL HFA/VENTOLIN HFA) 108 (90 Base) MCG/ACT inhaler Past Month at prn  Yes Yes   Sig: Inhale 2 puffs into the lungs every 6 hours as needed for shortness of breath, wheezing or cough   amitriptyline (ELAVIL) 10 MG tablet Past Week at hs  Yes Yes   Sig: Take 30 mg by mouth at bedtime   ammonium lactate (AMLACTIN) 12 % external cream Past Month at prn  Yes Yes   Sig: Apply topically daily as needed for dry skin   bacitracin 500 UNIT/GM external ointment Past Week at unknown  Yes Yes   Sig: Apply topically 2 times daily   benzonatate (TESSALON) 100 MG capsule Past Month at prn  Yes Yes   Sig: Take 200 mg by mouth 3 times daily as needed for cough   budesonide-formoterol (SYMBICORT) 160-4.5 MCG/ACT Inhaler Past Week at unknown  Yes Yes   Sig: Inhale 2 puffs into the lungs two times daily Rinse mouth/gargle after use   cetirizine (ZYRTEC) 10 MG tablet Past Week at unknown   Yes Yes   Sig: Take 10 mg by mouth daily   cholecalciferol (VITAMIN D3) 50 MCG (2000 UT) CAPS Past Week at unknown  Yes Yes   Sig: Take 4,000 Units by mouth daily   estradiol (ESTRACE) 0.1 MG/GM vaginal cream Past Week at unknown  Yes Yes   Sig: Place vaginally twice a week   famotidine (PEPCID) 20 MG tablet Past Week at unknown  Yes Yes   Sig: Take 20 mg by mouth 2 times daily   ferrous gluconate (FERGON) 324 (38 Fe) MG tablet Past Week at unknown  Yes Yes   Sig: Take 324 mg by mouth daily (with breakfast)   fluticasone (FLONASE) 50 MCG/ACT nasal spray Past Month at prn  Yes Yes   Sig: Spray 2 sprays into both nostrils daily as needed for rhinitis or allergies   gabapentin (NEURONTIN) 300 MG capsule Past Week at unknown  Yes Yes   Sig: Take 600 mg by mouth 3 times daily   hydrocortisone 2.5 % ointment Past Month at prn  Yes Yes   Sig: Apply topically 2 times daily as needed for other (Eczema)   ibuprofen (ADVIL/MOTRIN) 200 MG tablet Past Month at prn  Yes Yes   Sig: Take 600 mg by mouth 2 times daily as needed for pain   ipratropium - albuterol 0.5 mg/2.5 mg/3 mL (DUONEB) 0.5-2.5 (3) MG/3ML neb solution Past Month at prn  Yes Yes   Sig: Take 1 vial by nebulization 3 times daily as needed for shortness of breath, wheezing or cough   loperamide (IMODIUM) 2 mg capsule Past Week at prn  Yes Yes   Sig: Take 2 mg by mouth 4 times daily as needed for diarrhea   losartan (COZAAR) 25 MG tablet Past Week at unknown  No Yes   Sig: Take 1 tablet (25 mg) by mouth daily   montelukast (SINGULAIR) 10 MG tablet Past Week at unknown  Yes Yes   Sig: Take 1 tablet by mouth at bedtime   omeprazole (PRILOSEC) 20 MG DR capsule 4/23/2024 at pm  No Yes   Sig: Take 1 capsule (20 mg) by mouth 2 times daily for 30 days   oxyCODONE (ROXICODONE) 5 MG tablet Past Week at unknown  No Yes   Sig: Take 1 tablet (5 mg) by mouth every 6 hours as needed for severe pain T   polyethylene glycol (MIRALAX) 17 GM/Dose powder Past Week at unknown  No Yes    Sig: Take 17 g (1 Capful) by mouth daily   polyethylene glycol-propylene glycol (SYSTANE ULTRA) 0.4-0.3 % SOLN ophthalmic solution Past Month at prn  Yes Yes   Sig: Place 1 drop into both eyes 4 times daily as needed for dry eyes   propranolol ER (INDERAL LA) 120 MG 24 hr capsule Past Week at unknown  No Yes   Sig: Take 1 capsule (120 mg) by mouth daily   sertraline (ZOLOFT) 100 MG tablet Past Week at unknown  Yes Yes   Sig: Take 100 mg by mouth daily      Facility-Administered Medications: None        Review of Systems    The 10 point Review of Systems is negative other than noted in the HPI or here. Diarrhea with blood intermixed, abdominal pain    Physical Exam   Vital Signs: Temp: 98.9  F (37.2  C) Temp src: Oral BP: 125/58 Pulse: 78   Resp: 18 SpO2: 98 % O2 Device: None (Room air)    Weight: 128 lbs 12.8 oz    GENERAL: Patient was seen and examined at bedside with no acute distress, chronically ill-appearing and frail  HENT:  Head is normocephalic, atraumatic.  Sunken eyes, pale conjunctival mucosa  EYES:  Eyes have anicteric sclerae without conjunctival injection   LUNGS: Bilateral air entry, clear to auscultation bilaterally  CARDIOVASCULAR:  Regular rate and rhythm with no murmurs, gallops or rubs.  ABDOMEN:  Normal bowel sounds. Soft; tenderness to palpation in the right upper quadrant and epigastric region of the abdomen  EXT: no edema    SKIN:  No acute rashes.  Dry scaly wrinkled skin, poor skin turgor  NEUROLOGIC:  Grossly nonfocal.      Medical Decision Making       80 MINUTES SPENT BY ME on the date of service doing chart review, history, exam, documentation & further activities per the note.      Data     I have personally reviewed the following data over the past 24 hrs:    7.8  \   12.0   / 324     141 105 8.4 /  110 (H)   3.6 25 0.53 \     ALT: 39 AST: 25 AP: 136 TBILI: 0.4   ALB: 3.7 TOT PROTEIN: 6.5 LIPASE: 123 (H)     INR:  1.07 PTT:  N/A   D-dimer:  N/A Fibrinogen:  N/A       Imaging results  reviewed over the past 24 hrs:   Recent Results (from the past 24 hour(s))   CT Abdomen Pelvis w Contrast    Narrative    EXAM: CT ABDOMEN PELVIS W CONTRAST  LOCATION: [facility]  DATE: 4/24/2024    INDICATION: elevated lipase, s p cholecystectomy 4 14  COMPARISON: None.  TECHNIQUE: CT scan of the abdomen and pelvis was performed following injection of IV contrast. Multiplanar reformats were obtained. Dose reduction techniques were used.  CONTRAST: 100 mL of Omnipaque 350 iodinated contrast    FINDINGS:   LOWER CHEST: Normal.    HEPATOBILIARY: Pneumobilia is noted centrally, there is a common bile duct stent present. The gallbladder is surgically absent. Liver is otherwise unremarkable.    PANCREAS: Minimal soft tissue stranding is seen adjacent to the pancreatic head. No organized peripancreatic fluid collections are identified. The pancreatic parenchyma enhances normally.    SPLEEN: Normal size.    ADRENAL GLANDS: No significant nodules.    KIDNEYS/BLADDER: No significant mass, stone, or hydronephrosis.    BOWEL: The stomach and duodenum are normal in size and caliber. The small bowel is normal in size and caliber. There is mild mural thickening of the descending and sigmoid colon. No significant pericolonic stranding is present.    LYMPH NODES: No lymphadenopathy.    VASCULATURE: No abdominal aortic aneurysm.    PELVIC ORGANS: No pelvic masses.    MUSCULOSKELETAL: Unremarkable.      Impression    IMPRESSION:   1.  Pneumobilia, secondary to common bile duct stent placement  2.  mild peripancreatic soft tissue stranding, consistent with acute edematous interstitial pancreatitis, no evidence of pseudocyst or pancreatic necrosis is noted  3.  mild thickening of the descending and sigmoid colon, may be exaggerated under distention, correlate for symptoms of long segment colitis

## 2024-04-24 NOTE — TELEPHONE ENCOUNTER
General Surgery Postoperative Telephone Check-In    Telemedicine Visit: The patient's condition can be safely assessed and treated via telephone telemedicine encounter.      Mode of Communication: Telephone    Mulitple attempts to contact patient have failed. Unable to leave a message as the mailbox is full.     Chart review noted that patient did present to the ED with abdominal bloating and her exam was benign. Patient was discharged without further treatment.     Noted that other services have been unable to reach patient per chart review.    Will cease attempts at this time to continue to contact patient for post-op follow up.      Brynn Shipley, APRN CNP  [phone]  [facility]

## 2024-04-24 NOTE — ED PROVIDER NOTES
Emergency Department Midlevel Supervisory Note     I had a face to face encounter with this patient seen by the Advanced Practice Provider (MAGALY). I personally made/approved the management plan and take responsibility for the patient management. I personally saw patient and performed a substantive portion of the visit including all aspects of the medical decision making.     ED Course:  3:03 PM Marleny Esquivel PA-C staffed patient with me. I agree with their assessment and plan of management, and I will see the patient.  3:10 PM I met with the patient to introduce myself, gather additional history, perform my initial exam, and discuss the plan.     Brief HPI:     Odette Escobar is a 68 year old female who presents for evaluation of diarrhea.    Patient reports waking up around 3 AM this morning with diarrhea.  There is an episode of diarrhea about every 30 minutes since that time.She reports 2 episodes of stooling while in the ED already.  She reports 1 episode of bright red blood on the outside of her stool with one of her previous stools, but states she has had plain brown stools since that time without evidence of blood or black tarry/melena stools. She rates the pain 5/20. She took Zofran at around 1030 this morning with good relief and denies any vomiting.  Denies any fevers or chills at home. She is otherwise not taken anything for her pain.      I, Solis Cruz, am serving as a scribe to document services personally performed by Reginaldo Bob MD, based on my observations and the provider's statements to me.   I, Reginaldo Bob MD attest that Solis Cruz was acting in a scribe capacity, has observed my performance of the services and has documented them in accordance with my direction.    Brief Physical Exam: /58   Pulse 78   Temp 98.9  F (37.2  C) (Oral)   Resp 18   Wt 58.4 kg (128 lb 12.8 oz)   SpO2 98%   BMI 21.60 kg/m    Constitutional:  Alert, in no acute distress  EYES: Conjunctivae clear  HENT:   Atraumatic  Respiratory:  Respirations even, unlabored, in no acute respiratory distress  Cardiovascular:  Regular rate and rhythm, good peripheral perfusion  GI: Tender epigastric  Musculoskeletal:  Moves all 4 extremities equally, grossly symmetrical strength  Integument: Warm & dry. No appreciable rash, erythema.  Neurologic:  Alert & oriented, speech clear and fluent, no focal deficits noted  Psych: Normal mood and affect       MDM:  Patient evaluated for epigastric abdominal pain.  She is postop recent cholecystectomy.  On exam she is tender epigastric.  She was given pain medication.  Lipase is elevated.  CT shows evidence of pancreatitis.  Surgery was consulted.  She will be admitted to the hospital       1. Acute pancreatitis    2. Colitis    3. S/P laparoscopic cholecystectomy        Consults:  I discussed the patient with surgery who recommends no acute intervention at this time    Labs and Imaging:  Results for orders placed or performed during the hospital encounter of 04/24/24   CT Abdomen Pelvis w Contrast    Impression    IMPRESSION:   1.  Pneumobilia, secondary to common bile duct stent placement  2.  mild peripancreatic soft tissue stranding, consistent with acute edematous interstitial pancreatitis, no evidence of pseudocyst or pancreatic necrosis is noted  3.  mild thickening of the descending and sigmoid colon, may be exaggerated under distention, correlate for symptoms of long segment colitis   Basic metabolic panel   Result Value Ref Range    Sodium 141 135 - 145 mmol/L    Potassium 3.6 3.4 - 5.3 mmol/L    Chloride 105 98 - 107 mmol/L    Carbon Dioxide (CO2) 25 22 - 29 mmol/L    Anion Gap 11 7 - 15 mmol/L    Urea Nitrogen 8.4 8.0 - 23.0 mg/dL    Creatinine 0.53 0.51 - 0.95 mg/dL    GFR Estimate >90 >60 mL/min/1.73m2    Calcium 9.2 8.8 - 10.2 mg/dL    Glucose 110 (H) 70 - 99 mg/dL   Hepatic function panel   Result Value Ref Range    Protein Total 6.5 6.4 - 8.3 g/dL    Albumin 3.7 3.5 - 5.2 g/dL     Bilirubin Total 0.4 <=1.2 mg/dL    Alkaline Phosphatase 136 40 - 150 U/L    AST 25 0 - 45 U/L    ALT 39 0 - 50 U/L    Bilirubin Direct <0.20 0.00 - 0.30 mg/dL   Result Value Ref Range    Lipase 123 (H) 13 - 60 U/L   Result Value Ref Range    Magnesium 2.0 1.7 - 2.3 mg/dL   Result Value Ref Range    INR 1.07 0.85 - 1.15   CBC with platelets and differential   Result Value Ref Range    WBC Count 7.8 4.0 - 11.0 10e3/uL    RBC Count 4.01 3.80 - 5.20 10e6/uL    Hemoglobin 12.0 11.7 - 15.7 g/dL    Hematocrit 37.5 35.0 - 47.0 %    MCV 94 78 - 100 fL    MCH 29.9 26.5 - 33.0 pg    MCHC 32.0 31.5 - 36.5 g/dL    RDW 13.2 10.0 - 15.0 %    Platelet Count 324 150 - 450 10e3/uL    % Neutrophils 62 %    % Lymphocytes 27 %    % Monocytes 9 %    % Eosinophils 2 %    % Basophils 0 %    % Immature Granulocytes 0 %    NRBCs per 100 WBC 0 <1 /100    Absolute Neutrophils 4.8 1.6 - 8.3 10e3/uL    Absolute Lymphocytes 2.1 0.8 - 5.3 10e3/uL    Absolute Monocytes 0.7 0.0 - 1.3 10e3/uL    Absolute Eosinophils 0.2 0.0 - 0.7 10e3/uL    Absolute Basophils 0.0 0.0 - 0.2 10e3/uL    Absolute Immature Granulocytes 0.0 <=0.4 10e3/uL    Absolute NRBCs 0.0 10e3/uL       I have reviewed the relevant laboratory studies above.    I independently interpreted the following imaging study(s):         Procedures:  I was present for the key portions of procedures documented in MAGALY/midlevel note, see midlevel note for further details.    Reginaldo Bob MD  [facility] EMERGENCY DEPARTMENT  [address] [place] [id number] [phone]     Reginaldo Bob MD  04/24/24 [id number]

## 2024-04-24 NOTE — MEDICATION SCRIBE - ADMISSION MEDICATION HISTORY
Medication Scribe Admission Medication History    Admission medication history is complete. The information provided in this note is only as accurate as the sources available at the time of the update.    Information Source(s): Patient via in-person    Pertinent Information: Patient reports self management of medications.     AMH was completed at [facility] on 4/14/24 and 4/19/24    Patient reports following 4/20 AVS instructions regarding medications: (Omeprazole to BID, Stopping Atorvastatin/Celecoxib)    Patient reports, due to her health, not being able to take her medications regularly over this past week. Patient reports taking Omeprazole yesterday evening. Medications were marked as taking sometime this past week for scheduled and this past month for PRN.    Changes made to PTA medication list:  Added: None  Deleted: None  Changed: None    Allergies reviewed with patient and updates made in EHR: yes    Medication History Completed By: Reginaldo Fox 4/24/2024 4:03 PM    PTA Med List   Medication Sig Last Dose    acetaminophen (TYLENOL) 500 MG tablet Take 1,000 mg by mouth daily as needed for pain Past Month at prn    albuterol (PROAIR HFA/PROVENTIL HFA/VENTOLIN HFA) 108 (90 Base) MCG/ACT inhaler Inhale 2 puffs into the lungs every 6 hours as needed for shortness of breath, wheezing or cough Past Month at prn    amitriptyline (ELAVIL) 10 MG tablet Take 30 mg by mouth at bedtime Past Week at hs    ammonium lactate (AMLACTIN) 12 % external cream Apply topically daily as needed for dry skin Past Month at prn    bacitracin 500 UNIT/GM external ointment Apply topically 2 times daily Past Week at unknown    Baclofen (LIORESAL) 5 MG tablet Take 5-10 mg by mouth 2 times daily as needed for muscle spasms or other (or sleep) Past Month at prn    BELSOMRA 10 MG tablet Take 1 tablet by mouth nightly as needed for sleep Past Month at prn    benzonatate (TESSALON) 100 MG capsule Take 200 mg by mouth 3 times daily as needed for  cough Past Month at prn    budesonide-formoterol (SYMBICORT) 160-4.5 MCG/ACT Inhaler Inhale 2 puffs into the lungs two times daily Rinse mouth/gargle after use Past Week at unknown    cetirizine (ZYRTEC) 10 MG tablet Take 10 mg by mouth daily Past Week at unknown    cholecalciferol (VITAMIN D3) 50 MCG (2000 UT) CAPS Take 4,000 Units by mouth daily Past Week at unknown    estradiol (ESTRACE) 0.1 MG/GM vaginal cream Place vaginally twice a week Past Week at unknown    famotidine (PEPCID) 20 MG tablet Take 20 mg by mouth 2 times daily Past Week at unknown    ferrous gluconate (FERGON) 324 (38 Fe) MG tablet Take 324 mg by mouth daily (with breakfast) Past Week at unknown    fluticasone (FLONASE) 50 MCG/ACT nasal spray Spray 2 sprays into both nostrils daily as needed for rhinitis or allergies Past Month at prn    gabapentin (NEURONTIN) 300 MG capsule Take 600 mg by mouth 3 times daily Past Week at unknown    hydrocortisone 2.5 % ointment Apply topically 2 times daily as needed for other (Eczema) Past Month at prn    ibuprofen (ADVIL/MOTRIN) 200 MG tablet Take 600 mg by mouth 2 times daily as needed for pain Past Month at prn    ipratropium - albuterol 0.5 mg/2.5 mg/3 mL (DUONEB) 0.5-2.5 (3) MG/3ML neb solution Take 1 vial by nebulization 3 times daily as needed for shortness of breath, wheezing or cough Past Month at prn    JARDIANCE 10 MG TABS tablet Take 10 mg by mouth daily Past Week at unknown    loperamide (IMODIUM) 2 mg capsule Take 2 mg by mouth 4 times daily as needed for diarrhea Past Week at prn    losartan (COZAAR) 25 MG tablet Take 1 tablet (25 mg) by mouth daily Past Week at unknown    Magnesium Oxide -Mg Supplement 500 MG TABS Take 1 tablet by mouth daily Past Week at unknown    montelukast (SINGULAIR) 10 MG tablet Take 1 tablet by mouth at bedtime Past Week at unknown    Multiple Vitamins-Minerals (MULTIVITAMIN WOMEN 50+) TABS Take 1 tablet by mouth daily Past Week at unknown    omeprazole (PRILOSEC) 20  MG DR capsule Take 1 capsule (20 mg) by mouth 2 times daily for 30 days 4/23/2024 at pm    oxyCODONE (ROXICODONE) 5 MG tablet Take 1 tablet (5 mg) by mouth every 6 hours as needed for severe pain T Past Week at unknown    OZEMPIC, 0.25 OR 0.5 MG/DOSE, 2 MG/3ML pen Inject 0.5 mg Subcutaneous every 7 days Tuesdays Past Month at unknown    polyethylene glycol (MIRALAX) 17 GM/Dose powder Take 17 g (1 Capful) by mouth daily Past Week at unknown    polyethylene glycol-propylene glycol (SYSTANE ULTRA) 0.4-0.3 % SOLN ophthalmic solution Place 1 drop into both eyes 4 times daily as needed for dry eyes Past Month at prn    propranolol ER (INDERAL LA) 120 MG 24 hr capsule Take 1 capsule (120 mg) by mouth daily Past Week at unknown    sertraline (ZOLOFT) 100 MG tablet Take 100 mg by mouth daily Past Week at unknown    TYRVAYA 0.03 MG/ACT nasal spray Spray 1 spray into both nostrils 2 times daily Past Week at unknown

## 2024-04-24 NOTE — ED TRIAGE NOTES
The pt arrives with c/o abd pain and diarrhea at 3am. Was recently discharged from the hospital for having her gallbladder removed and a stent placed. States that she did have some blood in her stools around 6am.    Triage Assessment (Adult)       Row Name 04/24/24 1146          Triage Assessment    Airway WDL WDL        Cognitive/Neuro/Behavioral WDL    Cognitive/Neuro/Behavioral WDL WDL

## 2024-04-24 NOTE — ED NOTES
Pt has an order to be NPO with no exceptions at this moment, however, the H&P indicates to advance diet as tolerated. The hospitalist was messaged in order to confirm what he prefers as the patient does have oral meds due. Will wait to hear back.

## 2024-04-24 NOTE — ED NOTES
Per hospitalist on NPO status: Npo except meds if she has no more diarrhea may be uptitrated to clear liquids

## 2024-04-24 NOTE — PROGRESS NOTES
"Received several unintelligible messages from patient (for example, unable to understand her last name on voicemail. Next message writer was able to write down her phone number but \"Voicemail box is full\" when I tried to call her back. Next message stated, \"The number I am at is...\" then cut off). Reviewed with [facility] who has been trying to reach patient and her daughter. They can accept patient if they can reach her or her family. New referral sent per their request.    Chula Berger RN  "

## 2024-04-25 ENCOUNTER — APPOINTMENT (OUTPATIENT)
Dept: CT IMAGING | Facility: HOSPITAL | Age: 69
DRG: 438 | End: 2024-04-25
Attending: HOSPITALIST
Payer: COMMERCIAL

## 2024-04-25 LAB
ABO/RH(D): NORMAL
ALBUMIN SERPL BCG-MCNC: 3 G/DL (ref 3.5–5.2)
ALP SERPL-CCNC: 104 U/L (ref 40–150)
ALT SERPL W P-5'-P-CCNC: 26 U/L (ref 0–50)
ANION GAP SERPL CALCULATED.3IONS-SCNC: 9 MMOL/L (ref 7–15)
ANTIBODY SCREEN: NEGATIVE
AST SERPL W P-5'-P-CCNC: 22 U/L (ref 0–45)
BILIRUB SERPL-MCNC: 0.6 MG/DL
BLD PROD TYP BPU: NORMAL
BLOOD COMPONENT TYPE: NORMAL
BUN SERPL-MCNC: 5.2 MG/DL (ref 8–23)
CA-I BLD-MCNC: 4.9 MG/DL (ref 4.4–5.2)
CALCIUM SERPL-MCNC: 8.5 MG/DL (ref 8.8–10.2)
CHLORIDE SERPL-SCNC: 111 MMOL/L (ref 98–107)
CODING SYSTEM: NORMAL
CORTIS SERPL-MCNC: 13.5 UG/DL
CREAT SERPL-MCNC: 0.61 MG/DL (ref 0.51–0.95)
CROSSMATCH: NORMAL
DEPRECATED HCO3 PLAS-SCNC: 21 MMOL/L (ref 22–29)
EGFRCR SERPLBLD CKD-EPI 2021: >90 ML/MIN/1.73M2
ERYTHROCYTE [DISTWIDTH] IN BLOOD BY AUTOMATED COUNT: 13.6 % (ref 10–15)
ERYTHROCYTE [DISTWIDTH] IN BLOOD BY AUTOMATED COUNT: 14.2 % (ref 10–15)
GLUCOSE BLDC GLUCOMTR-MCNC: 102 MG/DL (ref 70–99)
GLUCOSE BLDC GLUCOMTR-MCNC: 116 MG/DL (ref 70–99)
GLUCOSE BLDC GLUCOMTR-MCNC: 81 MG/DL (ref 70–99)
GLUCOSE BLDC GLUCOMTR-MCNC: 81 MG/DL (ref 70–99)
GLUCOSE BLDC GLUCOMTR-MCNC: 91 MG/DL (ref 70–99)
GLUCOSE SERPL-MCNC: 117 MG/DL (ref 70–99)
HCT VFR BLD AUTO: 30 % (ref 35–47)
HCT VFR BLD AUTO: 37 % (ref 35–47)
HGB BLD-MCNC: 11.7 G/DL (ref 11.7–15.7)
HGB BLD-MCNC: 9.6 G/DL (ref 11.7–15.7)
ISSUE DATE AND TIME: NORMAL
LACTATE SERPL-SCNC: 0.9 MMOL/L (ref 0.7–2)
LIPASE SERPL-CCNC: 33 U/L (ref 13–60)
MAGNESIUM SERPL-MCNC: 1.9 MG/DL (ref 1.7–2.3)
MCH RBC QN AUTO: 29.8 PG (ref 26.5–33)
MCH RBC QN AUTO: 30.6 PG (ref 26.5–33)
MCHC RBC AUTO-ENTMCNC: 31.6 G/DL (ref 31.5–36.5)
MCHC RBC AUTO-ENTMCNC: 32 G/DL (ref 31.5–36.5)
MCV RBC AUTO: 94 FL (ref 78–100)
MCV RBC AUTO: 96 FL (ref 78–100)
PHOSPHATE SERPL-MCNC: 3.9 MG/DL (ref 2.5–4.5)
PLATELET # BLD AUTO: 268 10E3/UL (ref 150–450)
PLATELET # BLD AUTO: 343 10E3/UL (ref 150–450)
POTASSIUM SERPL-SCNC: 3.7 MMOL/L (ref 3.4–5.3)
PROT SERPL-MCNC: 5 G/DL (ref 6.4–8.3)
RBC # BLD AUTO: 3.14 10E6/UL (ref 3.8–5.2)
RBC # BLD AUTO: 3.93 10E6/UL (ref 3.8–5.2)
SODIUM SERPL-SCNC: 141 MMOL/L (ref 135–145)
SPECIMEN EXPIRATION DATE: NORMAL
UNIT ABO/RH: NORMAL
UNIT NUMBER: NORMAL
UNIT STATUS: NORMAL
UNIT TYPE ISBT: 5100
UPPER GI ENDOSCOPY: NORMAL
WBC # BLD AUTO: 5.4 10E3/UL (ref 4–11)
WBC # BLD AUTO: 8 10E3/UL (ref 4–11)

## 2024-04-25 PROCEDURE — 83605 ASSAY OF LACTIC ACID: CPT | Performed by: HOSPITALIST

## 2024-04-25 PROCEDURE — 250N000011 HC RX IP 250 OP 636: Performed by: HOSPITALIST

## 2024-04-25 PROCEDURE — 250N000013 HC RX MED GY IP 250 OP 250 PS 637: Performed by: NURSE PRACTITIONER

## 2024-04-25 PROCEDURE — 3E043XZ INTRODUCTION OF VASOPRESSOR INTO CENTRAL VEIN, PERCUTANEOUS APPROACH: ICD-10-PCS | Performed by: INTERNAL MEDICINE

## 2024-04-25 PROCEDURE — 36415 COLL VENOUS BLD VENIPUNCTURE: CPT | Performed by: HOSPITALIST

## 2024-04-25 PROCEDURE — 250N000011 HC RX IP 250 OP 636: Performed by: INTERNAL MEDICINE

## 2024-04-25 PROCEDURE — 250N000013 HC RX MED GY IP 250 OP 250 PS 637: Performed by: STUDENT IN AN ORGANIZED HEALTH CARE EDUCATION/TRAINING PROGRAM

## 2024-04-25 PROCEDURE — 82533 TOTAL CORTISOL: CPT | Performed by: INTERNAL MEDICINE

## 2024-04-25 PROCEDURE — 82330 ASSAY OF CALCIUM: CPT | Performed by: NURSE PRACTITIONER

## 2024-04-25 PROCEDURE — 250N000011 HC RX IP 250 OP 636: Performed by: STUDENT IN AN ORGANIZED HEALTH CARE EDUCATION/TRAINING PROGRAM

## 2024-04-25 PROCEDURE — 85027 COMPLETE CBC AUTOMATED: CPT | Performed by: HOSPITALIST

## 2024-04-25 PROCEDURE — 83690 ASSAY OF LIPASE: CPT | Performed by: STUDENT IN AN ORGANIZED HEALTH CARE EDUCATION/TRAINING PROGRAM

## 2024-04-25 PROCEDURE — 86923 COMPATIBILITY TEST ELECTRIC: CPT | Performed by: HOSPITALIST

## 2024-04-25 PROCEDURE — 82040 ASSAY OF SERUM ALBUMIN: CPT | Performed by: STUDENT IN AN ORGANIZED HEALTH CARE EDUCATION/TRAINING PROGRAM

## 2024-04-25 PROCEDURE — 87040 BLOOD CULTURE FOR BACTERIA: CPT | Performed by: HOSPITALIST

## 2024-04-25 PROCEDURE — 36415 COLL VENOUS BLD VENIPUNCTURE: CPT | Performed by: STUDENT IN AN ORGANIZED HEALTH CARE EDUCATION/TRAINING PROGRAM

## 2024-04-25 PROCEDURE — 85027 COMPLETE CBC AUTOMATED: CPT | Performed by: STUDENT IN AN ORGANIZED HEALTH CARE EDUCATION/TRAINING PROGRAM

## 2024-04-25 PROCEDURE — 74176 CT ABD & PELVIS W/O CONTRAST: CPT

## 2024-04-25 PROCEDURE — 272N000452 HC KIT SHRLOCK 5FR POWER PICC TRIPLE LUMEN

## 2024-04-25 PROCEDURE — 999N000099 HC STATISTIC MODERATE SEDATION < 10 MIN: Performed by: INTERNAL MEDICINE

## 2024-04-25 PROCEDURE — 258N000003 HC RX IP 258 OP 636: Performed by: NURSE PRACTITIONER

## 2024-04-25 PROCEDURE — 86900 BLOOD TYPING SEROLOGIC ABO: CPT | Performed by: HOSPITALIST

## 2024-04-25 PROCEDURE — C9113 INJ PANTOPRAZOLE SODIUM, VIA: HCPCS | Performed by: HOSPITALIST

## 2024-04-25 PROCEDURE — P9016 RBC LEUKOCYTES REDUCED: HCPCS | Performed by: HOSPITALIST

## 2024-04-25 PROCEDURE — 99291 CRITICAL CARE FIRST HOUR: CPT | Performed by: INTERNAL MEDICINE

## 2024-04-25 PROCEDURE — 120N000001 HC R&B MED SURG/OB

## 2024-04-25 PROCEDURE — 83735 ASSAY OF MAGNESIUM: CPT | Performed by: STUDENT IN AN ORGANIZED HEALTH CARE EDUCATION/TRAINING PROGRAM

## 2024-04-25 PROCEDURE — 43235 EGD DIAGNOSTIC BRUSH WASH: CPT | Performed by: INTERNAL MEDICINE

## 2024-04-25 PROCEDURE — 0DJ08ZZ INSPECTION OF UPPER INTESTINAL TRACT, VIA NATURAL OR ARTIFICIAL OPENING ENDOSCOPIC: ICD-10-PCS | Performed by: INTERNAL MEDICINE

## 2024-04-25 PROCEDURE — 250N000009 HC RX 250: Performed by: HOSPITALIST

## 2024-04-25 PROCEDURE — 36569 INSJ PICC 5 YR+ W/O IMAGING: CPT

## 2024-04-25 PROCEDURE — 258N000003 HC RX IP 258 OP 636: Performed by: HOSPITALIST

## 2024-04-25 PROCEDURE — 250N000009 HC RX 250: Performed by: INTERNAL MEDICINE

## 2024-04-25 PROCEDURE — 84100 ASSAY OF PHOSPHORUS: CPT | Performed by: STUDENT IN AN ORGANIZED HEALTH CARE EDUCATION/TRAINING PROGRAM

## 2024-04-25 RX ORDER — NALOXONE HYDROCHLORIDE 0.4 MG/ML
0.4 INJECTION, SOLUTION INTRAMUSCULAR; INTRAVENOUS; SUBCUTANEOUS
Status: DISCONTINUED | OUTPATIENT
Start: 2024-04-25 | End: 2024-04-25

## 2024-04-25 RX ORDER — DEXTROSE MONOHYDRATE 25 G/50ML
25-50 INJECTION, SOLUTION INTRAVENOUS
Status: DISCONTINUED | OUTPATIENT
Start: 2024-04-25 | End: 2024-04-29 | Stop reason: HOSPADM

## 2024-04-25 RX ORDER — LIDOCAINE 40 MG/G
CREAM TOPICAL
Status: DISCONTINUED | OUTPATIENT
Start: 2024-04-25 | End: 2024-04-26

## 2024-04-25 RX ORDER — LIDOCAINE 40 MG/G
CREAM TOPICAL
Status: DISCONTINUED | OUTPATIENT
Start: 2024-04-25 | End: 2024-04-29 | Stop reason: HOSPADM

## 2024-04-25 RX ORDER — SODIUM CHLORIDE, SODIUM LACTATE, POTASSIUM CHLORIDE, CALCIUM CHLORIDE 600; 310; 30; 20 MG/100ML; MG/100ML; MG/100ML; MG/100ML
INJECTION, SOLUTION INTRAVENOUS CONTINUOUS
Status: DISCONTINUED | OUTPATIENT
Start: 2024-04-25 | End: 2024-04-25

## 2024-04-25 RX ORDER — SIMETHICONE 40MG/0.6ML
133 SUSPENSION, DROPS(FINAL DOSAGE FORM)(ML) ORAL
Status: DISCONTINUED | OUTPATIENT
Start: 2024-04-25 | End: 2024-04-25

## 2024-04-25 RX ORDER — NOREPINEPHRINE BITARTRATE 0.02 MG/ML
.01-.6 INJECTION, SOLUTION INTRAVENOUS CONTINUOUS
Status: DISCONTINUED | OUTPATIENT
Start: 2024-04-25 | End: 2024-04-25

## 2024-04-25 RX ORDER — ALBUTEROL SULFATE 90 UG/1
2 AEROSOL, METERED RESPIRATORY (INHALATION) EVERY 6 HOURS PRN
Status: DISCONTINUED | OUTPATIENT
Start: 2024-04-25 | End: 2024-04-29 | Stop reason: HOSPADM

## 2024-04-25 RX ORDER — ROPIVACAINE IN 0.9% SOD CHL/PF 0.1 %
.03-.125 PLASTIC BAG, INJECTION (ML) EPIDURAL CONTINUOUS
Status: DISCONTINUED | OUTPATIENT
Start: 2024-04-25 | End: 2024-04-25

## 2024-04-25 RX ORDER — NICOTINE POLACRILEX 4 MG
15-30 LOZENGE BUCCAL
Status: DISCONTINUED | OUTPATIENT
Start: 2024-04-25 | End: 2024-04-25

## 2024-04-25 RX ORDER — CEFTRIAXONE 1 G/1
1 INJECTION, POWDER, FOR SOLUTION INTRAMUSCULAR; INTRAVENOUS
Status: DISCONTINUED | OUTPATIENT
Start: 2024-04-25 | End: 2024-04-28

## 2024-04-25 RX ORDER — DIPHENHYDRAMINE HYDROCHLORIDE 50 MG/ML
25-50 INJECTION INTRAMUSCULAR; INTRAVENOUS
Status: DISCONTINUED | OUTPATIENT
Start: 2024-04-25 | End: 2024-04-25

## 2024-04-25 RX ORDER — DEXTROSE MONOHYDRATE 25 G/50ML
25-50 INJECTION, SOLUTION INTRAVENOUS
Status: DISCONTINUED | OUTPATIENT
Start: 2024-04-25 | End: 2024-04-25

## 2024-04-25 RX ORDER — NICOTINE POLACRILEX 4 MG
15-30 LOZENGE BUCCAL
Status: DISCONTINUED | OUTPATIENT
Start: 2024-04-25 | End: 2024-04-29 | Stop reason: HOSPADM

## 2024-04-25 RX ORDER — FENTANYL CITRATE 50 UG/ML
50-100 INJECTION, SOLUTION INTRAMUSCULAR; INTRAVENOUS EVERY 5 MIN PRN
Status: DISCONTINUED | OUTPATIENT
Start: 2024-04-25 | End: 2024-04-25

## 2024-04-25 RX ORDER — ATROPINE SULFATE 0.1 MG/ML
1 INJECTION INTRAVENOUS
Status: DISCONTINUED | OUTPATIENT
Start: 2024-04-25 | End: 2024-04-25

## 2024-04-25 RX ORDER — FENTANYL CITRATE 50 UG/ML
INJECTION, SOLUTION INTRAMUSCULAR; INTRAVENOUS PRN
Status: DISCONTINUED | OUTPATIENT
Start: 2024-04-25 | End: 2024-04-25

## 2024-04-25 RX ORDER — NALOXONE HYDROCHLORIDE 0.4 MG/ML
0.2 INJECTION, SOLUTION INTRAMUSCULAR; INTRAVENOUS; SUBCUTANEOUS
Status: DISCONTINUED | OUTPATIENT
Start: 2024-04-25 | End: 2024-04-25

## 2024-04-25 RX ORDER — FLUTICASONE FUROATE AND VILANTEROL 200; 25 UG/1; UG/1
1 POWDER RESPIRATORY (INHALATION) DAILY
Status: DISCONTINUED | OUTPATIENT
Start: 2024-04-25 | End: 2024-04-29 | Stop reason: HOSPADM

## 2024-04-25 RX ORDER — EPINEPHRINE 1 MG/ML
0.1 INJECTION, SOLUTION INTRAMUSCULAR; SUBCUTANEOUS
Status: DISCONTINUED | OUTPATIENT
Start: 2024-04-25 | End: 2024-04-25

## 2024-04-25 RX ORDER — FLUMAZENIL 0.1 MG/ML
0.2 INJECTION, SOLUTION INTRAVENOUS
Status: DISCONTINUED | OUTPATIENT
Start: 2024-04-25 | End: 2024-04-25

## 2024-04-25 RX ADMIN — NOREPINEPHRINE BITARTRATE 0.03 MCG/KG/MIN: 0.02 INJECTION, SOLUTION INTRAVENOUS at 05:15

## 2024-04-25 RX ADMIN — SODIUM CHLORIDE 8 MG/HR: 9 INJECTION, SOLUTION INTRAVENOUS at 04:37

## 2024-04-25 RX ADMIN — PANTOPRAZOLE SODIUM 40 MG: 40 TABLET, DELAYED RELEASE ORAL at 16:10

## 2024-04-25 RX ADMIN — SODIUM CHLORIDE, POTASSIUM CHLORIDE, SODIUM LACTATE AND CALCIUM CHLORIDE: 600; 310; 30; 20 INJECTION, SOLUTION INTRAVENOUS at 11:33

## 2024-04-25 RX ADMIN — GABAPENTIN 600 MG: 300 CAPSULE ORAL at 08:57

## 2024-04-25 RX ADMIN — SODIUM CHLORIDE, POTASSIUM CHLORIDE, SODIUM LACTATE AND CALCIUM CHLORIDE 1000 ML: 600; 310; 30; 20 INJECTION, SOLUTION INTRAVENOUS at 00:59

## 2024-04-25 RX ADMIN — SODIUM CHLORIDE, POTASSIUM CHLORIDE, SODIUM LACTATE AND CALCIUM CHLORIDE: 600; 310; 30; 20 INJECTION, SOLUTION INTRAVENOUS at 02:09

## 2024-04-25 RX ADMIN — CEFTRIAXONE SODIUM 1 G: 1 INJECTION, POWDER, FOR SOLUTION INTRAMUSCULAR; INTRAVENOUS at 08:57

## 2024-04-25 RX ADMIN — SERTRALINE HYDROCHLORIDE 100 MG: 100 TABLET ORAL at 09:39

## 2024-04-25 RX ADMIN — HYDROMORPHONE HYDROCHLORIDE 0.5 MG: 1 INJECTION, SOLUTION INTRAMUSCULAR; INTRAVENOUS; SUBCUTANEOUS at 07:54

## 2024-04-25 RX ADMIN — FERROUS GLUCONATE 324 MG: 324 TABLET ORAL at 08:57

## 2024-04-25 RX ADMIN — ACETAMINOPHEN 1000 MG: 500 TABLET ORAL at 05:14

## 2024-04-25 RX ADMIN — GABAPENTIN 600 MG: 300 CAPSULE ORAL at 14:36

## 2024-04-25 RX ADMIN — LIDOCAINE HYDROCHLORIDE 2 ML: 10 INJECTION, SOLUTION EPIDURAL; INFILTRATION; INTRACAUDAL; PERINEURAL at 08:20

## 2024-04-25 RX ADMIN — GABAPENTIN 600 MG: 300 CAPSULE ORAL at 20:55

## 2024-04-25 RX ADMIN — FLUTICASONE FUROATE AND VILANTEROL TRIFENATATE 1 PUFF: 200; 25 POWDER RESPIRATORY (INHALATION) at 11:27

## 2024-04-25 ASSESSMENT — ACTIVITIES OF DAILY LIVING (ADL)
ADLS_ACUITY_SCORE: 26
ADLS_ACUITY_SCORE: 38
ADLS_ACUITY_SCORE: 28
ADLS_ACUITY_SCORE: 36
ADLS_ACUITY_SCORE: 36
ADLS_ACUITY_SCORE: 38
ADLS_ACUITY_SCORE: 38
ADLS_ACUITY_SCORE: 36
ADLS_ACUITY_SCORE: 36
ADLS_ACUITY_SCORE: 28
ADLS_ACUITY_SCORE: 36
ADLS_ACUITY_SCORE: 28
ADLS_ACUITY_SCORE: 36
ADLS_ACUITY_SCORE: 29
ADLS_ACUITY_SCORE: 28
ADLS_ACUITY_SCORE: 36
ADLS_ACUITY_SCORE: 28
ADLS_ACUITY_SCORE: 38
ADLS_ACUITY_SCORE: 36
ADLS_ACUITY_SCORE: 28
ADLS_ACUITY_SCORE: 36

## 2024-04-25 NOTE — SIGNIFICANT EVENT
Significant Event Note    Time of event: 12:53 AM April 25, 2024    Description of event:  -RN reported hypotension 70/43, MAP 52, treated with 1 L NS bolus  -Ongoing hypotension despite bolus  -Patient is groggy but arousable and answering questions appropriately  -Denies chest pain, dizziness, palpitations or abdominal pain  -On exam: Abdomen is soft and nontender  -1 L LR ordered  -Lactic acid 0.9, hemoglobin 9.6 down from 12 (suspect some hemodilution), WBC 5.4  -Status post ERCP on 4/16/2024 for choledocholithiasis requiring biliary sphincterotomy and balloon extraction with stent placement in the common bile duct  -Patient denied use of NSAIDs  -CT abdomen pelvis with contrast 4/24/2024 at 2:05 PM consistent with interstitial pancreatitis, mild thickening of the descending and sigmoid colon may be exaggerated underdistention  -Discussed with intensivist, recommended CT abdomen pelvis without contrast    Plan:  -Repeat CT abdomen pelvis without contrast  -Transfuse 1 unit PRBC, consent obtained    Discussed with: bedside nurse    Luis Alberto Herman MD

## 2024-04-25 NOTE — CONSULTS
Gastroenterology Consultation    Patient: Odette Escobar   1955  Date of Consult:  4/25/2024  Admission Date/Time: 4/24/2024 11:49 AM  Primary Care Provider:  Allie Johnson    Requesting Physician: Rosemary Leija APRN *    CHIEF COMPLAINT:   Blood in the stools    REASON FOR THE CONSULT: Bloody diarrhea    HPI:   The patient is a 68 year old White female with a history of asthma, type 2 diabetes mellitus, hypertension, obstructive sleep apnea on CPAP, fibromyalgia, who had a recent hospitalization, due to symptomatic gallstone disease.    She underwent an ERCP on April 4, 2024, which revealed presence of choledocholithiasis, requiring a biliary sphincterotomy, and a biliary stent placement after removal of the biliary stone.  She also underwent a cholecystectomy during that hospitalization.    She states that she has continued to have some abdominal cramping, which has been in the lower abdomen, as well as in her right upper quadrant since that hospitalization.  In fact, she started also noticing some dark stools, which brought her back for observation, a few days after her ERCP.  Apparently she remained hemodynamically stable, her hemoglobin remained stable, and no further intervention was required.    Over the last couple of days, she has started having looser stools, though admits having completed her course of antibiotics due to her recent acute cholecystitis.  In addition for the past 1 day, she is describing bright red blood per rectum.  C. difficile testing was negative, as well as the testing for other enteric bacteria.    CT scan of the abdomen pelvis done yesterday, was suggested pneumobilia secondary to common bile duct stent placement, which appeared to be in good position.  There was some mild peripancreatic soft tissue stranding consistent with acute interstitial pancreatitis.    Overnight, she became hypotensive, despite IV fluid resuscitation, and required pressor support with Levophed,  and was noted to have dropped her hemoglobin from 12.0-9.6 since hospitalization.  She was given 1 unit packed RBC.  Her baseline hemoglobin has been in the 11 range.  She was also transferred to the ICU for further management.    This morning, she feels better.  She has not had any further episodes of either diarrhea or blood in the stools.  She denies any nausea, vomiting, dysphagia, dyne aphasia, heartburn or regurgitation.  No hematemesis.  No anorexia or weight loss.  No constipation.    She denies any chest pain, shortness of breath, fever, chills or cough.  She does admit to feeling weak and tired.    She is not on any NSAIDs or anticoagulant therapy.  She is a non-smoker, and denies any alcohol use.    No prior history of colorectal cancer, polyps, inflammatory bowel disease.  No history of peptic ulcer disease or liver disease.    REVIEW OF SYSTEMS:  Review of systems as per HPI, rest essentially negative.    PAST MEDICAL HISTORY:  Past Medical History:   Diagnosis Date     Alcoholic cirrhosis of liver (H)      Asthma      DM2 (diabetes mellitus, type 2) (H)      Fibromyalgia      History of skin cancer      Hypertension      Migraine      Motion sickness      CASTRO on CPAP      Vertigo        PAST SURGICAL HISTORY:  Past Surgical History:   Procedure Laterality Date     ENDOMETRIAL ABLATION       ENDOSCOPIC RETROGRADE CHOLANGIOPANCREATOGRAM N/A 4/16/2024    Procedure: ENDOSCOPIC RETROGRADE CHOLANGIOPANCEATOGRAPHY, WITH SPHINCTEROTOMY;  Surgeon: Eduin Do MD;  Location: [facility]     ENDOSCOPIC RETROGRADE CHOLANGIOPANCREATOGRAM N/A 4/16/2024    Procedure: STONE EXTRACTION,;  Surgeon: Eduin Do MD;  Location: [facility]     ENDOSCOPIC RETROGRADE CHOLANGIOPANCREATOGRAM N/A 4/16/2024    Procedure: BILIARY STENT PLACEMENT;  Surgeon: Eduin Do MD;  Location: [facility]     ESOPHAGOSCOPY, GASTROSCOPY, DUODENOSCOPY (EGD), COMBINED N/A 4/16/2024    Procedure:  ESOPHAGOGASTRODUODENOSCOPY, WITH ENDOSCOPIC ULTRASOUND GUIDANCE;  Surgeon: Eduin Do MD;  [place] [facility] [place]     HERNIORRHAPHY, UMBILICAL, ROBOT-ASSISTED, LAPAROSCOPIC, USING DA ROCIO XI N/A 04/14/2024    Procedure: PRIMARY REPAIR OF  UMBILICAL HERNIA;  Surgeon: Christiano Cruz DO;  [place] [facility] [place]     HYSTERECTOMY       LAPAROSCOPIC CHOLECYSTECTOMY N/A 04/14/2024    Procedure: CHOLECYSTECTOMY, ROBOT-ASSISTED, LAPAROSCOPIC, USING DA ROCIO XI;  Surgeon: Christiano Cruz DO;  [place] [facility] [place]     PICC TRIPLE LUMEN PLACEMENT  4/25/2024     TONSILLECTOMY         MEDICATIONS:  Current Outpatient Medications   Medication Instructions     acetaminophen (TYLENOL) 1,000 mg, Oral, DAILY PRN     albuterol (PROAIR HFA/PROVENTIL HFA/VENTOLIN HFA) 108 (90 Base) MCG/ACT inhaler 2 puffs, Inhalation, EVERY 6 HOURS PRN     amitriptyline (ELAVIL) 30 mg, Oral, AT BEDTIME     ammonium lactate (AMLACTIN) 12 % external cream Topical, DAILY PRN     bacitracin 500 UNIT/GM external ointment Topical, 2 TIMES DAILY     Baclofen (LIORESAL) 5-10 mg, Oral, 2 TIMES DAILY PRN     BELSOMRA 10 MG tablet 1 tablet, Oral, AT BEDTIME PRN     benzonatate (TESSALON) 200 mg, Oral, 3 TIMES DAILY PRN     budesonide-formoterol (SYMBICORT) 160-4.5 MCG/ACT Inhaler 2 puffs, Inhalation, 2 TIMES DAILY RT, Rinse mouth/gargle after use     cetirizine (ZYRTEC) 10 mg, Oral, DAILY     estradiol (ESTRACE) 0.1 MG/GM vaginal cream Vaginal, TWICE WEEKLY     famotidine (PEPCID) 20 mg, Oral, 2 TIMES DAILY     ferrous gluconate (FERGON) 324 mg, Oral, DAILY WITH BREAKFAST     fluticasone (FLONASE) 50 MCG/ACT nasal spray 2 sprays, Both Nostrils, DAILY PRN     gabapentin (NEURONTIN) 600 mg, Oral, 3 TIMES DAILY     hydrocortisone 2.5 % ointment Topical, 2 TIMES DAILY PRN     ibuprofen (ADVIL/MOTRIN) 600 mg, Oral, 2 TIMES DAILY PRN     ipratropium - albuterol 0.5 mg/2.5 mg/3 mL (DUONEB) 0.5-2.5 (3) MG/3ML neb solution 1 vial, Nebulization, 3 TIMES  DAILY PRN     Jardiance 10 mg, Oral, DAILY     loperamide (IMODIUM) 2 mg, Oral, 4 TIMES DAILY PRN     losartan (COZAAR) 25 mg, Oral, DAILY     Magnesium Oxide -Mg Supplement 500 MG TABS 1 tablet, Oral, DAILY     montelukast (SINGULAIR) 10 MG tablet 1 tablet, Oral, AT BEDTIME     Multiple Vitamins-Minerals (MULTIVITAMIN WOMEN 50+) TABS 1 tablet, Oral, DAILY     omeprazole (PRILOSEC) 20 mg, Oral, 2 TIMES DAILY     oxyCODONE (ROXICODONE) 5 mg, Oral, EVERY 6 HOURS PRN, T     Ozempic (0.25 or 0.5 MG/DOSE) 0.5 mg, Subcutaneous, EVERY 7 DAYS, Tuesdays     polyethylene glycol (MIRALAX) 17 GM/Dose powder 1 Capful, Oral, DAILY     polyethylene glycol-propylene glycol (SYSTANE ULTRA) 0.4-0.3 % SOLN ophthalmic solution 1 drop, Both Eyes, 4 TIMES DAILY PRN     propranolol ER (INDERAL LA) 120 mg, Oral, DAILY     sertraline (ZOLOFT) 100 mg, Oral, DAILY     TYRVAYA 0.03 MG/ACT nasal spray 1 spray, Both Nostrils, 2 TIMES DAILY     vitamin D3 4,000 Units, Oral, DAILY       ALLERGIES:  Aspirin, Diflunisal, and Fd&c yellow #5 (tartrazine)    FAMILY HISTORY:  Family History   Problem Relation Age of Onset     Heart Failure Mother      Heart Failure Father      Heart Failure Sister      Coronary Artery Disease Brother      Coronary Artery Disease Brother      Coronary Artery Disease Brother      Cerebrovascular Disease Sister        SOCIAL HISTORY:  Social History     Tobacco Use     Smoking status: Never     Smokeless tobacco: Never   Substance Use Topics     Alcohol use: Yes     Comment: Alcoholic Drinks/day: Occasional usage       PHYSICAL EXAM:  /50   Pulse 73   Temp 98.2  F (36.8  C)   Resp 22   Wt 60.9 kg (134 lb 4.2 oz)   SpO2 94%   BMI 22.51 kg/m    Body mass index is 22.51 kg/m .  Constitutional: healthy, alert, and no distress   Cardiovascular: S1 and S2 normal  Respiratory: Clear to auscultation bilaterally  Abdomen: Abdomen soft, mild tenderness in right upper quadrant, no rebound or guarding or  rigidity.    LABS:  CBC:  Recent Labs   Lab Test 04/25/24  0649   WBC 8.0   RBC 3.93   HGB 11.7   HCT 37.0   MCV 94   MCH 29.8   MCHC 31.6   RDW 14.2           BMP:  Recent Labs   Lab 04/25/24  0855 04/25/24  0649 04/25/24  0437 04/24/24  2148 04/24/24  1237 04/20/24  0205 04/19/24  1541   NA  --  141  --   --  141  --  142   POTASSIUM  --  3.7  --   --  3.6  --  3.2*   CHLORIDE  --  111*  --   --  105  --  103   CO2  --  21*  --   --  25  --  29   * 117* 91   < > 110*   < > 96   CR  --  0.61  --   --  0.53  --  0.47*   BUN  --  5.2*  --   --  8.4  --  4.9*    < > = values in this interval not displayed.       Liver/Pancreas:  Recent Labs   Lab 04/25/24  0649 04/24/24  1237 04/19/24  1541   AST 22 25 29   ALT 26 39 107*   ALKPHOS 104 136 144   BILITOTAL 0.6 0.4 0.7   LIPASE 33 123* 42     Recent Labs   Lab Test 04/24/24  1237   INR 1.07       IMAGING:    CT Abdomen Pelvis w/o Contrast    Result Date: 4/25/2024  EXAM: CT ABDOMEN PELVIS W/O CONTRAST LOCATION: [facility] DATE: 4/25/2024 INDICATION: hypotension, gi bleed. Laparoscopic cholecystectomy 04/14/2024; ERCP with stone removal and stent installation 04/16/2024 COMPARISON: Contrast-enhanced abdomen pelvis CT from 2:00 PM 04/24/2024 and CT 04/19/2024. TECHNIQUE: CT scan of the abdomen and pelvis was performed without IV contrast. Multiplanar reformats were obtained. Dose reduction techniques were used. CONTRAST: None. FINDINGS: LOWER CHEST: Normal. HEPATOBILIARY: Common duct stent position as expected and unchanged. Pneumobilia. No focal hepatic lesion. New, small volume of low-density peritoneal fluid around the dome of the liver, adjacent to the spleen, and in the pelvis. Small foci of intraperitoneal gas are again observed, less voluminous than on the April 19 exam and acceptable given the time frame since cholecystectomy. PANCREAS: Mild soft tissue edema around the raeann hepatis and lateral to the pancreatic head on the  right. Pancreatic parenchyma has a normal noncontrast appearance; no new inflammatory change around the pancreatic body or tail. No focal fluid collection. SPLEEN: Normal. ADRENAL GLANDS: Normal. KIDNEYS/BLADDER: Normal. BOWEL: Normal. Segments of the colon which contain air demonstrate normal wall thickness; no acute bowel pathology is evident. LYMPH NODES: Normal. VASCULATURE: Atheromatous vascular calcification. PELVIC ORGANS: Normal. MUSCULOSKELETAL: No focal abscess collection. No bone or body wall lesion.     IMPRESSION: 1.  Biliary stent remains in place and is unchanged in position. There is some edema and periportal soft tissues and adjacent pancreatic head similar as on CT yesterday afternoon. The only interval changes the new presence of small volume of low-density peritoneal fluid over the dome of the liver and minimally adjacent to the spleen and pelvis, of unknown etiology.     CT Abdomen Pelvis w Contrast    Result Date: 4/24/2024  EXAM: CT ABDOMEN PELVIS W CONTRAST LOCATION: [facility] DATE: 4/24/2024 INDICATION: elevated lipase, s p cholecystectomy 4 14 COMPARISON: None. TECHNIQUE: CT scan of the abdomen and pelvis was performed following injection of IV contrast. Multiplanar reformats were obtained. Dose reduction techniques were used. CONTRAST: 100 mL of Omnipaque 350 iodinated contrast FINDINGS: LOWER CHEST: Normal. HEPATOBILIARY: Pneumobilia is noted centrally, there is a common bile duct stent present. The gallbladder is surgically absent. Liver is otherwise unremarkable. PANCREAS: Minimal soft tissue stranding is seen adjacent to the pancreatic head. No organized peripancreatic fluid collections are identified. The pancreatic parenchyma enhances normally. SPLEEN: Normal size. ADRENAL GLANDS: No significant nodules. KIDNEYS/BLADDER: No significant mass, stone, or hydronephrosis. BOWEL: The stomach and duodenum are normal in size and caliber. The small bowel is normal in  size and caliber. There is mild mural thickening of the descending and sigmoid colon. No significant pericolonic stranding is present. LYMPH NODES: No lymphadenopathy. VASCULATURE: No abdominal aortic aneurysm. PELVIC ORGANS: No pelvic masses. MUSCULOSKELETAL: Unremarkable.     IMPRESSION: 1.  Pneumobilia, secondary to common bile duct stent placement 2.  mild peripancreatic soft tissue stranding, consistent with acute edematous interstitial pancreatitis, no evidence of pseudocyst or pancreatic necrosis is noted 3.  mild thickening of the descending and sigmoid colon, may be exaggerated under distention, correlate for symptoms of long segment colitis      ASSESSMENT:   Odette Escobar is a 68-year-old female with multiple medical issues, with recent hospitalization for acute cholecystitis and choledocholithiasis, requiring cholecystectomy and ERCP for biliary sphincterotomy, stone extraction and stent placement.    Subsequently she has had initially melenic stools, and then now has presented with a 1 day history of diarrhea and bright red blood per rectum.  In conjunction, she was hemodynamically unstable, with concerns for hemorrhagic shock, requiring pressor support, IV fluids, and blood transfusion.    She has had no further evidence for GI bleeding, but her presentation is concerning for an upper GI source of blood loss.  Possibilities certainly include peptic ulcer disease, Dieulafoy's lesion of the stomach, as well as bleeding from the recent sphincterotomy site.    There is always a possibility, that she had a lower GI bleed, though these are less likely to be such hemodynamically significant bleeds.    In addition, her imaging study is suggestive of some mild pancreatitis.  She is now 10 days out of her recent ERCP, which would make post ERCP pancreatitis as well as post ERCP bleeding much less likely.    Her liver function test are normal, suggesting that the biliary stent is in place, and is working well.  She  does not have any leukocytosis either, again suggesting it unlikely that the biliary stent is blocked off or that there might be clot at the sphincterotomy site.    Regardless, at this point she will certainly benefit from an upper endoscopy for further evaluation.  I have discussed the indication, procedure as well as potential risks of an upper endoscopy with her, which she understands and is agreeable to proceed with.    PLAN:  1.  Keep n.p.o.  2.  Monitor CBC, transfuse as needed  3.  Avoid NSAIDs or anticoagulant therapy  4.  PPI twice daily  5.  EGD today    Thank you for this consultation, we will follow along with you.  Further recommendations based upon the results of the upper endoscopy this morning.            Zohaib Morgan MD  Thank you for the opportunity to participate in the care of this patient.   Please feel free to call with any questions or concerns.  [phone].       CC: [facility], Allie Johnson

## 2024-04-25 NOTE — PROGRESS NOTES
Pulmonary/Critical Care Consult Team Note    Odette Escobar,  1955, MRN [id number]  Admitting Dx: Acute pancreatitis [K85.90]  Colitis [K52.9]  S/P laparoscopic cholecystectomy [Z90.49]  Date / Time of Admission:  2024 11:49 AM    Recent Events:  Intake/Output last 3 shifts:  I/O last 3 completed shifts:  In: 1443.75 [I.V.:443.75; IV Piggyback:1000]  Out: -     She denies lightheadedness of dizziness  She denies dyspnea  She took her symbicort today  She denies fevers or chills  Minimal abdominal pain    Assessment/Plan: Odette Escobar is a 68 year old female with PMHx of HTN, DM2, Fibromyalgia, COPD who on 2024 developed significant diarrhea with blood intermixed associated with right upper quadrant and epigastric abdominal pain and was found to have colitis and acute pancreatitis s/p ERCP on 2024 for choledocholithiasis requiring biliary sphincterotomy and balloon extraction with stent placement in the common bile duct who returned to the ER multiple times for abdominal pain who presented to the ER last night for diarrhea and blood in the stool who was hypotensive and transferred to the ICU for further care.    Hypotensive due to vasodilation from acute pancreatitis and anemia of blood loss  - on levophed to keep MAP >65  - getting one unit PRBC now  - repeat Hgb after above  - sending a random cortisol  - Ceftriaxone  - NPO except meds    Hx of Hypotension  - holding home meds    Hx of fibromyalgia and mood disorder  - continue home meds    Medical Care Time excluding procedures and family discussions greater than: 1 Hour    Risk Factors Present on Admission:  Clinically Significant Risk Factors Present on Admission                  # Hypertension: Noted on problem list   # Circulatory Shock: required vasopressors within past 24 hours           # Financial/Environmental Concerns:    # Asthma: noted on problem list     # Anemia: based on hgb <11           Dipti Graham DO  Pulmonary and  Critical Care Attending  pgr [phone]    Allergies   Allergen Reactions    Aspirin Hives    Diflunisal Nausea and Vomiting     (Dolobid) Occurred approximately 20 years ago    Occurred approximately 20 years ago   Occurred approximately 20 years ago   (Dolobid) Occurred approximately 20 years ago    Fd&C Yellow #5 (Tartrazine) Nausea and Vomiting and Hives       Meds: See MAR    Physical Exam:  BP 90/54   Pulse 60   Temp 97.9  F (36.6  C) (Oral)   Resp 15   Wt 60.9 kg (134 lb 4.2 oz)   SpO2 96%   BMI 22.51 kg/m    Intake/Output this shift:  I/O this shift:  In: -   Out: 400 [Urine:400]  GEN: sitting up in bed, NAD  HEENT: MMD  CVS: regular rhythm, no murmurs  RESP: CTA BL   ABD: Soft, No abdominal pain with palpation, no guarding, no rigidity  EXT: Warm, well perfused, no edema, left arm in a cast  NEURO: moving all extremities, nonfocal  PSYCH: pleasant    Pertinent Labs: Latest lab results in EHR personally reviewed.   CMP  Recent Labs   Lab 04/25/24  0437 04/24/24  2148 04/24/24  1237 04/20/24  1137 04/20/24  0205 04/19/24  1541 04/18/24  0940   NA  --   --  141  --   --  142 143   POTASSIUM  --   --  3.6  --   --  3.2* 3.4   CHLORIDE  --   --  105  --   --  103 107   CO2  --   --  25  --   --  29 30*   ANIONGAP  --   --  11  --   --  10 6*   GLC 91 94 110* 87   < > 96 129*   BUN  --   --  8.4  --   --  4.9* 7.5*   CR  --   --  0.53  --   --  0.47* 0.51   GFRESTIMATED  --   --  >90  --   --  >90 >90   JOVON  --   --  9.2  --   --  9.6 8.6*   MAG  --   --  2.0  --   --  2.0  --    PHOS  --   --  4.5  --   --   --   --    PROTTOTAL  --   --  6.5  --   --  7.1 5.7*   ALBUMIN  --   --  3.7  --   --  3.7 3.1*   BILITOTAL  --   --  0.4  --   --  0.7 0.7   ALKPHOS  --   --  136  --   --  144 130   AST  --   --  25  --   --  29 35   ALT  --   --  39  --   --  107* 117*    < > = values in this interval not displayed.     CBC  Recent Labs   Lab 04/25/24  0204 04/24/24  1237 04/20/24  0734 04/20/24  0113  04/19/24  1541 04/18/24  0940   WBC 5.4 7.8  --   --  7.2 10.2   RBC 3.14* 4.01  --   --  3.91 3.71*   HGB 9.6* 12.0 11.1* 10.7* 11.9 11.2*   HCT 30.0* 37.5  --   --  36.6 34.6*   MCV 96 94  --   --  94 93   MCH 30.6 29.9  --   --  30.4 30.2   MCHC 32.0 32.0  --   --  32.5 32.4   RDW 13.6 13.2  --   --  13.5 13.5    324  --   --  247 199     INR  Recent Labs   Lab 04/24/24  1237 04/19/24  1541   INR 1.07 0.99     Arterial Blood GasNo lab results found in last 7 days.    Cultures: not yet available.      Imaging: personally reviewed.   No results found for this or any previous visit.    No results found for this or any previous visit.    Results for orders placed during the hospital encounter of 04/24/24    CT Abdomen Pelvis w/o Contrast    Narrative  EXAM: CT ABDOMEN PELVIS W/O CONTRAST  LOCATION: [facility]  DATE: 4/25/2024    INDICATION: hypotension, gi bleed. Laparoscopic cholecystectomy 04/14/2024; ERCP with stone removal and stent installation 04/16/2024  COMPARISON: Contrast-enhanced abdomen pelvis CT from 2:00 PM 04/24/2024 and CT 04/19/2024.  TECHNIQUE: CT scan of the abdomen and pelvis was performed without IV contrast. Multiplanar reformats were obtained. Dose reduction techniques were used.  CONTRAST: None.    FINDINGS:  LOWER CHEST: Normal.    HEPATOBILIARY: Common duct stent position as expected and unchanged. Pneumobilia. No focal hepatic lesion. New, small volume of low-density peritoneal fluid around the dome of the liver, adjacent to the spleen, and in the pelvis. Small foci of  intraperitoneal gas are again observed, less voluminous than on the April 19 exam and acceptable given the time frame since cholecystectomy.    PANCREAS: Mild soft tissue edema around the raeann hepatis and lateral to the pancreatic head on the right. Pancreatic parenchyma has a normal noncontrast appearance; no new inflammatory change around the pancreatic body or tail. No focal  fluid  collection.    SPLEEN: Normal.    ADRENAL GLANDS: Normal.    KIDNEYS/BLADDER: Normal.    BOWEL: Normal. Segments of the colon which contain air demonstrate normal wall thickness; no acute bowel pathology is evident.    LYMPH NODES: Normal.    VASCULATURE: Atheromatous vascular calcification.    PELVIC ORGANS: Normal.    MUSCULOSKELETAL: No focal abscess collection. No bone or body wall lesion.    Impression  IMPRESSION:  1.  Biliary stent remains in place and is unchanged in position. There is some edema and periportal soft tissues and adjacent pancreatic head similar as on CT yesterday afternoon. The only interval changes the new presence of small volume of low-density  peritoneal fluid over the dome of the liver and minimally adjacent to the spleen and pelvis, of unknown etiology.    No results found for this or any previous visit.      Patient Active Problem List   Diagnosis    Low back pain    Anxiety    Asthma    Diabetes mellitus (H)    Essential hypertension    Gastroesophageal reflux disease with esophagitis    Hyperlipidemia    Lumbar radiculopathy    Lumbar facet arthropathy    DDD (degenerative disc disease), lumbar    Fibromyalgia    Chronic neck pain    Adjustment disorder with mixed anxiety and depressed mood    Alcohol abuse, in remission    Benign essential tremor    Benign paroxysmal positional vertigo    Cervical radicular pain    CASTRO (obstructive sleep apnea)    SOBOE (shortness of breath on exertion)    Type 2 diabetes mellitus with diabetic neuropathy (H)    Allergic rhinitis    Allergic to aspirin    Arthritis of finger    Hand dermatitis    Plantar fasciitis    Blepharitis    Cat bite of right hand    Chest pain    Chronic constipation    Chronic pain of both shoulders    Costochondral pain    Cramp of both lower extremities    Dysphagia    Esophageal stricture    Frequent episodes of sinusitis    Seborrheic dermatitis of scalp    Gastroesophageal reflux disease without esophagitis     History of allergy to aspirin    History of diverticulitis    History of syncope    Hot flashes    YUMIKO (iron deficiency anemia)    Insomnia    Lack of adequate sleep    Iron deficiency    Localized osteoarthritis of knees, bilateral    Low folic acid    Lymphangitis    Midline cystocele    Mild persistent asthma without complication    Mixed incontinence    New daily persistent headache    Non-traumatic rotator cuff tear    Nuclear sclerotic cataract of both eyes    Numbness and tingling in left hand    Onychomycosis    Other cirrhosis of liver (H)    Patellofemoral pain syndrome of right knee    Portal hypertension (H)    Presbyopia    Right hand tendonitis    Strain of right deltoid muscle    Tension headache    Trochanteric bursitis of both hips    Vitiligo    Type 2 diabetes mellitus with hypoglycemia without coma (H)    Postmenopausal atrophic vaginitis    Elevated LFTs    Right upper quadrant abdominal pain    Rectal bleeding    Acute pancreatitis    Colitis    S/P laparoscopic cholecystectomy    Post-op pain         Surgical History  She  has a past surgical history that includes Endometrial Ablation; Laparoscopic cholecystectomy (N/A, 04/14/2024); Herniorrhaphy, Umbilical, Robot-Assisted, Laparoscopic, Using Da Suleman Xi (N/A, 04/14/2024); tonsillectomy; Hysterectomy; Esophagoscopy, gastroscopy, duodenoscopy (EGD), combined (N/A, 4/16/2024); Endoscopic retrograde cholangiopancreatogram (N/A, 4/16/2024); Endoscopic retrograde cholangiopancreatogram (N/A, 4/16/2024); and Endoscopic retrograde cholangiopancreatogram (N/A, 4/16/2024).    Family History  Reviewed, and family history includes Cerebrovascular Disease in her sister; Coronary Artery Disease in her brother, brother, and brother; Heart Failure in her father, mother, and sister.    Social History  Reviewed, and she  reports that she has never smoked. She has never used smokeless tobacco. She reports current alcohol use. She reports that she does not use  drugs.    Allergies  Allergies   Allergen Reactions    Aspirin Hives    Diflunisal Nausea and Vomiting     (Dolobid) Occurred approximately 20 years ago    Occurred approximately 20 years ago   Occurred approximately 20 years ago   (Dolobid) Occurred approximately 20 years ago    Fd&C Yellow #5 (Tartrazine) Nausea and Vomiting and Hives              Dipti Graham DO  Pulmonary and Critical Care Attending  pgr [phone]    Securely message with the Vocera Web Console (learn more here)

## 2024-04-25 NOTE — PROGRESS NOTES
[facility]    After midnight - Hospitalist Service    Assessment and Plan    Active Problems:    Acute pancreatitis    Colitis    S/P laparoscopic cholecystectomy    Post-op pain    Patient downgraded this afternoon and seen by ICU team today  Odette Escobar is a 68 year old old female with h/o PMHx of HTN, DM2, Fibromyalgia, COPD who on 4/24/2024 developed significant diarrhea with blood intermixed associated with right upper quadrant and epigastric abdominal pain and was found to have colitis and acute pancreatitis s/p ERCP on 4/16/2024 for choledocholithiasis requiring biliary sphincterotomy and balloon extraction with stent placement in the common bile duct who returned to the ER multiple times for abdominal pain who presented to the ER last night for diarrhea and blood in the stool     Transferred to ICU last night due to pressors but now downgrading this afternoon per ICU team    Pancreatitis with hypotension and transferred to ICU now stable   - previously given 1 L NS bolus then placed on levophed after additional liter of LR   -Lactic acid 0.9, hemoglobin 9.6 down from 12 (suspect some hemodilution), WBC 5.4  -Status post ERCP on 4/16/2024 for choledocholithiasis requiring biliary sphincterotomy and balloon extraction with stent placement in the common bile duct  -CT abdomen pelvis with contrast 4/24/2024 at 2:05 PM consistent with interstitial pancreatitis, mild thickening of the descending and sigmoid colon may be exaggerated underdistention  - intensivist recommended repeat CT abdomen pelvis without contrast Biliary stent remains in place and is unchanged in position. There is some edema and periportal soft tissues and adjacent pancreatic head similar as on CT yesterday afternoon. The only interval changes the new presence of small volume of low-density peritoneal fluid over the dome of the liver and minimally adjacent to the spleen and pelvis, of unknown etiology.  - - EGD done  today by Dr. Morgan. Normal esophagus and stomach. Biliary stent in place in the duodenum. No source of bleeding found.   Okd to advance diet to clear liquid.       VTE prophylaxis:  Pneumatic Compression Devices  DIET: Orders Placed This Encounter      Clear Liquid Diet    Drains/Lines: none  Weight bearing status: WBAT  Disposition/Barriers to discharge: pending improvement  Code Status:Full Code    Subjective:  Downgraded from ICU today     B/P: 106/54, T: 98, P: 71, R: 22     PERTINENT LABS  Imaging results reviewed over the past 24 hrs:   Recent Results (from the past 24 hour(s))   CT Abdomen Pelvis w/o Contrast    Narrative    EXAM: CT ABDOMEN PELVIS W/O CONTRAST  LOCATION: [facility]  DATE: 4/25/2024    INDICATION: hypotension, gi bleed. Laparoscopic cholecystectomy 04/14/2024; ERCP with stone removal and stent installation 04/16/2024  COMPARISON: Contrast-enhanced abdomen pelvis CT from 2:00 PM 04/24/2024 and CT 04/19/2024.  TECHNIQUE: CT scan of the abdomen and pelvis was performed without IV contrast. Multiplanar reformats were obtained. Dose reduction techniques were used.  CONTRAST: None.    FINDINGS:   LOWER CHEST: Normal.    HEPATOBILIARY: Common duct stent position as expected and unchanged. Pneumobilia. No focal hepatic lesion. New, small volume of low-density peritoneal fluid around the dome of the liver, adjacent to the spleen, and in the pelvis. Small foci of   intraperitoneal gas are again observed, less voluminous than on the April 19 exam and acceptable given the time frame since cholecystectomy.    PANCREAS: Mild soft tissue edema around the raeann hepatis and lateral to the pancreatic head on the right. Pancreatic parenchyma has a normal noncontrast appearance; no new inflammatory change around the pancreatic body or tail. No focal fluid   collection.    SPLEEN: Normal.    ADRENAL GLANDS: Normal.    KIDNEYS/BLADDER: Normal.    BOWEL: Normal. Segments of the colon  which contain air demonstrate normal wall thickness; no acute bowel pathology is evident.    LYMPH NODES: Normal.    VASCULATURE: Atheromatous vascular calcification.    PELVIC ORGANS: Normal.    MUSCULOSKELETAL: No focal abscess collection. No bone or body wall lesion.       Impression    IMPRESSION:   1.  Biliary stent remains in place and is unchanged in position. There is some edema and periportal soft tissues and adjacent pancreatic head similar as on CT yesterday afternoon. The only interval changes the new presence of small volume of low-density   peritoneal fluid over the dome of the liver and minimally adjacent to the spleen and pelvis, of unknown etiology.       Recent Labs   Lab 04/25/24  1608 04/25/24  1122 04/25/24  0855 04/25/24  0649 04/25/24  0437 04/25/24  0204 04/24/24  2148 04/24/24  1237 04/20/24  0113 04/19/24  1541   WBC  --   --   --  8.0  --  5.4  --  7.8  --  7.2   HGB  --   --   --  11.7  --  9.6*  --  12.0   < > 11.9   MCV  --   --   --  94  --  96  --  94  --  94   PLT  --   --   --  343  --  268  --  324  --  247   INR  --   --   --   --   --   --   --  1.07  --  0.99   NA  --   --   --  141  --   --   --  141  --  142   POTASSIUM  --   --   --  3.7  --   --   --  3.6  --  3.2*   CHLORIDE  --   --   --  111*  --   --   --  105  --  103   CO2  --   --   --  21*  --   --   --  25  --  29   BUN  --   --   --  5.2*  --   --   --  8.4  --  4.9*   CR  --   --   --  0.61  --   --   --  0.53  --  0.47*   ANIONGAP  --   --   --  9  --   --   --  11  --  10   JOVON  --   --   --  8.5*  --   --   --  9.2  --  9.6   GLC 81 102* 116* 117*   < >  --    < > 110*   < > 96   ALBUMIN  --   --   --  3.0*  --   --   --  3.7  --  3.7   PROTTOTAL  --   --   --  5.0*  --   --   --  6.5  --  7.1   BILITOTAL  --   --   --  0.6  --   --   --  0.4  --  0.7   ALKPHOS  --   --   --  104  --   --   --  136  --  144   ALT  --   --   --  26  --   --   --  39  --  107*   AST  --   --   --  22  --   --   --  25  --  29    LIPASE  --   --   --  33  --   --   --  123*  --  42    < > = values in this interval not displayed.       Ritika Nunes MD  [facility]  [phone]

## 2024-04-25 NOTE — PROGRESS NOTES
Patient transferred to ICU and on levophed,spoke with Dr. Girard briefly and started on Levophed and should be ICU. Notified Rosemary Green on [facility] signing off. Please notify [facility] when ready to downgrade

## 2024-04-25 NOTE — PROGRESS NOTES
SPIRITUAL HEALTH SERVICES Note     Saw pt Odette Escobar and administered Shinto sacrament of anointing for the healing of the sick.    Fr. Yeison Li

## 2024-04-25 NOTE — PLAN OF CARE
[facility] - ICU    RN Progress Note:            Key Events - This Shift:     - Received 1 unit of blood and tolerated it well.  - Started levophed to keep MAP >65.  - PRN tylenol given x1 for lower back pain and abdominal discomfort.           Barriers to Discharge / Downgrade:   - on pressors.    Problem: Pancreatitis  Goal: Fluid and Electrolyte Balance  Outcome: Progressing  Goal: Absence of Infection Signs and Symptoms  Outcome: Progressing  Goal: Optimal Nutrition Delivery  Outcome: Progressing  Goal: Optimal Pain Control and Function  Outcome: Progressing  Goal: Effective Oxygenation and Ventilation  Outcome: Progressing  Intervention: Optimize Oxygenation and Ventilation

## 2024-04-25 NOTE — PROGRESS NOTES
Pulmonary/Critical Care  4/25/2024  3:35 PM     Chart reviewed.   EGD done today by Dr. Morgan. Normal esophagus and stomach. Biliary stent in place in the duodenum. No source of bleeding found.   Okd to advance diet to clear liquid.   Needed a bit of levophed post procedure due to sedation but now off.     OK to step down out of ICU.   Will notify [facility].     Our service will sign off.     Rosemary Leija, CNP  [facility] Pulmonary/Critical Care

## 2024-04-25 NOTE — CONSULTS
"Care Management Initial Consult    General Information  Assessment completed with: Patient, Spouse or significant other, Juan  Type of CM/SW Visit: Initial Assessment    Primary Care Provider verified and updated as needed: No   Readmission within the last 30 days: previous discharge plan unsuccessful 4/14/24 - 4/17/24, 4/19/24 - 4/20/24   Return Category: New Diagnosis  Reason for Consult: discharge planning, length of stay  Advance Care Planning: Advance Care Planning Reviewed: no concerns identified          Communication Assessment  Patient's communication style: spoken language (English or Bilingual)             Cognitive  Cognitive/Neuro/Behavioral: WDL                      Living Environment:   People in home: significant other  Odette and significant other Vance  Current living Arrangements: house (\"I live in a condo. It is an above the garage condo. So I have 15 steps to get up into the main level living\".)      Able to return to prior arrangements: yes       Family/Social Support:  Care provided by: self, spouse/significant other  Provides care for: no one, unable/limited ability to care for self  Marital Status: Lives with Significant Other  Significant Other, Children       Vance  Description of Support System: Supportive, Involved    Support Assessment: Adequate family and caregiver support, Adequate social supports, Patient communicates needs well met    Current Resources:   Patient receiving home care services: No (She was set up with [facility] 4/15/24, but she never answered her phone so never did SOC.)     Community Resources: None  Equipment currently used at home: none  Supplies currently used at home: Oxygen Tubing/Supplies, Nebulizer tubing, Other (\"CPAP, Nebulizer, reading glasses\")    Employment/Financial:  Employment Status: retired (\"I was working part time up until around [facility], but now my doctor says I have to stay retired\".)     Employment/ Comments: \"no " " history\"  Financial Concerns:     Referral to Financial Worker: No       Does the patient's insurance plan have a 3 day qualifying hospital stay waiver?  No        Functional Status:  Prior to admission patient needed assistance:   Dependent ADLs:: Independent, Ambulation-no assistive device  Dependent IADLs:: Independent (\"I drive mostly during the nighttime. Vance cannot see as well so he doesn't drive at night, but drives during the day\".)       Mental Health Status:  Mental Health Status: Past Concern  Mental Health Management: Medication    Chemical Dependency Status:                Values/Beliefs:  Spiritual, Cultural Beliefs, Latter-day Practices, Values that affect care:                 Additional Information:  Odette lives in a condo with her significant other Vance. \"I live in a condo. It is an above the garage condo. So I have 15 steps to get up into the main level living\".    She is independent with ADLs and IADLs. \"I drive mostly during the nighttime. Vance cannot see as well so he doesn't drive at night, but drives during the day. My son could also help with transportation if I needed him to\".    She was admitted 4/14/24 - 4/17/24, 4/19/24 - 4/20/24. At her 4/17/24 discharge home care was ordered and they accepted her on 4/15/24. When she got home \"[facility] called her, but never got an answer so didn't do SOC\". Patient states, \"I think my phone wasn't working right. My phone is getting fixed and I will answer now. Otherwise they could call my sister Maureen Kohli [phone] or my son Figueroa [phone].\" She will need new home care orders placed at discharge.    Significant other to transport at discharge.    CM to follow for medical progression of care, discharge recommendations, and final discharge plan.    Kandace Kohli RN    "

## 2024-04-25 NOTE — PROGRESS NOTES
"Care Management Follow Up    Length of Stay (days): 1    Expected Discharge Date: 04/27/2024     Concerns to be Addressed:  GI status, NPO, able to tolerate diet, pain, IV medications    Patient plan of care discussed at interdisciplinary rounds: Yes    Anticipated Discharge Disposition:  TBD     Anticipated Discharge Services:  TBD  Anticipated Discharge DME:  TBD       Additional Information:  Patient presented to the ER with complaint of significant diarrhea with blood intermixed associated with right upper quadrant and epigastric abdominal pain and was found to have colitis and acute pancreatitis.  GI following.        Social History:  Odette lives in a condo with her significant other Vance. \"I live in a condo. It is an above the garage condo. So I have 15 steps to get up into the main level living\".     She is independent with ADLs and IADLs. \"I drive mostly during the nighttime. Vacne cannot see as well so he doesn't drive at night, but drives during the day. My son could also help with transportation if I needed him to\".     She was admitted 4/14/24 - 4/17/24, 4/19/24 - 4/20/24. At her 4/17/24 discharge home care was ordered and they accepted her on 4/15/24. When she got home \"[facility] called her, but never got an answer so didn't do SOC\". Patient states, \"I think my phone wasn't working right. My phone is getting fixed and I will answer now. Otherwise they could call my sister Maureen Kohli [phone] or my son Figueroa [phone].\" She will need new home care orders placed at discharge.     Significant other to transport at discharge.      4/25/24:  Final discharge plan pending progression and recommendations.    Elaine Loo, RN      "

## 2024-04-25 NOTE — SIGNIFICANT EVENT
Significant Event Note    Time of event: 3:40 AM April 25, 2024    Description of event:  -Ongoing circulatory shock despite appropriate IV fluid resuscitation  -CT abdomen pelvis being done    Plan:  -Nothing by mouth  -IV Protonix drip  -Transfuse as needed  -Follow-up CT  -Start Levophed  -Admit to ICU    Discussed with: bedside nurse    Luis Alberto Herman MD

## 2024-04-25 NOTE — PROCEDURES
"PICC Line Insertion Procedure Note  Pt. Name: Odette Escobar  MRN:        [id number]    Procedure: Insertion of a  triple Lumen  5 fr  Bard SOLO (valved) Power PICC, Lot number [id number]    Indications: vasopressors    Contraindications : none, currently has cast to left lower arm    Procedure Details     Patient identified with 2 identifiers and \"Time Out\" conducted.  .     Central line insertion bundle followed: hand hygiene performed prior to procedure, site cleansed with cholraprep, hat, mask, sterile gloves, sterile gown worn, patient draped with maximum barrier head to toe drape, sterile field maintained.    The vein was assessed and found to be compressible and of adequate size.     Lidocaine 1% 2 ml administered sq to the insertion site. A 5 Fr PICC was inserted into the BASILIC vein of the right arm with ultrasound guidance. 1 attempt(s) required to access vein.   Catheter threaded without difficulty. Good blood return noted.    Modified Seldinger Technique used for insertion.    The 8 sharps that are included in the PICC insertion kit were accounted for and disposed of in the sharps container prior to breakdown of the sterile field.    Catheter secured with Statlock, biopatch and Tegaderm dressing applied.    Findings:    Total catheter length  38 cm, with 0 cm exposed. Mid upper arm circumference is 24 cm. Catheter was flushed with 30 cc NS. Patient  tolerated procedure well.    Tip placement verified by [employer] . Tip placement in the SVC.      CLABSI prevention brochure left at bedside.    Patient's primary RN notified PICC is ready for use.      Comments:        Shaylee Smiley RN PICC  [facility] - [facility]      "

## 2024-04-26 LAB
CRP SERPL-MCNC: 8.1 MG/L
GLUCOSE BLDC GLUCOMTR-MCNC: 127 MG/DL (ref 70–99)
GLUCOSE BLDC GLUCOMTR-MCNC: 128 MG/DL (ref 70–99)
GLUCOSE BLDC GLUCOMTR-MCNC: 164 MG/DL (ref 70–99)
GLUCOSE BLDC GLUCOMTR-MCNC: 77 MG/DL (ref 70–99)
GLUCOSE BLDC GLUCOMTR-MCNC: 79 MG/DL (ref 70–99)
HGB BLD-MCNC: 12.4 G/DL (ref 11.7–15.7)

## 2024-04-26 PROCEDURE — 250N000011 HC RX IP 250 OP 636: Performed by: NURSE PRACTITIONER

## 2024-04-26 PROCEDURE — 120N000001 HC R&B MED SURG/OB

## 2024-04-26 PROCEDURE — 85018 HEMOGLOBIN: CPT | Performed by: NURSE PRACTITIONER

## 2024-04-26 PROCEDURE — 250N000013 HC RX MED GY IP 250 OP 250 PS 637: Performed by: NURSE PRACTITIONER

## 2024-04-26 PROCEDURE — 86140 C-REACTIVE PROTEIN: CPT | Performed by: NURSE PRACTITIONER

## 2024-04-26 PROCEDURE — 250N000012 HC RX MED GY IP 250 OP 636 PS 637: Performed by: NURSE PRACTITIONER

## 2024-04-26 PROCEDURE — 99233 SBSQ HOSP IP/OBS HIGH 50: CPT | Performed by: INTERNAL MEDICINE

## 2024-04-26 RX ADMIN — BACLOFEN 5 MG: 5 TABLET ORAL at 09:48

## 2024-04-26 RX ADMIN — PANTOPRAZOLE SODIUM 40 MG: 40 TABLET, DELAYED RELEASE ORAL at 06:17

## 2024-04-26 RX ADMIN — OXYCODONE HYDROCHLORIDE 5 MG: 5 TABLET ORAL at 08:08

## 2024-04-26 RX ADMIN — INSULIN ASPART 1 UNITS: 100 INJECTION, SOLUTION INTRAVENOUS; SUBCUTANEOUS at 11:41

## 2024-04-26 RX ADMIN — FLUTICASONE FUROATE AND VILANTEROL TRIFENATATE 1 PUFF: 200; 25 POWDER RESPIRATORY (INHALATION) at 08:08

## 2024-04-26 RX ADMIN — PANTOPRAZOLE SODIUM 40 MG: 40 TABLET, DELAYED RELEASE ORAL at 18:05

## 2024-04-26 RX ADMIN — CEFTRIAXONE SODIUM 1 G: 1 INJECTION, POWDER, FOR SOLUTION INTRAMUSCULAR; INTRAVENOUS at 06:18

## 2024-04-26 RX ADMIN — GABAPENTIN 600 MG: 300 CAPSULE ORAL at 20:19

## 2024-04-26 RX ADMIN — SERTRALINE HYDROCHLORIDE 100 MG: 100 TABLET ORAL at 20:19

## 2024-04-26 RX ADMIN — OXYCODONE HYDROCHLORIDE 5 MG: 5 TABLET ORAL at 22:38

## 2024-04-26 RX ADMIN — GABAPENTIN 600 MG: 300 CAPSULE ORAL at 08:08

## 2024-04-26 ASSESSMENT — ACTIVITIES OF DAILY LIVING (ADL)
ADLS_ACUITY_SCORE: 27
ADLS_ACUITY_SCORE: 26
ADLS_ACUITY_SCORE: 26
ADLS_ACUITY_SCORE: 27
ADLS_ACUITY_SCORE: 27
ADLS_ACUITY_SCORE: 28
ADLS_ACUITY_SCORE: 27
ADLS_ACUITY_SCORE: 28
ADLS_ACUITY_SCORE: 26
ADLS_ACUITY_SCORE: 27
ADLS_ACUITY_SCORE: 28
ADLS_ACUITY_SCORE: 28
ADLS_ACUITY_SCORE: 26

## 2024-04-26 NOTE — PLAN OF CARE
NURSING PROGRESS NOTE  Shift Summary      Date:   Neuro/Musculoskeletal:  A&Ox4. Calm and pleasant towards writer and other staff. Receptive to all cares.   Cardiac: On TELE monitoring: NSR. Denied and offered no c/o CP. VSS. BP soft but MAP >65.   Respiratory:  LS: CTA, absent of any adventitious sounds. Continued on RA, SpO2: 97%. Absent of SOB/cough.   GI/:  BS: normoactive x4Q. Last BM: . Voiding spontaneously without difficulty.   Diet/Appetite:  Tolerating a clear liquid diet.   Activity:  Up indep, steady gait noted.   Pain: Denied and offered no c/o pain.   Skin:  No new deficits noted.   LDAs + Drips/IVF:  PIV x2 to RUE: SL, dressing CDI. PICC: triple lumen to RUE. Dressing CDI. All lumens patent, good blood return: SL.   Protocols/Labs:  Not on any electrolyte replacement protocols. RM and 79      Goal Outcome Evaluation:    Problem: Pain Acute  Goal: Optimal Pain Control and Function  Outcome: Progressing  Intervention: Prevent or Manage Pain  Recent Flowsheet Documentation  Taken 2024 0300 by Efrem Gomez, RN  Medication Review/Management:   medications reviewed   high-risk medications identified  Taken 2024 by Efrem Gomez, RN  Medication Review/Management:   medications reviewed   high-risk medications identified  Intervention: Optimize Psychosocial Wellbeing  Recent Flowsheet Documentation  Taken 2024 0300 by Efrem Gomez, RN  Supportive Measures:   active listening utilized   verbalization of feelings encouraged  Taken 2024 by Efrem Gomez, RN  Supportive Measures:   active listening utilized   verbalization of feelings encouraged     Problem: Pancreatitis  Goal: Fluid and Electrolyte Balance  Outcome: Progressing  Goal: Optimal Pain Control and Function  Outcome: Progressing  Goal: Effective Oxygenation and Ventilation  Intervention: Optimize Oxygenation and Ventilation  Recent Flowsheet Documentation  Taken 2024  0500 by Efrem Gomez, RN  Activity Management:   activity adjusted per tolerance   ambulated in room   ambulated to bathroom  Taken 4/26/2024 0300 by Efrem Gomez, RN  Cough And Deep Breathing: done independently per patient  Activity Management:   activity adjusted per tolerance   ambulated in room   ambulated to bathroom  Head of Bed (HOB) Positioning: HOB at 20-30 degrees  Taken 4/25/2024 2106 by Efrem Gomez, RN  Cough And Deep Breathing: done independently per patient

## 2024-04-26 NOTE — PROGRESS NOTES
[facility]    PROGRESS NOTE - Hospitalist Service    Assessment and Plan    Principal Problem:    Acute pancreatitis  Active Problems:    Essential hypertension    Fibromyalgia    CASTRO (obstructive sleep apnea)    Type 2 diabetes mellitus with diabetic neuropathy (H)    Gastroesophageal reflux disease without esophagitis    Mild persistent asthma without complication    Colitis    S/P laparoscopic cholecystectomy    Post-op pain    Odette Escobar is a 68 year old female with h/o PMHx of HTN, DM2, Fibromyalgia, COPD who on 4/24/2024 developed significant diarrhea with blood intermixed associated with right upper quadrant and epigastric abdominal pain and was found to have colitis and acute pancreatitis s/p ERCP on 4/16/2024 for choledocholithiasis requiring biliary sphincterotomy and balloon extraction with stent placement in the common bile duct who returned to the ER multiple times for abdominal pain who presented to the ER last night for diarrhea and blood in the stool      Transferred to ICU previously due to hypotension and  now stable and downgraded on 4/25     Pancreatitic inflammation concern for acute pancreatitis of unknown etiology.   -Status post ERCP on 4/16/2024 for choledocholithiasis requiring biliary sphincterotomy and balloon extraction with stent placement in the common bile duct  -CT abdomen pelvis with contrast 4/24/2024 at 2:05 PM consistent with interstitial pancreatitis, mild thickening of the descending and sigmoid colon may be exaggerated underdistention  - 4/15/2024 repeat CT abdomen pelvis without contrast Biliary stent remains in place and is unchanged in position. There is some edema and periportal soft tissues and adjacent pancreatic head similar as on CT yesterday afternoon. The only interval changes the new presence of small volume of low-density peritoneal fluid over the dome of the liver and minimally adjacent to the spleen and pelvis, of unknown etiology.  -   "EGD on 4/25 by Dr. Morgan unrevealing. Normal esophagus and stomach. Biliary stent in place in the duodenum. No source of bleeding found.   - GI seen today [facility]   - trend hgb if ongoing problems with anemia consider IR intervention vs if stable outpatient pill cam.   - outpatient colonoscopy   - PPI BID   - CRP minimally elevated  - would recommend discontinuation of Ozempic at discharge can cause pancreatitis     Hypotension requiring ICU monitoring resolved   - -previously given 1 L NS bolus then placed on levophed after additional liter of LR Lactic acid 0.9, hemoglobin 9.6 down from 12 (suspect some hemodilution), WBC 5.4  - downgrade 4/25 from ICU   - pressures stable   - holding BP meds   - monitor Hgb  - holding iron for now with dark stool to ensure no evidence of bleeding   - discontinue telemetry     Acute colitis  - resolved on repeat CT   - mild RLQ abd pain monitor for now    Choledocholithiasis with stone removal and stent placement. ERCP with stent removal in 8 weeks.   - GI follow up     DM type 2 Hemoglobin A1c recently done was 5.4  - holding jardiance and ozempic   - novolog sliding scale TID with meals while in the hospital   - continue gabapentin      History of mood disorder and fibromyalgia   Continue  Zoloft and prn belsomra  - holding amitriptyline for now      History of hypertension  Continue to hold BP meds  losartan, propranolol  - pressures low normal and stable      History of asthma not in exacerbation  - continue inhalers      History of obstructive sleep apnea  - continue home CPAP    Clinically Significant Risk Factors      # Overweight: Estimated body mass index is 28.31 kg/m  as calculated from the following:  Height as of this encounter: 1.626 m (5' 4\").  Weight as of this encounter: 74.8 kg (164 lb 14.4 oz)., PRESENT ON ADMISSION    COVID-19 PCR Results          3/18/2024    20:21   COVID-19 PCR Results   SARS CoV2 PCR Negative      COVID-19 Antibody Results, Testing for " Immunity           No data to display               Code Status: Full Code  VTE prophylaxis:  Pneumatic Compression Devices  DIET: Orders Placed This Encounter      Advance Diet as Tolerated: Regular Diet Adult    Drains/Lines: Patient has PICC/CVC/Central line.    Qu[place]one[place] Catheter: Not present      Weight bearing status: WBAt     Expected Discharge Date: 04/27/2024    Discharge Delays: Lab Result Pending (enter specific test & time in comments)  OT New Consult needed  OT Disposition recs needed  Placement - Homecare  PT New Consult needed  PT Disposition recs needed  Specialist Consult (enter specialist & decision needed in comments)  Voiding/Eating Trial needed  Destination: home with family;home with help/services  Discharge Comments: pending GI urther recs, Hgb stable and vitals      Subjective:  RLQ pain and epigastric pain but improving and tolerating diet.  in room and dicussed plan monitoring overnight to ensure tolerating diet and rechecking labs     PHYSICAL EXAM  Temp:  [97.4  F (36.3  C)-98.1  F (36.7  C)] 98.1  F (36.7  C)  Pulse:  [71-80] 75  Resp:  [18-26] 18  BP: (102-110)/(52-68) 107/56  SpO2:  [96 %-100 %] 98 %  Wt Readings from Last 1 Encounters:   04/25/24 60.9 kg (134 lb 4.2 oz)       Intake/Output Summary (Last 24 hours) at 4/26/2024 0800  Last data filed at 4/26/2024 0500  Gross per 24 hour   Intake 2382.78 ml   Output --   Net 2382.78 ml      Body mass index is 22.51 kg/m .    Physical Exam  Vitals and nursing note reviewed.   Constitutional:       Comments: Chronically ill-appearing but nontoxic and NAD   HENT:      Head: Normocephalic and atraumatic.   Cardiovascular:      Rate and Rhythm: Normal rate and regular rhythm.      Pulses: Normal pulses.   Pulmonary:      Effort: Pulmonary effort is normal. No respiratory distress.      Breath sounds: Normal breath sounds. No wheezing or rales.   Abdominal:      General: Bowel sounds are normal. There is no distension.      Palpations:  Abdomen is soft.      Comments: Epigastric tenderness with gentle palpation  RLQ tenderness with Deep palpation   Musculoskeletal:         General: Normal range of motion.      Right lower leg: No edema.      Left lower leg: No edema.   Skin:     General: Skin is warm and dry.      Capillary Refill: Capillary refill takes less than 2 seconds.   Neurological:      Mental Status: She is alert and oriented to person, place, and time. Mental status is at baseline.       PERTINENT LABS/IMAGING:  Results for orders placed or performed during the hospital encounter of 04/24/24   CT Abdomen Pelvis w Contrast    Impression    IMPRESSION:   1.  Pneumobilia, secondary to common bile duct stent placement  2.  mild peripancreatic soft tissue stranding, consistent with acute edematous interstitial pancreatitis, no evidence of pseudocyst or pancreatic necrosis is noted  3.  mild thickening of the descending and sigmoid colon, may be exaggerated under distention, correlate for symptoms of long segment colitis   CT Abdomen Pelvis w/o Contrast    Impression    IMPRESSION:   1.  Biliary stent remains in place and is unchanged in position. There is some edema and periportal soft tissues and adjacent pancreatic head similar as on CT yesterday afternoon. The only interval changes the new presence of small volume of low-density   peritoneal fluid over the dome of the liver and minimally adjacent to the spleen and pelvis, of unknown etiology.         Imaging results reviewed over the past 24 hrs:   No results found for this or any previous visit (from the past 24 hour(s)).  Recent Labs   Lab 04/26/24  1135 04/26/24  1123 04/26/24  0803 04/26/24  0208 04/25/24  0855 04/25/24  0649 04/25/24  0437 04/25/24  0204 04/24/24  2148 04/24/24  1237 04/20/24  0113 04/19/24  1541   WBC  --   --   --   --   --  8.0  --  5.4  --  7.8  --  7.2   HGB 12.4  --   --   --   --  11.7  --  9.6*  --  12.0   < > 11.9   MCV  --   --   --   --   --  94  --  96  --   94  --  94   PLT  --   --   --   --   --  343  --  268  --  324  --  247   INR  --   --   --   --   --   --   --   --   --  1.07  --  0.99   NA  --   --   --   --   --  141  --   --   --  141  --  142   POTASSIUM  --   --   --   --   --  3.7  --   --   --  3.6  --  3.2*   CHLORIDE  --   --   --   --   --  111*  --   --   --  105  --  103   CO2  --   --   --   --   --  21*  --   --   --  25  --  29   BUN  --   --   --   --   --  5.2*  --   --   --  8.4  --  4.9*   CR  --   --   --   --   --  0.61  --   --   --  0.53  --  0.47*   ANIONGAP  --   --   --   --   --  9  --   --   --  11  --  10   JOVON  --   --   --   --   --  8.5*  --   --   --  9.2  --  9.6   GLC  --  164* 77 79   < > 117*   < >  --    < > 110*   < > 96   ALBUMIN  --   --   --   --   --  3.0*  --   --   --  3.7  --  3.7   PROTTOTAL  --   --   --   --   --  5.0*  --   --   --  6.5  --  7.1   BILITOTAL  --   --   --   --   --  0.6  --   --   --  0.4  --  0.7   ALKPHOS  --   --   --   --   --  104  --   --   --  136  --  144   ALT  --   --   --   --   --  26  --   --   --  39  --  107*   AST  --   --   --   --   --  22  --   --   --  25  --  29   LIPASE  --   --   --   --   --  33  --   --   --  123*  --  42    < > = values in this interval not displayed.       Recent Labs   Lab Test 04/14/24  0600   A1C 5.4         50 MINUTES SPENT BY ME on the date of service doing chart review, history, exam, documentation, discussion with nursing staff and specialist, & further activities per the note.  Ritika Nunes MD  [facility]  [phone]

## 2024-04-26 NOTE — PLAN OF CARE
Problem: Skin Injury Risk Increased  Goal: Skin Health and Integrity  Outcome: Progressing  Intervention: Plan: Nurse Driven Intervention: Moisture Management  Recent Flowsheet Documentation  Taken 4/26/2024 0800 by Con Julien, RN  Moisture Interventions: Encourage regular toileting  Bathing/Skin Care: bath, partial     Problem: Adult Inpatient Plan of Care  Goal: Optimal Comfort and Wellbeing  Outcome: Progressing     Problem: Adult Inpatient Plan of Care  Goal: Absence of Hospital-Acquired Illness or Injury  Outcome: Progressing     Problem: Adult Inpatient Plan of Care  Goal: Readiness for Transition of Care  Outcome: Progressing   Goal Outcome Evaluation:      Plan of Care Reviewed With: patient, significant other    Overall Patient Progress: improving

## 2024-04-26 NOTE — PROGRESS NOTES
Spoke with Patient regarding wearing home CPAP . Started to set up missing her hose to her Machine. Declined hospital machine being she can't use her home mask. Will have Family bring her hose. Placed on 2L for night. RN notified.    Gertrudis Michaud, RT

## 2024-04-26 NOTE — PROGRESS NOTES
CLINICAL NUTRITION SERVICES - ASSESSMENT NOTE     Nutrition Prescription    RECOMMENDATIONS FOR MDs/PROVIDERS TO ORDER:  None at this time    Malnutrition Status:    Not noted    Recommendations already ordered by Registered Dietitian (RD):  Add SF gelatein supplement twice daily ( with lunch and supper meals)    Future/Additional Recommendations:  Monitor po intake, weight, diet advance, labs     REASON FOR ASSESSMENT  Odette Escobar is a/an 68 year old female assessed by the dietitian for Admission Nutrition Risk Screen for positive weight loss PTA but noted to be eating fine ( and pt states she was eating fine)    Pt with past medical history of hypertension, DM 2, fibromyalgia, COPD who developed significant diarrhea with blood intermixed associated with right upper quadrant epigastric abdominal pain and was found to have colitis and acute pancreatitis.  She is s/p ERCP on 4/16/2024 for choledocholithiasis requiring biliary sphincterotomy and balloon extraction with stent placement in the common bile duct who returned to the ER multiple times for abdominal pain.  Presented to the ER on 4/24 for diarrhea and blood in the stool    NUTRITION HISTORY  Pt states she avoids simple CHOs.  She did take about 6 protein drinks at home- does take a multi vitamin, D 3 and Mg at home. She states she has been gradually losing weight and is not sure why. She checks her BG at home in the morning and states  when she was on prednisone they were up in the 200s but have since come down.  Pt states she does not have diarrhea anymore since yesterday.  She states she is lactose intolerant.    CURRENT NUTRITION ORDERS  Diet: full liquids ( diet just adv from clear liquids)  Intake/Tolerance: Ate 100% clear liquids at lunch on 4/25    LABS  Labs reviewed: ( 4/25)- Na 141, K 3.7, urea nitrogen 5.2 (L), Cr 0.61, Glu 117, lipase 33    MEDICATIONS  Medications reviewed: rocephin, novolog, pantoprazole    ANTHROPOMETRICS  Height:  "5'4.75\"  Most Recent Weight: 60.9 kg (134 lb 4.2 oz)    IBW: 56.25 kg ( 124 lb)  BMI: Normal BMI  Weight History:   Wt Readings from Last 10 Encounters:   04/25/24 60.9 kg (134 lb 4.2 oz)   04/19/24 61.7 kg (136 lb)   04/18/24 63.2 kg (139 lb 6.4 oz)   04/14/24 60.8 kg (134 lb 1.6 oz)   04/02/24 62.6 kg (138 lb)   03/18/24 62.6 kg (138 lb)   03/07/24 64.4 kg (142 lb)   02/26/19 70.9 kg (156 lb 6.4 oz)   Weight down 5 lb in 1 week ( 3.6%)    Dosing Weight: 60.9 kg    ASSESSED NUTRITION NEEDS  Estimated Energy Needs: 1344-8008 kcals/day (25 - 30 kcals/kg)  Justification: Maintenance  Estimated Protein Needs: 73+ grams protein/day (1.2+ g/Kg)  Justification: Increased needs  Estimated Fluid Needs: 3850-4306 mL/day (25 - 30 mL/kg)   Justification: Maintenance    PHYSICAL FINDINGS  See malnutrition section below.  Skin: Scab on forearm  Jean Pierre score=18, nutrition noted as probably inadequate  GI: Abdominal discomfort  Last BM 4/26/2024    MALNUTRITION:  % Weight Loss:  > 2% in 1 week (severe malnutrition)  % Intake:  No decreased intake noted  Subcutaneous Fat Loss:  None observed  Muscle Loss:  None observed  Fluid Retention:  None noted    Malnutrition Diagnosis: Patient does not meet two of the above criteria necessary for diagnosing malnutrition    NUTRITION DIAGNOSIS  Predicted inadequate nutrient intake related to acute illness as evidenced by weight loss PTA      INTERVENTIONS  Implementation  Add gelatein SF twice daily with lunch and supper meals  Monitor po intake, weight, diet adv, labs    Goals  Maintain weight  Patient to consume % of nutritionally adequate meals three times per day, or the equivalent with supplements/snacks.     Monitoring/Evaluation  Progress toward goals will be monitored and evaluated per protocol.   "

## 2024-04-26 NOTE — PROGRESS NOTES
GASTROENTEROLOGY PROGRESS NOTE     SUBJECTIVE: still with abdominal pain- diffusely- CT with signs of pancreatic edema and left sided bowel inflammation, bowel inflammation not seen on CT without contrast yesterday.  Pain on the right side of abdomen.  On clear liquid diet requesting to have diet advanced. Patient reports black loose stool yesterday, none today. HGB normal yesterday and not checked today.  C diff and enteric pathogen negative from 2024. Initially had loose melenotic stools then bright red blood per rectum. Was on iron and omeprazole at home.   She remains on ceftriaxone and pantoprazole.    EGD yesterday without signs of GI bleeding  Last colonoscopy reported about 15 years ago with polyps  ERCP 2024 with removal of common bile duct stone and temporary stent placed. Major papilla located in diverticulum with plans to repeat ERCP in 8 weeks with stent removal.   Pancreatic inflammation noted on imaging 2024 with lipase 123. Atypical pain presentation for pancreatitis.   Transferred to ICU for circulatory shock placed on levophed and admitted to ICU. HGB 12 down to 9.6. 1 unit of blood transfused. Transferred out of ICU later in the day for stable symptoms.     OBJECTIVE:   /58 (BP Location: Left arm)   Pulse 76   Temp 98  F (36.7  C) (Oral)   Resp 18   Wt 60.9 kg (134 lb 4.2 oz)   SpO2 98%   BMI 22.51 kg/m     Temp (24hrs), Av  F (36.7  C), Min:97.4  F (36.3  C), Max:98.6  F (37  C)     Patient Vitals for the past 72 hrs:   Weight   24 0435 60.9 kg (134 lb 4.2 oz)   24 1146 58.4 kg (128 lb 12.8 oz)        Intake/Output Summary (Last 24 hours) at 2024 1005  Last data filed at 2024 0811  Gross per 24 hour   Intake 1680.02 ml   Output --   Net 1680.02 ml      PHYSICAL EXAM   Constitutional: ill appearing female sitting up in the chair, in no acute distress  Respiratory: respirations non labored.   Abdomen: abdomen soft with right sided colon  tenderness.     I have reviewed the patient's new clinical lab results:   Recent Labs   Lab Test 04/25/24  0649 04/25/24  0204 04/24/24  1237 04/20/24  0113 04/19/24  1541   WBC 8.0 5.4 7.8  --  7.2   HGB 11.7 9.6* 12.0   < > 11.9   MCV 94 96 94  --  94    268 324  --  247   INR  --   --  1.07  --  0.99    < > = values in this interval not displayed.      Recent Labs   Lab Test 04/25/24  0649 04/24/24  1237 04/19/24  1541    141 142   POTASSIUM 3.7 3.6 3.2*   CHLORIDE 111* 105 103   CO2 21* 25 29   BUN 5.2* 8.4 4.9*   CR 0.61 0.53 0.47*   ANIONGAP 9 11 10   JOVON 8.5* 9.2 9.6      Recent Labs   Lab Test 04/25/24  0649 04/24/24  1237 04/19/24  1541 04/18/24  1009 04/14/24  0600 03/07/24  1046 03/07/24  0945 02/11/19  0853   ALBUMIN 3.0* 3.7 3.7  --    < >  --    < >  --    BILITOTAL 0.6 0.4 0.7  --    < >  --    < >  --    ALT 26 39 107*  --    < >  --    < >  --    AST 22 25 29  --    < >  --    < >  --    ALKPHOS 104 136 144  --    < >  --    < >  --    PROTEIN  --   --   --  Negative  --  Negative  --  Negative   LIPASE 33 123* 42  --    < >  --    < >  --     < > = values in this interval not displayed.    CT with contrast                                                                IMPRESSION:   1.  Pneumobilia, secondary to common bile duct stent placement  2.  mild peripancreatic soft tissue stranding, consistent with acute edematous interstitial pancreatitis, no evidence of pseudocyst or pancreatic necrosis is noted  3.  mild thickening of the descending and sigmoid colon, may be exaggerated under distention, correlate for symptoms of long segment colitis  CT without contrast                                                                IMPRESSION:   1.  Biliary stent remains in place and is unchanged in position. There is some edema and periportal soft tissues and adjacent pancreatic head similar as on CT yesterday afternoon. The only interval changes the new presence of small volume of  low-density   peritoneal fluid over the dome of the liver and minimally adjacent to the spleen and pelvis, of unknown etiology.  Assessment:  Odette Escobar is a 68-year-old female with multiple medical issues, with recent hospitalization for acute cholecystitis and choledocholithiasis, requiring cholecystectomy and ERCP for biliary sphincterotomy, stone extraction and stent placement 4/16/2024. She presented with abdominal pain 4/24 with CT imaging suggesting left sided colitis and pancreatic inflammation. LFT remaining normal. Yesterday HGB dropped 2 gm with hypotension and melena then bright red stools requiring blood pressure support and blood transfusion. EGD unrevealing. Stool studies negative for infection. Repeat CT without contrast showed resolution of colitis but ongoing pancreatic inflammation. She continues to have some right sided abdominal pain and reports melena with loose stools yesterday.   Melena the bright red blood per rectum- with HGB drop yesterday with hypotension requiring blood pressure support and blood transfuse. Upper GI source of GI bleed not found. History of multiple colon polyps per patient with last colonoscopy greater than 15 years ago. She reports black stools yesterday.  Consider small bowel or lower GI source. Will hold off on colonoscopy unless HGB should drop again. She can plan outpatient colonoscopy and piill cam if there are no findings. These can be completed inpatient if ongoing bleeding. If brisk bleeding then IR intervention.   2. Pancreatitic inflammation concerning for acute pancreatitis of unknown etiology. Will check CRP. Current pain atypically of pancreatitis pain, lipase 123. No signs of biliary obstruction.  I will discuss with Dr. Morgan if further evaluation needs to be pursued.   3. Choledocholithiasis with stone removal and stent placement. ERCP with stent removal in 8 weeks.     Plan  Check HGB , CRP today   Follow HGB transfuse PRN  Colonscopy as outpatient  for history of polyps   If ongoing problems with anemia then consider pillcam  Brisk bleeding will need IR intervention.   Advance diet   Continue PPI therapy.    40 minutes of total time was spent providing patient care, including patient evaluation, reviewing documentation/test result, and .  Ashely Kelly CNP  [facility]   Office

## 2024-04-26 NOTE — PLAN OF CARE
Verbal phone report given to SHANTELL Beltrán on P2. Patient belongings packed up by patient. Patient on the phone with daughter earlier this evening and informed them that she was moving rooms. Patient escorted off the unit to room 232 via wheelchair with aide at this time. Belongings, chart and medications sent with patient.

## 2024-04-27 ENCOUNTER — APPOINTMENT (OUTPATIENT)
Dept: RADIOLOGY | Facility: HOSPITAL | Age: 69
DRG: 438 | End: 2024-04-27
Attending: ORTHOPAEDIC SURGERY
Payer: COMMERCIAL

## 2024-04-27 LAB
ANION GAP SERPL CALCULATED.3IONS-SCNC: 8 MMOL/L (ref 7–15)
BUN SERPL-MCNC: 5.4 MG/DL (ref 8–23)
CALCIUM SERPL-MCNC: 9 MG/DL (ref 8.8–10.2)
CHLORIDE SERPL-SCNC: 106 MMOL/L (ref 98–107)
CREAT SERPL-MCNC: 0.51 MG/DL (ref 0.51–0.95)
CRP SERPL-MCNC: 5 MG/L
DEPRECATED HCO3 PLAS-SCNC: 28 MMOL/L (ref 22–29)
EGFRCR SERPLBLD CKD-EPI 2021: >90 ML/MIN/1.73M2
ERYTHROCYTE [DISTWIDTH] IN BLOOD BY AUTOMATED COUNT: 14 % (ref 10–15)
GLUCOSE BLDC GLUCOMTR-MCNC: 111 MG/DL (ref 70–99)
GLUCOSE BLDC GLUCOMTR-MCNC: 130 MG/DL (ref 70–99)
GLUCOSE BLDC GLUCOMTR-MCNC: 172 MG/DL (ref 70–99)
GLUCOSE BLDC GLUCOMTR-MCNC: 178 MG/DL (ref 70–99)
GLUCOSE BLDC GLUCOMTR-MCNC: 96 MG/DL (ref 70–99)
GLUCOSE SERPL-MCNC: 137 MG/DL (ref 70–99)
HCT VFR BLD AUTO: 35.5 % (ref 35–47)
HGB BLD-MCNC: 11.7 G/DL (ref 11.7–15.7)
MAGNESIUM SERPL-MCNC: 1.6 MG/DL (ref 1.7–2.3)
MCH RBC QN AUTO: 30 PG (ref 26.5–33)
MCHC RBC AUTO-ENTMCNC: 33 G/DL (ref 31.5–36.5)
MCV RBC AUTO: 91 FL (ref 78–100)
PHOSPHATE SERPL-MCNC: 3.1 MG/DL (ref 2.5–4.5)
PLATELET # BLD AUTO: 267 10E3/UL (ref 150–450)
POTASSIUM SERPL-SCNC: 3.2 MMOL/L (ref 3.4–5.3)
POTASSIUM SERPL-SCNC: 3.8 MMOL/L (ref 3.4–5.3)
RBC # BLD AUTO: 3.9 10E6/UL (ref 3.8–5.2)
SODIUM SERPL-SCNC: 142 MMOL/L (ref 135–145)
WBC # BLD AUTO: 5.6 10E3/UL (ref 4–11)

## 2024-04-27 PROCEDURE — 250N000013 HC RX MED GY IP 250 OP 250 PS 637: Performed by: NURSE PRACTITIONER

## 2024-04-27 PROCEDURE — 84132 ASSAY OF SERUM POTASSIUM: CPT | Performed by: INTERNAL MEDICINE

## 2024-04-27 PROCEDURE — 84100 ASSAY OF PHOSPHORUS: CPT | Performed by: INTERNAL MEDICINE

## 2024-04-27 PROCEDURE — 250N000011 HC RX IP 250 OP 636: Performed by: INTERNAL MEDICINE

## 2024-04-27 PROCEDURE — 86140 C-REACTIVE PROTEIN: CPT | Performed by: INTERNAL MEDICINE

## 2024-04-27 PROCEDURE — 120N000001 HC R&B MED SURG/OB

## 2024-04-27 PROCEDURE — 99233 SBSQ HOSP IP/OBS HIGH 50: CPT | Performed by: INTERNAL MEDICINE

## 2024-04-27 PROCEDURE — 83735 ASSAY OF MAGNESIUM: CPT | Performed by: INTERNAL MEDICINE

## 2024-04-27 PROCEDURE — 80048 BASIC METABOLIC PNL TOTAL CA: CPT | Performed by: INTERNAL MEDICINE

## 2024-04-27 PROCEDURE — 250N000013 HC RX MED GY IP 250 OP 250 PS 637: Performed by: INTERNAL MEDICINE

## 2024-04-27 PROCEDURE — 250N000011 HC RX IP 250 OP 636: Performed by: NURSE PRACTITIONER

## 2024-04-27 PROCEDURE — 73110 X-RAY EXAM OF WRIST: CPT | Mod: LT

## 2024-04-27 PROCEDURE — 85027 COMPLETE CBC AUTOMATED: CPT | Performed by: INTERNAL MEDICINE

## 2024-04-27 RX ORDER — POTASSIUM CHLORIDE 1500 MG/1
40 TABLET, EXTENDED RELEASE ORAL ONCE
Status: COMPLETED | OUTPATIENT
Start: 2024-04-27 | End: 2024-04-27

## 2024-04-27 RX ORDER — HYDROCORTISONE 2.5 %
CREAM (GRAM) TOPICAL 2 TIMES DAILY
Qty: 168 G | Refills: 0 | Status: DISCONTINUED | OUTPATIENT
Start: 2024-04-27 | End: 2024-04-29 | Stop reason: HOSPADM

## 2024-04-27 RX ORDER — MAGNESIUM SULFATE HEPTAHYDRATE 40 MG/ML
2 INJECTION, SOLUTION INTRAVENOUS ONCE
Status: COMPLETED | OUTPATIENT
Start: 2024-04-27 | End: 2024-04-27

## 2024-04-27 RX ADMIN — FLUTICASONE FUROATE AND VILANTEROL TRIFENATATE 1 PUFF: 200; 25 POWDER RESPIRATORY (INHALATION) at 08:06

## 2024-04-27 RX ADMIN — OXYCODONE HYDROCHLORIDE 5 MG: 5 TABLET ORAL at 11:26

## 2024-04-27 RX ADMIN — MAGNESIUM HYDROXIDE 30 ML: 400 SUSPENSION ORAL at 14:30

## 2024-04-27 RX ADMIN — GABAPENTIN 600 MG: 300 CAPSULE ORAL at 08:06

## 2024-04-27 RX ADMIN — GABAPENTIN 600 MG: 300 CAPSULE ORAL at 14:30

## 2024-04-27 RX ADMIN — PANTOPRAZOLE SODIUM 40 MG: 40 TABLET, DELAYED RELEASE ORAL at 17:32

## 2024-04-27 RX ADMIN — GABAPENTIN 600 MG: 300 CAPSULE ORAL at 21:03

## 2024-04-27 RX ADMIN — OXYCODONE HYDROCHLORIDE 5 MG: 5 TABLET ORAL at 22:25

## 2024-04-27 RX ADMIN — HYDROCORTISONE: 25 CREAM TOPICAL at 17:46

## 2024-04-27 RX ADMIN — MAGNESIUM SULFATE HEPTAHYDRATE 2 G: 40 INJECTION, SOLUTION INTRAVENOUS at 09:43

## 2024-04-27 RX ADMIN — HYDROCORTISONE: 25 CREAM TOPICAL at 21:04

## 2024-04-27 RX ADMIN — OXYCODONE HYDROCHLORIDE 5 MG: 5 TABLET ORAL at 06:29

## 2024-04-27 RX ADMIN — PANTOPRAZOLE SODIUM 40 MG: 40 TABLET, DELAYED RELEASE ORAL at 06:30

## 2024-04-27 RX ADMIN — SERTRALINE HYDROCHLORIDE 100 MG: 100 TABLET ORAL at 21:04

## 2024-04-27 RX ADMIN — POTASSIUM CHLORIDE 40 MEQ: 1500 TABLET, EXTENDED RELEASE ORAL at 09:43

## 2024-04-27 RX ADMIN — CEFTRIAXONE SODIUM 1 G: 1 INJECTION, POWDER, FOR SOLUTION INTRAMUSCULAR; INTRAVENOUS at 05:29

## 2024-04-27 ASSESSMENT — ACTIVITIES OF DAILY LIVING (ADL)
ADLS_ACUITY_SCORE: 26
ADLS_ACUITY_SCORE: 26
ADLS_ACUITY_SCORE: 27
ADLS_ACUITY_SCORE: 26
ADLS_ACUITY_SCORE: 27
ADLS_ACUITY_SCORE: 26
ADLS_ACUITY_SCORE: 27
ADLS_ACUITY_SCORE: 26
ADLS_ACUITY_SCORE: 27
ADLS_ACUITY_SCORE: 26
ADLS_ACUITY_SCORE: 27
ADLS_ACUITY_SCORE: 26
ADLS_ACUITY_SCORE: 27
ADLS_ACUITY_SCORE: 26
ADLS_ACUITY_SCORE: 27
ADLS_ACUITY_SCORE: 27

## 2024-04-27 NOTE — PLAN OF CARE
Problem: Adult Inpatient Plan of Care  Goal: Optimal Comfort and Wellbeing  Outcome: Progressing     Problem: Pain Acute  Goal: Optimal Pain Control and Function  Outcome: Progressing  Intervention: Prevent or Manage Pain  Recent Flowsheet Documentation  Taken 4/27/2024 0814 by Shaylee Castillo RN  Medication Review/Management: medications reviewed   Goal Outcome Evaluation:  Patient Has been OOB independently and walking halls today. Good appetite.Oxycodone 1130 was helpful to decrease abdominal cramps. K-3.2. Mag-1.6. Rechecking K now.

## 2024-04-27 NOTE — PLAN OF CARE
Problem: Pain Acute  Goal: Optimal Pain Control and Function  Outcome: Progressing  Intervention: Prevent or Manage Pain  Recent Flowsheet Documentation  Taken 4/27/2024 0030 by Sharifa Blair RN  Medication Review/Management:   medications reviewed   high-risk medications identified  Intervention: Optimize Psychosocial Wellbeing  Recent Flowsheet Documentation  Taken 4/27/2024 0030 by Sharifa Blair RN  Supportive Measures:   active listening utilized   verbalization of feelings encouraged     Problem: Pancreatitis  Goal: Fluid and Electrolyte Balance  Outcome: Progressing   Goal Outcome Evaluation:       Pt on RA overnight, reported having a better sleep. vss,  Bp 105/59. Getting up independently.  Scheduled abx administered. Prn oxycodone given for wrist and head pain.

## 2024-04-27 NOTE — PROGRESS NOTES
[facility]    Medicine Progress Note - Hospitalist Service    Date of Admission:  4/24/2024    Assessment & Plan     68 year old female with h/o PMHx of HTN, DM2, Fibromyalgia, COPD who on 4/24/2024 developed significant diarrhea with blood intermixed associated with right upper quadrant and epigastric abdominal pain and was found to have colitis and acute pancreatitis s/p ERCP on 4/16/2024 for choledocholithiasis requiring biliary sphincterotomy and balloon extraction with stent placement in the common bile duct who returned to the ER multiple times for abdominal pain who presented to the ER for diarrhea and blood in the stool      Transferred to ICU previously due to hypotension and  now stable and downgraded on 4/25/24     1.Pancreatitic inflammation concern for acute pancreatitis of unknown etiology.   -Status post ERCP on 4/16/2024 for choledocholithiasis requiring biliary sphincterotomy and balloon extraction with stent placement in the common bile duct  -CT abdomen pelvis with contrast 4/24/2024 at 2:05 PM consistent with interstitial pancreatitis, mild thickening of the descending and sigmoid colon may be exaggerated underdistention  - 4/15/2024 repeat CT abdomen pelvis without contrast Biliary stent remains in place and is unchanged in position. There is some edema and periportal soft tissues and adjacent pancreatic head similar as on CT yesterday afternoon. The only interval changes the new presence of small volume of low-density peritoneal fluid over the dome of the liver and minimally adjacent to the spleen and pelvis, of unknown etiology.  -at this time could agree on stopping Ozempic here in case triggered some of the interstitial changes in pancreas    2.Possible Upper GIB with melena  -EGD neg here  -hgb stable  -ppi  -GI to set up colon as outpt and they will also consider Pill cam  -if brisk bleeding here, would need IR eval       3.Hypotension requiring ICU monitoring  resolved   - -previously given 1 L NS bolus then placed on levophed after additional liter of LR Lactic acid 0.9, hemoglobin 9.6 down from 12 (suspect some hemodilution), WBC 5.4  - downgrade 4/25 from ICU   - pressures stable   - holding BP meds  and bp still lower side normal  - monitor Hgb  - holding iron for now with dark stool to ensure no evidence of bleeding   - discontinue telemetry      4.Acute colitis  - resolved on repeat CT   - mild RLQ abd pain monitor for now     5.Choledocholithiasis with stone removal and stent placement. ERCP with stent removal in 8 weeks.   - GI follow up      6.DM type 2 Hemoglobin A1c recently done was 5.4  - holding jardiance and ozempic --would not restart Ozempic at all--I am concerned risk of involving pancreatic changes   - novolog sliding scale TID with meals while in the hospital   - continue gabapentin      7.History of mood disorder and fibromyalgia   Continue  Zoloft and prn belsomra  - on hold  amitriptyline --has some interactions with her other meds     8.History of hypertension  Continue to hold BP meds  losartan, propranolol  - pressures low normal and stable      9.History of asthma not in exacerbation  - continue inhalers      10.History of obstructive sleep apnea  - continue home CPAP     11.Hypokalemia  -replace    12.Hypomag  -iv dose now    13.Cast on left arm  -from prior issue, was to see [place] in few days  -cast loose and causing some pain in hand   -will as [place] ortho to see here                  Diet: Snacks/Supplements Adult: Gelatein 20 (sugar-free); With Meals  Advance Diet as Tolerated: Regular Diet Adult    DVT Prophylaxis: Pneumatic Compression Devices  [place] Catheter: Not present  Lines: PRESENT      PICC 04/25/24 Triple Lumen Right Basilic-Site Assessment: WDL      Cardiac Monitoring: None  Code Status: Full Code      Clinically Significant Risk Factors        # Hypokalemia: Lowest K = 3.2 mmol/L in last 2 days, will replace as needed     #  Hypomagnesemia: Lowest Mg = 1.6 mg/dL in last 2 days, will replace as needed   # Hypoalbuminemia: Lowest albumin = 3 g/dL at 4/25/2024  6:49 AM, will monitor as appropriate     # Hypertension: Noted on problem list            # Financial/Environmental Concerns:    # Asthma: noted on problem list        Disposition Plan     Medically Ready for Discharge: Anticipated in 2-4 Days             Yisel Mattson MD  Hospitalist Service  [facility]  Securely message with [employer] (more info)  Text page via [employer] Paging/Directory   ______________________________________________________________________    Interval History   She notes stools more brown today, yesterday black  Formed  Less abd pain  Eating solids  No n/v  Cast loose on left arm, was to see [place] ortho in few days, showed me how loose, can slip arm out    Physical Exam   Vital Signs: Temp: 98.5  F (36.9  C) Temp src: Oral BP: 116/59 Pulse: 73   Resp: 18 SpO2: 96 % O2 Device: None (Room air)    Weight: 134 lbs 4.16 oz    Constitutional: awake, alert, cooperative, and no apparent distress  Eyes: sclera clear  Respiratory: no increased work of breathing, good air exchange, no retractions, and clear to auscultation  Cardiovascular: regular rate and rhythm and normal S1 and S2  GI: normal bowel sounds, soft, non-distended, and non-tender  Skin: no bruising or bleeding  Musculoskeletal: lower arm cast on left arm, legs no edema  Neurologic: Mental Status Exam:  Level of Alertness:   awake  Neuropsychiatric: General: normal, calm, and normal eye contact    Medical Decision Making       50 MINUTES SPENT BY ME on the date of service doing chart review, history, exam, documentation & further activities per the note.      Data     I have personally reviewed the following data over the past 24 hrs:    5.6  \   11.7   / 267     142 106 5.4 (L) /  130 (H)   3.2 (L) 28 0.51 \     Procal: N/A CRP: 5.00 (H) Lactic Acid: N/A         Imaging results  reviewed over the past 24 hrs:   No results found for this or any previous visit (from the past 24 hour(s)).

## 2024-04-27 NOTE — CONSULTS
[facility] History and Physical    Odette Escobar MRN# [id number]   Age: 68 year old YOB: 1955     Date of Admission:  4/24/2024    Primary care provider: Allie Johnson          Assessment and Plan:   Assessment:   Nondisplaced left distal radius extra-articular fracture, DOI: 4/1/2024.      Plan:   The patient is in a clean cast that is fitting appropriately.  I have recommended getting new x-rays while she is here in the hospital to make sure that the fracture is healing appropriately and there has been no change in the alignment of the fracture.  I modified the cast so that it is no longer rubbing/irritating the skin at the base of the thumb.  I will review the radiographs obtained here in the hospital.  If there has been no change in the alignment of the fracture, I have recommended that she remain casted in this cast for an additional 2 weeks.  She has an appointment to see Dr. Moreno on May 2, 2024.  She can keep this appointment for if she is still hospitalized, she can schedule an appointment for when she is discharged.  The patient had the opportunity ask questions which I answered to the best my ability.  She verbalized understanding and agreed with the plan as outlined above.    Thank you for involving us in this patient's care while she is in the hospital.  At this time there are no acute issues.  Please reconsult as needed.               Chief Complaint:   I fractured my wrist 4 weeks ago.  The cast has loosened and it is irritating my hand at the base of the thumb.     History is obtained from the patient           History of Present Illness:   This patient is a 68 year old right-hand-dominant female who fell from a coffee table while opening new drapes on 4/1/2024.  She was seen at [facility] where radiographs demonstrated a nondisplaced distal radius fracture.  She was placed into a splint at that visit.  She was instructed to follow-up  with an orthopedic upper extremity specialist.  She followed up with Dr. Suman Moreno on 4/4/2024.  She was casted at that visit.  Subsequently, she developed acute pancreatitis and was admitted to [facility] on 4/24/2024.  She is complaining that her cast is somewhat loose and uncomfortable near the thumb.           Past Medical History:   I have reviewed this patient's past medical history  Past Medical History:   Diagnosis Date    Alcoholic cirrhosis of liver (H)     Asthma     DM2 (diabetes mellitus, type 2) (H)     Fibromyalgia     History of skin cancer     Hypertension     Migraine     Motion sickness     CASTRO on CPAP     Vertigo             Past Surgical History:   I have reviewed this patient's past surgical history  Past Surgical History:   Procedure Laterality Date    ENDOMETRIAL ABLATION      ENDOSCOPIC RETROGRADE CHOLANGIOPANCREATOGRAM N/A 4/16/2024    Procedure: ENDOSCOPIC RETROGRADE CHOLANGIOPANCEATOGRAPHY, WITH SPHINCTEROTOMY;  Surgeon: Eduin Do MD;  Location: [facility] [place] OR    ENDOSCOPIC RETROGRADE CHOLANGIOPANCREATOGRAM N/A 4/16/2024    Procedure: STONE EXTRACTION,;  Surgeon: Eduin Do MD;  Location: [facility] [place] OR    ENDOSCOPIC RETROGRADE CHOLANGIOPANCREATOGRAM N/A 4/16/2024    Procedure: BILIARY STENT PLACEMENT;  Surgeon: Eduin Do MD;  Location: [facility] [place] OR    ESOPHAGOSCOPY, GASTROSCOPY, DUODENOSCOPY (EGD), COMBINED N/A 4/16/2024    Procedure: ESOPHAGOGASTRODUODENOSCOPY, WITH ENDOSCOPIC ULTRASOUND GUIDANCE;  Surgeon: Eduin Do MD;  Location: [facility] [place] OR    ESOPHAGOSCOPY, GASTROSCOPY, DUODENOSCOPY (EGD), COMBINED N/A 4/25/2024    Procedure: ESOPHAGOGASTRODUODENOSCOPY;  Surgeon: Zohaib Morgan MD;  Location: [facility] GI    HERNIORRHAPHY, UMBILICAL, ROBOT-ASSISTED, LAPAROSCOPIC, USING DA ROCIO XI N/A 04/14/2024    Procedure: PRIMARY REPAIR OF  UMBILICAL HERNIA;  Surgeon: Christiano Cruz DO;  Location: [facility] [place] OR    HYSTERECTOMY       LAPAROSCOPIC CHOLECYSTECTOMY N/A 04/14/2024    Procedure: CHOLECYSTECTOMY, ROBOT-ASSISTED, LAPAROSCOPIC, USING DA ROCIO XI;  Surgeon: Christiano Cruz DO;  Location: [facility] [place] OR    [facility] TRIPLE LUMEN PLACEMENT  4/25/2024    TONSILLECTOMY              Social History:   I have reviewed this patient's social history  Social History     Tobacco Use    Smoking status: Never    Smokeless tobacco: Never   Substance Use Topics    Alcohol use: Yes     Comment: Alcoholic Drinks/day: Occasional usage            Family History:   I have reviewed this patient's family history  The family history includes Cerebrovascular Disease in her sister; Coronary Artery Disease in her brother, brother, and brother; Heart Failure in her father, mother, and sister.    Family history              Immunizations:     Immunization History   Administered Date(s) Administered    COVID-19 12+ (2023-24) (Pfizer) 10/10/2023    COVID-19 Bivalent 12+ (Pfizer) 09/22/2022, 06/15/2023    COVID-19 Monovalent 18+ (Moderna) 03/12/2021, 04/09/2021, 11/18/2021, 04/14/2022    Hepatitis A (ADULT 19+) 10/11/2021, 05/17/2022    Hepatitis B, Adult 01/23/2015, 02/25/2015, 05/28/2015    Influenza (H1N1) 05/04/2010    Influenza (IIV3) PF 11/29/2001, 11/21/2006, 09/30/2010, 10/12/2011, 08/29/2012    Influenza Vaccine 65+ (FLUAD) 09/22/2022, 09/26/2023    Influenza Vaccine >6 months,quad, PF 11/26/2013, 09/29/2014, 09/08/2015, 09/22/2016, 09/28/2017, 09/06/2018, 09/05/2019, 10/04/2021    Influenza Vaccine, 6+MO IM (QUADRIVALENT W/PRESERVATIVES) 11/26/2013, 09/18/2020    Influenza, seasonal, injectable, PF 09/30/2010    Pneumococcal 20 valent Conjugate (Prevnar 20) 05/17/2022    Pneumococcal 23 valent 08/18/2010    TDAP (Adacel,Boostrix) 10/12/2011, 02/12/2019    Td (Adult), Adsorbed 12/24/1997, 01/01/2002    Zoster recombinant adjuvanted (SHINGRIX) 04/10/2018, 07/19/2018    Zoster vaccine, live 11/02/2017              Allergies:     Allergies   Allergen Reactions     Aspirin Hives    Diflunisal Nausea and Vomiting     (Dolobid) Occurred approximately 20 years ago    Occurred approximately 20 years ago   Occurred approximately 20 years ago   (Dolobid) Occurred approximately 20 years ago    Fd&C Yellow #5 (Tartrazine) Nausea and Vomiting and Hives            Medications:     Medications Prior to Admission   Medication Sig Dispense Refill Last Dose    acetaminophen (TYLENOL) 500 MG tablet Take 1,000 mg by mouth daily as needed for pain   Past Month at prn    albuterol (PROAIR HFA/PROVENTIL HFA/VENTOLIN HFA) 108 (90 Base) MCG/ACT inhaler Inhale 2 puffs into the lungs every 6 hours as needed for shortness of breath, wheezing or cough   Past Month at prn    amitriptyline (ELAVIL) 10 MG tablet Take 30 mg by mouth at bedtime   Past Week at hs    ammonium lactate (AMLACTIN) 12 % external cream Apply topically daily as needed for dry skin   Past Month at prn    bacitracin 500 UNIT/GM external ointment Apply topically 2 times daily   Past Week at unknown    Baclofen (LIORESAL) 5 MG tablet Take 5-10 mg by mouth 2 times daily as needed for muscle spasms or other (or sleep)   Past Month at prn    BELSOMRA 10 MG tablet Take 1 tablet by mouth nightly as needed for sleep   Past Month at prn    benzonatate (TESSALON) 100 MG capsule Take 200 mg by mouth 3 times daily as needed for cough   Past Month at prn    budesonide-formoterol (SYMBICORT) 160-4.5 MCG/ACT Inhaler Inhale 2 puffs into the lungs two times daily Rinse mouth/gargle after use   Past Week at unknown    cetirizine (ZYRTEC) 10 MG tablet Take 10 mg by mouth daily   Past Week at unknown    cholecalciferol (VITAMIN D3) 50 MCG (2000 UT) CAPS Take 4,000 Units by mouth daily   Past Week at unknown    estradiol (ESTRACE) 0.1 MG/GM vaginal cream Place vaginally twice a week   Past Week at unknown    famotidine (PEPCID) 20 MG tablet Take 20 mg by mouth 2 times daily   Past Week at unknown    ferrous gluconate (FERGON) 324 (38 Fe) MG tablet Take  324 mg by mouth daily (with breakfast)   Past Week at unknown    fluticasone (FLONASE) 50 MCG/ACT nasal spray Spray 2 sprays into both nostrils daily as needed for rhinitis or allergies   Past Month at prn    gabapentin (NEURONTIN) 300 MG capsule Take 600 mg by mouth 3 times daily   Past Week at unknown    hydrocortisone 2.5 % ointment Apply topically 2 times daily as needed for other (Eczema)   Past Month at prn    ibuprofen (ADVIL/MOTRIN) 200 MG tablet Take 600 mg by mouth 2 times daily as needed for pain   Past Month at prn    ipratropium - albuterol 0.5 mg/2.5 mg/3 mL (DUONEB) 0.5-2.5 (3) MG/3ML neb solution Take 1 vial by nebulization 3 times daily as needed for shortness of breath, wheezing or cough   Past Month at prn    JARDIANCE 10 MG TABS tablet Take 10 mg by mouth daily   Past Week at unknown    loperamide (IMODIUM) 2 mg capsule Take 2 mg by mouth 4 times daily as needed for diarrhea   Past Week at prn    losartan (COZAAR) 25 MG tablet Take 1 tablet (25 mg) by mouth daily   Past Week at unknown    Magnesium Oxide -Mg Supplement 500 MG TABS Take 1 tablet by mouth daily   Past Week at unknown    montelukast (SINGULAIR) 10 MG tablet Take 1 tablet by mouth at bedtime   Past Week at unknown    Multiple Vitamins-Minerals (MULTIVITAMIN WOMEN 50+) TABS Take 1 tablet by mouth daily   Past Week at unknown    omeprazole (PRILOSEC) 20 MG DR capsule Take 1 capsule (20 mg) by mouth 2 times daily for 30 days 60 capsule 0 4/23/2024 at pm    oxyCODONE (ROXICODONE) 5 MG tablet Take 1 tablet (5 mg) by mouth every 6 hours as needed for severe pain T 10 tablet 0 Past Week at unknown    OZEMPIC, 0.25 OR 0.5 MG/DOSE, 2 MG/3ML pen Inject 0.5 mg Subcutaneous every 7 days Tuesdays   Past Month at unknown    polyethylene glycol (MIRALAX) 17 GM/Dose powder Take 17 g (1 Capful) by mouth daily 225 g 0 Past Week at unknown    polyethylene glycol-propylene glycol (SYSTANE ULTRA) 0.4-0.3 % SOLN ophthalmic solution Place 1 drop into both  eyes 4 times daily as needed for dry eyes   Past Month at prn    propranolol ER (INDERAL LA) 120 MG 24 hr capsule Take 1 capsule (120 mg) by mouth daily   Past Week at unknown    sertraline (ZOLOFT) 100 MG tablet Take 100 mg by mouth daily   Past Week at unknown    TYRVAYA 0.03 MG/ACT nasal spray Spray 1 spray into both nostrils 2 times daily   Past Week at unknown             Review of Systems:   The Review of Systems is negative other than noted in the HPI         Physical Exam:   Vitals were reviewed  Temp: 97.9  F (36.6  C) Temp src: Oral BP: 109/64 Pulse: 80   Resp: 18 SpO2: 97 % O2 Device: None (Room air)    All vitals have been reviewed    GENERAL: The patient is alert, awake, and oriented x 3.  She is sitting comfortably in bed.  LEFT UPPER EXTREMITY EXAM:       VASCULAR: The patient has brisk cap refill at the tips of her digits.       NEURO: The patient has normal sensation with respect to light touch in the ulnar, median, and radial nerve distributions of her left hand.       MUSCULOSKELETAL:             -The patient is in a purple short arm cast.              -The cast is clean dry and intact.            -The cast is fitting appropriately.  It is looser than it was when it was initially placed but it is still secure on the wrist.            -The patient is able to make a complete fist and fully extend all of her digits.            -She has 5/5 APB, intrinsic strength.               Data:   All laboratory data reviewed       Lab Results   Component Value Date     04/27/2024    Lab Results   Component Value Date    CHLORIDE 106 04/27/2024    CHLORIDE 102 02/11/2019    Lab Results   Component Value Date    BUN 5.4 04/27/2024    BUN 10 02/11/2019      Lab Results   Component Value Date    POTASSIUM 3.8 04/27/2024    POTASSIUM 3.5 02/11/2019    Lab Results   Component Value Date    CO2 28 04/27/2024    CO2 30 02/11/2019    Lab Results   Component Value Date    CR 0.51 04/27/2024        Lab Results  "  Component Value Date    WBC 5.6 04/27/2024    HGB 11.7 04/27/2024    HCT 35.5 04/27/2024    MCV 91 04/27/2024     04/27/2024     No results found for: \"SED\"  Lab Results   Component Value Date     (H) 04/27/2024     Lab Results   Component Value Date    INR 1.07 04/24/2024     Lab Results   Component Value Date     04/27/2024     Lab Results   Component Value Date    WBC 5.6 04/27/2024     Extremity x-ray of the left distal radius::   I personally reviewed PA, lateral, and oblique radiographs of the left wrist obtained on 4/1/2024 at [facility] and AP and lateral radiographs of the left forearm obtained at [facility] on 4/4/2024.  Both sets of radiographs demonstrate a nondisplaced extra-articular distal radius fracture.  No other bone, joint, or soft tissue abnormalities appreciated.          Attestation:  Amount of time performed on this consult: 30 minutes.      Myranda Escoto MD     "

## 2024-04-27 NOTE — PLAN OF CARE
Goal Outcome Evaluation:      Pt c/o of head and L wrist pain around 2245. PRN 5mg Oxycodone given to pt. NOC RN to follow up on pain. BG ac 127 and hs 128. Ate well for dinner, up Independent to the BR. A&O x4, pleasant VSS soft BP's on RA.      Problem: Adult Inpatient Plan of Care  Goal: Plan of Care Review  Description: The Plan of Care Review/Shift note should be completed every shift.  The Outcome Evaluation is a brief statement about your assessment that the patient is improving, declining, or no change.  This information will be displayed automatically on your shift  note.  Outcome: Progressing  Flowsheets (Taken 4/26/2024 2315)  Plan of Care Reviewed With: patient  Overall Patient Progress: improving     Problem: Adult Inpatient Plan of Care  Goal: Absence of Hospital-Acquired Illness or Injury  Intervention: Prevent and Manage VTE (Venous Thromboembolism) Risk  Recent Flowsheet Documentation  Taken 4/26/2024 1730 by Yamilka Pina, RN  VTE Prevention/Management: SCDs (sequential compression devices) off     Problem: Adult Inpatient Plan of Care  Goal: Optimal Comfort and Wellbeing  Outcome: Progressing     Problem: Adult Inpatient Plan of Care  Goal: Readiness for Transition of Care  Outcome: Progressing       Plan of Care Reviewed With: patient    Overall Patient Progress: improvingOverall Patient Progress: improving

## 2024-04-27 NOTE — PROGRESS NOTES
GASTROENTEROLOGY PROGRESS NOTE     SUBJECTIVE: hgb normal, tolerating a regular diet. Will need outpatient colonoscopy for colon cancer screening.      Remains on ceftriaxone      OBJECTIVE:   /59 (BP Location: Left arm)   Pulse 73   Temp 98.5  F (36.9  C) (Oral)   Resp 18   Wt 60.9 kg (134 lb 4.2 oz)   SpO2 96%   BMI 22.51 kg/m     Temp (24hrs), Av.3  F (36.8  C), Min:98.1  F (36.7  C), Max:98.5  F (36.9  C)     Patient Vitals for the past 72 hrs:   Weight   24 0435 60.9 kg (134 lb 4.2 oz)        Intake/Output Summary (Last 24 hours) at 2024 1328  Last data filed at 2024 0857  Gross per 24 hour   Intake 320 ml   Output --   Net 320 ml      PHYSICAL EXAM   Constitutional: Healthy, in bed, no acute distress  Cardiovascular: Regular rate and rhythm.   Respiratory: Clear to auscultation bilaterally, respirations non labored.   Abdomen: Soft, non-tender, non-distended, normally active bowel sounds.   I have reviewed the patient's new clinical lab results:   Recent Labs   Lab Test 24  0522 24  1135 24  0649 24  0204 24  1237 24  0113 24  1541   WBC 5.6  --  8.0 5.4 7.8  --  7.2   HGB 11.7 12.4 11.7 9.6* 12.0   < > 11.9   MCV 91  --  94 96 94  --  94     --  343 268 324  --  247   INR  --   --   --   --  1.07  --  0.99    < > = values in this interval not displayed.      Recent Labs   Lab Test 24  0522 24  0649 24  1237    141 141   POTASSIUM 3.2* 3.7 3.6   CHLORIDE 106 111* 105   CO2 28 21* 25   BUN 5.4* 5.2* 8.4   CR 0.51 0.61 0.53   ANIONGAP 8 9 11   JOVON 9.0 8.5* 9.2      Recent Labs   Lab Test 24  0649 24  1237 24  1541 24  1009 24  0600 24  1046 24  0945 19  0853   ALBUMIN 3.0* 3.7 3.7  --    < >  --    < >  --    BILITOTAL 0.6 0.4 0.7  --    < >  --    < >  --    ALT 26 39 107*  --    < >  --    < >  --    AST 22 25 29  --    < >  --    < >  --    ALKPHOS 104 136  144  --    < >  --    < >  --    PROTEIN  --   --   --  Negative  --  Negative  --  Negative   LIPASE 33 123* 42  --    < >  --    < >  --     < > = values in this interval not displayed.      Assessment:  Odette Escobar is a 68-year-old female with multiple medical issues, with recent hospitalization for acute cholecystitis and choledocholithiasis, requiring cholecystectomy and ERCP for biliary sphincterotomy, stone extraction and stent placement 4/16/2024. She presented with abdominal pain 4/24 with CT imaging suggesting left sided colitis and pancreatic inflammation. LFT remaining normal. Two days ago, HGB dropped 2 gm with hypotension and melena then bright red stools requiring blood pressure support and blood transfusion. EGD unrevealing. Stool studies negative for infection. Repeat CT without contrast showed resolution of colitis but ongoing pancreatic inflammation. She is better today tolerating a regular diet.   Melena the bright red blood per rectum-EGD negative. Stools are brown. HGB normal and stable. Likely a lower GI source such as diverticular. She is due for colon cancer screening. We will arrange outpatient.   2. Pancreatitic inflammation concerning for acute pancreatitis of unknown etiology. . No signs of biliary obstruction. Tolerating a regular.   Continue supportive caer.   3. Choledocholithiasis with stone removal and stent placement. ERCP with stent removal in 8 weeks.   4. Abdominal pain persists- likely due to constipation. CRP better. Eating.   Plan:    Colonoscopy as outpatient   Continue supportive care   MOM   30 minutes of total time was spent providing patient care, including patient evaluation, reviewing documentation/test result, and .  Ashely Kelly [facility]   Office

## 2024-04-28 LAB
ANION GAP SERPL CALCULATED.3IONS-SCNC: 6 MMOL/L (ref 7–15)
BUN SERPL-MCNC: 13.3 MG/DL (ref 8–23)
CALCIUM SERPL-MCNC: 9.4 MG/DL (ref 8.8–10.2)
CHLORIDE SERPL-SCNC: 103 MMOL/L (ref 98–107)
CREAT SERPL-MCNC: 0.51 MG/DL (ref 0.51–0.95)
DEPRECATED HCO3 PLAS-SCNC: 29 MMOL/L (ref 22–29)
EGFRCR SERPLBLD CKD-EPI 2021: >90 ML/MIN/1.73M2
ERYTHROCYTE [DISTWIDTH] IN BLOOD BY AUTOMATED COUNT: 13.9 % (ref 10–15)
GLUCOSE BLDC GLUCOMTR-MCNC: 110 MG/DL (ref 70–99)
GLUCOSE BLDC GLUCOMTR-MCNC: 118 MG/DL (ref 70–99)
GLUCOSE BLDC GLUCOMTR-MCNC: 136 MG/DL (ref 70–99)
GLUCOSE BLDC GLUCOMTR-MCNC: 152 MG/DL (ref 70–99)
GLUCOSE BLDC GLUCOMTR-MCNC: 95 MG/DL (ref 70–99)
GLUCOSE SERPL-MCNC: 108 MG/DL (ref 70–99)
HCT VFR BLD AUTO: 38.5 % (ref 35–47)
HGB BLD-MCNC: 12.6 G/DL (ref 11.7–15.7)
MAGNESIUM SERPL-MCNC: 2 MG/DL (ref 1.7–2.3)
MCH RBC QN AUTO: 30.2 PG (ref 26.5–33)
MCHC RBC AUTO-ENTMCNC: 32.7 G/DL (ref 31.5–36.5)
MCV RBC AUTO: 92 FL (ref 78–100)
PLATELET # BLD AUTO: 286 10E3/UL (ref 150–450)
POTASSIUM SERPL-SCNC: 4.5 MMOL/L (ref 3.4–5.3)
RBC # BLD AUTO: 4.17 10E6/UL (ref 3.8–5.2)
SODIUM SERPL-SCNC: 138 MMOL/L (ref 135–145)
WBC # BLD AUTO: 7 10E3/UL (ref 4–11)

## 2024-04-28 PROCEDURE — 250N000011 HC RX IP 250 OP 636: Performed by: NURSE PRACTITIONER

## 2024-04-28 PROCEDURE — 120N000001 HC R&B MED SURG/OB

## 2024-04-28 PROCEDURE — 99232 SBSQ HOSP IP/OBS MODERATE 35: CPT | Performed by: INTERNAL MEDICINE

## 2024-04-28 PROCEDURE — 85041 AUTOMATED RBC COUNT: CPT | Performed by: INTERNAL MEDICINE

## 2024-04-28 PROCEDURE — 250N000013 HC RX MED GY IP 250 OP 250 PS 637: Performed by: NURSE PRACTITIONER

## 2024-04-28 PROCEDURE — 83735 ASSAY OF MAGNESIUM: CPT | Performed by: INTERNAL MEDICINE

## 2024-04-28 PROCEDURE — 250N000013 HC RX MED GY IP 250 OP 250 PS 637: Performed by: INTERNAL MEDICINE

## 2024-04-28 PROCEDURE — 82565 ASSAY OF CREATININE: CPT | Performed by: INTERNAL MEDICINE

## 2024-04-28 RX ORDER — CARBOXYMETHYLCELLULOSE SODIUM 5 MG/ML
1 SOLUTION/ DROPS OPHTHALMIC
Status: DISCONTINUED | OUTPATIENT
Start: 2024-04-28 | End: 2024-04-29 | Stop reason: HOSPADM

## 2024-04-28 RX ORDER — BISACODYL 10 MG
10 SUPPOSITORY, RECTAL RECTAL DAILY PRN
Status: DISCONTINUED | OUTPATIENT
Start: 2024-04-28 | End: 2024-04-29 | Stop reason: HOSPADM

## 2024-04-28 RX ADMIN — MAGNESIUM HYDROXIDE 30 ML: 400 SUSPENSION ORAL at 08:44

## 2024-04-28 RX ADMIN — GABAPENTIN 600 MG: 300 CAPSULE ORAL at 19:43

## 2024-04-28 RX ADMIN — HYDROMORPHONE HYDROCHLORIDE 0.5 MG: 1 INJECTION, SOLUTION INTRAMUSCULAR; INTRAVENOUS; SUBCUTANEOUS at 16:02

## 2024-04-28 RX ADMIN — FLUTICASONE FUROATE AND VILANTEROL TRIFENATATE 1 PUFF: 200; 25 POWDER RESPIRATORY (INHALATION) at 08:45

## 2024-04-28 RX ADMIN — HYDROCORTISONE: 25 CREAM TOPICAL at 08:44

## 2024-04-28 RX ADMIN — Medication 1 DROP: at 19:45

## 2024-04-28 RX ADMIN — GABAPENTIN 600 MG: 300 CAPSULE ORAL at 13:28

## 2024-04-28 RX ADMIN — OXYCODONE HYDROCHLORIDE 5 MG: 5 TABLET ORAL at 04:50

## 2024-04-28 RX ADMIN — CEFTRIAXONE SODIUM 1 G: 1 INJECTION, POWDER, FOR SOLUTION INTRAMUSCULAR; INTRAVENOUS at 06:09

## 2024-04-28 RX ADMIN — SERTRALINE HYDROCHLORIDE 100 MG: 100 TABLET ORAL at 19:43

## 2024-04-28 RX ADMIN — PANTOPRAZOLE SODIUM 40 MG: 40 TABLET, DELAYED RELEASE ORAL at 16:02

## 2024-04-28 RX ADMIN — Medication 1 DROP: at 11:19

## 2024-04-28 RX ADMIN — GABAPENTIN 600 MG: 300 CAPSULE ORAL at 08:44

## 2024-04-28 RX ADMIN — PANTOPRAZOLE SODIUM 40 MG: 40 TABLET, DELAYED RELEASE ORAL at 08:44

## 2024-04-28 ASSESSMENT — ACTIVITIES OF DAILY LIVING (ADL)
ADLS_ACUITY_SCORE: 26

## 2024-04-28 NOTE — PLAN OF CARE
Goal Outcome Evaluation:       Prn dilaudid x1 given for RUQ pain 8/10 when it spasms. Good appetite. PRN eyedrops administered for dry eyes. Declined o2 for HS and on RA.

## 2024-04-28 NOTE — PLAN OF CARE
Problem: Adult Inpatient Plan of Care  Goal: Optimal Comfort and Wellbeing  Intervention: Monitor Pain and Promote Comfort  Recent Flowsheet Documentation  Taken 4/28/2024 0800 by Shaylee Castillo RN  Pain Management Interventions: emotional support   Goal Outcome Evaluation:  Patient has been walking halls today independently. She reported abdominal spasms earlier today 5/10. Significant other here visiting. K-4.5.

## 2024-04-28 NOTE — PROGRESS NOTES
Pt was not in room towards second half of evening shift. Could not be located and whereabouts unknown by RN, aide, charge RN for around almost half an hour. Later, pt let RN know she was using the phone in surgical waiting area on 2nd floor d/t her room phone not working. RN reminded pt to try to stay on the unit and/or let Charge RN know if she will be stepping off the unit. RN reminded pt to call with any needs and staff will try to help. Pt acknowledged.

## 2024-04-28 NOTE — PLAN OF CARE
Goal Outcome Evaluation:       Prn oxycodone administered 1x for and abdominal pain. Pt reported skin itchiness, scheduled hydrocortisone cream available. Picc line patent, flushes well with good blood return.

## 2024-04-28 NOTE — PLAN OF CARE
Goal Outcome Evaluation:       PRN oxycodone x1 given for RUQ pain. C/o itchy red skin on right wrist. MD notified and orders for hydrocortisone cream obtained. Pt stated she has sensitive skin. 2L O2 on for HS. PICC line has good blood return and saline locked. No insulin coverage given. SCDs on. CHG bath done.

## 2024-04-28 NOTE — PROGRESS NOTES
I reviewed the plain films of the left wrist which were completed on 4/27/2024.  The radiographs were performed in the cast which obscures bony detail.  There is a comminuted intra-articular impacted distal radius fracture with minimal displacement.  When compared to prior radiographs, there have been no significant changes in the alignment.      Based on these radiographic findings, I have recommended continued cast immobilization for an additional 2 weeks.  The patient should follow-up at [facility] upon discharge.      Please contact us if you have any additional questions

## 2024-04-28 NOTE — PROGRESS NOTES
GASTROENTEROLOGY PROGRESS NOTE     SUBJECTIVE: still with abdominal discomfort. Tolerating a regular diet. Is not having good bowel movements. Despite Milk of magnesia.      OBJECTIVE:   /60 (BP Location: Left arm)   Pulse 79   Temp 98.2  F (36.8  C)   Resp 16   Wt 60.9 kg (134 lb 4.2 oz)   SpO2 97%   BMI 22.51 kg/m     Temp (24hrs), Av.1  F (36.7  C), Min:97.9  F (36.6  C), Max:98.2  F (36.8  C)     No data found.     Intake/Output Summary (Last 24 hours) at 2024 1157  Last data filed at 2024 1000  Gross per 24 hour   Intake 360 ml   Output --   Net 360 ml      PHYSICAL EXAM   Constitutional: Healthy, in bed, no acute distress  Cardiovascular: Regular rate and rhythm.   Respiratory: Clear to auscultation bilaterally, respirations non labored.   Abdomen: Soft, mild tenderness.     I have reviewed the patient's new clinical lab results:   Recent Labs   Lab Test 24  0559 24  0522 24  1135 24  0649 24  0204 24  1237 24  0113 24  1541   WBC 7.0 5.6  --  8.0   < > 7.8  --  7.2   HGB 12.6 11.7 12.4 11.7   < > 12.0   < > 11.9   MCV 92 91  --  94   < > 94  --  94    267  --  343   < > 324  --  247   INR  --   --   --   --   --  1.07  --  0.99    < > = values in this interval not displayed.      Recent Labs   Lab Test 24  0559 24  1435 24  0522 24  0649     --  142 141   POTASSIUM 4.5 3.8 3.2* 3.7   CHLORIDE 103  --  106 111*   CO2 29  --  28 21*   BUN 13.3  --  5.4* 5.2*   CR 0.51  --  0.51 0.61   ANIONGAP 6*  --  8 9   JOVON 9.4  --  9.0 8.5*      Recent Labs   Lab Test 24  0649 24  1237 24  1541 24  1009 24  0600 24  1046 24  0945 19  0853   ALBUMIN 3.0* 3.7 3.7  --    < >  --    < >  --    BILITOTAL 0.6 0.4 0.7  --    < >  --    < >  --    ALT 26 39 107*  --    < >  --    < >  --    AST 22 25 29  --    < >  --    < >  --    ALKPHOS 104 136 144  --    < >  --    < >   --    PROTEIN  --   --   --  Negative  --  Negative  --  Negative   LIPASE 33 123* 42  --    < >  --    < >  --     < > = values in this interval not displayed.      Assessment:  Odette Escobar is a 68-year-old female with multiple medical issues, with recent hospitalization for acute cholecystitis and choledocholithiasis, requiring cholecystectomy and ERCP for biliary sphincterotomy, stone extraction and stent placement 4/16/2024. She presented with abdominal pain 4/24 with CT imaging suggesting left sided colitis and pancreatic inflammation. LFT remaining normal. Two days ago, HGB dropped 2 gm with hypotension and melena then bright red stools requiring blood pressure support and blood transfusion. EGD unrevealing. Stool studies negative for infection. Repeat CT without contrast showed resolution of colitis but ongoing pancreatic inflammation. She is better today tolerating a regular diet.   Melena the bright red blood per rectum-EGD negative. Stools are brown. HGB normal and stable. Likely a lower GI source such as diverticular. She is due for colon cancer screening. We will arrange outpatient.   2. Pancreatitic inflammation concerning for acute pancreatitis of unknown etiology. . No signs of biliary obstruction. Tolerating a regular.   Continue supportive caer.   3. Choledocholithiasis with stone removal and stent placement. ERCP with stent removal in 8 weeks.   4. Abdominal pain persists- likely due to constipation. CRP better. Eating.   Plan:    Colonoscopy as outpatient   Continue supportive care   MOM  Okay to stop antibiotics  GI will sign off, please call with questions/concerns.      20 minutes of total time was spent providing patient care, including patient evaluation, reviewing documentation/test result, and .  Ashely Kelly [facility]   Office

## 2024-04-28 NOTE — PROGRESS NOTES
[facility]    Medicine Progress Note - Hospitalist Service    Date of Admission:  4/24/2024    Assessment & Plan   68 year old female with h/o PMHx of HTN, DM2, Fibromyalgia, COPD who on 4/24/2024 developed significant diarrhea with blood intermixed associated with right upper quadrant and epigastric abdominal pain and was found to have colitis and acute pancreatitis s/p ERCP on 4/16/2024 for choledocholithiasis requiring biliary sphincterotomy and balloon extraction with stent placement in the common bile duct who returned to the ER multiple times for abdominal pain who presented to the ER for diarrhea and blood in the stool      Transferred to ICU previously due to hypotension and  now stable and downgraded on 4/25/24     1.Pancreatitic inflammation concern for acute pancreatitis of unknown etiology.   -Status post ERCP on 4/16/2024 for choledocholithiasis requiring biliary sphincterotomy and balloon extraction with stent placement in the common bile duct  -CT abdomen pelvis with contrast 4/24/2024 at 2:05 PM consistent with interstitial pancreatitis, mild thickening of the descending and sigmoid colon may be exaggerated underdistention  - 4/15/2024 repeat CT abdomen pelvis without contrast Biliary stent remains in place and is unchanged in position. There is some edema and periportal soft tissues and adjacent pancreatic head similar as on CT yesterday afternoon. The only interval changes the new presence of small volume of low-density peritoneal fluid over the dome of the liver and minimally adjacent to the spleen and pelvis, of unknown etiology.  -at this time agree on stopping Ozempic here in case triggered some of the interstitial changes in pancreas    2.Possible Upper GIB with melena  -EGD neg here  -hgb stable  -ppi  -GI to set up colon as outpt and they will also consider Pill cam  -if brisk bleeding here, would need IR eval       3.Hypotension requiring ICU monitoring resolved   -  -previously given 1 L NS bolus then placed on levophed after additional liter of LR Lactic acid 0.9, hemoglobin 9.6 down from 12 (suspect some hemodilution), WBC 5.4  - downgrade 4/25 from ICU   - pressures stable   - holding BP meds  and bp still lower side normal  - monitor Hgb  - holding iron for now with dark stool to ensure no evidence of bleeding   - discontinue telemetry      4.Acute colitis  - resolved on repeat CT   - mild RLQ abd pain monitor   -she was placed on ceftriaxone on admit for concern when in ICU  -blood cx ntd  -will check in with GI but I anticipate we might be able to stop this today--will discontinue and GI ok with it ( she got total of 4 days Ceftriaxone)     5.Choledocholithiasis with stone removal and stent placement. ERCP with stent removal in 8 weeks.   - GI follow up      6.DM type 2 Hemoglobin A1c recently done was 5.4  - holding jardiance and ozempic --would not restart Ozempic at all--I am concerned risk of involving pancreatic changes   - novolog sliding scale TID with meals while in the hospital   - continue gabapentin      7.History of mood disorder and fibromyalgia   Continue  Zoloft and prn belsomra  - on hold  amitriptyline --has some interactions with her other meds     8.History of hypertension  Continue to hold BP meds  losartan, propranolol  - pressures low normal and stable      9.History of asthma not in exacerbation  - continue inhalers      10.History of obstructive sleep apnea  - continue home CPAP     11.Hypokalemia  -replace    12.Hypomag  -iv dose now    13.Cast on left arm-distal radial fx  -from prior issue, was to see [facility]  -cast loose and causing some pain in hand --appreciate [facility] seeing here  -will still need to follow up in clinic with [facility]    14.Constipation  -tried mom ,not helping  -if no stool by noon, would try dulcolax supp      15.Irritated skin on right arm  -improved with hydrocortisone 2.5 %    16.Dry eyes  -artificial tears prn             Diet: Snacks/Supplements Adult: Gelatein 20 (sugar-free); With Meals  Advance Diet as Tolerated: Regular Diet Adult    DVT Prophylaxis: Pneumatic Compression Devices  [place] Catheter: Not present  Lines: PRESENT      PICC 04/25/24 Triple Lumen Right Basilic-Site Assessment: WDL      Cardiac Monitoring: None  Code Status: Full Code      Clinically Significant Risk Factors        # Hypokalemia: Lowest K = 3.2 mmol/L in last 2 days, will replace as needed     # Hypomagnesemia: Lowest Mg = 1.6 mg/dL in last 2 days, will replace as needed   # Hypoalbuminemia: Lowest albumin = 3 g/dL at 4/25/2024  6:49 AM, will monitor as appropriate     # Hypertension: Noted on problem list            # Financial/Environmental Concerns:    # Asthma: noted on problem list        Disposition Plan     Medically Ready for Discharge: Anticipated Today             Yisel Mattson MD  Hospitalist Service  [facility]  Securely message with [employer] (more info)  Text page via [employer] Paging/Directory   ______________________________________________________________________    Interval History   She notes abd still some pain  No stool after MOM yesterday  Willing to try something else  Eating ok  No bloody stools   Cast feel better after adjustment yesterday  C/o dry eyes        Physical Exam   Vital Signs: Temp: 98.2  F (36.8  C) Temp src: Oral BP: 107/60 Pulse: 79   Resp: 16 SpO2: 97 % O2 Device: None (Room air)    Weight: 134 lbs 4.16 oz    Constitutional: awake, fatigued, alert, cooperative, and no apparent distress  Eyes: sclera clear  Respiratory: no increased work of breathing, good air exchange, no retractions, and clear to auscultation  Cardiovascular: regular rate and rhythm and normal S1 and S2  GI: normal bowel sounds, soft, non-distended, and tenderness noted upper abd  Skin: no bruising or bleeding  Musculoskeletal: cast on left arm  Neurologic: Mental Status Exam:  Level of Alertness:    awake  Neuropsychiatric: General: normal, calm, and normal eye contact    Medical Decision Making       35 MINUTES SPENT BY ME on the date of service doing chart review, history, exam, documentation & further activities per the note.      Data     I have personally reviewed the following data over the past 24 hrs:    7.0  \   12.6   / 286     138 103 13.3 /  95   4.5 29 0.51 \       Imaging results reviewed over the past 24 hrs:   Recent Results (from the past 24 hour(s))   XR Wrist Left G/E 3 Views    Narrative    EXAM: XR WRIST LEFT G/E 3 VIEWS  LOCATION: [facility]  DATE: 4/27/2024    INDICATION: s p left wrist fracture.  Casted.  COMPARISON: 04/01/2024      Impression    IMPRESSION: Films taken through cast material obscures some bony detail. Again seen is a fracture of the distal aspect of the left radius which was also noted on the prior study. This is in good anatomic alignment. No new fractures are visualized. Normal   carpal alignment.

## 2024-04-29 VITALS
BODY MASS INDEX: 22.51 KG/M2 | HEART RATE: 92 BPM | RESPIRATION RATE: 18 BRPM | TEMPERATURE: 98.1 F | WEIGHT: 134.26 LBS | OXYGEN SATURATION: 97 % | SYSTOLIC BLOOD PRESSURE: 116 MMHG | DIASTOLIC BLOOD PRESSURE: 63 MMHG

## 2024-04-29 LAB
ANION GAP SERPL CALCULATED.3IONS-SCNC: 8 MMOL/L (ref 7–15)
BUN SERPL-MCNC: 14.6 MG/DL (ref 8–23)
CALCIUM SERPL-MCNC: 9.3 MG/DL (ref 8.8–10.2)
CHLORIDE SERPL-SCNC: 100 MMOL/L (ref 98–107)
CREAT SERPL-MCNC: 0.57 MG/DL (ref 0.51–0.95)
DEPRECATED HCO3 PLAS-SCNC: 28 MMOL/L (ref 22–29)
EGFRCR SERPLBLD CKD-EPI 2021: >90 ML/MIN/1.73M2
ERYTHROCYTE [DISTWIDTH] IN BLOOD BY AUTOMATED COUNT: 13.4 % (ref 10–15)
GLUCOSE BLDC GLUCOMTR-MCNC: 121 MG/DL (ref 70–99)
GLUCOSE SERPL-MCNC: 123 MG/DL (ref 70–99)
HCT VFR BLD AUTO: 37.6 % (ref 35–47)
HGB BLD-MCNC: 12.4 G/DL (ref 11.7–15.7)
MAGNESIUM SERPL-MCNC: 1.9 MG/DL (ref 1.7–2.3)
MCH RBC QN AUTO: 30.5 PG (ref 26.5–33)
MCHC RBC AUTO-ENTMCNC: 33 G/DL (ref 31.5–36.5)
MCV RBC AUTO: 93 FL (ref 78–100)
PLATELET # BLD AUTO: 264 10E3/UL (ref 150–450)
POTASSIUM SERPL-SCNC: 4.4 MMOL/L (ref 3.4–5.3)
RBC # BLD AUTO: 4.06 10E6/UL (ref 3.8–5.2)
SODIUM SERPL-SCNC: 136 MMOL/L (ref 135–145)
WBC # BLD AUTO: 8.4 10E3/UL (ref 4–11)

## 2024-04-29 PROCEDURE — 99239 HOSP IP/OBS DSCHRG MGMT >30: CPT | Performed by: INTERNAL MEDICINE

## 2024-04-29 PROCEDURE — 250N000011 HC RX IP 250 OP 636: Performed by: NURSE PRACTITIONER

## 2024-04-29 PROCEDURE — 85027 COMPLETE CBC AUTOMATED: CPT | Performed by: INTERNAL MEDICINE

## 2024-04-29 PROCEDURE — 250N000013 HC RX MED GY IP 250 OP 250 PS 637: Performed by: NURSE PRACTITIONER

## 2024-04-29 PROCEDURE — 80048 BASIC METABOLIC PNL TOTAL CA: CPT | Performed by: INTERNAL MEDICINE

## 2024-04-29 PROCEDURE — 83735 ASSAY OF MAGNESIUM: CPT | Performed by: INTERNAL MEDICINE

## 2024-04-29 RX ADMIN — PANTOPRAZOLE SODIUM 40 MG: 40 TABLET, DELAYED RELEASE ORAL at 08:07

## 2024-04-29 RX ADMIN — FLUTICASONE FUROATE AND VILANTEROL TRIFENATATE 1 PUFF: 200; 25 POWDER RESPIRATORY (INHALATION) at 08:15

## 2024-04-29 RX ADMIN — HYDROMORPHONE HYDROCHLORIDE 0.5 MG: 1 INJECTION, SOLUTION INTRAMUSCULAR; INTRAVENOUS; SUBCUTANEOUS at 03:57

## 2024-04-29 RX ADMIN — MAGNESIUM HYDROXIDE 30 ML: 400 SUSPENSION ORAL at 08:07

## 2024-04-29 RX ADMIN — HYDROCORTISONE: 25 CREAM TOPICAL at 08:13

## 2024-04-29 RX ADMIN — GABAPENTIN 600 MG: 300 CAPSULE ORAL at 08:06

## 2024-04-29 ASSESSMENT — ACTIVITIES OF DAILY LIVING (ADL)
ADLS_ACUITY_SCORE: 26
ADLS_ACUITY_SCORE: 24
ADLS_ACUITY_SCORE: 26

## 2024-04-29 NOTE — DISCHARGE SUMMARY
[facility] MEDICINE  DISCHARGE SUMMARY     Primary Care Physician: Allie Johnson  Admission Date: 4/24/2024   Discharge Provider: Yisel Mattson MD Discharge Date: 4/29/2024   Diet:   Active Diet and Nourishment Order   Procedures     Snacks/Supplements Adult: Gelatein 20 (sugar-free); With Meals     Advance Diet as Tolerated: Regular Diet Adult     Diet       Code Status: Full Code   Activity: DCACTIVITY: Activity as tolerated        Condition at Discharge: Stable     REASON FOR PRESENTATION(See Admission Note for Details)   Abd pain    PRINCIPAL & ACTIVE DISCHARGE DIAGNOSES     Principal Problem:    Acute pancreatitis  Active Problems:    Essential hypertension    Fibromyalgia    CASTRO (obstructive sleep apnea)    Type 2 diabetes mellitus with diabetic neuropathy (H)    Gastroesophageal reflux disease without esophagitis    Mild persistent asthma without complication    Colitis    S/P laparoscopic cholecystectomy    Post-op pain      PENDING LABS     Unresulted Labs Ordered in the Past 30 Days of this Admission       Date and Time Order Name Status Description    4/25/2024 12:57 AM Blood Culture Arm, Left Preliminary     4/25/2024 12:57 AM Blood Culture Arm, Right Preliminary               PROCEDURES ( this hospitalization only)      Procedure(s):  ESOPHAGOGASTRODUODENOSCOPY    RECOMMENDATIONS TO OUTPATIENT PROVIDER FOR F/U VISIT     Follow-up Appointments     Follow-up and recommended labs and tests       Follow up with primary care provider, Allie Johnson, within 3-4 days   for hospital follow- up.  The following labs/tests are recommended: cmp,   cbc    Follow with GI 6-8 weeks.  Follow with ortho for wrist as previously ordered                DISPOSITION     Home with home care    SUMMARY OF HOSPITAL COURSE:      68 year old female with h/o PMHx of HTN, DM2, Fibromyalgia, COPD who on 4/24/2024 developed significant diarrhea with blood intermixed associated with  right upper quadrant and epigastric abdominal pain and was found to have colitis and acute pancreatitis s/p ERCP on 4/16/2024 for choledocholithiasis requiring biliary sphincterotomy and balloon extraction with stent placement in the common bile duct who returned to the ER multiple times for abdominal pain who presented to the ER for diarrhea and blood in the stool      Transferred to ICU previously due to hypotension and  now stable and downgraded on 4/25/24     1.Pancreatitic inflammation concern for acute pancreatitis of unknown etiology.   -Status post ERCP on 4/16/2024 for choledocholithiasis requiring biliary sphincterotomy and balloon extraction with stent placement in the common bile duct  -CT abdomen pelvis with contrast 4/24/2024 at 2:05 PM consistent with interstitial pancreatitis, mild thickening of the descending and sigmoid colon may be exaggerated underdistention  - 4/15/2024 repeat CT abdomen pelvis without contrast Biliary stent remains in place and is unchanged in position. There is some edema and periportal soft tissues and adjacent pancreatic head similar as on CT yesterday afternoon. The only interval changes the new presence of small volume of low-density peritoneal fluid over the dome of the liver and minimally adjacent to the spleen and pelvis, of unknown etiology.  -at this time agree on stopping Ozempic here in case triggered some of the interstitial changes in pancreas     2.Possible Upper GIB with melena, with some hemorrhagic shock with hgb 12-->9 then transfused 1 unit pRBC  -EGD neg here  -hgb stable  -ppi  -GI to set up colon as outpt and they will also consider Pill cam     3.Hypotension requiring ICU monitoring resolved   - -previously given 1 L NS bolus then placed on levophed after additional liter of LR Lactic acid 0.9, hemoglobin 9.6 down from 12 (suspect some hemodilution), WBC 5.4  - downgrade 4/25 from ICU   - pressures stable   - holding BP meds  and bp still lower side  normal--will not restart and have her follow with PCP to consider re-starts  - monitor Hgb  - discontinue telemetry      4.Acute colitis  - resolved on repeat CT   - mild RLQ abd pain monitor   -she was placed on ceftriaxone on admit for concern when in ICU  -blood cx ntd  -will check in with GI but I anticipate we might be able to stop this today--will discontinue and GI ok with it ( she got total of 4 days Ceftriaxone)     5.Choledocholithiasis with stone removal and stent placement. ERCP with stent removal in 8 weeks.   - GI follow up --she told me she got appt with [facility]      6.DM type 2 Hemoglobin A1c recently done was 5.4  - holding jardiance and ozempic --would not restart Ozempic at all--I am concerned risk of involving pancreatic changes   - novolog sliding scale TID with meals while in the hospital   - continue gabapentin      7.History of mood disorder and fibromyalgia   Continue  Zoloft and prn belsomra  - on hold  amitriptyline -she notes very helpful or migraines and will go back on it     8.History of hypertension  Continue to hold BP meds  losartan, propranolol  - pressures low normal and stable      9.History of asthma not in exacerbation  - continue inhalers      10.History of obstructive sleep apnea  - continue home CPAP     11.Hypokalemia  -replace     12.Hypomag  -replaced     13.Cast on left arm-distal radial fx  -from prior issue, was to see [facility]  -cast loose and causing some pain in hand --appreciate [facility] seeing here  -will still need to follow up in clinic with [facility]     14.Constipation  -tried mom ,not helping  -had stool yesterday       15.Irritated skin on right arm  -improved with hydrocortisone 2.5 %     16.Dry eyes  -artificial tears prn           Discharge Medications with Med changes:     Current Discharge Medication List        CONTINUE these medications which have NOT CHANGED    Details   acetaminophen (TYLENOL) 500 MG tablet Take 1,000 mg by mouth daily as needed  for pain      albuterol (PROAIR HFA/PROVENTIL HFA/VENTOLIN HFA) 108 (90 Base) MCG/ACT inhaler Inhale 2 puffs into the lungs every 6 hours as needed for shortness of breath, wheezing or cough      amitriptyline (ELAVIL) 10 MG tablet Take 30 mg by mouth at bedtime      ammonium lactate (AMLACTIN) 12 % external cream Apply topically daily as needed for dry skin      bacitracin 500 UNIT/GM external ointment Apply topically 2 times daily      Baclofen (LIORESAL) 5 MG tablet Take 5-10 mg by mouth 2 times daily as needed for muscle spasms or other (or sleep)      BELSOMRA 10 MG tablet Take 1 tablet by mouth nightly as needed for sleep      budesonide-formoterol (SYMBICORT) 160-4.5 MCG/ACT Inhaler Inhale 2 puffs into the lungs two times daily Rinse mouth/gargle after use      cetirizine (ZYRTEC) 10 MG tablet Take 10 mg by mouth daily      cholecalciferol (VITAMIN D3) 50 MCG (2000 UT) CAPS Take 4,000 Units by mouth daily      estradiol (ESTRACE) 0.1 MG/GM vaginal cream Place vaginally twice a week      famotidine (PEPCID) 20 MG tablet Take 20 mg by mouth 2 times daily      ferrous gluconate (FERGON) 324 (38 Fe) MG tablet Take 324 mg by mouth daily (with breakfast)      fluticasone (FLONASE) 50 MCG/ACT nasal spray Spray 2 sprays into both nostrils daily as needed for rhinitis or allergies      gabapentin (NEURONTIN) 300 MG capsule Take 600 mg by mouth 3 times daily      hydrocortisone 2.5 % ointment Apply topically 2 times daily as needed for other (Eczema)      ipratropium - albuterol 0.5 mg/2.5 mg/3 mL (DUONEB) 0.5-2.5 (3) MG/3ML neb solution Take 1 vial by nebulization 3 times daily as needed for shortness of breath, wheezing or cough      JARDIANCE 10 MG TABS tablet Take 10 mg by mouth daily      loperamide (IMODIUM) 2 mg capsule Take 2 mg by mouth 4 times daily as needed for diarrhea      Magnesium Oxide -Mg Supplement 500 MG TABS Take 1 tablet by mouth daily      montelukast (SINGULAIR) 10 MG tablet Take 1 tablet by  mouth at bedtime      Multiple Vitamins-Minerals (MULTIVITAMIN WOMEN 50+) TABS Take 1 tablet by mouth daily      omeprazole (PRILOSEC) 20 MG DR capsule Take 1 capsule (20 mg) by mouth 2 times daily for 30 days  Qty: 60 capsule, Refills: 0    Associated Diagnoses: Right upper quadrant abdominal pain      oxyCODONE (ROXICODONE) 5 MG tablet Take 1 tablet (5 mg) by mouth every 6 hours as needed for severe pain T  Qty: 10 tablet, Refills: 0    Associated Diagnoses: Acute cholecystitis      polyethylene glycol (MIRALAX) 17 GM/Dose powder Take 17 g (1 Capful) by mouth daily  Qty: 225 g, Refills: 0      polyethylene glycol-propylene glycol (SYSTANE ULTRA) 0.4-0.3 % SOLN ophthalmic solution Place 1 drop into both eyes 4 times daily as needed for dry eyes      sertraline (ZOLOFT) 100 MG tablet Take 100 mg by mouth daily      TYRVAYA 0.03 MG/ACT nasal spray Spray 1 spray into both nostrils 2 times daily           STOP taking these medications       benzonatate (TESSALON) 100 MG capsule Comments:   Reason for Stopping:         ibuprofen (ADVIL/MOTRIN) 200 MG tablet Comments:   Reason for Stopping:         losartan (COZAAR) 25 MG tablet Comments:   Reason for Stopping:         OZEMPIC, 0.25 OR 0.5 MG/DOSE, 2 MG/3ML pen Comments:   Reason for Stopping:         propranolol ER (INDERAL LA) 120 MG 24 hr capsule Comments:   Reason for Stopping:                     Rationale for medication changes:      Stopped losartan and propanolol low bp  Stopped Ozempic-pancreatitis        Consults       GASTROENTEROLOGY IP CONSULT  CARE MANAGEMENT / SOCIAL WORK IP CONSULT  INTENSIVIST IP CONSULT  VASCULAR ACCESS ADULT IP CONSULT  HOSPITALIST IP CONSULT  ORTHOPEDIC SURGERY IP CONSULT    Immunizations given this encounter     Most Recent Immunizations   Administered Date(s) Administered     COVID-19 12+ (2023-24) (Pfizer) 10/10/2023     COVID-19 Bivalent 12+ (Pfizer) 06/15/2023     COVID-19 Monovalent 18+ (Moderna) 04/14/2022     Hepatitis A  (ADULT 19+) 05/17/2022     Hepatitis B, Adult 05/28/2015     Influenza (H1N1) 05/04/2010     Influenza (IIV3) PF 08/29/2012     Influenza Vaccine 65+ (FLUAD) 09/26/2023     Influenza Vaccine >6 months,quad, PF 10/04/2021     Influenza Vaccine, 6+MO IM (QUADRIVALENT W/PRESERVATIVES) 09/18/2020     Influenza, seasonal, injectable, PF 09/30/2010     Pneumococcal 20 valent Conjugate (Prevnar 20) 05/17/2022     Pneumococcal 23 valent 08/18/2010     TDAP (Adacel,Boostrix) 02/12/2019     Td (Adult), Adsorbed 01/01/2002     Zoster recombinant adjuvanted (SHINGRIX) 07/19/2018     Zoster vaccine, live 11/02/2017           Anticoagulation Information      Recent INR results:   Recent Labs   Lab 04/24/24  1237   INR 1.07     Warfarin doses (if applicable) or name of other anticoagulant: none      SIGNIFICANT IMAGING FINDINGS     Results for orders placed or performed during the hospital encounter of 04/24/24   CT Abdomen Pelvis w Contrast    Impression    IMPRESSION:   1.  Pneumobilia, secondary to common bile duct stent placement  2.  mild peripancreatic soft tissue stranding, consistent with acute edematous interstitial pancreatitis, no evidence of pseudocyst or pancreatic necrosis is noted  3.  mild thickening of the descending and sigmoid colon, may be exaggerated under distention, correlate for symptoms of long segment colitis   CT Abdomen Pelvis w/o Contrast    Impression    IMPRESSION:   1.  Biliary stent remains in place and is unchanged in position. There is some edema and periportal soft tissues and adjacent pancreatic head similar as on CT yesterday afternoon. The only interval changes the new presence of small volume of low-density   peritoneal fluid over the dome of the liver and minimally adjacent to the spleen and pelvis, of unknown etiology.     XR Wrist Left G/E 3 Views    Impression    IMPRESSION: Films taken through cast material obscures some bony detail. Again seen is a fracture of the distal aspect of the  left radius which was also noted on the prior study. This is in good anatomic alignment. No new fractures are visualized. Normal   carpal alignment.       SIGNIFICANT LABORATORY FINDINGS     Most Recent 3 CBC's:  Recent Labs   Lab Test 04/29/24  0617 04/28/24  0559 04/27/24  0522   WBC 8.4 7.0 5.6   HGB 12.4 12.6 11.7   MCV 93 92 91    286 267     Most Recent 3 BMP's:  Recent Labs   Lab Test 04/29/24  0725 04/29/24  0617 04/28/24 2012 04/28/24  0739 04/28/24  0559 04/27/24  1618 04/27/24  1435 04/27/24  0726 04/27/24  0522   NA  --  136  --   --  138  --   --   --  142   POTASSIUM  --  4.4  --   --  4.5  --  3.8  --  3.2*   CHLORIDE  --  100  --   --  103  --   --   --  106   CO2  --  28  --   --  29  --   --   --  28   BUN  --  14.6  --   --  13.3  --   --   --  5.4*   CR  --  0.57  --   --  0.51  --   --   --  0.51   ANIONGAP  --  8  --   --  6*  --   --   --  8   JOVON  --  9.3  --   --  9.4  --   --   --  9.0   * 123* 152*   < > 108*   < >  --    < > 137*    < > = values in this interval not displayed.     Most Recent 2 LFT's:  Recent Labs   Lab Test 04/25/24  0649 04/24/24  1237   AST 22 25   ALT 26 39   ALKPHOS 104 136   BILITOTAL 0.6 0.4     Most Recent 3 INR's:  Recent Labs   Lab Test 04/24/24  1237 04/19/24  1541   INR 1.07 0.99       XR Wrist Left G/E 3 Views    Result Date: 4/27/2024  EXAM: XR WRIST LEFT G/E 3 VIEWS LOCATION: [facility] DATE: 4/27/2024 INDICATION: s p left wrist fracture.  Casted. COMPARISON: 04/01/2024     IMPRESSION: Films taken through cast material obscures some bony detail. Again seen is a fracture of the distal aspect of the left radius which was also noted on the prior study. This is in good anatomic alignment. No new fractures are visualized. Normal  carpal alignment.    CT Abdomen Pelvis w/o Contrast    Result Date: 4/25/2024  EXAM: CT ABDOMEN PELVIS W/O CONTRAST LOCATION: [facility] DATE: 4/25/2024 INDICATION:  hypotension, gi bleed. Laparoscopic cholecystectomy 04/14/2024; ERCP with stone removal and stent installation 04/16/2024 COMPARISON: Contrast-enhanced abdomen pelvis CT from 2:00 PM 04/24/2024 and CT 04/19/2024. TECHNIQUE: CT scan of the abdomen and pelvis was performed without IV contrast. Multiplanar reformats were obtained. Dose reduction techniques were used. CONTRAST: None. FINDINGS: LOWER CHEST: Normal. HEPATOBILIARY: Common duct stent position as expected and unchanged. Pneumobilia. No focal hepatic lesion. New, small volume of low-density peritoneal fluid around the dome of the liver, adjacent to the spleen, and in the pelvis. Small foci of intraperitoneal gas are again observed, less voluminous than on the April 19 exam and acceptable given the time frame since cholecystectomy. PANCREAS: Mild soft tissue edema around the raeann hepatis and lateral to the pancreatic head on the right. Pancreatic parenchyma has a normal noncontrast appearance; no new inflammatory change around the pancreatic body or tail. No focal fluid collection. SPLEEN: Normal. ADRENAL GLANDS: Normal. KIDNEYS/BLADDER: Normal. BOWEL: Normal. Segments of the colon which contain air demonstrate normal wall thickness; no acute bowel pathology is evident. LYMPH NODES: Normal. VASCULATURE: Atheromatous vascular calcification. PELVIC ORGANS: Normal. MUSCULOSKELETAL: No focal abscess collection. No bone or body wall lesion.     IMPRESSION: 1.  Biliary stent remains in place and is unchanged in position. There is some edema and periportal soft tissues and adjacent pancreatic head similar as on CT yesterday afternoon. The only interval changes the new presence of small volume of low-density peritoneal fluid over the dome of the liver and minimally adjacent to the spleen and pelvis, of unknown etiology.     CT Abdomen Pelvis w Contrast    Result Date: 4/24/2024  EXAM: CT ABDOMEN PELVIS W CONTRAST LOCATION: [facility] DATE:  4/24/2024 INDICATION: elevated lipase, s p cholecystectomy 4 14 COMPARISON: None. TECHNIQUE: CT scan of the abdomen and pelvis was performed following injection of IV contrast. Multiplanar reformats were obtained. Dose reduction techniques were used. CONTRAST: 100 mL of Omnipaque 350 iodinated contrast FINDINGS: LOWER CHEST: Normal. HEPATOBILIARY: Pneumobilia is noted centrally, there is a common bile duct stent present. The gallbladder is surgically absent. Liver is otherwise unremarkable. PANCREAS: Minimal soft tissue stranding is seen adjacent to the pancreatic head. No organized peripancreatic fluid collections are identified. The pancreatic parenchyma enhances normally. SPLEEN: Normal size. ADRENAL GLANDS: No significant nodules. KIDNEYS/BLADDER: No significant mass, stone, or hydronephrosis. BOWEL: The stomach and duodenum are normal in size and caliber. The small bowel is normal in size and caliber. There is mild mural thickening of the descending and sigmoid colon. No significant pericolonic stranding is present. LYMPH NODES: No lymphadenopathy. VASCULATURE: No abdominal aortic aneurysm. PELVIC ORGANS: No pelvic masses. MUSCULOSKELETAL: Unremarkable.     IMPRESSION: 1.  Pneumobilia, secondary to common bile duct stent placement 2.  mild peripancreatic soft tissue stranding, consistent with acute edematous interstitial pancreatitis, no evidence of pseudocyst or pancreatic necrosis is noted 3.  mild thickening of the descending and sigmoid colon, may be exaggerated under distention, correlate for symptoms of long segment colitis    XR HAND RIGHT G/E 3 VIEWS    Result Date: 4/21/2024  For Patients: As a result of the 21st Century Cures Act, medical imaging exams and procedure reports are released immediately into your electronic medical record. You may view this report before your referring provider. If you have questions, please contact your health care provider. EXAM: XR HAND 3 VIEWS RIGHT LOCATION: [facility] DATE: 4/21/2024 INDICATION: Pain COMPARISON: 05/24/2018    No fracture or osseous erosions. Minimal scattered interphalangeal osteoarthritis.    CTA Abdomen Pelvis with Contrast    Result Date: 4/19/2024  EXAM: CTA ABDOMEN PELVIS WITH CONTRAST LOCATION: [facility] DATE: 4/19/2024 INDICATION: recent cholecysteomy and ERCP. H o cirrhosis. Now with bloody stools melena and worsening abdominal pain COMPARISON: CT chest and abdomen, pelvis from 04/14/2024, MRI abdomen from 04/14/2024 TECHNIQUE: CT angiogram abdomen pelvis during arterial phase of injection of IV contrast. 2D and 3D MIP reconstructions were performed by the CT technologist. Dose reduction techniques were used. CONTRAST: 90 mL Isovue 370 FINDINGS: ANGIOGRAM ABDOMEN/PELVIS: The aorta is normal in caliber. Mild atherosclerosis without high-grade stenosis. No active extravasation or pseudoaneurysm. There are a few paraesophageal varices. LOWER CHEST: Patchy bibasilar atelectasis. Tiny hiatal hernia. HEPATOBILIARY: Interval cholecystectomy. New biliary stent. Expected small volume pneumobilia. No pathologic biliary ductal dilation. No gallbladder fossa fluid collection. The liver is mildly dysmorphic. PANCREAS: Minimal fat stranding and edema around the head and uncinate process of the pancreas. No pancreatic ductal dilation. SPLEEN: Normal. ADRENAL GLANDS: Normal. KIDNEYS/BLADDER: No significant mass, stone, or hydronephrosis. BOWEL: Diverticulosis. There is mild wall thickening of the distal stomach and second portion of the duodenum with mucosal hyperenhancement and surrounding fat stranding. Small amount of free air seen throughout the abdomen and pelvis is within normal limits given recent operative intervention. Trace free fluid in the pelvis. No drainable collection. Tiny periampullary duodenal diverticulum. LYMPH NODES: Normal. PELVIC ORGANS: Hysterectomy. MUSCULOSKELETAL: Degenerative changes of  the spine and bilateral hips.     IMPRESSION: 1.  Interval cholecystectomy and biliary stent placement. No active extravasation. 2.  Mild wall thickening of the distal stomach and second portion the duodenum with mucosal hyperenhancement and surrounding fat stranding. In addition, there is mild fat stranding around the head and uncinate process of pancreas. The findings are likely  related to reactive changes from recent operative intervention. Duodenitis and/or pancreatitis remain in the differential in the appropriate clinical context. 3.  No drainable collection. 4.  Trace free fluid in the pelvis and small amount of free air seen throughout the abdomen and pelvis are likely related to recent operative intervention. 5.  The liver is mildly dysmorphic suggestive of fibrosis and/or cirrhosis.    XR Surgery ABIMAEL Fluoro L/T 5 Min    Result Date: 4/16/2024  This exam was marked as non-reportable because it will not be read by a radiologist or a [facility] non-radiologist provider.     MR Abdomen MRCP w/o & w Contrast    Result Date: 4/14/2024  EXAM: MR ABDOMEN MRCP W/O and W CONTRAST LOCATION: [facility] DATE: 4/14/2024 INDICATION: Right upper quadrant abdominal pain elevated liver function test this was requested by gastroenterologist. COMPARISON: CT and ultrasound 4/14/2024 TECHNIQUE: Routine MR liver/pancreas protocol including axial and coronal MRCP sequences. 2D and 3D reconstruction performed by MR technologist including MIP reconstruction and slab cholangiograms. If performed with contrast, additional dynamic T1 post IV contrast images. CONTRAST: 6ml gadavist FINDINGS: MRCP: Mild biliary dilation with the common duct measuring up to 0.8 cm and tapering smoothly at the ampulla. No biliary wall thickening, enhancement, or filling defects. Normal pancreatic duct anatomy. The gallbladder is distended with tiny stones, pericholecystic fluid, and mild wall thickening. LIVER: No significant  hepatic abnormality. No focal masses. No evidence of cirrhosis or significant fat or iron deposition. PANCREAS: No evidence of mass or pancreatitis. ADDITIONAL FINDINGS: No significant abnormality in the spleen, kidneys, and adrenal glands. No adenopathy. No ascites.     IMPRESSION: 1.  Mild biliary dilation, but no choledocholithiasis or other source of obstruction. 2.  Tiny gallstones with mild wall thickening and pericholecystic fluid raising concern for cholecystitis.    CTA Chest Abdomen Pelvis w Contrast    Result Date: 4/14/2024  EXAM: CTA CHEST ABDOMEN PELVIS W CONTRAST LOCATION: [facility] DATE: 4/14/2024 INDICATION: Right sided chest and abdominal pain.  Supple hematemesis.  Evaluate for active bleeding Helga Guerrero tear cholecystitis COMPARISON: Ultrasound abdomen 04/14/2024, CT chest 03/18/2024, CT abdomen pelvis 03/07/2024 TECHNIQUE: CT angiogram chest abdomen pelvis during arterial phase of injection of IV contrast. 2D and 3D MIP reconstructions were performed by the CT technologist. Dose reduction techniques were used. CONTRAST: ISOVUE 370 65ML FINDINGS: CT ANGIOGRAM CHEST, ABDOMEN, AND PELVIS: Normal caliber thoracic aorta which is negative for intramural hematoma, dissection, or aneurysm. The proximal aortic arch branch vessels are patent. Normal caliber abdominal aorta which is negative for dissection  or aneurysm. The celiac, superior mesenteric, bilateral renal, inferior mesenteric, bilateral common, internal and external iliac, common femoral and upper femoral and profunda femoral arteries are patent without significant stenosis. No active extravasation. Mild scattered atherosclerotic plaque. Lower paraesophageal varices. LUNGS AND PLEURA: Lungs are clear. No pleural effusions. MEDIASTINUM/AXILLAE: Normal. CORONARY ARTERY CALCIFICATION: Mild. HEPATOBILIARY: Hypertrophy of the caudate lobe suggesting fibrosis. No mass. Cholelithiasis with mild gallbladder wall  thickening. Dilated upper common bile duct up to 9 mm. PANCREAS: Normal. SPLEEN: Normal. ADRENAL GLANDS: Normal. KIDNEYS/BLADDER: Mild right renal pelviectasis. No urinary calculi or hydronephrosis. BOWEL: No obstruction. Mild diffuse hyperenhancement of the gastric mucosa suggesting possible gastritis. Duodenal diverticulum. Distal colonic diverticulosis. LYMPH NODES: Normal. PELVIC ORGANS: Pelvic phleboliths. MUSCULOSKELETAL: Small fat-containing periumbilical hernia. Chronic bilateral rib fractures. Mild multilevel degenerative changes in the thoracolumbar spine.     IMPRESSION: 1.  No active extravasation identified. 2.  Mild diffuse hyperenhancement of the gastric mucosa suggesting possible gastritis. 3.  Morphologic changes of hepatic fibrosis with paraesophageal varices suggesting portal hypertension. 4.  Mildly dilated upper common bile duct as seen on the same day ultrasound. MRCP is recommended for further evaluation. 5.  Distal colonic diverticulosis without acute diverticulitis. 6.  Cholelithiasis with mild gallbladder wall thickening, better evaluated on the same day ultrasound.     US Abdomen Limited    Result Date: 4/14/2024  EXAM: US ABDOMEN LIMITED LOCATION: [facility] DATE: 4/14/2024 INDICATION: pain COMPARISON: Ultrasound 10/18/2023, CT abdomen pelvis 03/07/2024 TECHNIQUE: Limited abdominal ultrasound. FINDINGS: GALLBLADDER: Cholelithiasis. Mild gallbladder wall thickening (4 mm). Trace pericholecystic fluid. Negative sonographic Fontanez's sign. BILE DUCTS: The common duct is dilated and measures 11 mm. LIVER: Coarsened echotexture, suggesting underlying fibrosis. Smooth contour. No focal mass. RIGHT KIDNEY: Mild pelviectasis. PANCREAS: The pancreas is obscured by overlying gas. No ascites.     IMPRESSION: 1.  Cholelithiasis with mild gallbladder wall thickening and trace pericholecystic fluid but negative sonographic Fontanez's sign. Findings are equivocal for acute  cholecystitis with wall thickening potentially related to intrinsic hepatic disease. 2.  Dilated common bile duct up to 11 mm. Recommend MRCP to evaluate for potential choledocholithiasis which is not seen sonographically. 3.  Coarsened hepatic echotexture suggesting fibrosis. No focal mass. 4.   Mild right renal pelviectasis.    Head CT w/o contrast    Result Date: 4/2/2024  EXAM: CT HEAD W/O CONTRAST LOCATION: [facility] DATE: 4/2/2024 INDICATION: fall COMPARISON: 05/19/2023 TECHNIQUE: Routine CT Head without IV contrast. Multiplanar reformats. Dose reduction techniques were used. FINDINGS: INTRACRANIAL CONTENTS: No intracranial hemorrhage, extraaxial collection, or mass effect.  No CT evidence of acute infarct. Moderate presumed chronic small vessel ischemic changes. Mild generalized volume loss. No hydrocephalus. VISUALIZED ORBITS/SINUSES/MASTOIDS: No intraorbital abnormality. Mild mucosal thickening scattered about the paranasal sinuses. No middle ear or mastoid effusion. BONES/SOFT TISSUES: No acute abnormality.     IMPRESSION: 1.  No acute intracranial process.       Discharge Orders        Home Care Referral      Reason for your hospital stay    abd pain     Follow-up and recommended labs and tests     Follow up with primary care provider, Allie Johnson, within 3-4 days for hospital follow- up.  The following labs/tests are recommended: cmp, cbc    Follow with GI 6-8 weeks.  Follow with ortho for wrist as previously ordered     Activity    Your activity upon discharge: activity as tolerated     Diet    Follow this diet upon discharge: Orders Placed This Encounter      Snacks/Supplements Adult: Gelatein 20 (sugar-free); With Meals      Advance Diet as Tolerated: Regular Diet Adult       Examination   Physical Exam   Temp:  [97.7  F (36.5  C)-98.1  F (36.7  C)] 98.1  F (36.7  C)  Pulse:  [90-94] 92  Resp:  [18] 18  BP: ()/(56-63) 116/63  SpO2:  [93 %-97 %] 97 %  Wt Readings  from Last 1 Encounters:   04/25/24 60.9 kg (134 lb 4.2 oz)       See today's note      Please see EMR for more detailed significant labs, imaging, consultant notes etc.    I, Yisel Mattson MD, personally saw the patient today and spent greater than 30 minutes discharging this patient.    Yisel Mattson MD  [facility]    CC:Allie Johnson

## 2024-04-29 NOTE — PLAN OF CARE
Problem: Pain Acute  Goal: Optimal Pain Control and Function  Outcome: Progressing  Intervention: Prevent or Manage Pain  Recent Flowsheet Documentation  Taken 4/29/2024 0100 by Sharifa Blair RN  Medication Review/Management: medications reviewed  Intervention: Optimize Psychosocial Wellbeing  Recent Flowsheet Documentation  Taken 4/29/2024 0100 by Sharifa Blair RN  Supportive Measures: active listening utilized     Problem: Pancreatitis  Goal: Fluid and Electrolyte Balance  Outcome: Progressing     Problem: Comorbidity Management  Goal: Maintenance of Behavioral Health Symptom Control  Outcome: Progressing  Intervention: Maintain Behavioral Health Symptom Control  Recent Flowsheet Documentation  Taken 4/29/2024 0100 by Sharifa Blair RN  Medication Review/Management: medications reviewed   Goal Outcome Evaluation:       Pt reported abdominal and wrist pain, given a dose of  dilaudid with relief. Slept well between overnight between cares.

## 2024-04-29 NOTE — PLAN OF CARE
Goal Outcome Evaluation:       All paperwork and education has been given to patient. PICC line was removed. All belongings packed and sent with patient. No complications at this time.        Yesika Rodriguez RN

## 2024-04-29 NOTE — PROGRESS NOTES
[facility]    Medicine Progress Note - Hospitalist Service    Date of Admission:  4/24/2024    Assessment & Plan   68 year old female with h/o PMHx of HTN, DM2, Fibromyalgia, COPD who on 4/24/2024 developed significant diarrhea with blood intermixed associated with right upper quadrant and epigastric abdominal pain and was found to have colitis and acute pancreatitis s/p ERCP on 4/16/2024 for choledocholithiasis requiring biliary sphincterotomy and balloon extraction with stent placement in the common bile duct who returned to the ER multiple times for abdominal pain who presented to the ER for diarrhea and blood in the stool      Transferred to ICU previously due to hypotension and  now stable and downgraded on 4/25/24     1.Pancreatitic inflammation concern for acute pancreatitis of unknown etiology.   -Status post ERCP on 4/16/2024 for choledocholithiasis requiring biliary sphincterotomy and balloon extraction with stent placement in the common bile duct  -CT abdomen pelvis with contrast 4/24/2024 at 2:05 PM consistent with interstitial pancreatitis, mild thickening of the descending and sigmoid colon may be exaggerated underdistention  - 4/15/2024 repeat CT abdomen pelvis without contrast Biliary stent remains in place and is unchanged in position. There is some edema and periportal soft tissues and adjacent pancreatic head similar as on CT yesterday afternoon. The only interval changes the new presence of small volume of low-density peritoneal fluid over the dome of the liver and minimally adjacent to the spleen and pelvis, of unknown etiology.  -at this time agree on stopping Ozempic here in case triggered some of the interstitial changes in pancreas    2.Possible Upper GIB with melena, with some hemorrhagic shock with hgb 12-->9 then transfused 1 unit pRBC  -EGD neg here  -hgb stable  -ppi  -GI to set up colon as outpt and they will also consider Pill cam     3.Hypotension requiring  ICU monitoring resolved   - -previously given 1 L NS bolus then placed on levophed after additional liter of LR Lactic acid 0.9, hemoglobin 9.6 down from 12 (suspect some hemodilution), WBC 5.4  - downgrade 4/25 from ICU   - pressures stable   - holding BP meds  and bp still lower side normal--will not restart and have her follow with PCP to consider re-starts  - monitor Hgb  - discontinue telemetry      4.Acute colitis  - resolved on repeat CT   - mild RLQ abd pain monitor   -she was placed on ceftriaxone on admit for concern when in ICU  -blood cx ntd  -will check in with GI but I anticipate we might be able to stop this today--will discontinue and GI ok with it ( she got total of 4 days Ceftriaxone)     5.Choledocholithiasis with stone removal and stent placement. ERCP with stent removal in 8 weeks.   - GI follow up --she told me she got appt with [facility]      6.DM type 2 Hemoglobin A1c recently done was 5.4  - holding jardiance and ozempic --would not restart Ozempic at all--I am concerned risk of involving pancreatic changes   - novolog sliding scale TID with meals while in the hospital   - continue gabapentin      7.History of mood disorder and fibromyalgia   Continue  Zoloft and prn belsomra  - on hold  amitriptyline -she notes very helpful or migraines and will go back on it     8.History of hypertension  Continue to hold BP meds  losartan, propranolol  - pressures low normal and stable      9.History of asthma not in exacerbation  - continue inhalers      10.History of obstructive sleep apnea  - continue home CPAP     11.Hypokalemia  -replace    12.Hypomag  -replaced    13.Cast on left arm-distal radial fx  -from prior issue, was to see [facility]  -cast loose and causing some pain in hand --appreciate [facility] seeing here  -will still need to follow up in clinic with [facility]    14.Constipation  -tried mom ,not helping  -had stool yesterday       15.Irritated skin on right arm  -improved with  hydrocortisone 2.5 %    16.Dry eyes  -artificial tears prn            Diet: Snacks/Supplements Adult: Gelatein 20 (sugar-free); With Meals  Advance Diet as Tolerated: Regular Diet Adult    DVT Prophylaxis: Pneumatic Compression Devices  Quinonez Catheter: Not present  Lines: PRESENT      PICC 04/25/24 Triple Lumen Right Basilic-Site Assessment: WDL      Cardiac Monitoring: None  Code Status: Full Code      Clinically Significant Risk Factors              # Hypoalbuminemia: Lowest albumin = 3 g/dL at 4/25/2024  6:49 AM, will monitor as appropriate     # Hypertension: Noted on problem list            # Financial/Environmental Concerns:    # Asthma: noted on problem list        Disposition Plan     Medically Ready for Discharge: Ready Now             Yisel Mattson MD  Hospitalist Service  [facility]  Securely message with [employer] (more info)  Text page via [employer] Paging/Directory   ______________________________________________________________________    Interval History   She feels good  Was up walking around on unit  Had stool  Feels ready for home and wants to see PCP appt today    Physical Exam   Vital Signs: Temp: 98.1  F (36.7  C) Temp src: Oral BP: 116/63 Pulse: 92   Resp: 18 SpO2: 97 % O2 Device: None (Room air)    Weight: 134 lbs 4.16 oz    Constitutional: awake, alert, cooperative, and no apparent distress  Respiratory: no increased work of breathing, good air exchange, no retractions, and clear to auscultation  Cardiovascular: regular rate and rhythm and normal S1 and S2  GI: normal bowel sounds, soft, non-distended, and non-tender  Skin: no bruising or bleeding  Musculoskeletal: no lower extremity pitting edema present  Neurologic: Mental Status Exam:  Level of Alertness:   awake  Neuropsychiatric: General: normal, calm, and normal eye contact        Data     I have personally reviewed the following data over the past 24 hrs:    8.4  \   12.4   / 264     136 100 14.6 /  121 (H)    4.4 28 0.57 \       Imaging results reviewed over the past 24 hrs:   No results found for this or any previous visit (from the past 24 hour(s)).

## 2024-04-29 NOTE — PROGRESS NOTES
Care Management Discharge Note    Discharge Date: 04/29/2024       Discharge Disposition:  home    Discharge Services:  Home RN and home OT    Discharge DME:  N/A    Discharge Transportation: family or friend will provide    Private pay costs discussed: Not applicable      Handoff Referral Completed: Yes    Additional Information:  Patient discharging today per MD.  New home care orders to be placed. Referral sent to [facility].  Patient is discharging home with her significant other Vacne.     Lori Rodriguez LGSW

## 2024-04-30 ENCOUNTER — THERAPY VISIT (OUTPATIENT)
Dept: PHYSICAL THERAPY | Facility: REHABILITATION | Age: 69
End: 2024-04-30
Payer: COMMERCIAL

## 2024-04-30 DIAGNOSIS — M47.816 LUMBAR FACET ARTHROPATHY: ICD-10-CM

## 2024-04-30 DIAGNOSIS — M62.81 GENERALIZED MUSCLE WEAKNESS: ICD-10-CM

## 2024-04-30 DIAGNOSIS — M54.50 CHRONIC BILATERAL LOW BACK PAIN WITHOUT SCIATICA: Primary | ICD-10-CM

## 2024-04-30 DIAGNOSIS — G89.29 CHRONIC NECK PAIN: ICD-10-CM

## 2024-04-30 DIAGNOSIS — M51.369 DDD (DEGENERATIVE DISC DISEASE), LUMBAR: ICD-10-CM

## 2024-04-30 DIAGNOSIS — M54.2 CHRONIC NECK PAIN: ICD-10-CM

## 2024-04-30 DIAGNOSIS — M79.7 FIBROMYALGIA: ICD-10-CM

## 2024-04-30 DIAGNOSIS — G89.29 CHRONIC BILATERAL LOW BACK PAIN WITHOUT SCIATICA: Primary | ICD-10-CM

## 2024-04-30 DIAGNOSIS — M54.16 LUMBAR RADICULOPATHY: ICD-10-CM

## 2024-04-30 LAB
BACTERIA BLD CULT: NO GROWTH
BACTERIA BLD CULT: NO GROWTH

## 2024-04-30 PROCEDURE — 97110 THERAPEUTIC EXERCISES: CPT | Mod: GP | Performed by: PHYSICAL THERAPY ASSISTANT

## 2024-05-02 ENCOUNTER — TELEPHONE (OUTPATIENT)
Dept: PHARMACY | Facility: OTHER | Age: 69
End: 2024-05-02
Payer: COMMERCIAL

## 2024-05-02 NOTE — LETTER
May 2, 2024      Odette Escobar  [address] [facility] [address]        Dear Odette,           It has been recommended you schedule a Medication Therapy Management (MTM) appointment. MTM is designed to help you get the most of out of your medicines.     During an MTM appointment a specially trained pharmacist will review all of your medicines, both prescription and over-the-counter. They will make sure your medicines are the best choice for you and are safe and convenient for you.  MTM pharmacists work together with you and your doctor to help you understand your medicines, solve any problems related to your medicines and help you get the best results from taking your medicines.     At [facility], we strongly believe in a team approach to health care. We want to help you understand your medicines and health conditions. To learn more about how you might benefit from MTM services, check out the following website:    [email or web address]    To make an appointment, please call the clinic or the MTM scheduling line at [phone] (toll-free at [phone]).    We look forward to hearing from you!        Ying Shelby, PharmD  Medication Therapy Management Pharmacist   [facility]                Detail Level: Zone Detail Level: Detailed Include Location In Plan?: No

## 2024-05-02 NOTE — TELEPHONE ENCOUNTER
Letter sent.     Ying Shelby, PharmD  Medication Therapy Management Pharmacist   [facility]

## 2024-05-02 NOTE — TELEPHONE ENCOUNTER
MTM referral from: Transitions of Care (recent hospital discharge or ED visit)    MTM referral outreach attempt #2 on May 2, 2024 at 8:28 AM      Outcome: Patient not reachable after several attempts, routed to Pharmacist Team/Provider as an FYI    Use [facility] med adv (leslye) for the carrier/Plan on the flowsheet      MTM Practitioner please send patient letter    Iqra Awan CPhT  MTM

## 2024-05-08 NOTE — PROGRESS NOTES
Patient presented to [facility] to update her contacts    Vance Cummings [phone] added to contacts and is main contact as she has phone issues currently.     Olive Dee RN

## 2024-05-24 ENCOUNTER — HOSPITAL ENCOUNTER (OUTPATIENT)
Dept: CARDIOLOGY | Facility: HOSPITAL | Age: 69
Discharge: HOME | End: 2024-05-24
Attending: FAMILY MEDICINE | Admitting: FAMILY MEDICINE
Payer: COMMERCIAL

## 2024-05-24 DIAGNOSIS — R07.9 CHEST PAIN: ICD-10-CM

## 2024-05-24 PROCEDURE — 93325 DOPPLER ECHO COLOR FLOW MAPG: CPT | Mod: TC

## 2024-05-24 PROCEDURE — 93325 DOPPLER ECHO COLOR FLOW MAPG: CPT | Mod: 26 | Performed by: INTERNAL MEDICINE

## 2024-05-24 PROCEDURE — 93350 STRESS TTE ONLY: CPT | Mod: TC

## 2024-05-24 PROCEDURE — 93018 CV STRESS TEST I&R ONLY: CPT | Performed by: INTERNAL MEDICINE

## 2024-05-24 PROCEDURE — 93321 DOPPLER ECHO F-UP/LMTD STD: CPT | Mod: 26 | Performed by: INTERNAL MEDICINE

## 2024-05-24 PROCEDURE — 93350 STRESS TTE ONLY: CPT | Mod: 26 | Performed by: INTERNAL MEDICINE

## 2024-05-24 PROCEDURE — 93016 CV STRESS TEST SUPVJ ONLY: CPT | Performed by: INTERNAL MEDICINE

## 2024-06-11 ENCOUNTER — APPOINTMENT (OUTPATIENT)
Dept: CT IMAGING | Facility: HOSPITAL | Age: 69
End: 2024-06-11
Attending: STUDENT IN AN ORGANIZED HEALTH CARE EDUCATION/TRAINING PROGRAM
Payer: COMMERCIAL

## 2024-06-11 ENCOUNTER — HOSPITAL ENCOUNTER (EMERGENCY)
Facility: HOSPITAL | Age: 69
Discharge: HOME | End: 2024-06-11
Attending: STUDENT IN AN ORGANIZED HEALTH CARE EDUCATION/TRAINING PROGRAM | Admitting: STUDENT IN AN ORGANIZED HEALTH CARE EDUCATION/TRAINING PROGRAM
Payer: COMMERCIAL

## 2024-06-11 VITALS
SYSTOLIC BLOOD PRESSURE: 112 MMHG | OXYGEN SATURATION: 96 % | DIASTOLIC BLOOD PRESSURE: 55 MMHG | HEART RATE: 78 BPM | TEMPERATURE: 98.8 F | WEIGHT: 137 LBS | BODY MASS INDEX: 23.39 KG/M2 | HEIGHT: 64 IN | RESPIRATION RATE: 20 BRPM

## 2024-06-11 DIAGNOSIS — R19.7 NAUSEA VOMITING AND DIARRHEA: ICD-10-CM

## 2024-06-11 DIAGNOSIS — R11.2 NAUSEA VOMITING AND DIARRHEA: ICD-10-CM

## 2024-06-11 DIAGNOSIS — R10.84 GENERALIZED ABDOMINAL PAIN: ICD-10-CM

## 2024-06-11 LAB
ALBUMIN SERPL BCG-MCNC: 4.2 G/DL (ref 3.5–5.2)
ALP SERPL-CCNC: 100 U/L (ref 40–150)
ALT SERPL W P-5'-P-CCNC: 23 U/L (ref 0–50)
ANION GAP SERPL CALCULATED.3IONS-SCNC: 12 MMOL/L (ref 7–15)
AST SERPL W P-5'-P-CCNC: 31 U/L (ref 0–45)
BASOPHILS # BLD AUTO: 0 10E3/UL (ref 0–0.2)
BASOPHILS NFR BLD AUTO: 0 %
BILIRUB DIRECT SERPL-MCNC: <0.2 MG/DL (ref 0–0.3)
BILIRUB SERPL-MCNC: 0.5 MG/DL
BUN SERPL-MCNC: 8.6 MG/DL (ref 8–23)
CALCIUM SERPL-MCNC: 9.6 MG/DL (ref 8.8–10.2)
CHLORIDE SERPL-SCNC: 103 MMOL/L (ref 98–107)
CREAT SERPL-MCNC: 0.72 MG/DL (ref 0.51–0.95)
DEPRECATED HCO3 PLAS-SCNC: 26 MMOL/L (ref 22–29)
EGFRCR SERPLBLD CKD-EPI 2021: >90 ML/MIN/1.73M2
EOSINOPHIL # BLD AUTO: 0.1 10E3/UL (ref 0–0.7)
EOSINOPHIL NFR BLD AUTO: 2 %
ERYTHROCYTE [DISTWIDTH] IN BLOOD BY AUTOMATED COUNT: 13.2 % (ref 10–15)
GLUCOSE SERPL-MCNC: 89 MG/DL (ref 70–99)
HCT VFR BLD AUTO: 42.1 % (ref 35–47)
HGB BLD-MCNC: 13.8 G/DL (ref 11.7–15.7)
IMM GRANULOCYTES # BLD: 0 10E3/UL
IMM GRANULOCYTES NFR BLD: 0 %
LIPASE SERPL-CCNC: 46 U/L (ref 13–60)
LYMPHOCYTES # BLD AUTO: 2.7 10E3/UL (ref 0.8–5.3)
LYMPHOCYTES NFR BLD AUTO: 37 %
MCH RBC QN AUTO: 29.3 PG (ref 26.5–33)
MCHC RBC AUTO-ENTMCNC: 32.8 G/DL (ref 31.5–36.5)
MCV RBC AUTO: 89 FL (ref 78–100)
MONOCYTES # BLD AUTO: 0.6 10E3/UL (ref 0–1.3)
MONOCYTES NFR BLD AUTO: 8 %
NEUTROPHILS # BLD AUTO: 3.9 10E3/UL (ref 1.6–8.3)
NEUTROPHILS NFR BLD AUTO: 54 %
NRBC # BLD AUTO: 0 10E3/UL
NRBC BLD AUTO-RTO: 0 /100
PLATELET # BLD AUTO: 230 10E3/UL (ref 150–450)
POTASSIUM SERPL-SCNC: 3.6 MMOL/L (ref 3.4–5.3)
PROT SERPL-MCNC: 7.3 G/DL (ref 6.4–8.3)
RBC # BLD AUTO: 4.71 10E6/UL (ref 3.8–5.2)
SODIUM SERPL-SCNC: 141 MMOL/L (ref 135–145)
TROPONIN T SERPL HS-MCNC: <6 NG/L
WBC # BLD AUTO: 7.3 10E3/UL (ref 4–11)

## 2024-06-11 PROCEDURE — 82248 BILIRUBIN DIRECT: CPT | Performed by: STUDENT IN AN ORGANIZED HEALTH CARE EDUCATION/TRAINING PROGRAM

## 2024-06-11 PROCEDURE — 93005 ELECTROCARDIOGRAM TRACING: CPT | Performed by: STUDENT IN AN ORGANIZED HEALTH CARE EDUCATION/TRAINING PROGRAM

## 2024-06-11 PROCEDURE — 258N000003 HC RX IP 258 OP 636: Mod: JZ | Performed by: STUDENT IN AN ORGANIZED HEALTH CARE EDUCATION/TRAINING PROGRAM

## 2024-06-11 PROCEDURE — 85025 COMPLETE CBC W/AUTO DIFF WBC: CPT | Performed by: STUDENT IN AN ORGANIZED HEALTH CARE EDUCATION/TRAINING PROGRAM

## 2024-06-11 PROCEDURE — 99285 EMERGENCY DEPT VISIT HI MDM: CPT | Mod: 25

## 2024-06-11 PROCEDURE — 84484 ASSAY OF TROPONIN QUANT: CPT | Performed by: STUDENT IN AN ORGANIZED HEALTH CARE EDUCATION/TRAINING PROGRAM

## 2024-06-11 PROCEDURE — 83690 ASSAY OF LIPASE: CPT | Performed by: STUDENT IN AN ORGANIZED HEALTH CARE EDUCATION/TRAINING PROGRAM

## 2024-06-11 PROCEDURE — 96375 TX/PRO/DX INJ NEW DRUG ADDON: CPT

## 2024-06-11 PROCEDURE — 250N000011 HC RX IP 250 OP 636: Mod: JZ | Performed by: STUDENT IN AN ORGANIZED HEALTH CARE EDUCATION/TRAINING PROGRAM

## 2024-06-11 PROCEDURE — 96361 HYDRATE IV INFUSION ADD-ON: CPT

## 2024-06-11 PROCEDURE — 36415 COLL VENOUS BLD VENIPUNCTURE: CPT | Performed by: STUDENT IN AN ORGANIZED HEALTH CARE EDUCATION/TRAINING PROGRAM

## 2024-06-11 PROCEDURE — 80053 COMPREHEN METABOLIC PANEL: CPT | Performed by: STUDENT IN AN ORGANIZED HEALTH CARE EDUCATION/TRAINING PROGRAM

## 2024-06-11 PROCEDURE — 74177 CT ABD & PELVIS W/CONTRAST: CPT

## 2024-06-11 PROCEDURE — 96374 THER/PROPH/DIAG INJ IV PUSH: CPT | Mod: 59

## 2024-06-11 RX ORDER — IOPAMIDOL 755 MG/ML
75 INJECTION, SOLUTION INTRAVASCULAR ONCE
Status: COMPLETED | OUTPATIENT
Start: 2024-06-11 | End: 2024-06-11

## 2024-06-11 RX ORDER — ONDANSETRON 2 MG/ML
4 INJECTION INTRAMUSCULAR; INTRAVENOUS
Status: COMPLETED | OUTPATIENT
Start: 2024-06-11 | End: 2024-06-11

## 2024-06-11 RX ORDER — HYDROMORPHONE HCL IN WATER/PF 6 MG/30 ML
0.3 PATIENT CONTROLLED ANALGESIA SYRINGE INTRAVENOUS
Status: COMPLETED | OUTPATIENT
Start: 2024-06-11 | End: 2024-06-11

## 2024-06-11 RX ADMIN — HYDROMORPHONE HYDROCHLORIDE 0.3 MG: 0.2 INJECTION, SOLUTION INTRAMUSCULAR; INTRAVENOUS; SUBCUTANEOUS at 19:16

## 2024-06-11 RX ADMIN — SODIUM CHLORIDE 1000 ML: 9 INJECTION, SOLUTION INTRAVENOUS at 19:21

## 2024-06-11 RX ADMIN — ONDANSETRON 4 MG: 2 INJECTION INTRAMUSCULAR; INTRAVENOUS at 19:11

## 2024-06-11 RX ADMIN — IOPAMIDOL 75 ML: 755 INJECTION, SOLUTION INTRAVENOUS at 20:20

## 2024-06-11 ASSESSMENT — ACTIVITIES OF DAILY LIVING (ADL)
ADLS_ACUITY_SCORE: 37

## 2024-06-11 NOTE — ED PROVIDER NOTES
NAME: Odette Escobar  AGE: 68 year old female  YOB: 1955  MRN: [id number]  EVALUATION DATE & TIME: 2024  6:29 PM    PCP: Allie Johnson  ED PROVIDER: Paris Dubois MD.    Chief Complaint   Patient presents with    Abdominal Pain       FINAL IMPRESSION:  1. Generalized abdominal pain    2. Nausea vomiting and diarrhea        MEDICAL DECISION MAKIN:33 PM I met with the patient, obtained history, performed an initial exam, and discussed options and plan for diagnostics and treatment here in the ED.   9:05 PM I rechecked and updated patient.     69 y/o F with h/o T2DM, HTN, HLP, asthma/COPD, fibromyalgia who presents with adominal pain. Earlier this year she had several admissions after she was found to have colitis and pancreatitis s/p ERCP on 24 for choledocholithisis requiring biliary sphinecteromy/stent placement and later admitted for possible GI bleed (EGD negative at that time). She reports having some ongoing chronic abdominal pain since then but since last night has had worsening abdominal pain (across lower abdomen and up into RUQ) as well as non bloody diarrhea, vomiting and chills. She is nontoxic appearing on exam with reassuring vitals.    Dx includes but not limited to gastritis, ulcer, pancreatitis, appendicitis, hepatobiliary pathology, colitis, diverticulitis, kidney stone, hernia, among others. UA done earlier at clinic today did not appear infectious. CBC, BMP, LFTs, lipase all reassuring here. EKG without concerning St changes and troponin <6, do not suspect atypical ACS. No shortness of breath or pleuritic pain, no hypoxia/tachycardia, do not suspect PE. She was unable to provide a stool sample here. CT abd/pelvis without any acute findings, known biliary stent in good position. She was given fluids and one dose of dilaudid and Zofran here and felt much better on reassessment. I considered admission but with improvement in symptoms and reassuring workup she feels  comfortable with discharge home with close outpatient follow up. She reports she has some nausea medications already sent to her pharmacy which she will .          Medical Decision Making  Obtained supplemental history:Supplemental history obtained?: No  Reviewed external records: External records reviewed?: Documented in chart and Inpatient Record: inpatient admission 4/24/24, office visit  today 6/11/24  Care impacted by chronic illness:Chronic Lung Disease, Chronic Pain, Diabetes, and Hypertension  Care significantly affected by social determinants of health:Access to Medical Care  Did you consider but not order tests?: Work up considered but not performed and documented in chart, if applicable  Did you interpret images independently?: Independent interpretation of ECG and images noted in documentation, when applicable.  Consultation discussion with other provider:Did you involve another provider (consultant, , pharmacy, etc.)?: No  Discharge. No recommendations on prescription strength medication(s). See documentation for any additional details.    MEDICATIONS GIVEN IN THE EMERGENCY:  Medications   sodium chloride 0.9% BOLUS 1,000 mL (0 mLs Intravenous Stopped 6/11/24 2123)   ondansetron (ZOFRAN) injection 4 mg (4 mg Intravenous $Given 6/11/24 1911)   HYDROmorphone (DILAUDID) injection 0.3 mg (0.3 mg Intravenous $Given 6/11/24 1916)   iopamidol (ISOVUE-370) solution 75 mL (75 mLs Intravenous $Given 6/11/24 2020)       NEW PRESCRIPTIONS STARTED AT TODAY'S ER VISIT:  New Prescriptions    No medications on file        =================================================================  HPI    Patient information was obtained from: patient   Use of : N/A     Odette Escobar is a 68 year old female with a past medical history of s/p cholecystectomy (4/14/2024), DM II, HLD, HTN, alcoholic cirrhosis of liver, asthma who presents for abdominal pain.     The patient reports ongoing abdominal pain that  worsened last night. Pain radiates across the left side of her abdomen into the right upper quadrant,some into her back. Her pain became worse after eating last night. She has vomited today and had some mucous like dark stool today. Reports some chills and burning with urination, and that she feels dehydrated. Of note, patient has scheduled surgery for 6/14/2024 for removal of stents placed during her cholecystectomy.     Patient denies hematemesis or blood in stool. No fevers. No new chest pain. No recent antibiotic use.     Per review- 4/19-4/20/2024, patient with melena and worsening abdominal pain. Hgb stable, CTA abdomen/pelvis without active bleeding. Admitted for endoscopic interventions, however none were performed. Patient discharged home in stable condition with instruction to follow up with [facility].     PAST MEDICAL HISTORY:  Past Medical History:   Diagnosis Date    Alcoholic cirrhosis of liver (H)     Asthma     DM2 (diabetes mellitus, type 2) (H)     Fibromyalgia     History of skin cancer     Hypertension     Migraine     Motion sickness     CASTRO on CPAP     Vertigo        PAST SURGICAL HISTORY:  Past Surgical History:   Procedure Laterality Date    ENDOMETRIAL ABLATION      ENDOSCOPIC RETROGRADE CHOLANGIOPANCREATOGRAM N/A 4/16/2024    Procedure: ENDOSCOPIC RETROGRADE CHOLANGIOPANCEATOGRAPHY, WITH SPHINCTEROTOMY;  Surgeon: Eduin Do MD;  Location: [facility] OR    ENDOSCOPIC RETROGRADE CHOLANGIOPANCREATOGRAM N/A 4/16/2024    Procedure: STONE EXTRACTION,;  Surgeon: Eduin Do MD;  Location: [facility] OR    ENDOSCOPIC RETROGRADE CHOLANGIOPANCREATOGRAM N/A 4/16/2024    Procedure: BILIARY STENT PLACEMENT;  Surgeon: Eduin Do MD;  Location: [facility] OR    ESOPHAGOSCOPY, GASTROSCOPY, DUODENOSCOPY (EGD), COMBINED N/A 4/16/2024    Procedure: ESOPHAGOGASTRODUODENOSCOPY, WITH ENDOSCOPIC ULTRASOUND GUIDANCE;  Surgeon: Eduin Do MD;  Location: [facility]  OR    ESOPHAGOSCOPY, GASTROSCOPY, DUODENOSCOPY (EGD), COMBINED N/A 4/25/2024    Procedure: ESOPHAGOGASTRODUODENOSCOPY;  Surgeon: Zohaib Morgan MD;  Location: [facility] GI    HERNIORRHAPHY, UMBILICAL, ROBOT-ASSISTED, LAPAROSCOPIC, USING DA ROCIO XI N/A 04/14/2024    Procedure: PRIMARY REPAIR OF  UMBILICAL HERNIA;  Surgeon: Christiano Cruz DO;  Location: [facility] OR    HYSTERECTOMY      LAPAROSCOPIC CHOLECYSTECTOMY N/A 04/14/2024    Procedure: CHOLECYSTECTOMY, ROBOT-ASSISTED, LAPAROSCOPIC, USING DA ROCIO XI;  Surgeon: Christiano Cruz DO;  Location: [facility] OR    PICC TRIPLE LUMEN PLACEMENT  4/25/2024    TONSILLECTOMY         CURRENT MEDICATIONS:    No current facility-administered medications for this encounter.    Current Outpatient Medications:     acetaminophen (TYLENOL) 500 MG tablet, Take 1,000 mg by mouth daily as needed for pain, Disp: , Rfl:     albuterol (PROAIR HFA/PROVENTIL HFA/VENTOLIN HFA) 108 (90 Base) MCG/ACT inhaler, Inhale 2 puffs into the lungs every 6 hours as needed for shortness of breath, wheezing or cough, Disp: , Rfl:     amitriptyline (ELAVIL) 10 MG tablet, Take 30 mg by mouth at bedtime, Disp: , Rfl:     ammonium lactate (AMLACTIN) 12 % external cream, Apply topically daily as needed for dry skin, Disp: , Rfl:     bacitracin 500 UNIT/GM external ointment, Apply topically 2 times daily, Disp: , Rfl:     Baclofen (LIORESAL) 5 MG tablet, Take 5-10 mg by mouth 2 times daily as needed for muscle spasms or other (or sleep), Disp: , Rfl:     BELSOMRA 10 MG tablet, Take 1 tablet by mouth nightly as needed for sleep, Disp: , Rfl:     budesonide-formoterol (SYMBICORT) 160-4.5 MCG/ACT Inhaler, Inhale 2 puffs into the lungs two times daily Rinse mouth/gargle after use, Disp: , Rfl:     cetirizine (ZYRTEC) 10 MG tablet, Take 10 mg by mouth daily, Disp: , Rfl:     cholecalciferol (VITAMIN D3) 50 MCG (2000 UT) CAPS, Take 4,000 Units by mouth daily, Disp: , Rfl:     estradiol (ESTRACE) 0.1 MG/GM  vaginal cream, Place vaginally twice a week, Disp: , Rfl:     famotidine (PEPCID) 20 MG tablet, Take 20 mg by mouth 2 times daily, Disp: , Rfl:     ferrous gluconate (FERGON) 324 (38 Fe) MG tablet, Take 324 mg by mouth daily (with breakfast), Disp: , Rfl:     fluticasone (FLONASE) 50 MCG/ACT nasal spray, Spray 2 sprays into both nostrils daily as needed for rhinitis or allergies, Disp: , Rfl:     gabapentin (NEURONTIN) 300 MG capsule, Take 600 mg by mouth 3 times daily, Disp: , Rfl:     hydrocortisone 2.5 % ointment, Apply topically 2 times daily as needed for other (Eczema), Disp: , Rfl:     ipratropium - albuterol 0.5 mg/2.5 mg/3 mL (DUONEB) 0.5-2.5 (3) MG/3ML neb solution, Take 1 vial by nebulization 3 times daily as needed for shortness of breath, wheezing or cough, Disp: , Rfl:     JARDIANCE 10 MG TABS tablet, Take 10 mg by mouth daily, Disp: , Rfl:     loperamide (IMODIUM) 2 mg capsule, Take 2 mg by mouth 4 times daily as needed for diarrhea, Disp: , Rfl:     Magnesium Oxide -Mg Supplement 500 MG TABS, Take 1 tablet by mouth daily, Disp: , Rfl:     montelukast (SINGULAIR) 10 MG tablet, Take 1 tablet by mouth at bedtime, Disp: , Rfl:     Multiple Vitamins-Minerals (MULTIVITAMIN WOMEN 50+) TABS, Take 1 tablet by mouth daily, Disp: , Rfl:     oxyCODONE (ROXICODONE) 5 MG tablet, Take 1 tablet (5 mg) by mouth every 6 hours as needed for severe pain T, Disp: 10 tablet, Rfl: 0    polyethylene glycol (MIRALAX) 17 GM/Dose powder, Take 17 g (1 Capful) by mouth daily, Disp: 225 g, Rfl: 0    polyethylene glycol-propylene glycol (SYSTANE ULTRA) 0.4-0.3 % SOLN ophthalmic solution, Place 1 drop into both eyes 4 times daily as needed for dry eyes, Disp: , Rfl:     sertraline (ZOLOFT) 100 MG tablet, Take 100 mg by mouth daily, Disp: , Rfl:     TYRVAYA 0.03 MG/ACT nasal spray, Spray 1 spray into both nostrils 2 times daily, Disp: , Rfl:     ALLERGIES:  Allergies   Allergen Reactions    Aspirin Hives    Diflunisal Nausea and  "Vomiting     (Dolobid) Occurred approximately 20 years ago    Occurred approximately 20 years ago   Occurred approximately 20 years ago   (Dolobid) Occurred approximately 20 years ago    Fd&C Yellow #5 (Tartrazine) Nausea and Vomiting and Hives       FAMILY HISTORY:  Family History   Problem Relation Age of Onset    Heart Failure Mother     Heart Failure Father     Heart Failure Sister     Coronary Artery Disease Brother     Coronary Artery Disease Brother     Coronary Artery Disease Brother     Cerebrovascular Disease Sister        SOCIAL HISTORY:   Social History     Socioeconomic History    Marital status: Single   Tobacco Use    Smoking status: Never    Smokeless tobacco: Never   Substance and Sexual Activity    Alcohol use: Yes     Comment: Alcoholic Drinks/day: Occasional usage    Drug use: No     Social Determinants of Health     Financial Resource Strain: At Risk (3/21/2024)    Received from [facility]    Financial Resource Strain     Is it hard for you to pay for the very basics like food, housing, medical care or heating?: Yes   Food Insecurity: Not At Risk (3/21/2024)    [facility]    Food Insecurity     Does your food run out before you have the money to buy more?: No   Transportation Needs: Not At Risk (3/21/2024)    [facility]    Transportation Needs     Does a lack of transportation keep you from your medical appointments or from getting your medications?: No   Interpersonal Safety: Unknown (2/21/2024)    [facility]    Humiliation, Afraid, Rape, and Kick questionnaire     Physically Abused: No     Sexually Abused: No       PHYSICAL EXAM:    Vitals: /64   Pulse 72   Temp 98.8  F (37.1  C) (Temporal)   Resp 20   Ht 1.626 m (5' 4\")   Wt 62.1 kg (137 lb)   SpO2 97%   BMI 23.52 kg/m     Constitutional: Well developed, well nourished. No acute distress.  HENT: Normocephalic, " atraumatic. Neck-gross ROM intact.   Eyes: Pupils mid-range, sclera white  Respiratory: CTAB, no respiratory distress  Cardiovascular: Normal heart rate, regular rhythm  GI: Soft, not distended, diffuse tenderness without guarding, most tender throughout the lower abdomen and some to the RUQ  Musculoskeletal: Moving extremities intentionally and without pain. No obvious deformity.  Skin: Warm, dry, no rash.  Neurologic: Alert & oriented, speech clear, Cns grossly intact, no focal deficits noted    LAB:  All pertinent labs reviewed and interpreted.  Labs Ordered and Resulted from Time of ED Arrival to Time of ED Departure   BASIC METABOLIC PANEL - Normal       Result Value    Sodium 141      Potassium 3.6      Chloride 103      Carbon Dioxide (CO2) 26      Anion Gap 12      Urea Nitrogen 8.6      Creatinine 0.72      GFR Estimate >90      Calcium 9.6      Glucose 89     HEPATIC FUNCTION PANEL - Normal    Protein Total 7.3      Albumin 4.2      Bilirubin Total 0.5      Alkaline Phosphatase 100      AST 31      ALT 23      Bilirubin Direct <0.20     LIPASE - Normal    Lipase 46     TROPONIN T, HIGH SENSITIVITY - Normal    Troponin T, High Sensitivity <6     CBC WITH PLATELETS AND DIFFERENTIAL    WBC Count 7.3      RBC Count 4.71      Hemoglobin 13.8      Hematocrit 42.1      MCV 89      MCH 29.3      MCHC 32.8      RDW 13.2      Platelet Count 230      % Neutrophils 54      % Lymphocytes 37      % Monocytes 8      % Eosinophils 2      % Basophils 0      % Immature Granulocytes 0      NRBCs per 100 WBC 0      Absolute Neutrophils 3.9      Absolute Lymphocytes 2.7      Absolute Monocytes 0.6      Absolute Eosinophils 0.1      Absolute Basophils 0.0      Absolute Immature Granulocytes 0.0      Absolute NRBCs 0.0     ENTERIC BACTERIA AND VIRUS PANEL BY PCR   C. DIFFICILE TOXIN B PCR WITH REFLEX TO C. DIFFICILE ANTIGEN AND TOXINS A/B EIA       RADIOLOGY:  CT Abdomen Pelvis w Contrast   Final Result   IMPRESSION:    1. No  new acute findings in the abdomen or pelvis.   2. Distal common bile duct stent, unchanged, in good position. Prior peripancreatic inflammatory changes have resolved.          EKG:   Performed at: 19:08  Impression: Sinus rhythm, Normal ECG  Rate: 75 bpm  Rhythm: Sinus  QRS Interval: 82 ms  QTc Interval: 464 ms  Comparison: No significant change found, compared with EKG 4/14/2024  I have independently reviewed and interpreted the EKG(s) documented above.     PROCEDURES:   Procedures     I, Karla Young, am serving as a scribe to document services personally performed by Dr. Paris Dubois based on my observation and the provider's statements to me. I, Paris Dubois MD attest that Karla Young is acting in a scribe capacity, has observed my performance of the services and has documented them in accordance with my direction.    Paris Dubois M.D.  Emergency Medicine  [facility] EMERGENCY DEPARTMENT  [address] [place] [id number] [phone]  Dept: [phone]     Paris Dubois MD  06/11/24 2188

## 2024-06-11 NOTE — ED TRIAGE NOTES
Pt arrive to triage for abdominal pain. She was seen at her clinic today and they recommended she come to the ER for a CT scan. She recently had her gallbladder removed and a stent placed. Pin is in the left and right lower quadrants rated 7/10. She endorses nausea, vomiting, and diarrhea as well.

## 2024-06-12 LAB
ATRIAL RATE - MUSE: 75 BPM
DIASTOLIC BLOOD PRESSURE - MUSE: NORMAL MMHG
INTERPRETATION ECG - MUSE: NORMAL
P AXIS - MUSE: 72 DEGREES
PR INTERVAL - MUSE: 144 MS
QRS DURATION - MUSE: 82 MS
QT - MUSE: 416 MS
QTC - MUSE: 464 MS
R AXIS - MUSE: 68 DEGREES
SYSTOLIC BLOOD PRESSURE - MUSE: NORMAL MMHG
T AXIS - MUSE: 72 DEGREES
VENTRICULAR RATE- MUSE: 75 BPM

## 2024-06-12 RX ORDER — LOSARTAN POTASSIUM 25 MG/1
25 TABLET ORAL DAILY
COMMUNITY

## 2024-06-12 RX ORDER — ATORVASTATIN CALCIUM 80 MG/1
80 TABLET, FILM COATED ORAL DAILY
COMMUNITY

## 2024-06-12 RX ORDER — PROPRANOLOL HYDROCHLORIDE 10 MG/1
10 TABLET ORAL 2 TIMES DAILY
COMMUNITY

## 2024-06-12 NOTE — DISCHARGE INSTRUCTIONS
Thankfully your lab work and CT scan today were reassuring. But things can change- if you are unable to keep down fluids, develop a fever, worsening abdominal pain, blood in vomit or stool or other worsening symptoms return to the Emergency Room. Recommend a bland diet for the next couple of days. Follow up closely with your primary care doctor and GI team

## 2024-06-14 ENCOUNTER — APPOINTMENT (OUTPATIENT)
Dept: RADIOLOGY | Facility: HOSPITAL | Age: 69
End: 2024-06-14
Attending: INTERNAL MEDICINE
Payer: COMMERCIAL

## 2024-06-14 ENCOUNTER — ANESTHESIA EVENT (OUTPATIENT)
Dept: SURGERY | Facility: HOSPITAL | Age: 69
End: 2024-06-14
Payer: COMMERCIAL

## 2024-06-14 ENCOUNTER — HOSPITAL ENCOUNTER (OUTPATIENT)
Facility: HOSPITAL | Age: 69
Discharge: HOME | End: 2024-06-14
Attending: INTERNAL MEDICINE | Admitting: INTERNAL MEDICINE
Payer: COMMERCIAL

## 2024-06-14 ENCOUNTER — ANESTHESIA (OUTPATIENT)
Dept: SURGERY | Facility: HOSPITAL | Age: 69
End: 2024-06-14
Payer: COMMERCIAL

## 2024-06-14 VITALS
OXYGEN SATURATION: 98 % | TEMPERATURE: 97.1 F | DIASTOLIC BLOOD PRESSURE: 57 MMHG | BODY MASS INDEX: 23.32 KG/M2 | HEIGHT: 64 IN | SYSTOLIC BLOOD PRESSURE: 108 MMHG | WEIGHT: 136.6 LBS | HEART RATE: 66 BPM | RESPIRATION RATE: 16 BRPM

## 2024-06-14 LAB
ERCP: NORMAL
GLUCOSE BLDC GLUCOMTR-MCNC: 101 MG/DL (ref 70–99)
GLUCOSE BLDC GLUCOMTR-MCNC: 96 MG/DL (ref 70–99)

## 2024-06-14 PROCEDURE — C1769 GUIDE WIRE: HCPCS | Performed by: INTERNAL MEDICINE

## 2024-06-14 PROCEDURE — 255N000002 HC RX 255 OP 636: Performed by: INTERNAL MEDICINE

## 2024-06-14 PROCEDURE — 370N000017 HC ANESTHESIA TECHNICAL FEE, PER MIN: Performed by: INTERNAL MEDICINE

## 2024-06-14 PROCEDURE — 999N000141 HC STATISTIC PRE-PROCEDURE NURSING ASSESSMENT: Performed by: INTERNAL MEDICINE

## 2024-06-14 PROCEDURE — 82962 GLUCOSE BLOOD TEST: CPT

## 2024-06-14 PROCEDURE — 360N000082 HC SURGERY LEVEL 2 W/ FLUORO, PER MIN: Performed by: INTERNAL MEDICINE

## 2024-06-14 PROCEDURE — 258N000003 HC RX IP 258 OP 636: Mod: JZ | Performed by: ANESTHESIOLOGY

## 2024-06-14 PROCEDURE — 250N000011 HC RX IP 250 OP 636: Performed by: ANESTHESIOLOGY

## 2024-06-14 PROCEDURE — 999N000180 XR SURGERY CARM FLUORO LESS THAN 5 MIN: Mod: TC

## 2024-06-14 PROCEDURE — 710N000012 HC RECOVERY PHASE 2, PER MINUTE: Performed by: INTERNAL MEDICINE

## 2024-06-14 PROCEDURE — 250N000009 HC RX 250: Performed by: ANESTHESIOLOGY

## 2024-06-14 PROCEDURE — 272N000001 HC OR GENERAL SUPPLY STERILE: Performed by: INTERNAL MEDICINE

## 2024-06-14 RX ORDER — NALOXONE HYDROCHLORIDE 0.4 MG/ML
0.4 INJECTION, SOLUTION INTRAMUSCULAR; INTRAVENOUS; SUBCUTANEOUS
Status: DISCONTINUED | OUTPATIENT
Start: 2024-06-14 | End: 2024-06-14 | Stop reason: HOSPADM

## 2024-06-14 RX ORDER — PROPOFOL 10 MG/ML
INJECTION, EMULSION INTRAVENOUS PRN
Status: DISCONTINUED | OUTPATIENT
Start: 2024-06-14 | End: 2024-06-14

## 2024-06-14 RX ORDER — DEXAMETHASONE SODIUM PHOSPHATE 10 MG/ML
4 INJECTION, SOLUTION INTRAMUSCULAR; INTRAVENOUS
Status: DISCONTINUED | OUTPATIENT
Start: 2024-06-14 | End: 2024-06-14 | Stop reason: HOSPADM

## 2024-06-14 RX ORDER — ONDANSETRON 2 MG/ML
4 INJECTION INTRAMUSCULAR; INTRAVENOUS EVERY 6 HOURS PRN
Status: DISCONTINUED | OUTPATIENT
Start: 2024-06-14 | End: 2024-06-14 | Stop reason: HOSPADM

## 2024-06-14 RX ORDER — NALOXONE HYDROCHLORIDE 0.4 MG/ML
0.2 INJECTION, SOLUTION INTRAMUSCULAR; INTRAVENOUS; SUBCUTANEOUS
Status: DISCONTINUED | OUTPATIENT
Start: 2024-06-14 | End: 2024-06-14 | Stop reason: HOSPADM

## 2024-06-14 RX ORDER — ONDANSETRON 4 MG/1
4 TABLET, ORALLY DISINTEGRATING ORAL EVERY 6 HOURS PRN
Status: DISCONTINUED | OUTPATIENT
Start: 2024-06-14 | End: 2024-06-14 | Stop reason: HOSPADM

## 2024-06-14 RX ORDER — PROCHLORPERAZINE MALEATE 5 MG
5 TABLET ORAL EVERY 6 HOURS PRN
Status: DISCONTINUED | OUTPATIENT
Start: 2024-06-14 | End: 2024-06-14 | Stop reason: HOSPADM

## 2024-06-14 RX ORDER — FLUMAZENIL 0.1 MG/ML
0.2 INJECTION, SOLUTION INTRAVENOUS
Status: DISCONTINUED | OUTPATIENT
Start: 2024-06-14 | End: 2024-06-14 | Stop reason: HOSPADM

## 2024-06-14 RX ORDER — NALOXONE HYDROCHLORIDE 0.4 MG/ML
0.1 INJECTION, SOLUTION INTRAMUSCULAR; INTRAVENOUS; SUBCUTANEOUS
Status: DISCONTINUED | OUTPATIENT
Start: 2024-06-14 | End: 2024-06-14 | Stop reason: HOSPADM

## 2024-06-14 RX ORDER — SODIUM CHLORIDE, SODIUM LACTATE, POTASSIUM CHLORIDE, CALCIUM CHLORIDE 600; 310; 30; 20 MG/100ML; MG/100ML; MG/100ML; MG/100ML
INJECTION, SOLUTION INTRAVENOUS CONTINUOUS
Status: DISCONTINUED | OUTPATIENT
Start: 2024-06-14 | End: 2024-06-14 | Stop reason: HOSPADM

## 2024-06-14 RX ORDER — ONDANSETRON 4 MG/1
4 TABLET, ORALLY DISINTEGRATING ORAL EVERY 30 MIN PRN
Status: DISCONTINUED | OUTPATIENT
Start: 2024-06-14 | End: 2024-06-14 | Stop reason: HOSPADM

## 2024-06-14 RX ORDER — LIDOCAINE HYDROCHLORIDE 20 MG/ML
INJECTION, SOLUTION INFILTRATION; PERINEURAL PRN
Status: DISCONTINUED | OUTPATIENT
Start: 2024-06-14 | End: 2024-06-14

## 2024-06-14 RX ORDER — PROPOFOL 10 MG/ML
INJECTION, EMULSION INTRAVENOUS CONTINUOUS PRN
Status: DISCONTINUED | OUTPATIENT
Start: 2024-06-14 | End: 2024-06-14

## 2024-06-14 RX ORDER — OXYCODONE HYDROCHLORIDE 5 MG/1
10 TABLET ORAL
Status: DISCONTINUED | OUTPATIENT
Start: 2024-06-14 | End: 2024-06-14 | Stop reason: HOSPADM

## 2024-06-14 RX ORDER — LIDOCAINE 40 MG/G
CREAM TOPICAL
Status: DISCONTINUED | OUTPATIENT
Start: 2024-06-14 | End: 2024-06-14 | Stop reason: HOSPADM

## 2024-06-14 RX ORDER — OXYCODONE HYDROCHLORIDE 5 MG/1
5 TABLET ORAL
Status: DISCONTINUED | OUTPATIENT
Start: 2024-06-14 | End: 2024-06-14 | Stop reason: HOSPADM

## 2024-06-14 RX ORDER — ONDANSETRON 2 MG/ML
4 INJECTION INTRAMUSCULAR; INTRAVENOUS EVERY 30 MIN PRN
Status: DISCONTINUED | OUTPATIENT
Start: 2024-06-14 | End: 2024-06-14 | Stop reason: HOSPADM

## 2024-06-14 RX ADMIN — LIDOCAINE HYDROCHLORIDE 15 MG: 20 INJECTION, SOLUTION INFILTRATION; PERINEURAL at 10:08

## 2024-06-14 RX ADMIN — SODIUM CHLORIDE, POTASSIUM CHLORIDE, SODIUM LACTATE AND CALCIUM CHLORIDE: 600; 310; 30; 20 INJECTION, SOLUTION INTRAVENOUS at 09:25

## 2024-06-14 RX ADMIN — PROPOFOL 100 MCG/KG/MIN: 10 INJECTION, EMULSION INTRAVENOUS at 10:06

## 2024-06-14 RX ADMIN — LIDOCAINE HYDROCHLORIDE 15 MG: 20 INJECTION, SOLUTION INFILTRATION; PERINEURAL at 10:06

## 2024-06-14 RX ADMIN — LIDOCAINE HYDROCHLORIDE 15 MG: 20 INJECTION, SOLUTION INFILTRATION; PERINEURAL at 10:09

## 2024-06-14 RX ADMIN — PROPOFOL 15 MG: 10 INJECTION, EMULSION INTRAVENOUS at 10:06

## 2024-06-14 RX ADMIN — LIDOCAINE HYDROCHLORIDE 15 MG: 20 INJECTION, SOLUTION INFILTRATION; PERINEURAL at 10:12

## 2024-06-14 ASSESSMENT — ACTIVITIES OF DAILY LIVING (ADL)
ADLS_ACUITY_SCORE: 27
ADLS_ACUITY_SCORE: 27
ADLS_ACUITY_SCORE: 26
ADLS_ACUITY_SCORE: 29

## 2024-06-14 NOTE — ANESTHESIA CARE TRANSFER NOTE
Patient: Odette Escobar    Procedure: Procedure(s):  ENDOSCOPIC RETROGRADE CHOLANGIOPANCREATOGRAPHY       Diagnosis: Choledocholithiasis [K80.50]  Diagnosis Additional Information: No value filed.    Anesthesia Type:   MAC     Note:    Oropharynx: oropharynx clear of all foreign objects  Level of Consciousness: drowsy      Independent Airway: airway patency satisfactory and stable  Dentition: dentition unchanged  Vital Signs Stable: post-procedure vital signs reviewed and stable  Report to RN Given: handoff report given  Patient transferred to: Phase II    Handoff Report: Identifed the Patient, Identified the Reponsible Provider, Reviewed the pertinent medical history, Discussed the surgical course, Reviewed Intra-OP anesthesia mangement and issues during anesthesia, Set expectations for post-procedure period and Allowed opportunity for questions and acknowledgement of understanding      Vitals:  Vitals Value Taken Time   BP 97/56    Temp 97.1    Pulse 77 06/14/24 1029   Resp 16    SpO2 98 % 06/14/24 1029   Vitals shown include unfiled device data.    Electronically Signed By: JASWINDER West CRNA  June 14, 2024  10:31 AM

## 2024-06-14 NOTE — ANESTHESIA POSTPROCEDURE EVALUATION
Patient: Odette Escobar    Procedure: Procedure(s):  ENDOSCOPIC RETROGRADE CHOLANGIOPANCREATOGRAPHY, STENT REMOVAL       Anesthesia Type:  MAC    Note:  Disposition: Outpatient   Postop Pain Control: Uneventful            Sign Out: Well controlled pain   PONV: No   Neuro/Psych: Uneventful            Sign Out: Acceptable/Baseline neuro status   Airway/Respiratory: Uneventful            Sign Out: Acceptable/Baseline resp. status   CV/Hemodynamics: Uneventful            Sign Out: Acceptable CV status; No obvious hypovolemia; No obvious fluid overload   Other NRE:    DID A NON-ROUTINE EVENT OCCUR?        Last vitals:  Vitals Value Taken Time   /57 06/14/24 1115   Temp 36.2  C (97.1  F) 06/14/24 1027   Pulse 66 06/14/24 1115   Resp 16 06/14/24 1115   SpO2 98 % 06/14/24 1115       Electronically Signed By: Delvin Soto MD  June 14, 2024  11:54 AM

## 2024-06-14 NOTE — H&P
GENERAL PRE-PROCEDURE:   Procedure:  ERCP -  Stent Removal, F/U Choledocholithiasis  Date/Time:  6/14/2024 8:50 AM    Verbal consent obtained?: Yes    Written consent obtained?: Yes    Risks and benefits: Risks, benefits and alternatives were discussed    Consent given by:  Patient  Patient states understanding of procedure being performed: Yes    Patient's understanding of procedure matches consent: Yes    Procedure consent matches procedure scheduled: Yes    Expected level of sedation:  Deep  Appropriately NPO:  Yes  ASA Class:  3  Mallampati  :  Grade 2- soft palate, base of uvula, tonsillar pillars, and portion of posterior pharyngeal wall visible  Lungs:  Lungs clear with good breath sounds bilaterally  Heart:  Normal heart sounds and rate and systolic murmur  History & Physical reviewed:  History and physical reviewed and no updates needed  Statement of review:  I have reviewed the lab findings, diagnostic data, medications, and the plan for sedation

## 2024-06-14 NOTE — ANESTHESIA PREPROCEDURE EVALUATION
Anesthesia Pre-Procedure Evaluation    Patient: Odette Escobar   MRN: [id number] : 1955        Procedure : Procedure(s):  ESOPHAGOGASTRODUODENOSCOPY, WITH ENDOSCOPIC ULTRASOUND GUIDANCE  ENDOSCOPIC RETROGRADE CHOLANGIOPANCREATOGRAPHY          Past Medical History:   Diagnosis Date     Alcoholic cirrhosis of liver (H)      Allergic rhinitis due to pollen      Anemia      Asthma      DDD (degenerative disc disease), cervical      Diverticulitis of colon      DM2 (diabetes mellitus, type 2) (H)      Dysphagia      Dyspnea on exertion      Essential tremor      Fibromyalgia      YUKO (generalized anxiety disorder)      Gastroesophageal reflux disease      History of skin cancer      Hypertension      Migraine      Motion sickness      WOODS (nonalcoholic steatohepatitis)      Onychomycosis      CASTRO on CPAP      Other chronic pain      Syncopal episodes      Tension headache      Vertigo      Vitiligo       Past Surgical History:   Procedure Laterality Date     ENDOMETRIAL ABLATION       ENDOSCOPIC RETROGRADE CHOLANGIOPANCREATOGRAM N/A 2024    Procedure: ENDOSCOPIC RETROGRADE CHOLANGIOPANCEATOGRAPHY, WITH SPHINCTEROTOMY;  Surgeon: Eduin Do MD;  Location: [facility]     ENDOSCOPIC RETROGRADE CHOLANGIOPANCREATOGRAM N/A 2024    Procedure: STONE EXTRACTION,;  Surgeon: Eduin Do MD;  Location: [facility]     ENDOSCOPIC RETROGRADE CHOLANGIOPANCREATOGRAM N/A 2024    Procedure: BILIARY STENT PLACEMENT;  Surgeon: Eduin Do MD;  Location: [facility]     ESOPHAGOSCOPY, GASTROSCOPY, DUODENOSCOPY (EGD), COMBINED N/A 2024    Procedure: ESOPHAGOGASTRODUODENOSCOPY, WITH ENDOSCOPIC ULTRASOUND GUIDANCE;  Surgeon: Eduin Do MD;  Location: [facility]     ESOPHAGOSCOPY, GASTROSCOPY, DUODENOSCOPY (EGD), COMBINED N/A 2024    Procedure: ESOPHAGOGASTRODUODENOSCOPY;  Surgeon: Zohaib Morgan MD;  Location: [facility] GI     HERNIORRHAPHY, UMBILICAL,  ROBOT-ASSISTED, LAPAROSCOPIC, USING DA ROCIO XI N/A 04/14/2024    Procedure: PRIMARY REPAIR OF  UMBILICAL HERNIA;  Surgeon: Christiano Cruz DO;  Location: [facility] OR     HYSTERECTOMY       LAPAROSCOPIC CHOLECYSTECTOMY N/A 04/14/2024    Procedure: CHOLECYSTECTOMY, ROBOT-ASSISTED, LAPAROSCOPIC, USING DA ROCIO XI;  Surgeon: Christiano Cruz DO;  Location: [facility] OR     PICC TRIPLE LUMEN PLACEMENT  4/25/2024     TONSILLECTOMY        Allergies   Allergen Reactions     Aspirin Hives     Diflunisal Nausea and Vomiting     (Dolobid) Occurred approximately 20 years ago    Occurred approximately 20 years ago   Occurred approximately 20 years ago   (Dolobid) Occurred approximately 20 years ago     Fd&C Yellow #5 (Tartrazine) Nausea and Vomiting and Hives      Social History     Tobacco Use     Smoking status: Never     Smokeless tobacco: Never   Substance Use Topics     Alcohol use: Yes     Comment: Alcoholic Drinks/day: Occasional usage      Wt Readings from Last 1 Encounters:   06/14/24 62 kg (136 lb 9.6 oz)        Anesthesia Evaluation   Pt has not had prior anesthetic         ROS/MED HX  ENT/Pulmonary:  - neg pulmonary ROS   (+) sleep apnea, moderate, uses CPAP,                   Mild Persistent, asthma                  Neurologic:  - neg neurologic ROS     Cardiovascular: Comment: Interpretation Summary  1. Normal stress echocardiogram without evidence of stress induced ischemia,  but with reduced sensitivity due to not attaining target heart rate (patient  achieved 76% predicted maximum heart rate for age).  2. Normal resting LV systolic performance with an ejection fraction of 55-60%.  There is normal improvement in left ventricular systolic performance with a  peak ejection fraction of 70-75%.  3. No ECG evidence of ischemia.  4. No anginal chest pain reported with exercise.  5. Average functional capacity for age.      (+) Dyslipidemia hypertension- -   -  - -                                      METS/Exercise  "Tolerance: >4 METS    Hematologic:  - neg hematologic  ROS     Musculoskeletal:   (+)  arthritis,             GI/Hepatic:     (+) GERD,         cholecystitis/cholelithiasis, hepatitis (WOODS)  liver disease,       Renal/Genitourinary:  - neg Renal ROS     Endo:     (+)  type II DM,                    Psychiatric/Substance Use:     (+) psychiatric history anxiety and depression alcohol abuse      Infectious Disease:  - neg infectious disease ROS     Malignancy:  - neg malignancy ROS     Other:  - neg other ROS    (+)  , H/O Chronic Pain,         Physical Exam    Airway  airway exam normal      Mallampati: II   TM distance: > 3 FB   Neck ROM: full   Mouth opening: > 3 cm    Respiratory Devices and Support         Dental  no notable dental history         Cardiovascular   cardiovascular exam normal          Pulmonary   pulmonary exam normal              OUTSIDE LABS:  CBC:   Lab Results   Component Value Date    WBC 7.3 06/11/2024    WBC 8.4 04/29/2024    HGB 13.8 06/11/2024    HGB 12.4 04/29/2024    HCT 42.1 06/11/2024    HCT 37.6 04/29/2024     06/11/2024     04/29/2024     BMP:   Lab Results   Component Value Date     06/11/2024     04/29/2024    POTASSIUM 3.6 06/11/2024    POTASSIUM 4.4 04/29/2024    CHLORIDE 103 06/11/2024    CHLORIDE 100 04/29/2024    CO2 26 06/11/2024    CO2 28 04/29/2024    BUN 8.6 06/11/2024    BUN 14.6 04/29/2024    CR 0.72 06/11/2024    CR 0.57 04/29/2024    GLC 96 06/14/2024    GLC 89 06/11/2024     COAGS:   Lab Results   Component Value Date    INR 1.07 04/24/2024     POC: No results found for: \"BGM\", \"HCG\", \"HCGS\"  HEPATIC:   Lab Results   Component Value Date    ALBUMIN 4.2 06/11/2024    PROTTOTAL 7.3 06/11/2024    ALT 23 06/11/2024    AST 31 06/11/2024    ALKPHOS 100 06/11/2024    BILITOTAL 0.5 06/11/2024     OTHER:   Lab Results   Component Value Date    LACT 0.9 04/25/2024    A1C 5.4 04/14/2024    JOVON 9.6 06/11/2024    PHOS 3.1 04/27/2024    MAG 1.9 04/29/2024 "    LIPASE 46 06/11/2024    TSH 2.20 02/11/2019       Anesthesia Plan    ASA Status:  3    NPO Status:  NPO Appropriate    Anesthesia Type: MAC.     - Reason for MAC: straight local not clinically adequate   Induction: Propofol.   Maintenance: TIVA.        Consents    Anesthesia Plan(s) and associated risks, benefits, and realistic alternatives discussed. Questions answered and patient/representative(s) expressed understanding.     - Discussed:     - Discussed with:  Patient, Spouse      - Extended Intubation/Ventilatory Support Discussed: No.      - Patient is DNR/DNI Status: No     Use of blood products discussed: No .     Postoperative Care    Pain management: IV analgesics, Multi-modal analgesia, Oral pain medications.   PONV prophylaxis: Ondansetron (or other 5HT-3)     Comments:    Other Comments: The patient understands and accepts the risks of MAC anesthesia including (but not limited to) nausea, vomiting, dizziness, and chipped teeth. I also discussed the possibility of conversion to GAETT/GALMA anesthesia which include hoarse voice, sore throat, and pinched lip or chipped teeth.  Versed/fent  propofol ggt  Decadron/zofran                Delvin Soto MD    I have reviewed the pertinent notes and labs in the chart from the past 30 days and (re)examined the patient.  Any updates or changes from those notes are reflected in this note.

## 2024-07-16 ENCOUNTER — THERAPY VISIT (OUTPATIENT)
Dept: PHYSICAL THERAPY | Facility: REHABILITATION | Age: 69
End: 2024-07-16
Payer: COMMERCIAL

## 2024-07-16 DIAGNOSIS — M79.7 FIBROMYALGIA: ICD-10-CM

## 2024-07-16 DIAGNOSIS — G89.29 CHRONIC BILATERAL LOW BACK PAIN WITHOUT SCIATICA: ICD-10-CM

## 2024-07-16 DIAGNOSIS — M54.2 CHRONIC NECK PAIN: Primary | ICD-10-CM

## 2024-07-16 DIAGNOSIS — M54.50 CHRONIC BILATERAL LOW BACK PAIN WITHOUT SCIATICA: ICD-10-CM

## 2024-07-16 DIAGNOSIS — M51.369 DDD (DEGENERATIVE DISC DISEASE), LUMBAR: ICD-10-CM

## 2024-07-16 DIAGNOSIS — M54.16 LUMBAR RADICULOPATHY: ICD-10-CM

## 2024-07-16 DIAGNOSIS — M47.816 LUMBAR FACET ARTHROPATHY: ICD-10-CM

## 2024-07-16 DIAGNOSIS — G89.29 CHRONIC NECK PAIN: Primary | ICD-10-CM

## 2024-07-16 PROCEDURE — 97110 THERAPEUTIC EXERCISES: CPT | Mod: GP | Performed by: PHYSICAL THERAPIST
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
## 2024-09-27 ENCOUNTER — TRANSFERRED RECORDS (OUTPATIENT)
Dept: HEALTH INFORMATION MANAGEMENT | Facility: CLINIC | Age: 69
End: 2024-09-27

## 2025-01-29 ENCOUNTER — HOSPITAL ENCOUNTER (EMERGENCY)
Facility: HOSPITAL | Age: 70
Discharge: HOME | End: 2025-01-29
Admitting: PHYSICIAN ASSISTANT
Payer: COMMERCIAL

## 2025-01-29 VITALS
TEMPERATURE: 98 F | RESPIRATION RATE: 16 BRPM | OXYGEN SATURATION: 97 % | SYSTOLIC BLOOD PRESSURE: 118 MMHG | BODY MASS INDEX: 22.29 KG/M2 | DIASTOLIC BLOOD PRESSURE: 71 MMHG | WEIGHT: 129.85 LBS | HEART RATE: 74 BPM

## 2025-01-29 DIAGNOSIS — R10.9 FLANK PAIN: ICD-10-CM

## 2025-01-29 DIAGNOSIS — K83.8 PNEUMOBILIA: ICD-10-CM

## 2025-01-29 DIAGNOSIS — R10.11 RUQ ABDOMINAL PAIN: ICD-10-CM

## 2025-01-29 LAB
ALBUMIN SERPL BCG-MCNC: 4.4 G/DL (ref 3.5–5.2)
ALBUMIN UR-MCNC: NEGATIVE MG/DL
ALP SERPL-CCNC: 101 U/L (ref 40–150)
ALT SERPL W P-5'-P-CCNC: 50 U/L (ref 0–50)
ANION GAP SERPL CALCULATED.3IONS-SCNC: 11 MMOL/L (ref 7–15)
APPEARANCE UR: CLEAR
AST SERPL W P-5'-P-CCNC: 56 U/L (ref 0–45)
BASOPHILS # BLD AUTO: 0 10E3/UL (ref 0–0.2)
BASOPHILS NFR BLD AUTO: 0 %
BILIRUB SERPL-MCNC: 0.5 MG/DL
BILIRUB UR QL STRIP: NEGATIVE
BUN SERPL-MCNC: 13 MG/DL (ref 8–23)
CALCIUM SERPL-MCNC: 10 MG/DL (ref 8.8–10.4)
CHLORIDE SERPL-SCNC: 104 MMOL/L (ref 98–107)
COLOR UR AUTO: ABNORMAL
CREAT SERPL-MCNC: 0.84 MG/DL (ref 0.51–0.95)
D DIMER PPP FEU-MCNC: <0.27 UG/ML FEU (ref 0–0.5)
EGFRCR SERPLBLD CKD-EPI 2021: 76 ML/MIN/1.73M2
EOSINOPHIL # BLD AUTO: 0.1 10E3/UL (ref 0–0.7)
EOSINOPHIL NFR BLD AUTO: 1 %
ERYTHROCYTE [DISTWIDTH] IN BLOOD BY AUTOMATED COUNT: 12.5 % (ref 10–15)
GLUCOSE SERPL-MCNC: 88 MG/DL (ref 70–99)
GLUCOSE UR STRIP-MCNC: 1000 MG/DL
HCO3 SERPL-SCNC: 27 MMOL/L (ref 22–29)
HCT VFR BLD AUTO: 45.1 % (ref 35–47)
HGB BLD-MCNC: 15 G/DL (ref 11.7–15.7)
HGB UR QL STRIP: NEGATIVE
HYALINE CASTS: 4 /LPF
IMM GRANULOCYTES # BLD: 0 10E3/UL
IMM GRANULOCYTES NFR BLD: 0 %
KETONES UR STRIP-MCNC: NEGATIVE MG/DL
LEUKOCYTE ESTERASE UR QL STRIP: ABNORMAL
LIPASE SERPL-CCNC: 30 U/L (ref 13–60)
LYMPHOCYTES # BLD AUTO: 2.6 10E3/UL (ref 0.8–5.3)
LYMPHOCYTES NFR BLD AUTO: 38 %
MCH RBC QN AUTO: 30.1 PG (ref 26.5–33)
MCHC RBC AUTO-ENTMCNC: 33.3 G/DL (ref 31.5–36.5)
MCV RBC AUTO: 90 FL (ref 78–100)
MONOCYTES # BLD AUTO: 0.6 10E3/UL (ref 0–1.3)
MONOCYTES NFR BLD AUTO: 8 %
MUCOUS THREADS #/AREA URNS LPF: PRESENT /LPF
NEUTROPHILS # BLD AUTO: 3.5 10E3/UL (ref 1.6–8.3)
NEUTROPHILS NFR BLD AUTO: 52 %
NITRATE UR QL: NEGATIVE
NRBC # BLD AUTO: 0 10E3/UL
NRBC BLD AUTO-RTO: 0 /100
PH UR STRIP: 6 [PH] (ref 5–7)
PLATELET # BLD AUTO: 225 10E3/UL (ref 150–450)
POTASSIUM SERPL-SCNC: 4 MMOL/L (ref 3.4–5.3)
PROT SERPL-MCNC: 7.4 G/DL (ref 6.4–8.3)
RBC # BLD AUTO: 4.99 10E6/UL (ref 3.8–5.2)
RBC URINE: <1 /HPF
SODIUM SERPL-SCNC: 142 MMOL/L (ref 135–145)
SP GR UR STRIP: 1.01 (ref 1–1.03)
SQUAMOUS EPITHELIAL: 2 /HPF
TRANSITIONAL EPI: <1 /HPF
UROBILINOGEN UR STRIP-MCNC: <2 MG/DL
WBC # BLD AUTO: 6.7 10E3/UL (ref 4–11)
WBC URINE: 2 /HPF

## 2025-01-29 PROCEDURE — 84075 ASSAY ALKALINE PHOSPHATASE: CPT | Performed by: STUDENT IN AN ORGANIZED HEALTH CARE EDUCATION/TRAINING PROGRAM

## 2025-01-29 PROCEDURE — 85379 FIBRIN DEGRADATION QUANT: CPT | Performed by: PHYSICIAN ASSISTANT

## 2025-01-29 PROCEDURE — 250N000011 HC RX IP 250 OP 636: Performed by: PHYSICIAN ASSISTANT

## 2025-01-29 PROCEDURE — 84132 ASSAY OF SERUM POTASSIUM: CPT | Performed by: STUDENT IN AN ORGANIZED HEALTH CARE EDUCATION/TRAINING PROGRAM

## 2025-01-29 PROCEDURE — 99285 EMERGENCY DEPT VISIT HI MDM: CPT | Mod: 25

## 2025-01-29 PROCEDURE — 36415 COLL VENOUS BLD VENIPUNCTURE: CPT | Performed by: PHYSICIAN ASSISTANT

## 2025-01-29 PROCEDURE — 83690 ASSAY OF LIPASE: CPT | Performed by: PHYSICIAN ASSISTANT

## 2025-01-29 PROCEDURE — 85025 COMPLETE CBC W/AUTO DIFF WBC: CPT | Performed by: STUDENT IN AN ORGANIZED HEALTH CARE EDUCATION/TRAINING PROGRAM

## 2025-01-29 PROCEDURE — 81003 URINALYSIS AUTO W/O SCOPE: CPT | Performed by: STUDENT IN AN ORGANIZED HEALTH CARE EDUCATION/TRAINING PROGRAM

## 2025-01-29 PROCEDURE — 93005 ELECTROCARDIOGRAM TRACING: CPT | Performed by: PHYSICIAN ASSISTANT

## 2025-01-29 PROCEDURE — 84155 ASSAY OF PROTEIN SERUM: CPT | Performed by: STUDENT IN AN ORGANIZED HEALTH CARE EDUCATION/TRAINING PROGRAM

## 2025-01-29 RX ORDER — IOPAMIDOL 755 MG/ML
64 INJECTION, SOLUTION INTRAVASCULAR ONCE
Status: COMPLETED | OUTPATIENT
Start: 2025-01-29 | End: 2025-01-29

## 2025-01-29 RX ADMIN — IOPAMIDOL 64 ML: 755 INJECTION, SOLUTION INTRAVENOUS at 19:05

## 2025-01-29 ASSESSMENT — ACTIVITIES OF DAILY LIVING (ADL)
ADLS_ACUITY_SCORE: 58
ADLS_ACUITY_SCORE: 58

## 2025-01-29 NOTE — ED NOTES
Urine cup given to obtain urine. Pt verbalizing that she lost 6 lbs in the last 2 months and concrened about this

## 2025-01-29 NOTE — ED TRIAGE NOTES
Having right upper quadrant pain radiating to right flank pain.  On and off since last week but last night she was awaken by the pain and constant.  Last Tylenmol at 12 noon with some relief.  Had cholecystectomy  Apr 2024.     Triage Assessment (Adult)       Row Name 01/29/25 1441          Triage Assessment    Airway WDL WDL        Respiratory WDL    Respiratory WDL WDL        Skin Circulation/Temperature WDL    Skin Circulation/Temperature WDL WDL        Cardiac WDL    Cardiac WDL WDL        Peripheral/Neurovascular WDL    Peripheral Neurovascular WDL WDL        Cognitive/Neuro/Behavioral WDL    Cognitive/Neuro/Behavioral WDL WDL        [place] Coma Scale    Best Eye Response 4-->(E4) spontaneous     Best Motor Response 6-->(M6) obeys commands     Best Verbal Response 5-->(V5) oriented     [place] Coma Scale Score 15

## 2025-01-29 NOTE — ED PROVIDER NOTES
EMERGENCY DEPARTMENT ENCOUNTER      NAME: Odette Escobar  AGE: 69 year old female  YOB: 1955  MRN: [id number]  EVALUATION DATE & TIME: No admission date for patient encounter.    PCP: Allie Johnson    ED PROVIDER: Reena Pino PA-C      Chief Complaint   Patient presents with    Abdominal Pain    Flank Pain         FINAL IMPRESSION:  1. Pneumobilia    2. RUQ abdominal pain    3. Flank pain          ED COURSE & MEDICAL DECISION MAKIN:49 PM I introduced myself to patient, performed initial HPI and examination.   5:43 PM D-dimer negative, no indication for CT PE study.   7:40 PM Paged for general surgery  7:45 PM I updated the patient.   7:50 PM I spoke with General Surgery, Dr. Cardenas, about patient's case. They do not recommend GI consult.   8:05 PM Discussed plan for discharge      69 year old female with PMH Anxiety, Asthma, Diabetes, HTN, GERD, Fibromyalgia, Alcohol abuse in remission, Chronic constipation, Cirrhosis presents to the Emergency Department for evaluation of right flank pain/abdominal pain. Pain since Saturday, worsening. No shortness of breath but does report pain can be worsened with deep breath prompting ED evaluation.    VSS, afebrile.  No rash to suggest shingles.  Does have reproducible RUQ abdominal tenderness. S/p cholecystectomy last year with stent placement (removed ). Noted pneumobilia on CT 2024. Redemonstrated on CT tonight unchanged. No fevers, leukocytosis, or other symptoms to suggest acute infection or obstruction. This appears to be stable chronic finding and not consistent with patient's presentation.   No urinary symptoms. UA not consistent with infection, no hematuria to suggest stone. CT with 2 mm stone right kidney but no hydronephrosis and no ureterolithiasis. Unlikely contributing.   CT otherwise without any acute inflammatory of infectious process. No large stool burden to suggest constipation.   Not consistent with ACS. EKG nonischemic.  No indication for troponin. With pleuritic component, I did consider PE although patient has no risk factors. D-dimer negative, thus CT PE deferred. CT chest shows no pneumonia, and clinically patient has no cough or other symptoms to suggest pneumonia, lungs are clear.    Ultimately, work up reassuring. No identifiable cause of patient's symptoms. Discussed with patient. Patient does report pain that extends to her back, feels as if she fell, but does not have any trauma. Discussed potential for muscle spasm/pain.     Discussed at home management, close follow up and red flags/indications to return to the emergency department. All questions answered to the best of my ability and patient is agreeable with plan.       Medical Decision Making  Obtained supplemental history:Supplemental history obtained?: No  Reviewed external records: External records reviewed?: No  Care impacted by chronic illness:Alcohol and/or Drug Abuse or Dependence, Diabetes, and Hypertension  Care significantly affected by social determinants of health:N/A  Did you consider but not order tests?: Work up considered but not performed and documented in chart, if applicable  Did you interpret images independently?: Independent interpretation of ECG and images noted in documentation, when applicable.  Consultation discussion with other provider:Did you involve another provider (consultant, MH, pharmacy, etc.)?: I discussed the care with another health care provider, see documentation for details.  Discharge. No recommendations on prescription strength medication(s). See documentation for any additional details.  Not Applicable        MEDICATIONS GIVEN IN THE EMERGENCY:  Medications   iopamidol (ISOVUE-370) solution 64 mL (64 mLs Intravenous $Given 1/29/25 1900)       NEW PRESCRIPTIONS STARTED AT TODAY'S ER VISIT  Discharge Medication List as of 1/29/2025  8:00 PM              =================================================================    HPI    Patient information was obtained from: Patient    Use of : N/A         Odette Escobar is a 69 year old female with a pertinent history of pancreatitis, cirrhosis of liver, s/p cholecystectomy, alcohol abuse, asthma, hypertension, GERD, type II diabetes, and anxiety who presents to this ED by walk-in for evaluation of abdominal pain and flank plain.    Patient reports constant right sided flank pain since Saturday (~1/25/25) that has progressively worsened. Pain is now radiating to her right lower back. Pain worsens with taking a deep breaths. She describes the pain similar to a soreness after she has fallen on the area, but states she did not fall. No recent travel or hospitalizations. No estrogen therapies. Patient is s/p cystectomy, but no other reported abdominal surgeries. No h/o PE or DVT. Patient endorses urinary frequency, but denies any other urinary symptoms.     Patient denies fever, chills, cough, congestion, shortness of breath, runny nose, or rashes. Patient endorses looser stools yesterday, but no other bowel movement changes. No other symptoms, complaints, or concerns at this time.     REVIEW OF SYSTEMS   ROS negative unless otherwise stated in HPI    PAST MEDICAL HISTORY:  Past Medical History:   Diagnosis Date    Alcoholic cirrhosis of liver (H)     Allergic rhinitis due to pollen     Anemia     Asthma     DDD (degenerative disc disease), cervical     Diverticulitis of colon     DM2 (diabetes mellitus, type 2) (H)     Dysphagia     Dyspnea on exertion     Essential tremor     Fibromyalgia     YUKO (generalized anxiety disorder)     Gastroesophageal reflux disease     History of skin cancer     Hypertension     Migraine     Motion sickness     WOODS (nonalcoholic steatohepatitis)     Onychomycosis     CASTRO on CPAP     Other chronic pain     Syncopal episodes     Tension headache     Vertigo     Vitiligo        PAST  SURGICAL HISTORY:  Past Surgical History:   Procedure Laterality Date    ENDOMETRIAL ABLATION      ENDOSCOPIC RETROGRADE CHOLANGIOPANCREATOGRAM N/A 4/16/2024    Procedure: ENDOSCOPIC RETROGRADE CHOLANGIOPANCEATOGRAPHY, WITH SPHINCTEROTOMY;  Surgeon: Eduin Do MD;  Location: [facility] [place] [facility]    ENDOSCOPIC RETROGRADE CHOLANGIOPANCREATOGRAM N/A 4/16/2024    Procedure: STONE EXTRACTION,;  Surgeon: Eduin Do MD;  Location: [facility] [place] OR    ENDOSCOPIC RETROGRADE CHOLANGIOPANCREATOGRAM N/A 4/16/2024    Procedure: BILIARY STENT PLACEMENT;  Surgeon: Eduin Do MD;  Location: [facility] [place] OR    ENDOSCOPIC RETROGRADE CHOLANGIOPANCREATOGRAM N/A 6/14/2024    Procedure: ENDOSCOPIC RETROGRADE CHOLANGIOPANCREATOGRAPHY, STENT REMOVAL;  Surgeon: Eduin Do MD;  Location: [facility] [place] OR    ESOPHAGOSCOPY, GASTROSCOPY, DUODENOSCOPY (EGD), COMBINED N/A 4/16/2024    Procedure: ESOPHAGOGASTRODUODENOSCOPY, WITH ENDOSCOPIC ULTRASOUND GUIDANCE;  Surgeon: Eduin Do MD;  Location: [facility] [place] [facility]    ESOPHAGOSCOPY, GASTROSCOPY, DUODENOSCOPY (EGD), COMBINED N/A 4/25/2024    Procedure: ESOPHAGOGASTRODUODENOSCOPY;  Surgeon: Zohaib Morgan MD;  Location: [facility] GI    HERNIORRHAPHY, UMBILICAL, ROBOT-ASSISTED, LAPAROSCOPIC, USING DA ROCIO XI N/A 04/14/2024    Procedure: PRIMARY REPAIR OF  UMBILICAL HERNIA;  Surgeon: Christiano Cruz DO;  Location: [facility] [place] OR    HYSTERECTOMY      LAPAROSCOPIC CHOLECYSTECTOMY N/A 04/14/2024    Procedure: CHOLECYSTECTOMY, ROBOT-ASSISTED, LAPAROSCOPIC, USING DA ROCIO XI;  Surgeon: Christiano Cruz DO;  Location: [facility] [place] OR    PICC TRIPLE LUMEN PLACEMENT  4/25/2024    TONSILLECTOMY         CURRENT MEDICATIONS:    acetaminophen (TYLENOL) 500 MG tablet  albuterol (PROAIR HFA/PROVENTIL HFA/VENTOLIN HFA) 108 (90 Base) MCG/ACT inhaler  amitriptyline (ELAVIL) 10 MG tablet  ammonium lactate (AMLACTIN) 12 % external cream  atorvastatin (LIPITOR) 80 MG tablet  bacitracin  500 UNIT/GM external ointment  Baclofen (LIORESAL) 5 MG tablet  BELSOMRA 10 MG tablet  budesonide-formoterol (SYMBICORT) 160-4.5 MCG/ACT Inhaler  CARBOXYMETH-GLYCERIN-POLYSORB OP  cetirizine (ZYRTEC) 10 MG tablet  cholecalciferol (VITAMIN D3) 50 MCG (2000 UT) CAPS  estradiol (ESTRACE) 0.1 MG/GM vaginal cream  famotidine (PEPCID) 20 MG tablet  ferrous gluconate (FERGON) 324 (38 Fe) MG tablet  fluticasone (FLONASE) 50 MCG/ACT nasal spray  gabapentin (NEURONTIN) 300 MG capsule  hydrocortisone 2.5 % ointment  ipratropium - albuterol 0.5 mg/2.5 mg/3 mL (DUONEB) 0.5-2.5 (3) MG/3ML neb solution  JARDIANCE 10 MG TABS tablet  loperamide (IMODIUM) 2 mg capsule  losartan (COZAAR) 25 MG tablet  Magnesium Oxide -Mg Supplement 500 MG TABS  montelukast (SINGULAIR) 10 MG tablet  Multiple Vitamins-Minerals (MULTIVITAMIN WOMEN 50+) TABS  omeprazole (PRILOSEC) 20 MG DR capsule  oxyCODONE (ROXICODONE) 5 MG tablet  polyethylene glycol (MIRALAX) 17 GM/Dose powder  polyethylene glycol-propylene glycol (SYSTANE ULTRA) 0.4-0.3 % SOLN ophthalmic solution  propranolol (INDERAL) 10 MG tablet  sertraline (ZOLOFT) 100 MG tablet  TYRVAYA 0.03 MG/ACT nasal spray        ALLERGIES:  Allergies   Allergen Reactions    Aspirin Hives    Diflunisal Nausea and Vomiting     (Dolobid) Occurred approximately 20 years ago    Occurred approximately 20 years ago   Occurred approximately 20 years ago   (Dolobid) Occurred approximately 20 years ago    Fd&C Yellow #5 (Tartrazine) Nausea and Vomiting and Hives       FAMILY HISTORY:  Family History   Problem Relation Age of Onset    Heart Failure Mother     Heart Failure Father     Heart Failure Sister     Coronary Artery Disease Brother     Coronary Artery Disease Brother     Coronary Artery Disease Brother     Cerebrovascular Disease Sister        SOCIAL HISTORY:   Social History     Socioeconomic History    Marital status: Single   Tobacco Use    Smoking status: Never    Smokeless tobacco: Never   Substance and  Sexual Activity    Alcohol use: Yes     Comment: Alcoholic Drinks/day: Occasional usage    Drug use: No     Social Drivers of Health     Financial Resource Strain: At Risk (3/21/2024)    [facility]    Financial Resource Strain     Is it hard for you to pay for the very basics like food, housing, medical care or heating?: Yes   Food Insecurity: Not At Risk (3/21/2024)    Received from [facility]    Food Insecurity     Does your food run out before you have the money to buy more?: No   Transportation Needs: Not At Risk (3/21/2024)    Received from [facility]    Transportation Needs     Does a lack of transportation keep you from your medical appointments or from getting your medications?: No   Interpersonal Safety: Unknown (2/21/2024)    [facility]    Humiliation, Afraid, Rape, and Kick questionnaire     Physically Abused: No     Sexually Abused: No       VITALS:  /71   Pulse 74   Temp 98  F (36.7  C)   Resp 16   Wt 58.9 kg (129 lb 13.6 oz)   SpO2 97%   BMI 22.29 kg/m      PHYSICAL EXAM    Constitutional: Well developed, Well nourished, NAD, GCS 15   HENT: Normocephalic, Atraumatic, Oropharynx clear.   Neck- Supple, Nontender. Normal ROM.   Eyes: Conjunctiva normal. PERRL. EOM intact.   Respiratory: No respiratory distress, speaking in full sentences. Normal breath sounds, No wheezing  Cardiovascular: Normal heart rate, Regular rhythm, No murmurs.    GI: Soft, RUQ abdominal tenderness radiating to right flank/latissimus dorsi musculature. No CVAT  Musculoskeletal: No deformities, Moves all extremities equally. No midline thoracic or lumbar spine tenderness.   Integument: Warm, Dry, No erythema, ecchymosis, or rash.  Neurologic: Alert & oriented x 3, Normal sensory function. No focal deficits.   Psychiatric: Affect normal, Judgment normal, Mood normal. Cooperative.      LAB:  All pertinent labs  reviewed and interpreted.  Results for orders placed or performed during the hospital encounter of 01/29/25   CT Abdomen Pelvis w Contrast    Impression    IMPRESSION:   1.  Pneumobilia as above. This was also seen on the previous exam and likely relates to a previous sphincterotomy.  2.  Tiny nonobstructing stone at the lower pole of the right kidney..   Comprehensive metabolic panel   Result Value Ref Range    Sodium 142 135 - 145 mmol/L    Potassium 4.0 3.4 - 5.3 mmol/L    Carbon Dioxide (CO2) 27 22 - 29 mmol/L    Anion Gap 11 7 - 15 mmol/L    Urea Nitrogen 13.0 8.0 - 23.0 mg/dL    Creatinine 0.84 0.51 - 0.95 mg/dL    GFR Estimate 76 >60 mL/min/1.73m2    Calcium 10.0 8.8 - 10.4 mg/dL    Chloride 104 98 - 107 mmol/L    Glucose 88 70 - 99 mg/dL    Alkaline Phosphatase 101 40 - 150 U/L    AST 56 (H) 0 - 45 U/L    ALT 50 0 - 50 U/L    Protein Total 7.4 6.4 - 8.3 g/dL    Albumin 4.4 3.5 - 5.2 g/dL    Bilirubin Total 0.5 <=1.2 mg/dL   UA with Microscopic reflex to Culture    Specimen: Urine, Clean Catch   Result Value Ref Range    Color Urine Light Yellow Colorless, Straw, Light Yellow, Yellow    Appearance Urine Clear Clear    Glucose Urine 1000 (A) Negative mg/dL    Bilirubin Urine Negative Negative    Ketones Urine Negative Negative mg/dL    Specific Gravity Urine 1.012 1.001 - 1.030    Blood Urine Negative Negative    pH Urine 6.0 5.0 - 7.0    Protein Albumin Urine Negative Negative mg/dL    Urobilinogen Urine <2.0 <2.0 mg/dL    Nitrite Urine Negative Negative    Leukocyte Esterase Urine 75 [place]/uL (A) Negative    Mucus Urine Present (A) None Seen /LPF    RBC Urine <1 <=2 /HPF    WBC Urine 2 <=5 /HPF    Squamous Epithelials Urine 2 (H) <=1 /HPF    Transitional Epithelials Urine <1 <=1 /HPF    Hyaline Casts Urine 4 (H) <=2 /LPF   Result Value Ref Range    Lipase 30 13 - 60 U/L   D dimer quantitative   Result Value Ref Range    D-Dimer Quantitative <0.27 0.00 - 0.50 ug/mL FEU   CBC with platelets and differential    Result Value Ref Range    WBC Count 6.7 4.0 - 11.0 10e3/uL    RBC Count 4.99 3.80 - 5.20 10e6/uL    Hemoglobin 15.0 11.7 - 15.7 g/dL    Hematocrit 45.1 35.0 - 47.0 %    MCV 90 78 - 100 fL    MCH 30.1 26.5 - 33.0 pg    MCHC 33.3 31.5 - 36.5 g/dL    RDW 12.5 10.0 - 15.0 %    Platelet Count 225 150 - 450 10e3/uL    % Neutrophils 52 %    % Lymphocytes 38 %    % Monocytes 8 %    % Eosinophils 1 %    % Basophils 0 %    % Immature Granulocytes 0 %    NRBCs per 100 WBC 0 <1 /100    Absolute Neutrophils 3.5 1.6 - 8.3 10e3/uL    Absolute Lymphocytes 2.6 0.8 - 5.3 10e3/uL    Absolute Monocytes 0.6 0.0 - 1.3 10e3/uL    Absolute Eosinophils 0.1 0.0 - 0.7 10e3/uL    Absolute Basophils 0.0 0.0 - 0.2 10e3/uL    Absolute Immature Granulocytes 0.0 <=0.4 10e3/uL    Absolute NRBCs 0.0 10e3/uL       RADIOLOGY:  Reviewed all pertinent imaging. Please see official radiology report.  CT Abdomen Pelvis w Contrast   Final Result   IMPRESSION:    1.  Pneumobilia as above. This was also seen on the previous exam and likely relates to a previous sphincterotomy.   2.  Tiny nonobstructing stone at the lower pole of the right kidney..          EKG:    Performed at: 16:39    Impression: Normal sinus rhythm.    Rate: 84 bpm  Rhythm: Normal sinus rhythm.   Axis: 62 61 58  NV Interval: 166 ms  QRS Interval: 76 ms  QTc Interval: 441 ms  ST Changes: No significant changes noted.   Comparison: No previous ECGs available for comparison.     Dr. Roldan and I have independently reviewed and interpreted the EKG(s) documented above.    PROCEDURES:   None      I, Irene Mancera, am serving as a scribe to document services personally performed by Reena Pino PA-C based on my observation and the provider's statements to me. I, Reena Pino PA-C, attest that Irene Mancera is acting in a scribe capacity, has observed my performance of the services and has documented them in accordance with my direction.    Reena Pino PA-C  Emergency Medicine  [facility]  [facility] EMERGENCY DEPARTMENT  [address] [place] [id number]  [phone]             Reena Pino PA-C  01/30/25 0018

## 2025-01-30 NOTE — DISCHARGE INSTRUCTIONS
Your labs and imaging all look great.  It is not clear what is causing your pain.  The air pocket on your CT as we discussed looks similar to a year ago.  That can stay like that forever and should not be the source of your pain.    Continue to monitor your symptoms.   Use Tylenol, heat packs as needed for pain.  Follow-up in clinic ideally Friday if not early next week for recheck.    Return to the emergency department if you develop fevers, worsening or changing pain, shortness of breath, vomiting/not keeping fluids down, or any other concerning symptoms.  We would be happy to see you.

## 2025-01-31 ENCOUNTER — HOSPITAL ENCOUNTER (EMERGENCY)
Facility: HOSPITAL | Age: 70
Discharge: HOME | End: 2025-01-31
Attending: EMERGENCY MEDICINE | Admitting: EMERGENCY MEDICINE
Payer: COMMERCIAL

## 2025-01-31 VITALS
HEART RATE: 100 BPM | RESPIRATION RATE: 18 BRPM | DIASTOLIC BLOOD PRESSURE: 54 MMHG | TEMPERATURE: 98 F | SYSTOLIC BLOOD PRESSURE: 90 MMHG | OXYGEN SATURATION: 94 %

## 2025-01-31 DIAGNOSIS — R10.11 RIGHT UPPER QUADRANT ABDOMINAL PAIN: ICD-10-CM

## 2025-01-31 DIAGNOSIS — R11.2 NAUSEA AND VOMITING, UNSPECIFIED VOMITING TYPE: ICD-10-CM

## 2025-01-31 DIAGNOSIS — K81.0 ACUTE CHOLECYSTITIS: ICD-10-CM

## 2025-01-31 LAB
ALBUMIN SERPL BCG-MCNC: 4.3 G/DL (ref 3.5–5.2)
ALBUMIN UR-MCNC: NEGATIVE MG/DL
ALP SERPL-CCNC: 90 U/L (ref 40–150)
ALT SERPL W P-5'-P-CCNC: 69 U/L (ref 0–50)
APPEARANCE UR: CLEAR
AST SERPL W P-5'-P-CCNC: 79 U/L (ref 0–45)
BACTERIA #/AREA URNS HPF: ABNORMAL /HPF
BASOPHILS # BLD AUTO: 0 10E3/UL (ref 0–0.2)
BASOPHILS NFR BLD AUTO: 0 %
BILIRUB DIRECT SERPL-MCNC: <0.2 MG/DL (ref 0–0.3)
BILIRUB SERPL-MCNC: 0.5 MG/DL
BILIRUB UR QL STRIP: NEGATIVE
COLOR UR AUTO: ABNORMAL
EOSINOPHIL # BLD AUTO: 0 10E3/UL (ref 0–0.7)
EOSINOPHIL NFR BLD AUTO: 0 %
ERYTHROCYTE [DISTWIDTH] IN BLOOD BY AUTOMATED COUNT: 12.6 % (ref 10–15)
GLUCOSE UR STRIP-MCNC: 500 MG/DL
HCT VFR BLD AUTO: 41.8 % (ref 35–47)
HGB BLD-MCNC: 13.9 G/DL (ref 11.7–15.7)
HGB UR QL STRIP: NEGATIVE
HOLD SPECIMEN: NORMAL
HYALINE CASTS: 3 /LPF
IMM GRANULOCYTES # BLD: 0 10E3/UL
IMM GRANULOCYTES NFR BLD: 0 %
KETONES UR STRIP-MCNC: >150 MG/DL
LEUKOCYTE ESTERASE UR QL STRIP: ABNORMAL
LIPASE SERPL-CCNC: 21 U/L (ref 13–60)
LYMPHOCYTES # BLD AUTO: 2.1 10E3/UL (ref 0.8–5.3)
LYMPHOCYTES NFR BLD AUTO: 19 %
MCH RBC QN AUTO: 30.5 PG (ref 26.5–33)
MCHC RBC AUTO-ENTMCNC: 33.3 G/DL (ref 31.5–36.5)
MCV RBC AUTO: 92 FL (ref 78–100)
MONOCYTES # BLD AUTO: 1 10E3/UL (ref 0–1.3)
MONOCYTES NFR BLD AUTO: 9 %
MUCOUS THREADS #/AREA URNS LPF: PRESENT /LPF
NEUTROPHILS # BLD AUTO: 7.8 10E3/UL (ref 1.6–8.3)
NEUTROPHILS NFR BLD AUTO: 71 %
NITRATE UR QL: NEGATIVE
NRBC # BLD AUTO: 0 10E3/UL
NRBC BLD AUTO-RTO: 0 /100
PH UR STRIP: 5 [PH] (ref 5–7)
PLATELET # BLD AUTO: 229 10E3/UL (ref 150–450)
PROT SERPL-MCNC: 7 G/DL (ref 6.4–8.3)
RBC # BLD AUTO: 4.55 10E6/UL (ref 3.8–5.2)
RBC URINE: <1 /HPF
SP GR UR STRIP: 1.02 (ref 1–1.03)
SQUAMOUS EPITHELIAL: 3 /HPF
TRANSITIONAL EPI: 1 /HPF
UROBILINOGEN UR STRIP-MCNC: <2 MG/DL
WBC # BLD AUTO: 10.9 10E3/UL (ref 4–11)
WBC URINE: 2 /HPF

## 2025-01-31 PROCEDURE — 250N000011 HC RX IP 250 OP 636: Mod: JZ | Performed by: EMERGENCY MEDICINE

## 2025-01-31 PROCEDURE — 83690 ASSAY OF LIPASE: CPT | Performed by: EMERGENCY MEDICINE

## 2025-01-31 PROCEDURE — 258N000003 HC RX IP 258 OP 636: Performed by: EMERGENCY MEDICINE

## 2025-01-31 PROCEDURE — 85004 AUTOMATED DIFF WBC COUNT: CPT | Performed by: EMERGENCY MEDICINE

## 2025-01-31 PROCEDURE — 96374 THER/PROPH/DIAG INJ IV PUSH: CPT

## 2025-01-31 PROCEDURE — 36415 COLL VENOUS BLD VENIPUNCTURE: CPT | Performed by: EMERGENCY MEDICINE

## 2025-01-31 PROCEDURE — 99284 EMERGENCY DEPT VISIT MOD MDM: CPT | Mod: 25

## 2025-01-31 PROCEDURE — 81003 URINALYSIS AUTO W/O SCOPE: CPT | Performed by: EMERGENCY MEDICINE

## 2025-01-31 PROCEDURE — 96361 HYDRATE IV INFUSION ADD-ON: CPT

## 2025-01-31 PROCEDURE — 84155 ASSAY OF PROTEIN SERUM: CPT | Performed by: EMERGENCY MEDICINE

## 2025-01-31 PROCEDURE — 82040 ASSAY OF SERUM ALBUMIN: CPT | Performed by: EMERGENCY MEDICINE

## 2025-01-31 RX ORDER — MORPHINE SULFATE 4 MG/ML
4 INJECTION, SOLUTION INTRAMUSCULAR; INTRAVENOUS
Status: COMPLETED | OUTPATIENT
Start: 2025-01-31 | End: 2025-01-31

## 2025-01-31 RX ORDER — OXYCODONE HYDROCHLORIDE 5 MG/1
5 TABLET ORAL EVERY 6 HOURS PRN
Qty: 8 TABLET | Refills: 0 | Status: SHIPPED | OUTPATIENT
Start: 2025-01-31

## 2025-01-31 RX ORDER — ONDANSETRON 4 MG/1
4 TABLET, ORALLY DISINTEGRATING ORAL EVERY 6 HOURS PRN
Qty: 10 TABLET | Refills: 0 | Status: SHIPPED | OUTPATIENT
Start: 2025-01-31

## 2025-01-31 RX ORDER — ONDANSETRON 4 MG/1
4 TABLET, ORALLY DISINTEGRATING ORAL EVERY 6 HOURS PRN
Qty: 10 TABLET | Refills: 0 | Status: SHIPPED | OUTPATIENT
Start: 2025-01-31 | End: 2025-01-31

## 2025-01-31 RX ADMIN — SODIUM CHLORIDE, POTASSIUM CHLORIDE, SODIUM LACTATE AND CALCIUM CHLORIDE 500 ML: 600; 310; 30; 20 INJECTION, SOLUTION INTRAVENOUS at 02:56

## 2025-01-31 RX ADMIN — MORPHINE SULFATE 4 MG: 4 INJECTION, SOLUTION INTRAMUSCULAR; INTRAVENOUS at 02:59

## 2025-01-31 ASSESSMENT — ACTIVITIES OF DAILY LIVING (ADL)
ADLS_ACUITY_SCORE: 58

## 2025-01-31 ASSESSMENT — ENCOUNTER SYMPTOMS
VOMITING: 1
NAUSEA: 1
ABDOMINAL PAIN: 1

## 2025-01-31 NOTE — ED PROVIDER NOTES
NAME: Odette Escobar  AGE: 69 year old female  YOB: 1955  MRN: [id number]  EVALUATION DATE & TIME: 2025  2:20 AM    PCP: Allie Johnson    ED PROVIDER: Adrian Joseph M.D.      Chief Complaint   Patient presents with    Nausea & Vomiting    Flank Pain         FINAL IMPRESSION:  1. Right upper quadrant abdominal pain    2. Nausea and vomiting, unspecified vomiting type    3. Acute cholecystitis        MEDICAL DECISION MAKIN:38 AM I met with the patient, obtained history, performed an initial exam, and discussed options and plan for diagnostics and treatment here in the ED.   3:25 AM I rechecked and updated patient on lbs. She reports feeling much better.     Patient was clinically assessed and consented to treatment. After assessment, medical decision making and workup were discussed with the patient. The patient was agreeable to plan for testing, workup, and treatment.  Pertinent Labs & Imaging studies reviewed. (See chart for details)       Medical Decision Making  Obtained supplemental history:Supplemental history obtained?: Documented in chart  Reviewed external records: External records reviewed?: Documented in chart  Care impacted by chronic illness:Documented in Chart  Care significantly affected by social determinants of health:Access to Medical Care  Did you consider but not order tests?: In addition to work-up documented, I considered the following work up:   Did you interpret images independently?: Independent interpretation of ECG and images noted in documentation, when applicable.  Consultation discussion with other provider:Did you involve another provider (consultant, MH, pharmacy, etc.)?: No  Discharge. I prescribed additional prescription strength medication(s) as charted. See documentation for any additional details.  Not Applicable    Odette Escobar is a 69 year old female who presents with flank pain and nausea and vomiting.   Differential diagnosis includes but not  limited to pyelonephritis, urine tract infection, pancreatitis, CBD obstruction, musculoskeletal back.  Patient sick 9-year-old female presenting for flank pain which she had yesterday when she was seen and had a full workup including labs and CT scan.  CT scan showed a stable duct where she had prior sphincterotomy and stent that was removed.  Labs were unremarkable the previous day however patient tolerating medications at home until she took her evening meds including blood pressure meds and threw those up.  Patient not so nauseous here but did receive nausea meds with EMS.  IV fluids given after urinalysis showed ketones but no signs of acute infection.  I did agree to recheck some liver enzymes and lipase given the possibility that they were normal yesterday and could be increasing.  Patient comfortable this plan and after labs returned relatively unremarkable she received IV fluids.  Pain was controlled and on discussion patient will be plan for discharge home with antiemetics and smaller pain medications though the pain likely could be related to musculoskeletal back pain which she has a history of.  I do not feel this is likely related to any kidney abnormality or pancreatic abnormality with past history of the choledocholithiasis.  Patient comfortable plan will be discharged home.    0 minutes of critical care time    MEDICATIONS GIVEN IN THE EMERGENCY:  Medications   morphine (PF) injection 4 mg (4 mg Intravenous $Given 1/31/25 0259)   lactated ringers BOLUS 500 mL (0 mLs Intravenous Stopped 1/31/25 0334)       NEW PRESCRIPTIONS STARTED AT TODAY'S ER VISIT:  Discharge Medication List as of 1/31/2025  4:32 AM        START taking these medications    Details   ondansetron (ZOFRAN ODT) 4 MG ODT tab Take 1 tablet (4 mg) by mouth every 6 hours as needed for nausea., Disp-10 tablet, R-0, Local Print                =================================================================    HPI    Patient information was  obtained from: patient     Use of : N/A         Odette Escobar is a 69 year old female with a past medical history of pancreatitis, cirrhosis of liver, s/p cholecystectomy, alcohol abuse, asthma, hypertension, GERD, type II diabetes, and anxiety, who presents for abdominal pain.     The patient reports yesterday having pain in the middle of her abdomen that radiated to her right side into the middle of her back. This evening, she said she ate and took her medications, but upon going to bed she developed nausea and vomiting. Patient feels okay if she is not laying on her right side. Reports nausea has improved.     1/29/2025- [facility] ED visit for abdominal pain and flank pain. Patient with reproducible RUQ abdominal tenderness on exam. Imaging CT abdomen/pelvis showed 2 mm stone in right kidney, but no hydronephrosis or ureterolithiasis. Imaging CT chest shows no pneumonia. Patient discharged in stable condition.       REVIEW OF SYSTEMS   Review of Systems   Gastrointestinal:  Positive for abdominal pain, nausea and vomiting.        PAST MEDICAL HISTORY:  Past Medical History:   Diagnosis Date    Alcoholic cirrhosis of liver (H)     Allergic rhinitis due to pollen     Anemia     Asthma     DDD (degenerative disc disease), cervical     Diverticulitis of colon     DM2 (diabetes mellitus, type 2) (H)     Dysphagia     Dyspnea on exertion     Essential tremor     Fibromyalgia     YUKO (generalized anxiety disorder)     Gastroesophageal reflux disease     History of skin cancer     Hypertension     Migraine     Motion sickness     WOODS (nonalcoholic steatohepatitis)     Onychomycosis     CASTRO on CPAP     Other chronic pain     Syncopal episodes     Tension headache     Vertigo     Vitiligo        PAST SURGICAL HISTORY:  Past Surgical History:   Procedure Laterality Date    ENDOMETRIAL ABLATION      ENDOSCOPIC RETROGRADE CHOLANGIOPANCREATOGRAM N/A 4/16/2024    Procedure: ENDOSCOPIC RETROGRADE  CHOLANGIOPANCEATOGRAPHY, WITH SPHINCTEROTOMY;  Surgeon: Eduin Do MD;  Location: [facility] OR    ENDOSCOPIC RETROGRADE CHOLANGIOPANCREATOGRAM N/A 4/16/2024    Procedure: STONE EXTRACTION,;  Surgeon: Eduin Do MD;  Location: [facility] OR    ENDOSCOPIC RETROGRADE CHOLANGIOPANCREATOGRAM N/A 4/16/2024    Procedure: BILIARY STENT PLACEMENT;  Surgeon: Eduin Do MD;  Location: [facility] OR    ENDOSCOPIC RETROGRADE CHOLANGIOPANCREATOGRAM N/A 6/14/2024    Procedure: ENDOSCOPIC RETROGRADE CHOLANGIOPANCREATOGRAPHY, STENT REMOVAL;  Surgeon: Eduin Do MD;  Location: [facility] OR    ESOPHAGOSCOPY, GASTROSCOPY, DUODENOSCOPY (EGD), COMBINED N/A 4/16/2024    Procedure: ESOPHAGOGASTRODUODENOSCOPY, WITH ENDOSCOPIC ULTRASOUND GUIDANCE;  Surgeon: Eduin Do MD;  Location: [facility] OR    ESOPHAGOSCOPY, GASTROSCOPY, DUODENOSCOPY (EGD), COMBINED N/A 4/25/2024    Procedure: ESOPHAGOGASTRODUODENOSCOPY;  Surgeon: Zohaib Morgan MD;  Location: [facility] GI    HERNIORRHAPHY, UMBILICAL, ROBOT-ASSISTED, LAPAROSCOPIC, USING DA ROCIO XI N/A 04/14/2024    Procedure: PRIMARY REPAIR OF  UMBILICAL HERNIA;  Surgeon: Christiano Cruz DO;  Location: [facility] OR    HYSTERECTOMY      LAPAROSCOPIC CHOLECYSTECTOMY N/A 04/14/2024    Procedure: CHOLECYSTECTOMY, ROBOT-ASSISTED, LAPAROSCOPIC, USING DA ROCIO XI;  Surgeon: Christiano Cruz DO;  Location: [facility] OR    PICC TRIPLE LUMEN PLACEMENT  4/25/2024    TONSILLECTOMY         CURRENT MEDICATIONS:    No current facility-administered medications for this encounter.    Current Outpatient Medications:     ondansetron (ZOFRAN ODT) 4 MG ODT tab, Take 1 tablet (4 mg) by mouth every 6 hours as needed for nausea., Disp: 10 tablet, Rfl: 0    oxyCODONE (ROXICODONE) 5 MG tablet, Take 1 tablet (5 mg) by mouth every 6 hours as needed for severe pain. T, Disp: 8 tablet, Rfl: 0    acetaminophen (TYLENOL) 500 MG tablet, Take 1,000 mg by mouth daily as needed  for pain, Disp: , Rfl:     albuterol (PROAIR HFA/PROVENTIL HFA/VENTOLIN HFA) 108 (90 Base) MCG/ACT inhaler, Inhale 2 puffs into the lungs every 6 hours as needed for shortness of breath, wheezing or cough, Disp: , Rfl:     amitriptyline (ELAVIL) 10 MG tablet, Take 30 mg by mouth at bedtime, Disp: , Rfl:     ammonium lactate (AMLACTIN) 12 % external cream, Apply topically daily as needed for dry skin, Disp: , Rfl:     atorvastatin (LIPITOR) 80 MG tablet, Take 80 mg by mouth daily, Disp: , Rfl:     bacitracin 500 UNIT/GM external ointment, Apply topically 2 times daily, Disp: , Rfl:     Baclofen (LIORESAL) 5 MG tablet, Take 5-10 mg by mouth 2 times daily as needed for muscle spasms or other (or sleep), Disp: , Rfl:     BELSOMRA 10 MG tablet, Take 1 tablet by mouth nightly as needed for sleep, Disp: , Rfl:     budesonide-formoterol (SYMBICORT) 160-4.5 MCG/ACT Inhaler, Inhale 2 puffs into the lungs two times daily Rinse mouth/gargle after use, Disp: , Rfl:     CARBOXYMETH-GLYCERIN-POLYSORB OP, Apply 1 drop to eye daily as needed, Disp: , Rfl:     cetirizine (ZYRTEC) 10 MG tablet, Take 10 mg by mouth daily, Disp: , Rfl:     cholecalciferol (VITAMIN D3) 50 MCG (2000 UT) CAPS, Take 4,000 Units by mouth daily, Disp: , Rfl:     estradiol (ESTRACE) 0.1 MG/GM vaginal cream, Place vaginally twice a week, Disp: , Rfl:     famotidine (PEPCID) 20 MG tablet, Take 20 mg by mouth 2 times daily, Disp: , Rfl:     ferrous gluconate (FERGON) 324 (38 Fe) MG tablet, Take 324 mg by mouth daily (with breakfast), Disp: , Rfl:     fluticasone (FLONASE) 50 MCG/ACT nasal spray, Spray 2 sprays into both nostrils daily as needed for rhinitis or allergies, Disp: , Rfl:     gabapentin (NEURONTIN) 300 MG capsule, Take 600 mg by mouth 3 times daily, Disp: , Rfl:     hydrocortisone 2.5 % ointment, Apply topically 2 times daily as needed for other (Eczema), Disp: , Rfl:     ipratropium - albuterol 0.5 mg/2.5 mg/3 mL (DUONEB) 0.5-2.5 (3) MG/3ML neb  solution, Take 1 vial by nebulization 3 times daily as needed for shortness of breath, wheezing or cough, Disp: , Rfl:     JARDIANCE 10 MG TABS tablet, Take 10 mg by mouth daily, Disp: , Rfl:     loperamide (IMODIUM) 2 mg capsule, Take 2 mg by mouth 4 times daily as needed for diarrhea, Disp: , Rfl:     losartan (COZAAR) 25 MG tablet, Take 25 mg by mouth daily, Disp: , Rfl:     Magnesium Oxide -Mg Supplement 500 MG TABS, Take 1 tablet by mouth daily, Disp: , Rfl:     montelukast (SINGULAIR) 10 MG tablet, Take 1 tablet by mouth at bedtime, Disp: , Rfl:     Multiple Vitamins-Minerals (MULTIVITAMIN WOMEN 50+) TABS, Take 1 tablet by mouth daily, Disp: , Rfl:     omeprazole (PRILOSEC) 20 MG DR capsule, Take 20 mg by mouth 2 times daily, Disp: , Rfl:     polyethylene glycol (MIRALAX) 17 GM/Dose powder, Take 17 g (1 Capful) by mouth daily, Disp: 225 g, Rfl: 0    polyethylene glycol-propylene glycol (SYSTANE ULTRA) 0.4-0.3 % SOLN ophthalmic solution, Place 1 drop into both eyes 4 times daily as needed for dry eyes, Disp: , Rfl:     propranolol (INDERAL) 10 MG tablet, Take 10 mg by mouth 2 times daily, Disp: , Rfl:     sertraline (ZOLOFT) 100 MG tablet, Take 100 mg by mouth daily, Disp: , Rfl:     TYRVAYA 0.03 MG/ACT nasal spray, Spray 1 spray into both nostrils 2 times daily, Disp: , Rfl:     ALLERGIES:  Allergies   Allergen Reactions    Aspirin Hives    Diflunisal Nausea and Vomiting     (Dolobid) Occurred approximately 20 years ago    Occurred approximately 20 years ago   Occurred approximately 20 years ago   (Dolobid) Occurred approximately 20 years ago    Fd&C Yellow #5 (Tartrazine) Nausea and Vomiting and Hives       FAMILY HISTORY:  Family History   Problem Relation Age of Onset    Heart Failure Mother     Heart Failure Father     Heart Failure Sister     Coronary Artery Disease Brother     Coronary Artery Disease Brother     Coronary Artery Disease Brother     Cerebrovascular Disease Sister        SOCIAL HISTORY:    Social History     Socioeconomic History    Marital status: Single   Tobacco Use    Smoking status: Never    Smokeless tobacco: Never   Substance and Sexual Activity    Alcohol use: Yes     Comment: Alcoholic Drinks/day: Occasional usage    Drug use: No     Social Drivers of Health     Financial Resource Strain: At Risk (3/21/2024)    [facility]    Financial Resource Strain     Is it hard for you to pay for the very basics like food, housing, medical care or heating?: Yes   Food Insecurity: Not At Risk (3/21/2024)    Received from [facility]    Food Insecurity     Does your food run out before you have the money to buy more?: No   Transportation Needs: Not At Risk (3/21/2024)    Received from [facility]    Transportation Needs     Does a lack of transportation keep you from your medical appointments or from getting your medications?: No   Interpersonal Safety: Unknown (2/21/2024)    Received from [facility]    Humiliation, Afraid, Rape, and Kick questionnaire     Physically Abused: No     Sexually Abused: No       PHYSICAL EXAM:    Vitals: BP 90/54   Pulse 100   Temp 98  F (36.7  C) (Oral)   Resp 18   SpO2 94%    Physical Exam  Vitals and nursing note reviewed.   Constitutional:       General: She is not in acute distress.     Appearance: Normal appearance. She is normal weight. She is not ill-appearing or toxic-appearing.   HENT:      Head: Normocephalic.      Mouth/Throat:      Mouth: Mucous membranes are dry.   Eyes:      General: No scleral icterus.     Conjunctiva/sclera: Conjunctivae normal.   Cardiovascular:      Rate and Rhythm: Normal rate and regular rhythm.      Heart sounds: Normal heart sounds.   Pulmonary:      Effort: Pulmonary effort is normal. No respiratory distress.      Breath sounds: Normal breath sounds.   Abdominal:      General: Abdomen is flat. There is no distension.      Palpations: Abdomen  is soft.      Tenderness: There is abdominal tenderness in the right upper quadrant. There is right CVA tenderness. There is no left CVA tenderness, guarding or rebound.      Hernia: No hernia is present.   Musculoskeletal:         General: Normal range of motion.      Cervical back: Normal range of motion.      Right lower leg: No edema.      Left lower leg: No edema.   Skin:     General: Skin is warm and dry.   Neurological:      General: No focal deficit present.      Mental Status: She is alert.   Psychiatric:         Behavior: Behavior normal.           LAB:  All pertinent labs reviewed and interpreted.  Labs Ordered and Resulted from Time of ED Arrival to Time of ED Departure   HEPATIC FUNCTION PANEL - Abnormal       Result Value    Protein Total 7.0      Albumin 4.3      Bilirubin Total 0.5      Alkaline Phosphatase 90      AST 79 (*)     ALT 69 (*)     Bilirubin Direct <0.20     ROUTINE UA WITH MICROSCOPIC REFLEX TO CULTURE - Abnormal    Color Urine Light Yellow      Appearance Urine Clear      Glucose Urine 500 (*)     Bilirubin Urine Negative      Ketones Urine >150 (*)     Specific Gravity Urine 1.017      Blood Urine Negative      pH Urine 5.0      Protein Albumin Urine Negative      Urobilinogen Urine <2.0      Nitrite Urine Negative      Leukocyte Esterase Urine 25 Lupillo/uL (*)     Bacteria Urine Few (*)     Mucus Urine Present (*)     RBC Urine <1      WBC Urine 2      Squamous Epithelials Urine 3 (*)     Transitional Epithelials Urine 1      Hyaline Casts Urine 3 (*)    LIPASE - Normal    Lipase 21     CBC WITH PLATELETS AND DIFFERENTIAL    WBC Count 10.9      RBC Count 4.55      Hemoglobin 13.9      Hematocrit 41.8      MCV 92      MCH 30.5      MCHC 33.3      RDW 12.6      Platelet Count 229      % Neutrophils 71      % Lymphocytes 19      % Monocytes 9      % Eosinophils 0      % Basophils 0      % Immature Granulocytes 0      NRBCs per 100 WBC 0      Absolute Neutrophils 7.8      Absolute  Lymphocytes 2.1      Absolute Monocytes 1.0      Absolute Eosinophils 0.0      Absolute Basophils 0.0      Absolute Immature Granulocytes 0.0      Absolute NRBCs 0.0         RADIOLOGY:  No orders to display           I, Karla Young, am serving as a scribe to document services personally performed by Dr. Adrian Joseph  based on my observation and the provider's statements to me. I, Adrian Joseph MD attest that Karla Young is acting in a scribe capacity, has observed my performance of the services and has documented them in accordance with my direction.      Adrian Joseph M.D.  Emergency Medicine  [facility] EMERGENCY DEPARTMENT  [address] [place] [id number] [phone]  Dept: [phone]         Adrian Joseph MD  01/31/25 0552

## 2025-01-31 NOTE — ED TRIAGE NOTES
Pt arrives via EMS with complaint of nausea and vomiting that began 2 hours ago. R sided kidney sone seen on CT yesterday. Vomit 1x at home. Pain 3/10 R flank. 4mg zofran given en route. Denies nausea at this time. BG 89.      Triage Assessment (Adult)       Row Name 01/31/25 0234          Triage Assessment    Airway WDL WDL        Respiratory WDL    Respiratory WDL WDL        Skin Circulation/Temperature WDL    Skin Circulation/Temperature WDL WDL        Cardiac WDL    Cardiac WDL WDL        Peripheral/Neurovascular WDL    Peripheral Neurovascular WDL WDL        Cognitive/Neuro/Behavioral WDL    Cognitive/Neuro/Behavioral WDL WDL

## 2025-01-31 NOTE — ED NOTES
[facility]  Expected date:   Expected time:   Means of arrival:   Comments:  Karl, 69 F, n/v x2 hours, vss

## 2025-02-01 LAB
ATRIAL RATE - MUSE: 84 BPM
DIASTOLIC BLOOD PRESSURE - MUSE: NORMAL MMHG
INTERPRETATION ECG - MUSE: NORMAL
P AXIS - MUSE: 62 DEGREES
PR INTERVAL - MUSE: 166 MS
QRS DURATION - MUSE: 76 MS
QT - MUSE: 374 MS
QTC - MUSE: 441 MS
R AXIS - MUSE: 61 DEGREES
SYSTOLIC BLOOD PRESSURE - MUSE: NORMAL MMHG
T AXIS - MUSE: 58 DEGREES
VENTRICULAR RATE- MUSE: 84 BPM

## 2025-02-12 ENCOUNTER — OFFICE VISIT (OUTPATIENT)
Dept: PHYSICAL MEDICINE AND REHAB | Facility: CLINIC | Age: 70
End: 2025-02-12
Payer: COMMERCIAL

## 2025-02-12 VITALS
HEART RATE: 77 BPM | DIASTOLIC BLOOD PRESSURE: 53 MMHG | SYSTOLIC BLOOD PRESSURE: 100 MMHG | HEIGHT: 64 IN | BODY MASS INDEX: 22.17 KG/M2 | WEIGHT: 129.85 LBS

## 2025-02-12 DIAGNOSIS — M47.812 FACET ARTHROPATHY, CERVICAL: ICD-10-CM

## 2025-02-12 DIAGNOSIS — G89.29 CHRONIC NECK PAIN: Primary | ICD-10-CM

## 2025-02-12 DIAGNOSIS — M43.6 NECK STIFFNESS: ICD-10-CM

## 2025-02-12 DIAGNOSIS — M54.2 CHRONIC NECK PAIN: Primary | ICD-10-CM

## 2025-02-12 PROCEDURE — 99215 OFFICE O/P EST HI 40 MIN: CPT | Performed by: NURSE PRACTITIONER

## 2025-02-12 NOTE — PROGRESS NOTES
Assessment:     Diagnoses and all orders for this visit:  Chronic neck pain  -     MR Cervical Spine w/o Contrast; Future  Facet arthropathy, cervical  -     MR Cervical Spine w/o Contrast; Future  Neck stiffness     Odette Escobar is a 69 year old y.o. female with past medical history significant for  hyperlipidemia, GERD, asthma, hypertension, diabetes mellitus, anxiety, history of vertigo who presents today for follow-up regarding:    -Chronic mild neck pain.  Patient reports significant stiffness in her neck ongoing for the last couple weeks.     -Chronic mild thoracic pain     -Chronic bilateral low back pain    -She does have multiple other issues going on and is getting a workup for abdominal pain which they are unclear as to what is causing her abdominal pain.     Plan:     A shared decision making plan was used. The patient's values and choices were respected. Prior medical records were reviewed today. The following represents what was discussed and decided upon by the provider and the patient.        -DIAGNOSTIC TESTS: Images were personally reviewed and interpreted.   --Ordered cervical spine MRI to further evaluate neck pain for possible intervention.  Patient reports x-ray in the past with arthritis noted.  --Lumbar spine MRI 3/21/2024 with disc bulge at L5-S1 with moderate facet arthropathy with left lateral recess stenosis.  Moderate facet arthropathy L4-5 with mild central stenosis.  --CT abdomen pelvis 3/7/2024 with normal alignment, mild disc height loss at L5-S1 with disc bulging noted at L4-5 as well as upper lumbar spine.     -INTERVENTIONS: Consider injections pending imaging review potential C7-T1 IL CHRISSIE if nerve compression noted as her neck pain is very general.    -MEDICATIONS: No recommendations for medications at this time since she has multiple medical issues going on at the moment.  Discussed side effects of medications and proper use. Patient verbalized understanding.    -PHYSICAL  THERAPY: Discussed revisiting physical therapy.  She does report that she has done physical therapy in the past and continues with home exercises.  She wants to get the MRI first before considering further PT.  Discussed the importance of core strengthening, ROM, stretching exercises with the patient and how each of these entities is important in decreasing pain.  Explained to the patient that the purpose of physical therapy is to teach the patient a home exercise program.  These exercises need to be performed every day in order to decrease pain and prevent future occurrences of pain.        -PATIENT EDUCATION:  Total time of 46 minutes, on the day of service, spent with the patient, reviewing the chart, placing orders, and documenting.     -FOLLOW UP: Follow-up nurse call for imaging results.  *40 minute follow up always  Advised to contact clinic if symptoms worsen or change.    Subjective:     Odette Escobar is a 69 year old female who presents today for follow-up regarding.  Generalized neck pain and a lot of stiffness that has been progressive in the last couple months she reports that she has have difficulty moving her head.  Currently her pain is an 8/10, a 10 at its worst with any range of motion.  Otherwise she denies any radiating arm pain.  Denies upper extremity weakness.  She does report that she has chronic neuropathy so always has numbness and tingling in her hands but this is a unchanged.  She denies any recent trips or falls.    Patient has been in and out of the emergency department and hospital recently for right lower abdominal pain, palpitations    Patient reports a history of fibromyalgia.     -Treatment to Date: No prior spinal surgery.  Physical therapy [facility] in the past, nothing recent  Chiropractic treatments are not held in the past     Lumbar spine injection at age 26  Cervical spine injection 2020 [facility]:  Left third occipital nerve, C3, C4, C5 RFA 4/30/2021  Right third occipital  nerve, C3, C4, C5 RFA 5/7/2021     -Medications:  Acetaminophen  CBD   Gabapentin 300 mg    Oxycodone prescribed by the ED 1/31/2025, 8 tablets    Patient Active Problem List   Diagnosis    Low back pain    Anxiety    Asthma    Diabetes mellitus (H)    Essential hypertension    Gastroesophageal reflux disease with esophagitis    Hyperlipidemia    Lumbar radiculopathy    Lumbar facet arthropathy    DDD (degenerative disc disease), lumbar    Fibromyalgia    Chronic neck pain    Adjustment disorder with mixed anxiety and depressed mood    Alcohol abuse, in remission    Benign essential tremor    Benign paroxysmal positional vertigo    Cervical radicular pain    CASTRO (obstructive sleep apnea)    SOBOE (shortness of breath on exertion)    Type 2 diabetes mellitus with diabetic neuropathy (H)    Allergic rhinitis    Allergic to aspirin    Arthritis of finger    Hand dermatitis    Plantar fasciitis    Blepharitis    Cat bite of right hand    Chest pain    Chronic constipation    Chronic pain of both shoulders    Costochondral pain    Cramp of both lower extremities    Dysphagia    Esophageal stricture    Frequent episodes of sinusitis    Seborrheic dermatitis of scalp    Gastroesophageal reflux disease without esophagitis    History of allergy to aspirin    History of diverticulitis    History of syncope    Hot flashes    YUMIKO (iron deficiency anemia)    Insomnia    Lack of adequate sleep    Iron deficiency    Localized osteoarthritis of knees, bilateral    Low folic acid    Lymphangitis    Midline cystocele    Mild persistent asthma without complication    Mixed incontinence    New daily persistent headache    Non-traumatic rotator cuff tear    Nuclear sclerotic cataract of both eyes    Numbness and tingling in left hand    Onychomycosis    Other cirrhosis of liver (H)    Patellofemoral pain syndrome of right knee    Portal hypertension (H)    Presbyopia    Right hand tendonitis    Strain of right deltoid muscle    Tension  headache    Trochanteric bursitis of both hips    Vitiligo    Type 2 diabetes mellitus with hypoglycemia without coma (H)    Postmenopausal atrophic vaginitis    Elevated LFTs    Right upper quadrant abdominal pain    Rectal bleeding    Acute pancreatitis    Colitis    S/P laparoscopic cholecystectomy    Post-op pain       Current Outpatient Medications   Medication Sig Dispense Refill    acetaminophen (TYLENOL) 500 MG tablet Take 1,000 mg by mouth daily as needed for pain      albuterol (PROAIR HFA/PROVENTIL HFA/VENTOLIN HFA) 108 (90 Base) MCG/ACT inhaler Inhale 2 puffs into the lungs every 6 hours as needed for shortness of breath, wheezing or cough      amitriptyline (ELAVIL) 10 MG tablet Take 30 mg by mouth at bedtime      ammonium lactate (AMLACTIN) 12 % external cream Apply topically daily as needed for dry skin      atorvastatin (LIPITOR) 80 MG tablet Take 80 mg by mouth daily      bacitracin 500 UNIT/GM external ointment Apply topically 2 times daily      Baclofen (LIORESAL) 5 MG tablet Take 5-10 mg by mouth 2 times daily as needed for muscle spasms or other (or sleep)      BELSOMRA 10 MG tablet Take 1 tablet by mouth nightly as needed for sleep      budesonide-formoterol (SYMBICORT) 160-4.5 MCG/ACT Inhaler Inhale 2 puffs into the lungs two times daily Rinse mouth/gargle after use      CARBOXYMETH-GLYCERIN-POLYSORB OP Apply 1 drop to eye daily as needed      cetirizine (ZYRTEC) 10 MG tablet Take 10 mg by mouth daily      cholecalciferol (VITAMIN D3) 50 MCG (2000 UT) CAPS Take 4,000 Units by mouth daily      estradiol (ESTRACE) 0.1 MG/GM vaginal cream Place vaginally twice a week      famotidine (PEPCID) 20 MG tablet Take 20 mg by mouth 2 times daily      ferrous gluconate (FERGON) 324 (38 Fe) MG tablet Take 324 mg by mouth daily (with breakfast)      fluticasone (FLONASE) 50 MCG/ACT nasal spray Spray 2 sprays into both nostrils daily as needed for rhinitis or allergies      gabapentin (NEURONTIN) 300 MG  capsule Take 600 mg by mouth 3 times daily      hydrocortisone 2.5 % ointment Apply topically 2 times daily as needed for other (Eczema)      ipratropium - albuterol 0.5 mg/2.5 mg/3 mL (DUONEB) 0.5-2.5 (3) MG/3ML neb solution Take 1 vial by nebulization 3 times daily as needed for shortness of breath, wheezing or cough      JARDIANCE 10 MG TABS tablet Take 10 mg by mouth daily      loperamide (IMODIUM) 2 mg capsule Take 2 mg by mouth 4 times daily as needed for diarrhea      losartan (COZAAR) 25 MG tablet Take 25 mg by mouth daily      Magnesium Oxide -Mg Supplement 500 MG TABS Take 1 tablet by mouth daily      montelukast (SINGULAIR) 10 MG tablet Take 1 tablet by mouth at bedtime      Multiple Vitamins-Minerals (MULTIVITAMIN WOMEN 50+) TABS Take 1 tablet by mouth daily      omeprazole (PRILOSEC) 20 MG DR capsule Take 20 mg by mouth 2 times daily      ondansetron (ZOFRAN ODT) 4 MG ODT tab Take 1 tablet (4 mg) by mouth every 6 hours as needed for nausea. 10 tablet 0    oxyCODONE (ROXICODONE) 5 MG tablet Take 1 tablet (5 mg) by mouth every 6 hours as needed for severe pain. T 8 tablet 0    polyethylene glycol (MIRALAX) 17 GM/Dose powder Take 17 g (1 Capful) by mouth daily 225 g 0    polyethylene glycol-propylene glycol (SYSTANE ULTRA) 0.4-0.3 % SOLN ophthalmic solution Place 1 drop into both eyes 4 times daily as needed for dry eyes      propranolol (INDERAL) 10 MG tablet Take 10 mg by mouth 2 times daily      sertraline (ZOLOFT) 100 MG tablet Take 100 mg by mouth daily      TYRVAYA 0.03 MG/ACT nasal spray Spray 1 spray into both nostrils 2 times daily       No current facility-administered medications for this visit.       Allergies   Allergen Reactions    Aspirin Hives    Diflunisal Nausea and Vomiting     (Dolobid) Occurred approximately 20 years ago    Occurred approximately 20 years ago   Occurred approximately 20 years ago   (Dolobid) Occurred approximately 20 years ago    Fd&C Yellow #5 (Tartrazine) Nausea and  "Vomiting and Hives       Past Medical History:   Diagnosis Date    Alcoholic cirrhosis of liver (H)     Allergic rhinitis due to pollen     Anemia     Asthma     DDD (degenerative disc disease), cervical     Diverticulitis of colon     DM2 (diabetes mellitus, type 2) (H)     Dysphagia     Dyspnea on exertion     Essential tremor     Fibromyalgia     YUKO (generalized anxiety disorder)     Gastroesophageal reflux disease     History of skin cancer     Hypertension     Migraine     Motion sickness     WOODS (nonalcoholic steatohepatitis)     Onychomycosis     CASTRO on CPAP     Other chronic pain     Syncopal episodes     Tension headache     Vertigo     Vitiligo         Review of Systems  ROS:  Specifically negative for bowel/bladder dysfunction, balance changes, headache, dizziness, foot drop, fevers, chills, appetite changes, nausea/vomiting, unexplained weight loss. Otherwise 13 systems reviewed are negative. Please see the patient's intake questionnaire from today for details.    Reviewed Social, Family, Past Medical and Past Surgical history with patient, no significant changes noted since prior visit.     Objective:     /53 (BP Location: Left arm, Patient Position: Sitting, Cuff Size: Adult Regular)   Pulse 77   Ht 5' 4\" (1.626 m)   Wt 129 lb 13.6 oz (58.9 kg)   BMI 22.29 kg/m      PHYSICAL EXAMINATION:    --CONSTITUTIONAL: Well developed, well nourished, healthy appearing individual.  --PSYCHIATRIC: Appropriate mood and affect. No difficulty interacting due to temper, social withdrawal, or memory issues.  CERVICAL:   --HEENT:  Sclera are non-injected.  Extraocular muscles are intact.  Thyroid moves easily upon swallowing.  Moist oral mucosa.  --SKIN:  Skin over the face, bilateral upper extremities, and posterior torso is clean, dry, intact without rashes.   --UPPER EXTREMITY MOTOR TESTING:  Wrist flexion left 5/5, right 5/5  Wrist extension left 5/5, right 5/5  Pronators left 5/5, right 5/5  Biceps left " 5/5, right 5/5   Triceps left 5/5, right 5/5   Shoulder abduction left 5/5, right 5/5   left 5/5, right 5/5  --NEUROLOGIC:  CN III-XII are grossly intact.  Bilateral patellar and achilles reflexes are physiologic and symmetric. 2/4 symmetric biceps, brachioradialis, triceps reflexes bilaterally.  Sensation to upper extremities is intact.  Negative Manley's bilaterally.    --VASCULAR:  2/4 radial pulses bilaterally.  Warm upper limbs bilaterally.  Capillary refill in the upper extremities is less than 1 second. No obvious lower extremity swelling or varicosities.   --CERVICAL SPINE: Inspection reveals no evidence of deformity. Range of motion is not limited in cervical flexion, extension, or lateral rotation. No tenderness to palpation. Spurlings maneuver is negative to radicular pain.    --SHOULDERS: Full range of motion bilaterally. Negative empty can.      RESULTS:   Imaging: Spine imaging was reviewed today. The images were shown to the patient and the findings were explained using a spine model.    CT Abdomen Pelvis w Contrast    Result Date: 1/29/2025  EXAM: CT ABDOMEN PELVIS W CONTRAST LOCATION: [facility] DATE: 1/29/2025 INDICATION: RUQ abdominal pain COMPARISON: CT of the abdomen pelvis dated 6/11/2024 TECHNIQUE: CT scan of the abdomen and pelvis was performed following injection of IV contrast. Multiplanar reformats were obtained. Dose reduction techniques were used. CONTRAST: 64ml isovue 370 FINDINGS: LOWER CHEST: Clear. Again identified are distal paraesophageal and gastric varices. HEPATOBILIARY: Again noted is air in the central intrahepatic biliary tree extending into the common bile duct. A previously seen biliary stent has been removed. Patient is status post a cholecystectomy. PANCREAS: Unremarkable. SPLEEN: Unremarkable. ADRENAL GLANDS: Unremarkable. KIDNEYS/BLADDER: 2 mm nonobstructing stone at the lower pole of the right kidney. No hydronephrosis bilaterally. BOWEL:  There is a hiatal hernia. There are a few scattered colonic diverticuli. No evidence for acute diverticulitis. The appendix is unremarkable. Visualized small bowel is grossly unremarkable. LYMPH NODES: No enlarged abdominal or pelvic lymph nodes. VASCULATURE: The abdominal aorta is of normal caliber. The celiac trunk, superior mesenteric artery, and inferior mesenteric artery are patent. PELVIC ORGANS: Unremarkable     IMPRESSION: 1.  Pneumobilia as above. This was also seen on the previous exam and likely relates to a previous sphincterotomy. 2.  Tiny nonobstructing stone at the lower pole of the right kidney..

## 2025-02-12 NOTE — PATIENT INSTRUCTIONS
~Spine Center Scheduling #[phone].  ~Please call our [facility] Spine Nurse Navigation #[phone] with any questions or concerns about your treatment plan, if symptoms worsen and you would like to be seen urgently, or if you have problems controlling bladder and bowel function.  ~For any future flareups or new symptoms, recommend follow-up in clinic or contact the nurse navigator line.  ~Please note that any My Chart messages may take multiple days for a response due to the high volume of patients seen in clinic.  Anything sent Thursday night or after will be answered the following week when able, as Olive Ghosh CNP does not work in clinic on Fridays.   ~Olive Ghosh CNP is at the [facility] on Tuesdays, otherwise primarily at the [facility].       Importance of specialized Physical Therapy: Discussed the importance of core strengthening, ROM, stretching exercises with the patient and how each of these entities is important in decreasing pain.  Explained to the patient that the purpose of physical therapy is to teach the patient a home exercise program individualized to them and their specific health concerns.  These exercises need to be performed every day in order to decrease pain and prevent future occurrences of pain.           Imaging has been ordered. Radiology will call you to schedule. Please call below if you do not hear from them in the next couple of days.   [facility] Radiology Scheduling  Please call [phone] to schedule your image(s) (select option #1). There are 3 different locations near, see below.   There are imaging options for other locations as well, the imaging schedulers can help with other locations within [place].     [facility]  [address] [place], [place] [id number]    [facility] [place]  [address], [address]   [facility] [address]    [place] [id number] [facility]  [place] [address]

## 2025-02-12 NOTE — LETTER
2/12/2025      Odette Escobar  [address] [facility] [address]      Dear Colleague,    Thank you for referring your patient, Odette Escobar, to the [facility]. Please see a copy of my visit note below.      Assessment:     Diagnoses and all orders for this visit:  Chronic neck pain  -     MR Cervical Spine w/o Contrast; Future  Facet arthropathy, cervical  -     MR Cervical Spine w/o Contrast; Future  Neck stiffness     Odette Escobar is a 69 year old y.o. female with past medical history significant for  hyperlipidemia, GERD, asthma, hypertension, diabetes mellitus, anxiety, history of vertigo who presents today for follow-up regarding:    -Chronic mild neck pain.  Patient reports significant stiffness in her neck ongoing for the last couple weeks.     -Chronic mild thoracic pain     -Chronic bilateral low back pain    -She does have multiple other issues going on and is getting a workup for abdominal pain which they are unclear as to what is causing her abdominal pain.     Plan:     A shared decision making plan was used. The patient's values and choices were respected. Prior medical records were reviewed today. The following represents what was discussed and decided upon by the provider and the patient.        -DIAGNOSTIC TESTS: Images were personally reviewed and interpreted.   --Ordered cervical spine MRI to further evaluate neck pain for possible intervention.  Patient reports x-ray in the past with arthritis noted.  --Lumbar spine MRI 3/21/2024 with disc bulge at L5-S1 with moderate facet arthropathy with left lateral recess stenosis.  Moderate facet arthropathy L4-5 with mild central stenosis.  --CT abdomen pelvis 3/7/2024 with normal alignment, mild disc height loss at L5-S1 with disc bulging noted at L4-5 as well as upper lumbar spine.     -INTERVENTIONS: Consider injections pending imaging review potential C7-T1 IL CHRISSIE if nerve compression noted as her neck pain is  very general.    -MEDICATIONS: No recommendations for medications at this time since she has multiple medical issues going on at the moment.  Discussed side effects of medications and proper use. Patient verbalized understanding.    -PHYSICAL THERAPY: Discussed revisiting physical therapy.  She does report that she has done physical therapy in the past and continues with home exercises.  She wants to get the MRI first before considering further PT.  Discussed the importance of core strengthening, ROM, stretching exercises with the patient and how each of these entities is important in decreasing pain.  Explained to the patient that the purpose of physical therapy is to teach the patient a home exercise program.  These exercises need to be performed every day in order to decrease pain and prevent future occurrences of pain.        -PATIENT EDUCATION:  Total time of 46 minutes, on the day of service, spent with the patient, reviewing the chart, placing orders, and documenting.     -FOLLOW UP: Follow-up nurse call for imaging results.  *40 minute follow up always  Advised to contact clinic if symptoms worsen or change.    Subjective:     Odette Escobar is a 69 year old female who presents today for follow-up regarding.  Generalized neck pain and a lot of stiffness that has been progressive in the last couple months she reports that she has have difficulty moving her head.  Currently her pain is an 8/10, a 10 at its worst with any range of motion.  Otherwise she denies any radiating arm pain.  Denies upper extremity weakness.  She does report that she has chronic neuropathy so always has numbness and tingling in her hands but this is a unchanged.  She denies any recent trips or falls.    Patient has been in and out of the emergency department and hospital recently for right lower abdominal pain, palpitations    Patient reports a history of fibromyalgia.     -Treatment to Date: No prior spinal surgery.  Physical therapy  OSI LBP in the past, nothing recent  Chiropractic treatments are not held in the past     Lumbar spine injection at age 26  Cervical spine injection 2020 [facility]:  Left third occipital nerve, C3, C4, C5 RFA 4/30/2021  Right third occipital nerve, C3, C4, C5 RFA 5/7/2021     -Medications:  Acetaminophen  CBD   Gabapentin 300 mg    Oxycodone prescribed by the ED 1/31/2025, 8 tablets    Patient Active Problem List   Diagnosis     Low back pain     Anxiety     Asthma     Diabetes mellitus (H)     Essential hypertension     Gastroesophageal reflux disease with esophagitis     Hyperlipidemia     Lumbar radiculopathy     Lumbar facet arthropathy     DDD (degenerative disc disease), lumbar     Fibromyalgia     Chronic neck pain     Adjustment disorder with mixed anxiety and depressed mood     Alcohol abuse, in remission     Benign essential tremor     Benign paroxysmal positional vertigo     Cervical radicular pain     CASTRO (obstructive sleep apnea)     SOBOE (shortness of breath on exertion)     Type 2 diabetes mellitus with diabetic neuropathy (H)     Allergic rhinitis     Allergic to aspirin     Arthritis of finger     Hand dermatitis     Plantar fasciitis     Blepharitis     Cat bite of right hand     Chest pain     Chronic constipation     Chronic pain of both shoulders     Costochondral pain     Cramp of both lower extremities     Dysphagia     Esophageal stricture     Frequent episodes of sinusitis     Seborrheic dermatitis of scalp     Gastroesophageal reflux disease without esophagitis     History of allergy to aspirin     History of diverticulitis     History of syncope     Hot flashes     YUMIKO (iron deficiency anemia)     Insomnia     Lack of adequate sleep     Iron deficiency     Localized osteoarthritis of knees, bilateral     Low folic acid     Lymphangitis     Midline cystocele     Mild persistent asthma without complication     Mixed incontinence     New daily persistent headache     Non-traumatic rotator  cuff tear     Nuclear sclerotic cataract of both eyes     Numbness and tingling in left hand     Onychomycosis     Other cirrhosis of liver (H)     Patellofemoral pain syndrome of right knee     Portal hypertension (H)     Presbyopia     Right hand tendonitis     Strain of right deltoid muscle     Tension headache     Trochanteric bursitis of both hips     Vitiligo     Type 2 diabetes mellitus with hypoglycemia without coma (H)     Postmenopausal atrophic vaginitis     Elevated LFTs     Right upper quadrant abdominal pain     Rectal bleeding     Acute pancreatitis     Colitis     S/P laparoscopic cholecystectomy     Post-op pain       Current Outpatient Medications   Medication Sig Dispense Refill     acetaminophen (TYLENOL) 500 MG tablet Take 1,000 mg by mouth daily as needed for pain       albuterol (PROAIR HFA/PROVENTIL HFA/VENTOLIN HFA) 108 (90 Base) MCG/ACT inhaler Inhale 2 puffs into the lungs every 6 hours as needed for shortness of breath, wheezing or cough       amitriptyline (ELAVIL) 10 MG tablet Take 30 mg by mouth at bedtime       ammonium lactate (AMLACTIN) 12 % external cream Apply topically daily as needed for dry skin       atorvastatin (LIPITOR) 80 MG tablet Take 80 mg by mouth daily       bacitracin 500 UNIT/GM external ointment Apply topically 2 times daily       Baclofen (LIORESAL) 5 MG tablet Take 5-10 mg by mouth 2 times daily as needed for muscle spasms or other (or sleep)       BELSOMRA 10 MG tablet Take 1 tablet by mouth nightly as needed for sleep       budesonide-formoterol (SYMBICORT) 160-4.5 MCG/ACT Inhaler Inhale 2 puffs into the lungs two times daily Rinse mouth/gargle after use       CARBOXYMETH-GLYCERIN-POLYSORB OP Apply 1 drop to eye daily as needed       cetirizine (ZYRTEC) 10 MG tablet Take 10 mg by mouth daily       cholecalciferol (VITAMIN D3) 50 MCG (2000 UT) CAPS Take 4,000 Units by mouth daily       estradiol (ESTRACE) 0.1 MG/GM vaginal cream Place vaginally twice a week        famotidine (PEPCID) 20 MG tablet Take 20 mg by mouth 2 times daily       ferrous gluconate (FERGON) 324 (38 Fe) MG tablet Take 324 mg by mouth daily (with breakfast)       fluticasone (FLONASE) 50 MCG/ACT nasal spray Spray 2 sprays into both nostrils daily as needed for rhinitis or allergies       gabapentin (NEURONTIN) 300 MG capsule Take 600 mg by mouth 3 times daily       hydrocortisone 2.5 % ointment Apply topically 2 times daily as needed for other (Eczema)       ipratropium - albuterol 0.5 mg/2.5 mg/3 mL (DUONEB) 0.5-2.5 (3) MG/3ML neb solution Take 1 vial by nebulization 3 times daily as needed for shortness of breath, wheezing or cough       JARDIANCE 10 MG TABS tablet Take 10 mg by mouth daily       loperamide (IMODIUM) 2 mg capsule Take 2 mg by mouth 4 times daily as needed for diarrhea       losartan (COZAAR) 25 MG tablet Take 25 mg by mouth daily       Magnesium Oxide -Mg Supplement 500 MG TABS Take 1 tablet by mouth daily       montelukast (SINGULAIR) 10 MG tablet Take 1 tablet by mouth at bedtime       Multiple Vitamins-Minerals (MULTIVITAMIN WOMEN 50+) TABS Take 1 tablet by mouth daily       omeprazole (PRILOSEC) 20 MG DR capsule Take 20 mg by mouth 2 times daily       ondansetron (ZOFRAN ODT) 4 MG ODT tab Take 1 tablet (4 mg) by mouth every 6 hours as needed for nausea. 10 tablet 0     oxyCODONE (ROXICODONE) 5 MG tablet Take 1 tablet (5 mg) by mouth every 6 hours as needed for severe pain. T 8 tablet 0     polyethylene glycol (MIRALAX) 17 GM/Dose powder Take 17 g (1 Capful) by mouth daily 225 g 0     polyethylene glycol-propylene glycol (SYSTANE ULTRA) 0.4-0.3 % SOLN ophthalmic solution Place 1 drop into both eyes 4 times daily as needed for dry eyes       propranolol (INDERAL) 10 MG tablet Take 10 mg by mouth 2 times daily       sertraline (ZOLOFT) 100 MG tablet Take 100 mg by mouth daily       TYRVAYA 0.03 MG/ACT nasal spray Spray 1 spray into both nostrils 2 times daily       No current  "facility-administered medications for this visit.       Allergies   Allergen Reactions     Aspirin Hives     Diflunisal Nausea and Vomiting     (Dolobid) Occurred approximately 20 years ago    Occurred approximately 20 years ago   Occurred approximately 20 years ago   (Dolobid) Occurred approximately 20 years ago     Fd&C Yellow #5 (Tartrazine) Nausea and Vomiting and Hives       Past Medical History:   Diagnosis Date     Alcoholic cirrhosis of liver (H)      Allergic rhinitis due to pollen      Anemia      Asthma      DDD (degenerative disc disease), cervical      Diverticulitis of colon      DM2 (diabetes mellitus, type 2) (H)      Dysphagia      Dyspnea on exertion      Essential tremor      Fibromyalgia      YUKO (generalized anxiety disorder)      Gastroesophageal reflux disease      History of skin cancer      Hypertension      Migraine      Motion sickness      WOODS (nonalcoholic steatohepatitis)      Onychomycosis      CASTRO on CPAP      Other chronic pain      Syncopal episodes      Tension headache      Vertigo      Vitiligo         Review of Systems  ROS:  Specifically negative for bowel/bladder dysfunction, balance changes, headache, dizziness, foot drop, fevers, chills, appetite changes, nausea/vomiting, unexplained weight loss. Otherwise 13 systems reviewed are negative. Please see the patient's intake questionnaire from today for details.    Reviewed Social, Family, Past Medical and Past Surgical history with patient, no significant changes noted since prior visit.     Objective:     /53 (BP Location: Left arm, Patient Position: Sitting, Cuff Size: Adult Regular)   Pulse 77   Ht 5' 4\" (1.626 m)   Wt 129 lb 13.6 oz (58.9 kg)   BMI 22.29 kg/m      PHYSICAL EXAMINATION:    --CONSTITUTIONAL: Well developed, well nourished, healthy appearing individual.  --PSYCHIATRIC: Appropriate mood and affect. No difficulty interacting due to temper, social withdrawal, or memory issues.  CERVICAL:   --HEENT:  " Sclera are non-injected.  Extraocular muscles are intact.  Thyroid moves easily upon swallowing.  Moist oral mucosa.  --SKIN:  Skin over the face, bilateral upper extremities, and posterior torso is clean, dry, intact without rashes.   --UPPER EXTREMITY MOTOR TESTING:  Wrist flexion left 5/5, right 5/5  Wrist extension left 5/5, right 5/5  Pronators left 5/5, right 5/5  Biceps left 5/5, right 5/5   Triceps left 5/5, right 5/5   Shoulder abduction left 5/5, right 5/5   left 5/5, right 5/5  --NEUROLOGIC:  CN III-XII are grossly intact.  Bilateral patellar and achilles reflexes are physiologic and symmetric. 2/4 symmetric biceps, brachioradialis, triceps reflexes bilaterally.  Sensation to upper extremities is intact.  Negative Manley's bilaterally.    --VASCULAR:  2/4 radial pulses bilaterally.  Warm upper limbs bilaterally.  Capillary refill in the upper extremities is less than 1 second. No obvious lower extremity swelling or varicosities.   --CERVICAL SPINE: Inspection reveals no evidence of deformity. Range of motion is not limited in cervical flexion, extension, or lateral rotation. No tenderness to palpation. Spurlings maneuver is negative to radicular pain.    --SHOULDERS: Full range of motion bilaterally. Negative empty can.      RESULTS:   Imaging: Spine imaging was reviewed today. The images were shown to the patient and the findings were explained using a spine model.    CT Abdomen Pelvis w Contrast    Result Date: 1/29/2025  EXAM: CT ABDOMEN PELVIS W CONTRAST LOCATION: [facility] DATE: 1/29/2025 INDICATION: RUQ abdominal pain COMPARISON: CT of the abdomen pelvis dated 6/11/2024 TECHNIQUE: CT scan of the abdomen and pelvis was performed following injection of IV contrast. Multiplanar reformats were obtained. Dose reduction techniques were used. CONTRAST: 64ml isovue 370 FINDINGS: LOWER CHEST: Clear. Again identified are distal paraesophageal and gastric varices. HEPATOBILIARY:  Again noted is air in the central intrahepatic biliary tree extending into the common bile duct. A previously seen biliary stent has been removed. Patient is status post a cholecystectomy. PANCREAS: Unremarkable. SPLEEN: Unremarkable. ADRENAL GLANDS: Unremarkable. KIDNEYS/BLADDER: 2 mm nonobstructing stone at the lower pole of the right kidney. No hydronephrosis bilaterally. BOWEL: There is a hiatal hernia. There are a few scattered colonic diverticuli. No evidence for acute diverticulitis. The appendix is unremarkable. Visualized small bowel is grossly unremarkable. LYMPH NODES: No enlarged abdominal or pelvic lymph nodes. VASCULATURE: The abdominal aorta is of normal caliber. The celiac trunk, superior mesenteric artery, and inferior mesenteric artery are patent. PELVIC ORGANS: Unremarkable     IMPRESSION: 1.  Pneumobilia as above. This was also seen on the previous exam and likely relates to a previous sphincterotomy. 2.  Tiny nonobstructing stone at the lower pole of the right kidney..                          Again, thank you for allowing me to participate in the care of your patient.        Sincerely,        Olive Ghosh CNP    Electronically signed

## 2025-02-13 ENCOUNTER — TELEPHONE (OUTPATIENT)
Dept: BEHAVIORAL HEALTH | Facility: CLINIC | Age: 70
End: 2025-02-13
Payer: COMMERCIAL

## 2025-02-13 ENCOUNTER — HOSPITAL ENCOUNTER (EMERGENCY)
Facility: CLINIC | Age: 70
Discharge: HOME | End: 2025-02-13
Attending: STUDENT IN AN ORGANIZED HEALTH CARE EDUCATION/TRAINING PROGRAM | Admitting: STUDENT IN AN ORGANIZED HEALTH CARE EDUCATION/TRAINING PROGRAM
Payer: COMMERCIAL

## 2025-02-13 VITALS
BODY MASS INDEX: 21.99 KG/M2 | DIASTOLIC BLOOD PRESSURE: 83 MMHG | TEMPERATURE: 97 F | WEIGHT: 128.09 LBS | HEART RATE: 84 BPM | RESPIRATION RATE: 16 BRPM | OXYGEN SATURATION: 99 % | SYSTOLIC BLOOD PRESSURE: 121 MMHG

## 2025-02-13 DIAGNOSIS — Z86.59 HISTORY OF BIPOLAR DISORDER: ICD-10-CM

## 2025-02-13 PROBLEM — F43.20 ADJUSTMENT DISORDER: Status: ACTIVE | Noted: 2025-02-13

## 2025-02-13 PROCEDURE — 250N000013 HC RX MED GY IP 250 OP 250 PS 637: Performed by: STUDENT IN AN ORGANIZED HEALTH CARE EDUCATION/TRAINING PROGRAM

## 2025-02-13 PROCEDURE — 99285 EMERGENCY DEPT VISIT HI MDM: CPT | Performed by: STUDENT IN AN ORGANIZED HEALTH CARE EDUCATION/TRAINING PROGRAM

## 2025-02-13 PROCEDURE — 99284 EMERGENCY DEPT VISIT MOD MDM: CPT | Performed by: STUDENT IN AN ORGANIZED HEALTH CARE EDUCATION/TRAINING PROGRAM

## 2025-02-13 RX ORDER — ACETAMINOPHEN 325 MG/1
975 TABLET ORAL ONCE
Status: COMPLETED | OUTPATIENT
Start: 2025-02-13 | End: 2025-02-13

## 2025-02-13 RX ADMIN — ACETAMINOPHEN 975 MG: 325 TABLET, FILM COATED ORAL at 16:56

## 2025-02-13 ASSESSMENT — ACTIVITIES OF DAILY LIVING (ADL)
ADLS_ACUITY_SCORE: 58

## 2025-02-13 NOTE — ED TRIAGE NOTES
Patient comes in for stomach/abdominal pain and back pain that has been present since November but gotten worse recently.

## 2025-02-13 NOTE — TELEPHONE ENCOUNTER
1:16 PM [facility] provider Allie Johnson  [phone] calling with collateral - Pt is arriving to [facility] d/t declining mental health, Pt is experiencing angelina , not sleeping, tangential in speech and thought. Pt is looking for further evaluation.

## 2025-02-13 NOTE — ED PROVIDER NOTES
ED Provider Note  [facility]      History     Chief Complaint   Patient presents with    Flank Pain     HPI  Odette Escobar is a 69 year old female with a history of alcoholic cirrhosis, diverticulitis, s/p cholecystectomy, s/p hysterectomy, HTN, fibromyalgia, CASTRO on CPAP, GERD who presents to the emergency department for a mental health evaluation.     Per chart review, patient was seen at  ED earlier this morning and was recommended  mental health admission, but patient declined.  She later had an office visit with family medicine provider in who urged her to return to [facility] ED for evaluation and admission as she appeared to be exhibiting symptoms of angelina.  During visit, patient appeared tangential in thought and speech and reported not sleeping well.  Patient declined returning to [facility], but agreed to go to [facility] emergency department.     Here in the ED, patient's speech appears tangential.  She reports ongoing abdominal pain and provides complicated history of ongoing symptoms.  When asked why she was presenting today and what was different, she initially reported that she was having worsening diarrhea but then stated that she had no new abdominal complaints was sent here for mental health evaluation.  Patient later reported that she was supposed to have a mental health evaluation and her partner interjected and that she is presenting here for a mental health evaluation.  Patient denies any new abdominal pain.  She denies thoughts of hurting self or others or hallucinations      CT ABDOMEN PELVIS (2/11/2025)  Borderline dilated fluid-filled small bowel in the pelvis, which is nonspecific as an isolated finding without discrete or abrupt transition point. Findings could represent enteritis, hypomotility, or early/partial small bowel obstruction. Suggest serial radiographs for follow-up.     Past Medical History  Past Medical History:   Diagnosis Date     Alcoholic cirrhosis of liver (H)     Allergic rhinitis due to pollen     Anemia     Asthma     DDD (degenerative disc disease), cervical     Diverticulitis of colon     DM2 (diabetes mellitus, type 2) (H)     Dysphagia     Dyspnea on exertion     Essential tremor     Fibromyalgia     YUKO (generalized anxiety disorder)     Gastroesophageal reflux disease     History of skin cancer     Hypertension     Migraine     Motion sickness     WOODS (nonalcoholic steatohepatitis)     Onychomycosis     CASTRO on CPAP     Other chronic pain     Syncopal episodes     Tension headache     Vertigo     Vitiligo      Past Surgical History:   Procedure Laterality Date    ENDOMETRIAL ABLATION      ENDOSCOPIC RETROGRADE CHOLANGIOPANCREATOGRAM N/A 4/16/2024    Procedure: ENDOSCOPIC RETROGRADE CHOLANGIOPANCEATOGRAPHY, WITH SPHINCTEROTOMY;  Surgeon: Eduin Do MD;  [place] [facility] [place]    ENDOSCOPIC RETROGRADE CHOLANGIOPANCREATOGRAM N/A 4/16/2024    Procedure: STONE EXTRACTION,;  Surgeon: Eduin Do MD;  [place] [facility] [place]    ENDOSCOPIC RETROGRADE CHOLANGIOPANCREATOGRAM N/A 4/16/2024    Procedure: BILIARY STENT PLACEMENT;  Surgeon: Eduin Do MD;  [place] [facility] [place]    ENDOSCOPIC RETROGRADE CHOLANGIOPANCREATOGRAM N/A 6/14/2024    Procedure: ENDOSCOPIC RETROGRADE CHOLANGIOPANCREATOGRAPHY, STENT REMOVAL;  Surgeon: Eduin Do MD;  [place] [facility] [place]    ESOPHAGOSCOPY, GASTROSCOPY, DUODENOSCOPY (EGD), COMBINED N/A 4/16/2024    Procedure: ESOPHAGOGASTRODUODENOSCOPY, WITH ENDOSCOPIC ULTRASOUND GUIDANCE;  Surgeon: Eduin Do MD;  [place] [facility] [place]    ESOPHAGOSCOPY, GASTROSCOPY, DUODENOSCOPY (EGD), COMBINED N/A 4/25/2024    Procedure: ESOPHAGOGASTRODUODENOSCOPY;  Surgeon: Zohaib Morgan MD;  Location: [facility] GI    HERNIORRHAPHY, UMBILICAL, ROBOT-ASSISTED, LAPAROSCOPIC, USING DA ROCIO XI N/A 04/14/2024    Procedure: PRIMARY REPAIR OF  UMBILICAL HERNIA;  Surgeon: Anthony  DO Christiano;  Location: [facility] OR    HYSTERECTOMY      LAPAROSCOPIC CHOLECYSTECTOMY N/A 04/14/2024    Procedure: CHOLECYSTECTOMY, ROBOT-ASSISTED, LAPAROSCOPIC, USING DA ROCIO XI;  Surgeon: Christiano Cruz DO;  Location: [facility] OR    PICC TRIPLE LUMEN PLACEMENT  4/25/2024    TONSILLECTOMY       acetaminophen (TYLENOL) 500 MG tablet  albuterol (PROAIR HFA/PROVENTIL HFA/VENTOLIN HFA) 108 (90 Base) MCG/ACT inhaler  amitriptyline (ELAVIL) 10 MG tablet  ammonium lactate (AMLACTIN) 12 % external cream  atorvastatin (LIPITOR) 80 MG tablet  bacitracin 500 UNIT/GM external ointment  Baclofen (LIORESAL) 5 MG tablet  BELSOMRA 10 MG tablet  budesonide-formoterol (SYMBICORT) 160-4.5 MCG/ACT Inhaler  CARBOXYMETH-GLYCERIN-POLYSORB OP  cetirizine (ZYRTEC) 10 MG tablet  cholecalciferol (VITAMIN D3) 50 MCG (2000 UT) CAPS  estradiol (ESTRACE) 0.1 MG/GM vaginal cream  famotidine (PEPCID) 20 MG tablet  ferrous gluconate (FERGON) 324 (38 Fe) MG tablet  fluticasone (FLONASE) 50 MCG/ACT nasal spray  gabapentin (NEURONTIN) 300 MG capsule  hydrocortisone 2.5 % ointment  ipratropium - albuterol 0.5 mg/2.5 mg/3 mL (DUONEB) 0.5-2.5 (3) MG/3ML neb solution  JARDIANCE 10 MG TABS tablet  loperamide (IMODIUM) 2 mg capsule  losartan (COZAAR) 25 MG tablet  Magnesium Oxide -Mg Supplement 500 MG TABS  montelukast (SINGULAIR) 10 MG tablet  Multiple Vitamins-Minerals (MULTIVITAMIN WOMEN 50+) TABS  omeprazole (PRILOSEC) 20 MG DR capsule  ondansetron (ZOFRAN ODT) 4 MG ODT tab  oxyCODONE (ROXICODONE) 5 MG tablet  polyethylene glycol (MIRALAX) 17 GM/Dose powder  polyethylene glycol-propylene glycol (SYSTANE ULTRA) 0.4-0.3 % SOLN ophthalmic solution  propranolol (INDERAL) 10 MG tablet  sertraline (ZOLOFT) 100 MG tablet  TYRVAYA 0.03 MG/ACT nasal spray      Allergies   Allergen Reactions    Aspirin Hives    Diflunisal Nausea and Vomiting     (Dolobid) Occurred approximately 20 years ago    Occurred approximately 20 years ago   Occurred approximately 20  years ago   (Dolobid) Occurred approximately 20 years ago    Fd&C Yellow #5 (Tartrazine) Nausea and Vomiting and Hives     Family History  Family History   Problem Relation Age of Onset    Heart Failure Mother     Heart Failure Father     Heart Failure Sister     Coronary Artery Disease Brother     Coronary Artery Disease Brother     Coronary Artery Disease Brother     Cerebrovascular Disease Sister      Social History   Social History     Tobacco Use    Smoking status: Never    Smokeless tobacco: Never   Substance Use Topics    Alcohol use: Yes     Comment: Alcoholic Drinks/day: Occasional usage    Drug use: No      A medically appropriate review of systems was performed with pertinent positives and negatives noted in the HPI, and all other systems negative.    Physical Exam   BP: 123/60  Pulse: 84  Temp: 97  F (36.1  C)  Resp: 18  Weight: 58.1 kg (128 lb 1.4 oz)  SpO2: 99 %  Physical Exam  Constitutional:       General: She is not in acute distress.     Appearance: Normal appearance. She is not diaphoretic.   HENT:      Head: Atraumatic.      Mouth/Throat:      Mouth: Mucous membranes are moist.   Eyes:      General: No scleral icterus.     Conjunctiva/sclera: Conjunctivae normal.   Cardiovascular:      Rate and Rhythm: Normal rate.      Heart sounds: Normal heart sounds.   Pulmonary:      Effort: No respiratory distress.      Breath sounds: Normal breath sounds.   Abdominal:      General: Abdomen is flat. There is no distension.      Palpations: There is no mass.      Tenderness: There is no abdominal tenderness. There is no guarding or rebound.      Hernia: No hernia is present.   Musculoskeletal:      Cervical back: Neck supple.   Skin:     General: Skin is warm.      Findings: No rash.   Neurological:      Mental Status: She is alert.           ED Course, Procedures, & Data      Procedures                No results found for any visits on 02/13/25.  Medications - No data to display  Labs Ordered and Resulted  from Time of ED Arrival to Time of ED Departure - No data to display  No orders to display          Critical care was not performed.     Medical Decision Making  The patient's presentation was of moderate complexity (an undiagnosed new problem with uncertain prognosis).    The patient's evaluation involved:  review of external note(s) from 3+ sources (see separate area of note for details)  review of 3+ test result(s) ordered prior to this encounter (see separate area of note for details)  strong consideration of a test (labs, CT abdomen pelvis) that was ultimately deferred  discussion of management or test interpretation with another health professional (DEC )    The patient's management necessitated high risk (multiple discussion between myself patient and DEC , consideration of placing patient on a hold, ultimately discharged home and coordination of care).    Assessment & Plan    Patient was sent into the emergency room to be seen by mental health evaluator  As she currently is denying suicidal or homicidal ideation, has no visual or auditory hallucinations, and is able to demonstrate that she is alert and oriented x 4 and has a safe plan for discharge although she is not showing much insight into her condition, I do not believe the patient is holdable at this point she would like to discharge home although we initially offered admission  Had a long discussion with the DEC  who agrees with this assessment and plan, so therefore we will discharge her home with instruction to follow-up with her home primary care doctor and psychiatrist, and give her strict return precautions to come back to the emergency room for new or worsening symptoms.  I also encouraged her to come back to the emergency room tomorrow if she would like to be admitted to the hospital      I have reviewed the nursing notes. I have reviewed the findings, diagnosis, plan and need for follow up with the patient.    New  Prescriptions    No medications on file       Final diagnoses:   None   Reba JARRELL, am serving as a trained medical scribe to document services personally performed by Dewayne Hopper MD, based on the provider's statements to me.     Dewayne JARRELL MD, was physically present and have reviewed and verified the accuracy of this note documented by Reba Hopper MD  [facility] EMERGENCY DEPARTMENT  2/13/2025     Dewayne Hopper MD  02/13/25 [id number]

## 2025-02-14 NOTE — DISCHARGE INSTRUCTIONS
Transition Clinic Virtual Teletherapy Appointment for 2-17-25 at 3:30pm     Transition Clinic Med Management request submitted    Please consider coming back to our emergency room tomorrow presenting for an admission as I think this would be very helpful for you

## 2025-02-14 NOTE — PLAN OF CARE
Odette Escobar  February 13, 2025  Plan of Care Hand-off Note     Patient Recommended Care Path: discharge    Clinical Substantiation:  After therapeutic assessment, intervention and aftercare planning by ED care team and LM and in consultation with attending provider, the patient's circumstances and mental state were appropriate for outpatient management. It is the recommendation of this clinician that pt discharge with OP MH support. At this time the pt is not presenting as an acute risk to self or others due to the following factors: Pt presents for mental health evaluation. Pt denies SI/SIB/SA/HI and denies plans, means, or intent to harm herself or others. Pt denies hallucinations or delusions. While pt does appear to be hyperverbal and tangential, she has a therapy appointment for next week, reports an upcoming medication management appoinment in the next 3 weeks and reports she feels safe to return home. Pt is open to transition clinic and was scheduled for appointments. Information listed in AVS.    Goals for crisis stabilization:  pt will attend schedules appointments    Next steps for Care Team:  pt will discharge.    Treatment Objectives Addressed:  rapport building, assessing safety, identifying additional supports, safety planning, identifying an appropriate aftercare plan, processing feelings, exploring obstacles to safety in the community    Therapeutic Interventions:  Engaged in safety planning, Engaged in cognitive restructuring/ reframing, looked at common cognitive distortions and challenged negative thoughts., Introduced ABC and challenging questions worksheets, engaging in a sample worksheet together using a recent situation from their life., Coached on coping techniques/relaxation skills to help improve distress tolerance and managing intense emotions., Discussed and practiced mindfulness.    Has a specific means been identified for suicidal.homicide actions: No  If yes, describe:    Explain  action steps toward mitigation:    Document completion of mitigation action:    The follow up action still needed prior to discharge:      Patient coping skills attempted to reduce the crisis:  talked with providers, family, came to ED                          Collateral contact information:       Legal Status: Voluntary/Patient has signed consent for treatment                                                                                                                                 Reviewed court records: yes     Psychiatry Consult:     JUWAN Huerta
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
## 2025-02-25 ENCOUNTER — HOSPITAL ENCOUNTER (OUTPATIENT)
Dept: MRI IMAGING | Facility: HOSPITAL | Age: 70
Discharge: HOME | End: 2025-02-25
Attending: NURSE PRACTITIONER
Payer: COMMERCIAL

## 2025-02-25 ENCOUNTER — HOSPITAL ENCOUNTER (EMERGENCY)
Facility: HOSPITAL | Age: 70
Discharge: HOME | End: 2025-02-25
Attending: EMERGENCY MEDICINE | Admitting: EMERGENCY MEDICINE
Payer: COMMERCIAL

## 2025-02-25 VITALS
DIASTOLIC BLOOD PRESSURE: 68 MMHG | TEMPERATURE: 97.9 F | HEART RATE: 78 BPM | BODY MASS INDEX: 22.06 KG/M2 | WEIGHT: 128.53 LBS | RESPIRATION RATE: 20 BRPM | SYSTOLIC BLOOD PRESSURE: 153 MMHG | OXYGEN SATURATION: 99 %

## 2025-02-25 DIAGNOSIS — M54.2 CHRONIC NECK PAIN: ICD-10-CM

## 2025-02-25 DIAGNOSIS — Z81.8 FAMILY HISTORY OF BIPOLAR DISORDER: ICD-10-CM

## 2025-02-25 DIAGNOSIS — G89.29 CHRONIC NECK PAIN: ICD-10-CM

## 2025-02-25 DIAGNOSIS — M47.812 FACET ARTHROPATHY, CERVICAL: ICD-10-CM

## 2025-02-25 DIAGNOSIS — Z71.1 MENTAL HEALTH-RELATED COMPLAINT: ICD-10-CM

## 2025-02-25 PROBLEM — F31.9 BIPOLAR DISORDER (H): Status: ACTIVE | Noted: 2025-02-25

## 2025-02-25 LAB
ALBUMIN UR-MCNC: NEGATIVE MG/DL
AMPHETAMINES UR QL SCN: ABNORMAL
ANION GAP SERPL CALCULATED.3IONS-SCNC: 11 MMOL/L (ref 7–15)
APPEARANCE UR: CLEAR
BARBITURATES UR QL SCN: ABNORMAL
BASOPHILS # BLD AUTO: 0 10E3/UL (ref 0–0.2)
BASOPHILS NFR BLD AUTO: 1 %
BENZODIAZ UR QL SCN: ABNORMAL
BILIRUB UR QL STRIP: NEGATIVE
BUN SERPL-MCNC: 9.8 MG/DL (ref 8–23)
BZE UR QL SCN: ABNORMAL
CALCIUM SERPL-MCNC: 9.5 MG/DL (ref 8.8–10.4)
CANNABINOIDS UR QL SCN: ABNORMAL
CHLORIDE SERPL-SCNC: 103 MMOL/L (ref 98–107)
COLOR UR AUTO: COLORLESS
CREAT SERPL-MCNC: 0.57 MG/DL (ref 0.51–0.95)
EGFRCR SERPLBLD CKD-EPI 2021: >90 ML/MIN/1.73M2
EOSINOPHIL # BLD AUTO: 0.1 10E3/UL (ref 0–0.7)
EOSINOPHIL NFR BLD AUTO: 1 %
ERYTHROCYTE [DISTWIDTH] IN BLOOD BY AUTOMATED COUNT: 13.7 % (ref 10–15)
ETHANOL SERPL-MCNC: 0.01 G/DL
FENTANYL UR QL: ABNORMAL
GLUCOSE SERPL-MCNC: 122 MG/DL (ref 70–99)
GLUCOSE UR STRIP-MCNC: 100 MG/DL
HCO3 SERPL-SCNC: 26 MMOL/L (ref 22–29)
HCT VFR BLD AUTO: 39.2 % (ref 35–47)
HGB BLD-MCNC: 13 G/DL (ref 11.7–15.7)
HGB UR QL STRIP: NEGATIVE
IMM GRANULOCYTES # BLD: 0 10E3/UL
IMM GRANULOCYTES NFR BLD: 0 %
KETONES UR STRIP-MCNC: NEGATIVE MG/DL
LEUKOCYTE ESTERASE UR QL STRIP: ABNORMAL
LYMPHOCYTES # BLD AUTO: 2.4 10E3/UL (ref 0.8–5.3)
LYMPHOCYTES NFR BLD AUTO: 48 %
MCH RBC QN AUTO: 30.4 PG (ref 26.5–33)
MCHC RBC AUTO-ENTMCNC: 33.2 G/DL (ref 31.5–36.5)
MCV RBC AUTO: 92 FL (ref 78–100)
MONOCYTES # BLD AUTO: 0.6 10E3/UL (ref 0–1.3)
MONOCYTES NFR BLD AUTO: 11 %
NEUTROPHILS # BLD AUTO: 1.9 10E3/UL (ref 1.6–8.3)
NEUTROPHILS NFR BLD AUTO: 38 %
NITRATE UR QL: NEGATIVE
NRBC # BLD AUTO: 0 10E3/UL
NRBC BLD AUTO-RTO: 0 /100
OPIATES UR QL SCN: ABNORMAL
PCP QUAL URINE (ROCHE): ABNORMAL
PH UR STRIP: 7 [PH] (ref 5–7)
PLATELET # BLD AUTO: 212 10E3/UL (ref 150–450)
POTASSIUM SERPL-SCNC: 3.4 MMOL/L (ref 3.4–5.3)
RBC # BLD AUTO: 4.27 10E6/UL (ref 3.8–5.2)
RBC URINE: 1 /HPF
SODIUM SERPL-SCNC: 140 MMOL/L (ref 135–145)
SP GR UR STRIP: 1 (ref 1–1.03)
SQUAMOUS EPITHELIAL: 2 /HPF
UROBILINOGEN UR STRIP-MCNC: <2 MG/DL
WBC # BLD AUTO: 5 10E3/UL (ref 4–11)
WBC URINE: 1 /HPF

## 2025-02-25 PROCEDURE — 82077 ASSAY SPEC XCP UR&BREATH IA: CPT | Performed by: STUDENT IN AN ORGANIZED HEALTH CARE EDUCATION/TRAINING PROGRAM

## 2025-02-25 PROCEDURE — 36415 COLL VENOUS BLD VENIPUNCTURE: CPT | Performed by: STUDENT IN AN ORGANIZED HEALTH CARE EDUCATION/TRAINING PROGRAM

## 2025-02-25 PROCEDURE — 80048 BASIC METABOLIC PNL TOTAL CA: CPT | Performed by: STUDENT IN AN ORGANIZED HEALTH CARE EDUCATION/TRAINING PROGRAM

## 2025-02-25 PROCEDURE — 85004 AUTOMATED DIFF WBC COUNT: CPT | Performed by: STUDENT IN AN ORGANIZED HEALTH CARE EDUCATION/TRAINING PROGRAM

## 2025-02-25 PROCEDURE — 81001 URINALYSIS AUTO W/SCOPE: CPT | Performed by: STUDENT IN AN ORGANIZED HEALTH CARE EDUCATION/TRAINING PROGRAM

## 2025-02-25 PROCEDURE — 72141 MRI NECK SPINE W/O DYE: CPT

## 2025-02-25 PROCEDURE — 85048 AUTOMATED LEUKOCYTE COUNT: CPT | Performed by: STUDENT IN AN ORGANIZED HEALTH CARE EDUCATION/TRAINING PROGRAM

## 2025-02-25 PROCEDURE — 87086 URINE CULTURE/COLONY COUNT: CPT | Performed by: EMERGENCY MEDICINE

## 2025-02-25 PROCEDURE — 80307 DRUG TEST PRSMV CHEM ANLYZR: CPT | Performed by: STUDENT IN AN ORGANIZED HEALTH CARE EDUCATION/TRAINING PROGRAM

## 2025-02-25 PROCEDURE — 81001 URINALYSIS AUTO W/SCOPE: CPT | Performed by: EMERGENCY MEDICINE

## 2025-02-25 PROCEDURE — 99283 EMERGENCY DEPT VISIT LOW MDM: CPT

## 2025-02-25 PROCEDURE — 81003 URINALYSIS AUTO W/O SCOPE: CPT | Performed by: STUDENT IN AN ORGANIZED HEALTH CARE EDUCATION/TRAINING PROGRAM

## 2025-02-25 ASSESSMENT — ACTIVITIES OF DAILY LIVING (ADL)
ADLS_ACUITY_SCORE: 58
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
## 2025-02-26 ENCOUNTER — TELEPHONE (OUTPATIENT)
Dept: PHYSICAL MEDICINE AND REHAB | Facility: CLINIC | Age: 70
End: 2025-02-26
Payer: COMMERCIAL

## 2025-02-26 DIAGNOSIS — G89.29 CHRONIC NECK PAIN: Primary | ICD-10-CM

## 2025-02-26 DIAGNOSIS — M54.2 CHRONIC NECK PAIN: Primary | ICD-10-CM

## 2025-02-26 DIAGNOSIS — M54.12 CERVICAL RADICULOPATHY: ICD-10-CM

## 2025-02-26 LAB — BACTERIA UR CULT: NORMAL
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
## 2025-02-26 NOTE — TELEPHONE ENCOUNTER
----- Message from Oliveelsa Ghosh sent at 2/26/2025  3:29 PM CST -----  Please call patient and notify her that I did review her cervical spine MRI.  There is chronic wear-and-tear findings with mild central and left greater than right nerve compression at C6-7.  No concerning or worrisome finding.  It would be reasonable to trial a C7-T1 interlaminar epidural steroid injection to see if we can calm down her symptoms.  Also recommend continuing with her home exercises from prior physical therapy sessions.    #Of note she has been in and out of the hospital recently for mental health concerns.

## 2025-02-26 NOTE — TELEPHONE ENCOUNTER
Phone call to patient to review results and provider's recommendations. Results given and explained. Discussed the recommended injection. Patient would like to move forward with the injection. Order placed in Epic for C7-T1 interlaminar epidural steroid injection. Injection requirements reviewed in full with patient. Stated understanding. Transferred to scheduling to make appointment.      Does report she had a fall today after chasing a kitten. She fell back onto a chair and some boxes. She is noting increased pain in both shoulders and left elbow. She is currently at a walk-in clinic to be assessed for this as well as a cat scratch. She is also currently being treated for a UTI.
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
## 2025-02-26 NOTE — DISCHARGE INSTRUCTIONS
You were seen in the ER today for mental health concern.  You met with our DEC  who ultimately felt as though you do not to be admitted emergently to inpatient psych. Make sure you meet with a psychiatrist on Friday as scheduled and continue meeting with them.  Take your Abilify as prescribed.    Return to the ER if you develop any thoughts of harming yourself or others

## 2025-03-04 ENCOUNTER — HOSPITAL ENCOUNTER (EMERGENCY)
Facility: HOSPITAL | Age: 70
Discharge: HOME | End: 2025-03-05
Attending: STUDENT IN AN ORGANIZED HEALTH CARE EDUCATION/TRAINING PROGRAM | Admitting: STUDENT IN AN ORGANIZED HEALTH CARE EDUCATION/TRAINING PROGRAM
Payer: COMMERCIAL

## 2025-03-04 DIAGNOSIS — M25.551 HIP PAIN, RIGHT: ICD-10-CM
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
## 2025-03-04 DIAGNOSIS — M25.562 ACUTE PAIN OF LEFT KNEE: ICD-10-CM

## 2025-03-04 PROCEDURE — 99284 EMERGENCY DEPT VISIT MOD MDM: CPT | Mod: 25

## 2025-03-05 ENCOUNTER — APPOINTMENT (OUTPATIENT)
Dept: CT IMAGING | Facility: HOSPITAL | Age: 70
End: 2025-03-05
Attending: STUDENT IN AN ORGANIZED HEALTH CARE EDUCATION/TRAINING PROGRAM
Payer: COMMERCIAL

## 2025-03-05 ENCOUNTER — APPOINTMENT (OUTPATIENT)
Dept: RADIOLOGY | Facility: HOSPITAL | Age: 70
End: 2025-03-05
Attending: EMERGENCY MEDICINE
Payer: COMMERCIAL

## 2025-03-05 ENCOUNTER — HOSPITAL ENCOUNTER (EMERGENCY)
Facility: HOSPITAL | Age: 70
Discharge: HOME | End: 2025-03-05
Attending: EMERGENCY MEDICINE | Admitting: EMERGENCY MEDICINE
Payer: COMMERCIAL

## 2025-03-05 ENCOUNTER — APPOINTMENT (OUTPATIENT)
Dept: RADIOLOGY | Facility: HOSPITAL | Age: 70
End: 2025-03-05
Attending: STUDENT IN AN ORGANIZED HEALTH CARE EDUCATION/TRAINING PROGRAM
Payer: COMMERCIAL

## 2025-03-05 VITALS
BODY MASS INDEX: 22.71 KG/M2 | OXYGEN SATURATION: 96 % | DIASTOLIC BLOOD PRESSURE: 68 MMHG | SYSTOLIC BLOOD PRESSURE: 128 MMHG | HEIGHT: 64 IN | WEIGHT: 133 LBS | TEMPERATURE: 97.9 F | RESPIRATION RATE: 20 BRPM | HEART RATE: 80 BPM

## 2025-03-05 VITALS
TEMPERATURE: 97.9 F | OXYGEN SATURATION: 97 % | BODY MASS INDEX: 22.24 KG/M2 | DIASTOLIC BLOOD PRESSURE: 56 MMHG | HEIGHT: 65 IN | RESPIRATION RATE: 16 BRPM | HEART RATE: 69 BPM | WEIGHT: 133.5 LBS | SYSTOLIC BLOOD PRESSURE: 126 MMHG

## 2025-03-05 DIAGNOSIS — R07.89 CHEST WALL PAIN: ICD-10-CM

## 2025-03-05 PROCEDURE — 70450 CT HEAD/BRAIN W/O DYE: CPT

## 2025-03-05 PROCEDURE — 71046 X-RAY EXAM CHEST 2 VIEWS: CPT

## 2025-03-05 PROCEDURE — 250N000013 HC RX MED GY IP 250 OP 250 PS 637: Performed by: STUDENT IN AN ORGANIZED HEALTH CARE EDUCATION/TRAINING PROGRAM

## 2025-03-05 PROCEDURE — 73560 X-RAY EXAM OF KNEE 1 OR 2: CPT | Mod: LT

## 2025-03-05 PROCEDURE — 73522 X-RAY EXAM HIPS BI 3-4 VIEWS: CPT

## 2025-03-05 PROCEDURE — 99283 EMERGENCY DEPT VISIT LOW MDM: CPT | Mod: 25

## 2025-03-05 RX ORDER — ACETAMINOPHEN 325 MG/1
975 TABLET ORAL ONCE
Status: COMPLETED | OUTPATIENT
Start: 2025-03-05 | End: 2025-03-05

## 2025-03-05 RX ADMIN — ACETAMINOPHEN 975 MG: 325 TABLET ORAL at 00:22

## 2025-03-05 ASSESSMENT — ACTIVITIES OF DAILY LIVING (ADL)
ADLS_ACUITY_SCORE: 58

## 2025-03-05 NOTE — DISCHARGE INSTRUCTIONS
No signs of serious injury from your fall or spending out of your vehicle  Follow close with your doctor, return if any acute worsening symptoms
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
## 2025-03-05 NOTE — ED TRIAGE NOTES
Patient arrives from home. Reports she slipped and fell in driveway about 1 hour prior to arrival. Patient states she fell to knees and side. Patient c/o left knee and right hip pain. Denies LOC, thinners.      Triage Assessment (Adult)       Row Name 03/04/25 2208          Triage Assessment    Airway WDL WDL        Respiratory WDL    Respiratory WDL WDL        Cardiac WDL    Cardiac WDL WDL

## 2025-03-05 NOTE — DISCHARGE INSTRUCTIONS
Your imaging was reassuring and did not show any evidence of trauma.  You will likely have continued aches and pains over the next coming days, which you should treat with Tylenol, rest, ice, elevation.    Please return to the ER if your symptoms worsen, if you develop severe headache that does not improve with Tylenol, numbness/weakness on one side of your body, change in mental status.
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
## 2025-03-18 ENCOUNTER — RADIOLOGY INJECTION OFFICE VISIT (OUTPATIENT)
Dept: PHYSICAL MEDICINE AND REHAB | Facility: CLINIC | Age: 70
End: 2025-03-18
Attending: NURSE PRACTITIONER
Payer: COMMERCIAL

## 2025-03-18 VITALS
BODY MASS INDEX: 20.99 KG/M2 | HEIGHT: 65 IN | SYSTOLIC BLOOD PRESSURE: 120 MMHG | WEIGHT: 126 LBS | DIASTOLIC BLOOD PRESSURE: 60 MMHG | TEMPERATURE: 98 F | HEART RATE: 69 BPM | RESPIRATION RATE: 16 BRPM | OXYGEN SATURATION: 98 %

## 2025-03-18 DIAGNOSIS — E11.9 DIABETES MELLITUS (H): Primary | ICD-10-CM

## 2025-03-18 DIAGNOSIS — M54.12 CERVICAL RADICULOPATHY: ICD-10-CM

## 2025-03-18 LAB — GLUCOSE SERPL-MCNC: 127 MG/DL (ref 70–99)

## 2025-03-18 PROCEDURE — 82962 GLUCOSE BLOOD TEST: CPT | Performed by: STUDENT IN AN ORGANIZED HEALTH CARE EDUCATION/TRAINING PROGRAM

## 2025-03-18 PROCEDURE — 62321 NJX INTERLAMINAR CRV/THRC: CPT | Performed by: STUDENT IN AN ORGANIZED HEALTH CARE EDUCATION/TRAINING PROGRAM

## 2025-03-18 RX ORDER — CEPHALEXIN 500 MG/1
CAPSULE ORAL
COMMUNITY
Start: 2025-03-02

## 2025-03-18 RX ORDER — DEXAMETHASONE SODIUM PHOSPHATE 10 MG/ML
INJECTION, SOLUTION INTRAMUSCULAR; INTRAVENOUS
Status: COMPLETED | OUTPATIENT
Start: 2025-03-18 | End: 2025-03-18

## 2025-03-18 RX ORDER — LIDOCAINE HYDROCHLORIDE 10 MG/ML
INJECTION, SOLUTION EPIDURAL; INFILTRATION; INTRACAUDAL; PERINEURAL
Status: COMPLETED | OUTPATIENT
Start: 2025-03-18 | End: 2025-03-18

## 2025-03-18 RX ORDER — FUROSEMIDE 20 MG/1
TABLET ORAL
COMMUNITY
Start: 2025-03-16

## 2025-03-18 RX ORDER — ARIPIPRAZOLE 15 MG/1
TABLET ORAL
COMMUNITY
Start: 2025-03-12

## 2025-03-18 RX ADMIN — LIDOCAINE HYDROCHLORIDE 2 ML: 10 INJECTION, SOLUTION EPIDURAL; INFILTRATION; INTRACAUDAL; PERINEURAL at 10:12

## 2025-03-18 RX ADMIN — DEXAMETHASONE SODIUM PHOSPHATE 10 MG: 10 INJECTION, SOLUTION INTRAMUSCULAR; INTRAVENOUS at 10:12

## 2025-03-18 ASSESSMENT — PAIN SCALES - GENERAL
PAINLEVEL_OUTOF10: SEVERE PAIN (8)
PAINLEVEL_OUTOF10: MILD PAIN (2)

## 2025-03-18 NOTE — PATIENT INSTRUCTIONS
DISCHARGE INSTRUCTIONS    During office hours (8:00 a.m.- 4:00 p.m.) questions or concerns may be answered  by calling Sp[facility] Center Navigation Nurses at  [phone].  Messages received after hours will be returned the following business day.      In the case of an emergency, please dial 911 or seek assistance at the nearest Emergency Room/Urgent Care facility.     All Patients:    You may experience an increase in your symptoms for the first 2 days (It may take anywhere between 2 days- 2 weeks for the steroid to have maximum effect).    You may use ice on the injection site, as frequently as 20 minutes each hour if needed.    You may take your pain medicine.    You may continue taking your regular medication after your injection. If you have had a Medial Branch Block you may resume pain medication once your pain diary is completed.    You may shower. No swimming, tub bath or hot tub for 48 hours.  You may remove your bandaid/bandage as soon as you are home.    You may resume light activities, as tolerated.    Resume your usual diet as tolerated.    If you were told to hold any blood thinning medications you may resume taking them 24 hours after your procedure as prescribed.    It is strongly advised that you do not drive for 1-3 hours post injection.    If you have had oral sedation:  Do not drive for 8 hours post injection.      If you have had IV sedation:  Do not drive for 24 hours post injection.  Do not operate hazardous machinery or make important personal/business decisions for 24 hours.      POSSIBLE STEROID SIDE EFFECTS (If steroid/cortisone was used for your procedure)    -If you experience these symptoms, it should only last for a short period    Swelling of the legs              Skin redness (flushing)     Mouth (oral) irritation   Blood sugar (glucose) levels            Sweats                    Mood changes  Headache  Sleeplessness  Weakened immune system for up to 14 days, which could increase  the risk of edward the COVID-19 virus and/or experiencing more severe symptoms of the disease, if exposed.  Decreased effectiveness of the flu vaccine if given within 2 weeks of the steroid.         POSSIBLE PROCEDURE SIDE EFFECTS  -Call the Spine Center if you are concerned  Increased Pain           Increased numbness/tingling      Nausea/Vomiting          Bruising/bleeding at site      Redness or swelling                                              Difficulty walking      Weakness           Fever greater than 100.5    *In the event of a severe headache after an epidural steroid injection that is relieved by lying down, please call the [facility] to speak with a clinical staff member*   Please be advised that your blood glucose levels may be increased for the next 5-7 days as a result of the steroid you received with your injection today.  It is recommended that you monitor your blood glucose levels closely over the next week and direct any additional questions regarding your blood glucose management to your primary doctor.

## 2025-03-21 ENCOUNTER — MEDICAL CORRESPONDENCE (OUTPATIENT)
Dept: HEALTH INFORMATION MANAGEMENT | Facility: CLINIC | Age: 70
End: 2025-03-21
Payer: COMMERCIAL

## 2025-03-29 ENCOUNTER — APPOINTMENT (OUTPATIENT)
Dept: CT IMAGING | Facility: HOSPITAL | Age: 70
End: 2025-03-29
Attending: STUDENT IN AN ORGANIZED HEALTH CARE EDUCATION/TRAINING PROGRAM
Payer: COMMERCIAL

## 2025-03-29 ENCOUNTER — HOSPITAL ENCOUNTER (EMERGENCY)
Facility: HOSPITAL | Age: 70
Discharge: HOME | End: 2025-03-29
Attending: EMERGENCY MEDICINE
Payer: COMMERCIAL

## 2025-03-29 VITALS
HEIGHT: 64 IN | BODY MASS INDEX: 22.13 KG/M2 | WEIGHT: 129.6 LBS | DIASTOLIC BLOOD PRESSURE: 48 MMHG | TEMPERATURE: 97.6 F | OXYGEN SATURATION: 100 % | HEART RATE: 94 BPM | SYSTOLIC BLOOD PRESSURE: 97 MMHG | RESPIRATION RATE: 16 BRPM

## 2025-03-29 DIAGNOSIS — R10.30 LOWER ABDOMINAL PAIN: ICD-10-CM

## 2025-03-29 LAB
ALBUMIN SERPL BCG-MCNC: 3.8 G/DL (ref 3.5–5.2)
ALBUMIN UR-MCNC: NEGATIVE MG/DL
ALP SERPL-CCNC: 143 U/L (ref 40–150)
ALT SERPL W P-5'-P-CCNC: 19 U/L (ref 0–50)
ANION GAP SERPL CALCULATED.3IONS-SCNC: 9 MMOL/L (ref 7–15)
APPEARANCE UR: CLEAR
AST SERPL W P-5'-P-CCNC: 28 U/L (ref 0–45)
BASOPHILS # BLD AUTO: 0 10E3/UL (ref 0–0.2)
BASOPHILS NFR BLD AUTO: 1 %
BILIRUB SERPL-MCNC: 0.2 MG/DL
BILIRUB UR QL STRIP: NEGATIVE
BUN SERPL-MCNC: 8.8 MG/DL (ref 8–23)
CALCIUM SERPL-MCNC: 9.2 MG/DL (ref 8.8–10.4)
CHLORIDE SERPL-SCNC: 105 MMOL/L (ref 98–107)
COLOR UR AUTO: ABNORMAL
CREAT SERPL-MCNC: 0.53 MG/DL (ref 0.51–0.95)
EGFRCR SERPLBLD CKD-EPI 2021: >90 ML/MIN/1.73M2
EOSINOPHIL # BLD AUTO: 0.1 10E3/UL (ref 0–0.7)
EOSINOPHIL NFR BLD AUTO: 1 %
ERYTHROCYTE [DISTWIDTH] IN BLOOD BY AUTOMATED COUNT: 12.6 % (ref 10–15)
GLUCOSE SERPL-MCNC: 139 MG/DL (ref 70–99)
GLUCOSE UR STRIP-MCNC: >1000 MG/DL
HCO3 SERPL-SCNC: 27 MMOL/L (ref 22–29)
HCT VFR BLD AUTO: 37 % (ref 35–47)
HEMOCCULT STL QL: NEGATIVE
HGB BLD-MCNC: 12.3 G/DL (ref 11.7–15.7)
HGB UR QL STRIP: NEGATIVE
IMM GRANULOCYTES # BLD: 0 10E3/UL
IMM GRANULOCYTES NFR BLD: 0 %
KETONES UR STRIP-MCNC: NEGATIVE MG/DL
LEUKOCYTE ESTERASE UR QL STRIP: NEGATIVE
LIPASE SERPL-CCNC: 26 U/L (ref 13–60)
LYMPHOCYTES # BLD AUTO: 2 10E3/UL (ref 0.8–5.3)
LYMPHOCYTES NFR BLD AUTO: 37 %
MCH RBC QN AUTO: 30.1 PG (ref 26.5–33)
MCHC RBC AUTO-ENTMCNC: 33.2 G/DL (ref 31.5–36.5)
MCV RBC AUTO: 91 FL (ref 78–100)
MONOCYTES # BLD AUTO: 0.7 10E3/UL (ref 0–1.3)
MONOCYTES NFR BLD AUTO: 12 %
NEUTROPHILS # BLD AUTO: 2.7 10E3/UL (ref 1.6–8.3)
NEUTROPHILS NFR BLD AUTO: 49 %
NITRATE UR QL: NEGATIVE
NRBC # BLD AUTO: 0 10E3/UL
NRBC BLD AUTO-RTO: 0 /100
PH UR STRIP: 6.5 [PH] (ref 5–7)
PLATELET # BLD AUTO: 228 10E3/UL (ref 150–450)
POTASSIUM SERPL-SCNC: 3.9 MMOL/L (ref 3.4–5.3)
PROT SERPL-MCNC: 6.5 G/DL (ref 6.4–8.3)
RBC # BLD AUTO: 4.08 10E6/UL (ref 3.8–5.2)
RBC URINE: <1 /HPF
SODIUM SERPL-SCNC: 141 MMOL/L (ref 135–145)
SP GR UR STRIP: 1.02 (ref 1–1.03)
SQUAMOUS EPITHELIAL: <1 /HPF
UROBILINOGEN UR STRIP-MCNC: NORMAL MG/DL
WBC # BLD AUTO: 5.4 10E3/UL (ref 4–11)
WBC URINE: <1 /HPF

## 2025-03-29 PROCEDURE — 85014 HEMATOCRIT: CPT | Performed by: STUDENT IN AN ORGANIZED HEALTH CARE EDUCATION/TRAINING PROGRAM

## 2025-03-29 PROCEDURE — 80053 COMPREHEN METABOLIC PANEL: CPT | Performed by: STUDENT IN AN ORGANIZED HEALTH CARE EDUCATION/TRAINING PROGRAM

## 2025-03-29 PROCEDURE — 74177 CT ABD & PELVIS W/CONTRAST: CPT

## 2025-03-29 PROCEDURE — 99285 EMERGENCY DEPT VISIT HI MDM: CPT | Mod: 25

## 2025-03-29 PROCEDURE — 82272 OCCULT BLD FECES 1-3 TESTS: CPT | Performed by: STUDENT IN AN ORGANIZED HEALTH CARE EDUCATION/TRAINING PROGRAM

## 2025-03-29 PROCEDURE — 83690 ASSAY OF LIPASE: CPT | Performed by: STUDENT IN AN ORGANIZED HEALTH CARE EDUCATION/TRAINING PROGRAM

## 2025-03-29 PROCEDURE — 96374 THER/PROPH/DIAG INJ IV PUSH: CPT | Mod: 59

## 2025-03-29 PROCEDURE — 36415 COLL VENOUS BLD VENIPUNCTURE: CPT | Performed by: STUDENT IN AN ORGANIZED HEALTH CARE EDUCATION/TRAINING PROGRAM

## 2025-03-29 PROCEDURE — 250N000011 HC RX IP 250 OP 636: Performed by: STUDENT IN AN ORGANIZED HEALTH CARE EDUCATION/TRAINING PROGRAM

## 2025-03-29 PROCEDURE — 85004 AUTOMATED DIFF WBC COUNT: CPT | Performed by: STUDENT IN AN ORGANIZED HEALTH CARE EDUCATION/TRAINING PROGRAM

## 2025-03-29 PROCEDURE — 96375 TX/PRO/DX INJ NEW DRUG ADDON: CPT

## 2025-03-29 PROCEDURE — 81003 URINALYSIS AUTO W/O SCOPE: CPT | Performed by: STUDENT IN AN ORGANIZED HEALTH CARE EDUCATION/TRAINING PROGRAM

## 2025-03-29 RX ORDER — IOPAMIDOL 755 MG/ML
65 INJECTION, SOLUTION INTRAVASCULAR ONCE
Status: COMPLETED | OUTPATIENT
Start: 2025-03-29 | End: 2025-03-29

## 2025-03-29 RX ORDER — MORPHINE SULFATE 4 MG/ML
4 INJECTION, SOLUTION INTRAMUSCULAR; INTRAVENOUS ONCE
Status: COMPLETED | OUTPATIENT
Start: 2025-03-29 | End: 2025-03-29

## 2025-03-29 RX ORDER — ONDANSETRON 2 MG/ML
4 INJECTION INTRAMUSCULAR; INTRAVENOUS ONCE
Status: COMPLETED | OUTPATIENT
Start: 2025-03-29 | End: 2025-03-29

## 2025-03-29 RX ORDER — ONDANSETRON 4 MG/1
4 TABLET, ORALLY DISINTEGRATING ORAL EVERY 8 HOURS PRN
Qty: 20 TABLET | Refills: 0 | Status: SHIPPED | OUTPATIENT
Start: 2025-03-29

## 2025-03-29 RX ORDER — DICYCLOMINE HCL 20 MG
20 TABLET ORAL 4 TIMES DAILY PRN
Qty: 20 TABLET | Refills: 0 | Status: SHIPPED | OUTPATIENT
Start: 2025-03-29

## 2025-03-29 RX ADMIN — IOPAMIDOL 65 ML: 755 INJECTION, SOLUTION INTRAVENOUS at 05:26

## 2025-03-29 RX ADMIN — MORPHINE SULFATE 4 MG: 4 INJECTION, SOLUTION INTRAMUSCULAR; INTRAVENOUS at 05:11

## 2025-03-29 RX ADMIN — ONDANSETRON 4 MG: 2 INJECTION, SOLUTION INTRAMUSCULAR; INTRAVENOUS at 05:11

## 2025-03-29 ASSESSMENT — ACTIVITIES OF DAILY LIVING (ADL)
ADLS_ACUITY_SCORE: 58

## 2025-03-29 ASSESSMENT — COLUMBIA-SUICIDE SEVERITY RATING SCALE - C-SSRS
1. IN THE PAST MONTH, HAVE YOU WISHED YOU WERE DEAD OR WISHED YOU COULD GO TO SLEEP AND NOT WAKE UP?: NO
2. HAVE YOU ACTUALLY HAD ANY THOUGHTS OF KILLING YOURSELF IN THE PAST MONTH?: NO
6. HAVE YOU EVER DONE ANYTHING, STARTED TO DO ANYTHING, OR PREPARED TO DO ANYTHING TO END YOUR LIFE?: YES

## 2025-03-29 NOTE — ED TRIAGE NOTES
Patient reports left lower abdominal pain that wraps around to her back that started around 9 pm. Denies nausea, vomiting or diarrhea.      Triage Assessment (Adult)       Row Name 03/29/25 0350          Triage Assessment    Airway WDL WDL        Respiratory WDL    Respiratory WDL WDL        Skin Circulation/Temperature WDL    Skin Circulation/Temperature WDL WDL        Cardiac WDL    Cardiac WDL WDL        Peripheral/Neurovascular WDL    Peripheral Neurovascular WDL WDL        Cognitive/Neuro/Behavioral WDL    Cognitive/Neuro/Behavioral WDL WDL

## 2025-03-29 NOTE — DISCHARGE INSTRUCTIONS
Your blood work today was overall reassuring.  The rectal examination showed no blood in your stool.  The CT scan showed some nonspecific bowel loops that were dilated, this could be from gas or a viral syndrome, please take your home medications as prescribed, eat a well-balanced diet, push oral hydration.    Return to the hospital for any worsening pain, persistent nausea/vomiting, or any other concerning symptoms

## 2025-03-29 NOTE — ED PROVIDER NOTES
"  Emergency Department Encounter         FINAL IMPRESSION:  ***          ED COURSE AND MEDICAL DECISION MAKING       ED Course as of 03/29/25 0619   Sat Mar 29, 2025   0413 I met with the patient, obtained history, performed an initial exam, and discussed options and plan for diagnostics and treatment here in the ED.   0439 69-year-old female here with left lower and mid lower abdominal discomfort radiation to the lower back that started 9 PM last night.  Mild nausea with vomiting.  No fevers chills.  No chest pain or trouble breathing.  Reports black stool for the last week.  No dysuria.  Arrival she looks well.  Has left lower quad abdominal discomfort palpation with no rebound or guarding.  No rash appreciated.  Normal heart and lungs.  Plan for Hemoccult to rule out GI bleed, labs CT reevaluate                          Medical Decision Making  I obtained history from The patient, I reviewed the EMR Documented in chart, if applicable, and Care impacted by diverticular disease of large intestine, diverticulitis, gastroparesis, chronic constipation, WOODS, HTN, fibromyalgia, GERD, T2DM s/p laparoscopic cholecystectomy, and s/p hysterectomy  {ADMIT VS D/C:994499}    MIPS (CTPE, Dental pain, Quinonez, Sinusitis, Asthma/COPD, Head Trauma): {ECC MIPS DOCUMENTATION:062536}    SEPSIS: {Sepsis/Stemi/Stroke:764231::\"None\"}                  EKG  N/A      Critical Care     Performed by: Emiliano Nassar or    Authorized by: Emiliano Nassar  Total critical care time: *** minutes  Critical care was necessary to treat or prevent imminent or life-threatening deterioration of the following conditions: ***  Critical care was time spent personally by me on the following activities: development of treatment plan with patient or surrogate, discussions with consultants, examination of patient, evaluation of patient's response to treatment, obtaining history from patient or surrogate, ordering and performing treatments and interventions, ordering and " review of laboratory studies, ordering and review of radiographic studies, re-evaluation of patient's condition and monitoring for potential decompensation.  Critical care time was exclusive of separately billable procedures and treating other patients.'    At the conclusion of the encounter I discussed the results of all the tests and the disposition. The questions were answered. The patient or family acknowledged understanding and was agreeable with the care plan.        MEDICATIONS GIVEN IN THE EMERGENCY DEPARTMENT:  Medications   morphine (PF) injection 4 mg (4 mg Intravenous $Given 3/29/25 0511)   ondansetron (ZOFRAN) injection 4 mg (4 mg Intravenous $Given 3/29/25 0511)   iopamidol (ISOVUE-370) solution 65 mL (65 mLs Intravenous $Given 3/29/25 0526)       NEW PRESCRIPTIONS STARTED AT TODAY'S ED VISIT:  New Prescriptions    No medications on file       HPI     Patient information obtained from: The patient    Use of : N/A    Odette Escobar is a 69 year old female with a pertinent history of diverticular disease of large intestine, diverticulitis, gastroparesis, chronic constipation, WOODS, HTN, fibromyalgia, GERD, T2DM s/p laparoscopic cholecystectomy, and s/p hysterectomy, who presents to this ED via walk-in for evaluation of abdominal pain.    The patient reports that at approximately 8:30-9 PM yesterday night (on 3/28/25), she started to develop left lower abdominal pain. This pain starts in her LLQ abdomen and wraps to her left lower back and goes into her spine. States that she almost stopped at the ED last night due to the pain, but decided to head home instead. She called the nurse triage line and was advised to go to the ED to get evaluated. She denies experiencing any chest pain or trouble breathing with this. Notes that she has a UTI right now and has persistent UTI symptoms with this. Most of the pain she is experiencing is localized in the front of her abdomen in her left lower abdomen. She  indicates that her BM's has been fine, denies having any blood in her stools.     She admits she's been having tarry, black stools ongoing for about 1 week or so. Notes that every time she had a BM for this last week, she's been having black stools. Denies a previous history of melena. Mentions that when she wiped her rectum, she did not notice any blood at all. She's been taking omeprazole. She's been having gas and belching a lot this last week. She indicates that she did vomit this past Monday (3/24/25), but has not vomited again since then.    Denies she takes any Pepto Bismol, iron pills, antacids, Aleve or ibuprofen. She's been taking 1.000 mg of Extra-Strength Tylenol every 6 hours for the pain. About 1 year ago, she had her gallbladder removed and reports that she was bleeding rectally at that time. Reports having a history of kidney stones. No additional complaints or concerns voiced at this time.      MEDICAL HISTORY     Past Medical History:   Diagnosis Date    Alcoholic cirrhosis of liver (H)     Allergic rhinitis due to pollen     Anemia     Asthma     DDD (degenerative disc disease), cervical     Diverticulitis of colon     DM2 (diabetes mellitus, type 2) (H)     Dysphagia     Dyspnea on exertion     Essential tremor     Fibromyalgia     YUKO (generalized anxiety disorder)     Gastroesophageal reflux disease     History of skin cancer     Hypertension     Migraine     Motion sickness     WOODS (nonalcoholic steatohepatitis)     Onychomycosis     CASTRO on CPAP     Other chronic pain     Syncopal episodes     Tension headache     Vertigo     Vitiligo        Past Surgical History:   Procedure Laterality Date    ENDOMETRIAL ABLATION      ENDOSCOPIC RETROGRADE CHOLANGIOPANCREATOGRAM N/A 4/16/2024    Procedure: ENDOSCOPIC RETROGRADE CHOLANGIOPANCEATOGRAPHY, WITH SPHINCTEROTOMY;  Surgeon: Eduin Do MD;  Location: [facility] [place] OR    ENDOSCOPIC RETROGRADE CHOLANGIOPANCREATOGRAM N/A 4/16/2024    Procedure:  STONE EXTRACTION,;  Surgeon: Eduin Do MD;  Location: [facility] [place]    ENDOSCOPIC RETROGRADE CHOLANGIOPANCREATOGRAM N/A 4/16/2024    Procedure: BILIARY STENT PLACEMENT;  Surgeon: Eduin Do MD;  Location: [facility] [place]    ENDOSCOPIC RETROGRADE CHOLANGIOPANCREATOGRAM N/A 6/14/2024    Procedure: ENDOSCOPIC RETROGRADE CHOLANGIOPANCREATOGRAPHY, STENT REMOVAL;  Surgeon: Eduin Do MD;  Location: [facility] [place]    ESOPHAGOSCOPY, GASTROSCOPY, DUODENOSCOPY (EGD), COMBINED N/A 4/16/2024    Procedure: ESOPHAGOGASTRODUODENOSCOPY, WITH ENDOSCOPIC ULTRASOUND GUIDANCE;  Surgeon: Eduin Do MD;  Location: [facility] [place]    ESOPHAGOSCOPY, GASTROSCOPY, DUODENOSCOPY (EGD), COMBINED N/A 4/25/2024    Procedure: ESOPHAGOGASTRODUODENOSCOPY;  Surgeon: Zohaib Morgan MD;  Location: [facility] GI    HERNIORRHAPHY, UMBILICAL, ROBOT-ASSISTED, LAPAROSCOPIC, USING DA ROCIO XI N/A 04/14/2024    Procedure: PRIMARY REPAIR OF  UMBILICAL HERNIA;  Surgeon: Christiano Cruz DO;  Location: [facility] [place]    HYSTERECTOMY      LAPAROSCOPIC CHOLECYSTECTOMY N/A 04/14/2024    Procedure: CHOLECYSTECTOMY, ROBOT-ASSISTED, LAPAROSCOPIC, USING DA ROCIO XI;  Surgeon: Christiano Cruz DO;  Location: [facility] [place]    PICC TRIPLE LUMEN PLACEMENT  4/25/2024    TONSILLECTOMY         Social History     Tobacco Use    Smoking status: Never    Smokeless tobacco: Never   Substance Use Topics    Alcohol use: Yes     Comment: Alcoholic Drinks/day: Occasional usage    Drug use: No       acetaminophen (TYLENOL) 500 MG tablet  albuterol (PROAIR HFA/PROVENTIL HFA/VENTOLIN HFA) 108 (90 Base) MCG/ACT inhaler  amitriptyline (ELAVIL) 10 MG tablet  ammonium lactate (AMLACTIN) 12 % external cream  ARIPiprazole (ABILIFY) 15 MG tablet  atorvastatin (LIPITOR) 80 MG tablet  bacitracin 500 UNIT/GM external ointment  Baclofen (LIORESAL) 5 MG tablet  BELSOMRA 10 MG tablet  budesonide-formoterol (SYMBICORT) 160-4.5 MCG/ACT  "Inhaler  CARBOXYMETH-GLYCERIN-POLYSORB OP  cephALEXin (KEFLEX) 500 MG capsule  cetirizine (ZYRTEC) 10 MG tablet  cholecalciferol (VITAMIN D3) 50 MCG (2000 UT) CAPS  estradiol (ESTRACE) 0.1 MG/GM vaginal cream  famotidine (PEPCID) 20 MG tablet  ferrous gluconate (FERGON) 324 (38 Fe) MG tablet  fluticasone (FLONASE) 50 MCG/ACT nasal spray  furosemide (LASIX) 20 MG tablet  gabapentin (NEURONTIN) 300 MG capsule  hydrocortisone 2.5 % ointment  ipratropium - albuterol 0.5 mg/2.5 mg/3 mL (DUONEB) 0.5-2.5 (3) MG/3ML neb solution  JARDIANCE 10 MG TABS tablet  loperamide (IMODIUM) 2 mg capsule  losartan (COZAAR) 25 MG tablet  Magnesium Oxide -Mg Supplement 500 MG TABS  montelukast (SINGULAIR) 10 MG tablet  Multiple Vitamins-Minerals (MULTIVITAMIN WOMEN 50+) TABS  omeprazole (PRILOSEC) 20 MG DR capsule  ondansetron (ZOFRAN ODT) 4 MG ODT tab  oxyCODONE (ROXICODONE) 5 MG tablet  polyethylene glycol (MIRALAX) 17 GM/Dose powder  polyethylene glycol-propylene glycol (SYSTANE ULTRA) 0.4-0.3 % SOLN ophthalmic solution  propranolol (INDERAL) 10 MG tablet  sertraline (ZOLOFT) 100 MG tablet  TYRVAYA 0.03 MG/ACT nasal spray            PHYSICAL EXAM     /67   Pulse 94   Temp 97.6  F (36.4  C)   Resp 16   Ht 1.626 m (5' 4\")   Wt 58.8 kg (129 lb 9.6 oz)   SpO2 100%   BMI 22.25 kg/m        PHYSICAL EXAM:     General: Patient appears well, nontoxic, comfortable  HEENT: Moist mucous membranes,  No head trauma.    Cardiovascular: Normal rate, normal rhythm, no extremity edema.  No appreciable murmur.  Respiratory: No signs of respiratory distress, lungs are clear to auscultation bilaterally with no wheezes rhonchi or rales.  Abdominal: Soft, nontender, nondistended, no palpable masses, no guarding, no rebound  Musculoskeletal: Full range of motion of joints, no deformities appreciated.  Neurological: Alert and oriented, grossly neurologically intact.  Psychological: Normal affect and mood.  Integument: No rashes " appreciated    ***      RESULTS       Labs Ordered and Resulted from Time of ED Arrival to Time of ED Departure   COMPREHENSIVE METABOLIC PANEL - Abnormal       Result Value    Sodium 141      Potassium 3.9      Carbon Dioxide (CO2) 27      Anion Gap 9      Urea Nitrogen 8.8      Creatinine 0.53      GFR Estimate >90      Calcium 9.2      Chloride 105      Glucose 139 (*)     Alkaline Phosphatase 143      AST 28      ALT 19      Protein Total 6.5      Albumin 3.8      Bilirubin Total 0.2     LIPASE - Normal    Lipase 26     CBC WITH PLATELETS AND DIFFERENTIAL    WBC Count 5.4      RBC Count 4.08      Hemoglobin 12.3      Hematocrit 37.0      MCV 91      MCH 30.1      MCHC 33.2      RDW 12.6      Platelet Count 228      % Neutrophils 49      % Lymphocytes 37      % Monocytes 12      % Eosinophils 1      % Basophils 1      % Immature Granulocytes 0      NRBCs per 100 WBC 0      Absolute Neutrophils 2.7      Absolute Lymphocytes 2.0      Absolute Monocytes 0.7      Absolute Eosinophils 0.1      Absolute Basophils 0.0      Absolute Immature Granulocytes 0.0      Absolute NRBCs 0.0     ROUTINE UA WITH MICROSCOPIC REFLEX TO CULTURE   OCCULT BLOOD STOOL       CT Abdomen Pelvis w Contrast   Final Result   IMPRESSION:    1.  No inflammatory change or abscess.   2.  Diverticulosis.   3.  There are several prominent loops of small bowel in the lower abdomen, nonspecific. No mesenteric congestion or free fluid. Consider short-term radiographic follow-up to reassess the bowel gas pattern.   4.  The right colon is underdistended reducing evaluation for focal wall thickening.               PROCEDURES:  Procedures:  Procedures       I, Keiry Staton am serving as a scribe to document services personally performed by Emiliano Nassar DO, based on my observations and the provider's statements to me.  I, Emiliano Nassar DO, attest that Keiry Staton is acting in a scribe capacity, has observed my performance of the services and has documented them  in accordance with my direction.    Emiliano Nassar,   Emergency Medicine  [facility] EMERGENCY DEPARTMENT

## 2025-04-23 NOTE — PROGRESS NOTES
Assessment:     Diagnoses and all orders for this visit:  Cervical radiculopathy  -     Physical Therapy  Referral; Future  Foraminal stenosis of cervical region  -     Physical Therapy  Referral; Future  Chronic bilateral low back pain with left-sided sciatica  -     Physical Therapy  Referral; Future  Bulging lumbar disc  -     Physical Therapy  Referral; Future  Balance problems  -     Physical Therapy  Referral; Future  Multiple falls  -     Physical Therapy  Referral; Future     Odette Escobar is a 69 year old y.o. female with past medical history significant for hyperlipidemia, GERD, asthma, hypertension, diabetes mellitus, anxiety, history of vertigo who presents today for follow-up regarding:     - Chronic neck pain with 90% relief of C7-T1 IL CHRISSIE 3/18/2025    -Chronic midline low back pain with recent flareup after multiple falls.    Plan:     A shared decision making plan was used.  The patient's values and choices were respected. Prior medical records were reviewed today. The following represents what was discussed and decided upon by the provider and the patient.     -DIAGNOSTIC TESTS: Images were personally reviewed and interpreted.   --Lumbar spine x-ray 3/10/2025 with mild levoconvex curvature of the spine with multilevel degenerative changes greatest at L5-S1 with moderate disc height loss.  -- Cervical spine MRI 2/25/2025 with mild spinal stenosis at C6-7 with moderate disc height loss with moderate to severe right and moderate left foraminal stenosis.  Mild bilateral foraminal stenosis at C7-T1 and C3-4.   --Lumbar spine MRI 3/21/2024 with disc bulge at L5-S1 with moderate facet arthropathy with left lateral recess stenosis.  Moderate facet arthropathy L4-5 with mild central stenosis.  --CT abdomen pelvis 3/7/2024 with normal alignment, mild disc height loss at L5-S1 with disc bulging noted at L4-5 as well as upper lumbar spine.      -INTERVENTIONS: Discussed that we could consider back injections if she is not improving with physical therapy however would recommend a course of physical therapy before considering back injections.  Patient is in agreements with this.    -MEDICATIONS: No changes in medications today.  Discussed side effects of medications and proper use. Patient verbalized understanding.    -PHYSICAL THERAPY: Referral to physical therapy placed for neck pain back pain as well as working on exercises for balance and hopefully preventing future falls.  Discussed the importance of core strengthening, ROM, stretching exercises with the patient and how each of these entities is important in decreasing pain.  Explained to the patient that the purpose of physical therapy is to teach the patient a home exercise program.  These exercises need to be performed every day in order to decrease pain and prevent future occurrences of pain.        -PATIENT EDUCATION: Total time of 42 minutes, on the day of service, spent with the patient, reviewing the chart, placing orders, and documenting.     -FOLLOW UP: Follow-up as needed  Advised to contact clinic if symptoms worsen or change.    Subjective:     Odette Escobar is a 69 year old female who presents today for follow-up regarding generalized neck pain radiating to the left greater than right with upper extremity numbness and tingling in which she did receive approximately 90% relief with recent epidural steroid injection cervical spine and symptoms are much more tolerable.  Currently pain is a 4/10, a 10 at its worst, 8 to at its best.  She denies any upper extremity weakness.  Denies bowel or bladder loss control, denies saddle anesthesia.  She does report that she has had multiple falls in the last couple of months, partly related to her cats but also she had 2 falls when it was icy outside as well.  She typically reports she feels fairly balanced on flat ground otherwise.  Does report some  worsening of her upper lumbar spine pain since her multiple falls which she has had in the past as well and worked with physical therapy on, does report some pain into the left leg as well.    No myelopathic symptoms.     Patient reports a history of fibromyalgia.     -Treatment to Date: No prior spinal surgery.  Physical therapy [facility] in the past, nothing recent  Chiropractic treatments are not held in the past     Lumbar spine injection at age 26  Cervical spine injection 2020 [facility]:  Left third occipital nerve, C3, C4, C5 RFA 4/30/2021  Right third occipital nerve, C3, C4, C5 RFA 5/7/2021  Bilateral C2, C3, C4 MBB 5/31/2024  Left third occipital nerve, C2, C3 and C4 medial branch RFA 9/27/2024  C7-T1 IL CHRISSIE 3/18/2025 with 90% relief     -Medications:  Acetaminophen  CBD   Gabapentin 300 mg     Oxycodone prescribed by the ED 1/31/2025, 8 tablets    Patient Active Problem List   Diagnosis    Low back pain    Anxiety    Asthma    Diabetes mellitus (H)    Essential hypertension    Gastroesophageal reflux disease with esophagitis    Hyperlipidemia    Lumbar radiculopathy    Lumbar facet arthropathy    DDD (degenerative disc disease), lumbar    Fibromyalgia    Chronic neck pain    Adjustment disorder with mixed anxiety and depressed mood    Alcohol abuse, in remission    Benign essential tremor    Benign paroxysmal positional vertigo    Cervical radicular pain    CASTRO (obstructive sleep apnea)    SOBOE (shortness of breath on exertion)    Type 2 diabetes mellitus with diabetic neuropathy (H)    Allergic rhinitis    Allergic to aspirin    Arthritis of finger    Hand dermatitis    Plantar fasciitis    Blepharitis    Cat bite of right hand    Chest pain    Chronic constipation    Chronic pain of both shoulders    Costochondral pain    Cramp of both lower extremities    Dysphagia    Esophageal stricture    Frequent episodes of sinusitis    Seborrheic dermatitis of scalp    Gastroesophageal reflux disease without  esophagitis    History of allergy to aspirin    History of diverticulitis    History of syncope    Hot flashes    YUMIKO (iron deficiency anemia)    Insomnia    Lack of adequate sleep    Iron deficiency    Localized osteoarthritis of knees, bilateral    Low folic acid    Lymphangitis    Midline cystocele    Mild persistent asthma without complication    Mixed incontinence    New daily persistent headache    Non-traumatic rotator cuff tear    Nuclear sclerotic cataract of both eyes    Numbness and tingling in left hand    Onychomycosis    Other cirrhosis of liver (H)    Patellofemoral pain syndrome of right knee    Portal hypertension (H)    Presbyopia    Right hand tendonitis    Strain of right deltoid muscle    Tension headache    Trochanteric bursitis of both hips    Vitiligo    Type 2 diabetes mellitus with hypoglycemia without coma (H)    Postmenopausal atrophic vaginitis    Elevated LFTs    Right upper quadrant abdominal pain    Rectal bleeding    Acute pancreatitis    Colitis    S/P laparoscopic cholecystectomy    Post-op pain    Adjustment disorder    Bipolar disorder (H)       Current Outpatient Medications   Medication Sig Dispense Refill    acetaminophen (TYLENOL) 500 MG tablet Take 1,000 mg by mouth daily as needed for pain      Baclofen (LIORESAL) 5 MG tablet Take 5-10 mg by mouth 2 times daily as needed for muscle spasms or other (or sleep)      gabapentin (NEURONTIN) 300 MG capsule Take 600 mg by mouth 3 times daily      naproxen (NAPROSYN) 125 MG/5ML suspension TAKE 10-20 ML'S BY MOUTH TWICE DAILY AS NEEDED FOR PAIN      albuterol (PROAIR HFA/PROVENTIL HFA/VENTOLIN HFA) 108 (90 Base) MCG/ACT inhaler Inhale 2 puffs into the lungs every 6 hours as needed for shortness of breath, wheezing or cough      amitriptyline (ELAVIL) 10 MG tablet Take 30 mg by mouth at bedtime      ammonium lactate (AMLACTIN) 12 % external cream Apply topically daily as needed for dry skin      ARIPiprazole (ABILIFY) 15 MG tablet        atorvastatin (LIPITOR) 80 MG tablet Take 80 mg by mouth daily      bacitracin 500 UNIT/GM external ointment Apply topically 2 times daily      BELSOMRA 10 MG tablet Take 1 tablet by mouth nightly as needed for sleep      budesonide-formoterol (SYMBICORT) 160-4.5 MCG/ACT Inhaler Inhale 2 puffs into the lungs two times daily Rinse mouth/gargle after use      CARBOXYMETH-GLYCERIN-POLYSORB OP Apply 1 drop to eye daily as needed      cetirizine (ZYRTEC) 10 MG tablet Take 10 mg by mouth daily      cholecalciferol (VITAMIN D3) 50 MCG (2000 UT) CAPS Take 4,000 Units by mouth daily      dicyclomine (BENTYL) 20 MG tablet Take 1 tablet (20 mg) by mouth 4 times daily as needed (abdominal pain). 20 tablet 0    estradiol (ESTRACE) 0.1 MG/GM vaginal cream Place vaginally twice a week      famotidine (PEPCID) 20 MG tablet Take 20 mg by mouth 2 times daily      ferrous gluconate (FERGON) 324 (38 Fe) MG tablet Take 324 mg by mouth daily (with breakfast)      fluticasone (FLONASE) 50 MCG/ACT nasal spray Spray 2 sprays into both nostrils daily as needed for rhinitis or allergies      furosemide (LASIX) 20 MG tablet       hydrocortisone 2.5 % ointment Apply topically 2 times daily as needed for other (Eczema)      ipratropium - albuterol 0.5 mg/2.5 mg/3 mL (DUONEB) 0.5-2.5 (3) MG/3ML neb solution Take 1 vial by nebulization 3 times daily as needed for shortness of breath, wheezing or cough      JARDIANCE 10 MG TABS tablet Take 10 mg by mouth daily      loperamide (IMODIUM) 2 mg capsule Take 2 mg by mouth 4 times daily as needed for diarrhea      losartan (COZAAR) 25 MG tablet Take 25 mg by mouth daily      Magnesium Oxide -Mg Supplement 500 MG TABS Take 1 tablet by mouth daily      montelukast (SINGULAIR) 10 MG tablet Take 1 tablet by mouth at bedtime      Multiple Vitamins-Minerals (MULTIVITAMIN WOMEN 50+) TABS Take 1 tablet by mouth daily      omeprazole (PRILOSEC) 20 MG DR capsule Take 20 mg by mouth 2 times daily      ondansetron  (ZOFRAN ODT) 4 MG ODT tab Take 1 tablet (4 mg) by mouth every 8 hours as needed for nausea. 20 tablet 0    polyethylene glycol (MIRALAX) 17 GM/Dose powder Take 17 g (1 Capful) by mouth daily 225 g 0    polyethylene glycol-propylene glycol (SYSTANE ULTRA) 0.4-0.3 % SOLN ophthalmic solution Place 1 drop into both eyes 4 times daily as needed for dry eyes      propranolol (INDERAL) 10 MG tablet Take 10 mg by mouth 2 times daily      sertraline (ZOLOFT) 100 MG tablet Take 100 mg by mouth daily      TYRVAYA 0.03 MG/ACT nasal spray Spray 1 spray into both nostrils 2 times daily       No current facility-administered medications for this visit.       Allergies   Allergen Reactions    Aspirin Hives    Diflunisal Nausea and Vomiting     (Dolobid) Occurred approximately 20 years ago    Occurred approximately 20 years ago   Occurred approximately 20 years ago   (Dolobid) Occurred approximately 20 years ago    Fd&C Yellow #5 (Tartrazine) Nausea and Vomiting and Hives       Past Medical History:   Diagnosis Date    Alcoholic cirrhosis of liver (H)     Allergic rhinitis due to pollen     Anemia     Asthma     DDD (degenerative disc disease), cervical     Diverticulitis of colon     DM2 (diabetes mellitus, type 2) (H)     Dysphagia     Dyspnea on exertion     Essential tremor     Fibromyalgia     YUKO (generalized anxiety disorder)     Gastroesophageal reflux disease     History of skin cancer     Hypertension     Migraine     Motion sickness     WOODS (nonalcoholic steatohepatitis)     Onychomycosis     CASTRO on CPAP     Other chronic pain     Syncopal episodes     Tension headache     Vertigo     Vitiligo         Review of Systems  ROS: Specifically negative for bowel/bladder dysfunction, balance changes, headache, dizziness, foot drop, fevers, chills, appetite changes, nausea/vomiting, unexplained weight loss. Otherwise 13 systems reviewed are negative. Please see the patient's intake questionnaire from today for  "details.    Reviewed Social, Family, Past Medical and Past Surgical history with patient, no significant changes noted since prior visit.     Objective:     /60   Pulse 77   Ht 5' 4\" (1.626 m)   Wt 129 lb 9.6 oz (58.8 kg)   BMI 22.25 kg/m      PHYSICAL EXAMINATION:   --CONSTITUTIONAL: Vital signs as above. No acute distress. The patient is well nourished and well groomed.  --PSYCHIATRIC:  The patient is awake, alert, oriented to person, place, time and answering questions appropriately with clear speech. Appropriate mood and affect   --HEENT: Sclera are non-injected. Extraocular muscles are intact.   --SKIN: Skin over the face, bilateral upper extremities, and posterior torso is clean, dry, intact without rashes.  --RESPIRATORY: Normal rhythm and effort. No abnormal accessory muscle breathing patterns noted.   --GROSS MOTOR: Easily arises from a seated position.   --CERVICAL SPINE: Inspection reveals no evidence of deformity.     Imaging: Spine imaging was reviewed today. The images were shown to the patient and the findings were explained using a spine model.      CT Abdomen Pelvis w Contrast  Result Date: 3/29/2025  EXAM: CT ABDOMEN PELVIS W CONTRAST LOCATION: [facility] DATE: 3/29/2025 INDICATION: LLQ pain COMPARISON: 02/11/2025 TECHNIQUE: CT scan of the abdomen and pelvis was performed following injection of IV contrast. Multiplanar reformats were obtained. Dose reduction techniques were used. CONTRAST: ISOVUE 370 65ML FINDINGS: LOWER CHEST: Mild basilar atelectasis. HEPATOBILIARY: Cholecystectomy. Pneumobilia. PANCREAS: Normal. SPLEEN: Normal. ADRENAL GLANDS: Normal. KIDNEYS/BLADDER: Normal. BOWEL: Several fluid-filled prominent loops of small bowel left lower abdomen, nonspecific. Additional upper normal caliber loops of small bowel right pelvis containing fecal material. No mesenteric congestion or free fluid. Appendix normal. Moderate amount of colonic stool. " Underdistention of the right colon reduces evaluation for focal wall thickening. Diverticulosis. LYMPH NODES: Normal. VASCULATURE: Atherosclerotic vascular calcification. PELVIC ORGANS: Absent uterus. MUSCULOSKELETAL: Degenerative change osseous structures.   IMPRESSION: 1.  No inflammatory change or abscess. 2.  Diverticulosis. 3.  There are several prominent loops of small bowel in the lower abdomen, nonspecific. No mesenteric congestion or free fluid. Consider short-term radiographic follow-up to reassess the bowel gas pattern. 4.  The right colon is underdistended reducing evaluation for focal wall thickening.       CT Head w/o Contrast  Result Date: 3/25/2025  EXAM: CT HEAD WO IV CONT LOCATION: [facility] DATE: 3/25/2025 INDICATION: Fall stricking occiput 3 days ago, HA since that time., Unspecified fall, initial encounter, Unspecified injury of head, initial enco COMPARISON: March 10, 2025. TECHNIQUE: Routine CT Head without IV contrast. Multiplanar reformats. Dose reduction techniques were used. FINDINGS: INTRACRANIAL CONTENTS: No intracranial hemorrhage, extraaxial collection, or mass effect.  No CT evidence of acute infarct. Mild presumed chronic small vessel ischemic changes. Mild generalized volume loss. No hydrocephalus. VISUALIZED ORBITS/SINUSES/MASTOIDS: No intraorbital abnormality. No paranasal sinus mucosal disease. No middle ear or mastoid effusion. BONES/SOFT TISSUES: No acute abnormality. IMPRESSION: 1.  No CT evidence for acute intracranial process. 2.  Brain atrophy and presumed chronic microvascular ischemic changes as above.

## 2025-04-24 ENCOUNTER — OFFICE VISIT (OUTPATIENT)
Dept: PHYSICAL MEDICINE AND REHAB | Facility: CLINIC | Age: 70
End: 2025-04-24
Payer: COMMERCIAL

## 2025-04-24 VITALS
DIASTOLIC BLOOD PRESSURE: 60 MMHG | SYSTOLIC BLOOD PRESSURE: 124 MMHG | HEIGHT: 64 IN | WEIGHT: 129.6 LBS | HEART RATE: 77 BPM | BODY MASS INDEX: 22.13 KG/M2

## 2025-04-24 DIAGNOSIS — R29.6 MULTIPLE FALLS: ICD-10-CM

## 2025-04-24 DIAGNOSIS — M51.369 BULGING LUMBAR DISC: ICD-10-CM

## 2025-04-24 DIAGNOSIS — G89.29 CHRONIC BILATERAL LOW BACK PAIN WITH LEFT-SIDED SCIATICA: ICD-10-CM

## 2025-04-24 DIAGNOSIS — M48.02 FORAMINAL STENOSIS OF CERVICAL REGION: ICD-10-CM

## 2025-04-24 DIAGNOSIS — M54.42 CHRONIC BILATERAL LOW BACK PAIN WITH LEFT-SIDED SCIATICA: ICD-10-CM

## 2025-04-24 DIAGNOSIS — R26.89 BALANCE PROBLEMS: ICD-10-CM

## 2025-04-24 DIAGNOSIS — M54.12 CERVICAL RADICULOPATHY: Primary | ICD-10-CM

## 2025-04-24 RX ORDER — NAPROXEN 25 MG/ML
SUSPENSION ORAL
COMMUNITY
Start: 2025-04-15

## 2025-04-24 ASSESSMENT — PAIN SCALES - GENERAL: PAINLEVEL_OUTOF10: MODERATE PAIN (4)

## 2025-04-24 NOTE — LETTER
4/24/2025      Odette Escobar  [address] [facility] [address]      Dear Colleague,    Thank you for referring your patient, Odette Escobar, to the [facility]. Please see a copy of my visit note below.      Assessment:     Diagnoses and all orders for this visit:  Cervical radiculopathy  -     Physical Therapy  Referral; Future  Foraminal stenosis of cervical region  -     Physical Therapy  Referral; Future  Chronic bilateral low back pain with left-sided sciatica  -     Physical Therapy  Referral; Future  Bulging lumbar disc  -     Physical Therapy  Referral; Future  Balance problems  -     Physical Therapy  Referral; Future  Multiple falls  -     Physical Therapy  Referral; Future     Odette Escobar is a 69 year old y.o. female with past medical history significant for hyperlipidemia, GERD, asthma, hypertension, diabetes mellitus, anxiety, history of vertigo who presents today for follow-up regarding:     - Chronic neck pain with 90% relief of C7-T1 IL CHRISSIE 3/18/2025    -Chronic midline low back pain with recent flareup after multiple falls.    Plan:     A shared decision making plan was used.  The patient's values and choices were respected. Prior medical records were reviewed today. The following represents what was discussed and decided upon by the provider and the patient.     -DIAGNOSTIC TESTS: Images were personally reviewed and interpreted.   --Lumbar spine x-ray 3/10/2025 with mild levoconvex curvature of the spine with multilevel degenerative changes greatest at L5-S1 with moderate disc height loss.  -- Cervical spine MRI 2/25/2025 with mild spinal stenosis at C6-7 with moderate disc height loss with moderate to severe right and moderate left foraminal stenosis.  Mild bilateral foraminal stenosis at C7-T1 and C3-4.   --Lumbar spine MRI 3/21/2024 with disc bulge at L5-S1 with moderate facet arthropathy with left  lateral recess stenosis.  Moderate facet arthropathy L4-5 with mild central stenosis.  --CT abdomen pelvis 3/7/2024 with normal alignment, mild disc height loss at L5-S1 with disc bulging noted at L4-5 as well as upper lumbar spine.     -INTERVENTIONS: Discussed that we could consider back injections if she is not improving with physical therapy however would recommend a course of physical therapy before considering back injections.  Patient is in agreements with this.    -MEDICATIONS: No changes in medications today.  Discussed side effects of medications and proper use. Patient verbalized understanding.    -PHYSICAL THERAPY: Referral to physical therapy placed for neck pain back pain as well as working on exercises for balance and hopefully preventing future falls.  Discussed the importance of core strengthening, ROM, stretching exercises with the patient and how each of these entities is important in decreasing pain.  Explained to the patient that the purpose of physical therapy is to teach the patient a home exercise program.  These exercises need to be performed every day in order to decrease pain and prevent future occurrences of pain.        -PATIENT EDUCATION: Total time of 42 minutes, on the day of service, spent with the patient, reviewing the chart, placing orders, and documenting.     -FOLLOW UP: Follow-up as needed  Advised to contact clinic if symptoms worsen or change.    Subjective:     Odette Escobar is a 69 year old female who presents today for follow-up regarding generalized neck pain radiating to the left greater than right with upper extremity numbness and tingling in which she did receive approximately 90% relief with recent epidural steroid injection cervical spine and symptoms are much more tolerable.  Currently pain is a 4/10, a 10 at its worst, 8 to at its best.  She denies any upper extremity weakness.  Denies bowel or bladder loss control, denies saddle anesthesia.  She does report that  she has had multiple falls in the last couple of months, partly related to her cats but also she had 2 falls when it was icy outside as well.  She typically reports she feels fairly balanced on flat ground otherwise.  Does report some worsening of her upper lumbar spine pain since her multiple falls which she has had in the past as well and worked with physical therapy on, does report some pain into the left leg as well.    No myelopathic symptoms.     Patient reports a history of fibromyalgia.     -Treatment to Date: No prior spinal surgery.  Physical therapy [facility] in the past, nothing recent  Chiropractic treatments are not held in the past     Lumbar spine injection at age 26  Cervical spine injection 2020 [facility]:  Left third occipital nerve, C3, C4, C5 RFA 4/30/2021  Right third occipital nerve, C3, C4, C5 RFA 5/7/2021  Bilateral C2, C3, C4 MBB 5/31/2024  Left third occipital nerve, C2, C3 and C4 medial branch RFA 9/27/2024  C7-T1 IL CHRISSIE 3/18/2025 with 90% relief     -Medications:  Acetaminophen  CBD   Gabapentin 300 mg     Oxycodone prescribed by the ED 1/31/2025, 8 tablets    Patient Active Problem List   Diagnosis     Low back pain     Anxiety     Asthma     Diabetes mellitus (H)     Essential hypertension     Gastroesophageal reflux disease with esophagitis     Hyperlipidemia     Lumbar radiculopathy     Lumbar facet arthropathy     DDD (degenerative disc disease), lumbar     Fibromyalgia     Chronic neck pain     Adjustment disorder with mixed anxiety and depressed mood     Alcohol abuse, in remission     Benign essential tremor     Benign paroxysmal positional vertigo     Cervical radicular pain     CASTRO (obstructive sleep apnea)     SOBOE (shortness of breath on exertion)     Type 2 diabetes mellitus with diabetic neuropathy (H)     Allergic rhinitis     Allergic to aspirin     Arthritis of finger     Hand dermatitis     Plantar fasciitis     Blepharitis     Cat bite of right hand     Chest pain      Chronic constipation     Chronic pain of both shoulders     Costochondral pain     Cramp of both lower extremities     Dysphagia     Esophageal stricture     Frequent episodes of sinusitis     Seborrheic dermatitis of scalp     Gastroesophageal reflux disease without esophagitis     History of allergy to aspirin     History of diverticulitis     History of syncope     Hot flashes     YUMIKO (iron deficiency anemia)     Insomnia     Lack of adequate sleep     Iron deficiency     Localized osteoarthritis of knees, bilateral     Low folic acid     Lymphangitis     Midline cystocele     Mild persistent asthma without complication     Mixed incontinence     New daily persistent headache     Non-traumatic rotator cuff tear     Nuclear sclerotic cataract of both eyes     Numbness and tingling in left hand     Onychomycosis     Other cirrhosis of liver (H)     Patellofemoral pain syndrome of right knee     Portal hypertension (H)     Presbyopia     Right hand tendonitis     Strain of right deltoid muscle     Tension headache     Trochanteric bursitis of both hips     Vitiligo     Type 2 diabetes mellitus with hypoglycemia without coma (H)     Postmenopausal atrophic vaginitis     Elevated LFTs     Right upper quadrant abdominal pain     Rectal bleeding     Acute pancreatitis     Colitis     S/P laparoscopic cholecystectomy     Post-op pain     Adjustment disorder     Bipolar disorder (H)       Current Outpatient Medications   Medication Sig Dispense Refill     acetaminophen (TYLENOL) 500 MG tablet Take 1,000 mg by mouth daily as needed for pain       Baclofen (LIORESAL) 5 MG tablet Take 5-10 mg by mouth 2 times daily as needed for muscle spasms or other (or sleep)       gabapentin (NEURONTIN) 300 MG capsule Take 600 mg by mouth 3 times daily       naproxen (NAPROSYN) 125 MG/5ML suspension TAKE 10-20 ML'S BY MOUTH TWICE DAILY AS NEEDED FOR PAIN       albuterol (PROAIR HFA/PROVENTIL HFA/VENTOLIN HFA) 108 (90 Base) MCG/ACT  inhaler Inhale 2 puffs into the lungs every 6 hours as needed for shortness of breath, wheezing or cough       amitriptyline (ELAVIL) 10 MG tablet Take 30 mg by mouth at bedtime       ammonium lactate (AMLACTIN) 12 % external cream Apply topically daily as needed for dry skin       ARIPiprazole (ABILIFY) 15 MG tablet        atorvastatin (LIPITOR) 80 MG tablet Take 80 mg by mouth daily       bacitracin 500 UNIT/GM external ointment Apply topically 2 times daily       BELSOMRA 10 MG tablet Take 1 tablet by mouth nightly as needed for sleep       budesonide-formoterol (SYMBICORT) 160-4.5 MCG/ACT Inhaler Inhale 2 puffs into the lungs two times daily Rinse mouth/gargle after use       CARBOXYMETH-GLYCERIN-POLYSORB OP Apply 1 drop to eye daily as needed       cetirizine (ZYRTEC) 10 MG tablet Take 10 mg by mouth daily       cholecalciferol (VITAMIN D3) 50 MCG (2000 UT) CAPS Take 4,000 Units by mouth daily       dicyclomine (BENTYL) 20 MG tablet Take 1 tablet (20 mg) by mouth 4 times daily as needed (abdominal pain). 20 tablet 0     estradiol (ESTRACE) 0.1 MG/GM vaginal cream Place vaginally twice a week       famotidine (PEPCID) 20 MG tablet Take 20 mg by mouth 2 times daily       ferrous gluconate (FERGON) 324 (38 Fe) MG tablet Take 324 mg by mouth daily (with breakfast)       fluticasone (FLONASE) 50 MCG/ACT nasal spray Spray 2 sprays into both nostrils daily as needed for rhinitis or allergies       furosemide (LASIX) 20 MG tablet        hydrocortisone 2.5 % ointment Apply topically 2 times daily as needed for other (Eczema)       ipratropium - albuterol 0.5 mg/2.5 mg/3 mL (DUONEB) 0.5-2.5 (3) MG/3ML neb solution Take 1 vial by nebulization 3 times daily as needed for shortness of breath, wheezing or cough       JARDIANCE 10 MG TABS tablet Take 10 mg by mouth daily       loperamide (IMODIUM) 2 mg capsule Take 2 mg by mouth 4 times daily as needed for diarrhea       losartan (COZAAR) 25 MG tablet Take 25 mg by mouth  daily       Magnesium Oxide -Mg Supplement 500 MG TABS Take 1 tablet by mouth daily       montelukast (SINGULAIR) 10 MG tablet Take 1 tablet by mouth at bedtime       Multiple Vitamins-Minerals (MULTIVITAMIN WOMEN 50+) TABS Take 1 tablet by mouth daily       omeprazole (PRILOSEC) 20 MG DR capsule Take 20 mg by mouth 2 times daily       ondansetron (ZOFRAN ODT) 4 MG ODT tab Take 1 tablet (4 mg) by mouth every 8 hours as needed for nausea. 20 tablet 0     polyethylene glycol (MIRALAX) 17 GM/Dose powder Take 17 g (1 Capful) by mouth daily 225 g 0     polyethylene glycol-propylene glycol (SYSTANE ULTRA) 0.4-0.3 % SOLN ophthalmic solution Place 1 drop into both eyes 4 times daily as needed for dry eyes       propranolol (INDERAL) 10 MG tablet Take 10 mg by mouth 2 times daily       sertraline (ZOLOFT) 100 MG tablet Take 100 mg by mouth daily       TYRVAYA 0.03 MG/ACT nasal spray Spray 1 spray into both nostrils 2 times daily       No current facility-administered medications for this visit.       Allergies   Allergen Reactions     Aspirin Hives     Diflunisal Nausea and Vomiting     (Dolobid) Occurred approximately 20 years ago    Occurred approximately 20 years ago   Occurred approximately 20 years ago   (Dolobid) Occurred approximately 20 years ago     Fd&C Yellow #5 (Tartrazine) Nausea and Vomiting and Hives       Past Medical History:   Diagnosis Date     Alcoholic cirrhosis of liver (H)      Allergic rhinitis due to pollen      Anemia      Asthma      DDD (degenerative disc disease), cervical      Diverticulitis of colon      DM2 (diabetes mellitus, type 2) (H)      Dysphagia      Dyspnea on exertion      Essential tremor      Fibromyalgia      YUKO (generalized anxiety disorder)      Gastroesophageal reflux disease      History of skin cancer      Hypertension      Migraine      Motion sickness      WOODS (nonalcoholic steatohepatitis)      Onychomycosis      CASTRO on CPAP      Other chronic pain      Syncopal episodes  "     Tension headache      Vertigo      Vitiligo         Review of Systems  ROS: Specifically negative for bowel/bladder dysfunction, balance changes, headache, dizziness, foot drop, fevers, chills, appetite changes, nausea/vomiting, unexplained weight loss. Otherwise 13 systems reviewed are negative. Please see the patient's intake questionnaire from today for details.    Reviewed Social, Family, Past Medical and Past Surgical history with patient, no significant changes noted since prior visit.     Objective:     /60   Pulse 77   Ht 5' 4\" (1.626 m)   Wt 129 lb 9.6 oz (58.8 kg)   BMI 22.25 kg/m      PHYSICAL EXAMINATION:   --CONSTITUTIONAL: Vital signs as above. No acute distress. The patient is well nourished and well groomed.  --PSYCHIATRIC:  The patient is awake, alert, oriented to person, place, time and answering questions appropriately with clear speech. Appropriate mood and affect   --HEENT: Sclera are non-injected. Extraocular muscles are intact.   --SKIN: Skin over the face, bilateral upper extremities, and posterior torso is clean, dry, intact without rashes.  --RESPIRATORY: Normal rhythm and effort. No abnormal accessory muscle breathing patterns noted.   --GROSS MOTOR: Easily arises from a seated position.   --CERVICAL SPINE: Inspection reveals no evidence of deformity.     Imaging: Spine imaging was reviewed today. The images were shown to the patient and the findings were explained using a spine model.      CT Abdomen Pelvis w Contrast  Result Date: 3/29/2025  EXAM: CT ABDOMEN PELVIS W CONTRAST LOCATION: [facility] DATE: 3/29/2025 INDICATION: LLQ pain COMPARISON: 02/11/2025 TECHNIQUE: CT scan of the abdomen and pelvis was performed following injection of IV contrast. Multiplanar reformats were obtained. Dose reduction techniques were used. CONTRAST: ISOVUE 370 65ML FINDINGS: LOWER CHEST: Mild basilar atelectasis. HEPATOBILIARY: Cholecystectomy. Pneumobilia. PANCREAS: " Normal. SPLEEN: Normal. ADRENAL GLANDS: Normal. KIDNEYS/BLADDER: Normal. BOWEL: Several fluid-filled prominent loops of small bowel left lower abdomen, nonspecific. Additional upper normal caliber loops of small bowel right pelvis containing fecal material. No mesenteric congestion or free fluid. Appendix normal. Moderate amount of colonic stool. Underdistention of the right colon reduces evaluation for focal wall thickening. Diverticulosis. LYMPH NODES: Normal. VASCULATURE: Atherosclerotic vascular calcification. PELVIC ORGANS: Absent uterus. MUSCULOSKELETAL: Degenerative change osseous structures.   IMPRESSION: 1.  No inflammatory change or abscess. 2.  Diverticulosis. 3.  There are several prominent loops of small bowel in the lower abdomen, nonspecific. No mesenteric congestion or free fluid. Consider short-term radiographic follow-up to reassess the bowel gas pattern. 4.  The right colon is underdistended reducing evaluation for focal wall thickening.       CT Head w/o Contrast  Result Date: 3/25/2025  EXAM: CT HEAD WO IV CONT [facility]:  [facility] DATE: 3/25/2025 INDICATION: Fall stricking occiput 3 days ago, HA since that time., Unspecified fall, initial encounter, Unspecified injury of head, initial enco COMPARISON: March 10, 2025. TECHNIQUE: Routine CT Head without IV contrast. Multiplanar reformats. Dose reduction techniques were used. FINDINGS: INTRACRANIAL CONTENTS: No intracranial hemorrhage, extraaxial collection, or mass effect.  No CT evidence of acute infarct. Mild presumed chronic small vessel ischemic changes. Mild generalized volume loss. No hydrocephalus. VISUALIZED ORBITS/SINUSES/MASTOIDS: No intraorbital abnormality. No paranasal sinus mucosal disease. No middle ear or mastoid effusion. BONES/SOFT TISSUES: No acute abnormality. IMPRESSION: 1.  No CT evidence for acute intracranial process. 2.  Brain atrophy and presumed chronic microvascular ischemic changes as above.                     Again, thank you for allowing me to participate in the care of your patient.        Sincerely,        Olive Ghosh CNP    Electronically signed

## 2025-04-24 NOTE — PATIENT INSTRUCTIONS
~Spine Center Scheduling #[phone].  ~Please call our [facility] Spine Nurse Navigation #[phone] with any questions or concerns about your treatment plan, if symptoms worsen and you would like to be seen urgently, or if you have problems controlling bladder and bowel function.  ~For any future flareups or new symptoms, recommend follow-up in clinic or contact the nurse navigator line.  ~Please note that any My Chart messages may take multiple days for a response due to the high volume of patients seen in clinic.  Anything sent Thursday night or after will be answered the following week when able, as Olive Ghosh CNP does not work in clinic on Fridays.   ~Olive Ghosh CNP is at the [facility] on Tuesdays, otherwise primarily at the [facility].         ~You have been referred for Physical Therapy to [facility]. They will call you to schedule an appointment.       Scheduling phone number is [phone] for [facility] [place], [place], or [place] location.  If you have not heard from the scheduling office within 2 business days, please call [phone] for ALL other locations.     Discussed the importance of core strengthening, ROM, stretching exercises and how each of these entities is important in decreasing pain and improving long term spine health.  The purpose of physical therapy is to teach you an individualized home exercise program.  These exercises need to be performed every day in order to decrease pain and prevent future occurrences of pain.

## 2025-05-15 ENCOUNTER — TELEPHONE (OUTPATIENT)
Dept: PHYSICAL THERAPY | Facility: REHABILITATION | Age: 70
End: 2025-05-15

## 2025-05-15 ENCOUNTER — THERAPY VISIT (OUTPATIENT)
Dept: PHYSICAL THERAPY | Facility: REHABILITATION | Age: 70
End: 2025-05-15
Payer: COMMERCIAL

## 2025-05-15 DIAGNOSIS — G89.29 CHRONIC NECK PAIN: ICD-10-CM

## 2025-05-15 DIAGNOSIS — M54.41 CHRONIC BILATERAL LOW BACK PAIN WITH RIGHT-SIDED SCIATICA: ICD-10-CM

## 2025-05-15 DIAGNOSIS — M79.7 FIBROMYALGIA: ICD-10-CM

## 2025-05-15 DIAGNOSIS — M54.16 LUMBAR RADICULOPATHY: ICD-10-CM

## 2025-05-15 DIAGNOSIS — G89.29 CHRONIC BILATERAL LOW BACK PAIN WITH RIGHT-SIDED SCIATICA: ICD-10-CM

## 2025-05-15 DIAGNOSIS — M54.2 CHRONIC NECK PAIN: ICD-10-CM

## 2025-05-15 DIAGNOSIS — M62.81 GENERALIZED MUSCLE WEAKNESS: ICD-10-CM

## 2025-05-15 DIAGNOSIS — M47.816 LUMBAR FACET ARTHROPATHY: ICD-10-CM

## 2025-05-15 DIAGNOSIS — G89.29 CHRONIC BILATERAL LOW BACK PAIN WITHOUT SCIATICA: Primary | ICD-10-CM

## 2025-05-15 DIAGNOSIS — M54.50 CHRONIC BILATERAL LOW BACK PAIN WITHOUT SCIATICA: Primary | ICD-10-CM
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
## 2025-05-15 NOTE — TELEPHONE ENCOUNTER
Called the patient and went over some of the forms that we missed when she was here.    Breanna Corral PT

## 2025-05-27 ENCOUNTER — THERAPY VISIT (OUTPATIENT)
Dept: PHYSICAL THERAPY | Facility: REHABILITATION | Age: 70
End: 2025-05-27
Attending: NURSE PRACTITIONER
Payer: COMMERCIAL

## 2025-05-27 DIAGNOSIS — M54.42 CHRONIC BILATERAL LOW BACK PAIN WITH LEFT-SIDED SCIATICA: ICD-10-CM

## 2025-05-27 DIAGNOSIS — M54.50 CHRONIC BILATERAL LOW BACK PAIN WITHOUT SCIATICA: Primary | ICD-10-CM

## 2025-05-27 DIAGNOSIS — R26.89 BALANCE PROBLEMS: ICD-10-CM

## 2025-05-27 DIAGNOSIS — G89.29 CHRONIC BILATERAL LOW BACK PAIN WITH LEFT-SIDED SCIATICA: ICD-10-CM

## 2025-05-27 DIAGNOSIS — M79.7 FIBROMYALGIA: ICD-10-CM

## 2025-05-27 DIAGNOSIS — M48.02 FORAMINAL STENOSIS OF CERVICAL REGION: ICD-10-CM

## 2025-05-27 DIAGNOSIS — M51.369 BULGING LUMBAR DISC: ICD-10-CM

## 2025-05-27 DIAGNOSIS — M54.16 LUMBAR RADICULOPATHY: ICD-10-CM

## 2025-05-27 DIAGNOSIS — M54.12 CERVICAL RADICULOPATHY: ICD-10-CM

## 2025-05-27 DIAGNOSIS — R29.6 MULTIPLE FALLS: ICD-10-CM

## 2025-05-27 DIAGNOSIS — M47.816 LUMBAR FACET ARTHROPATHY: ICD-10-CM

## 2025-05-27 DIAGNOSIS — M54.2 CHRONIC NECK PAIN: ICD-10-CM

## 2025-05-27 DIAGNOSIS — G89.29 CHRONIC BILATERAL LOW BACK PAIN WITH RIGHT-SIDED SCIATICA: ICD-10-CM

## 2025-05-27 DIAGNOSIS — M62.81 GENERALIZED MUSCLE WEAKNESS: ICD-10-CM

## 2025-05-27 DIAGNOSIS — G89.29 CHRONIC BILATERAL LOW BACK PAIN WITHOUT SCIATICA: Primary | ICD-10-CM

## 2025-05-27 DIAGNOSIS — G89.29 CHRONIC NECK PAIN: ICD-10-CM

## 2025-05-27 DIAGNOSIS — M54.41 CHRONIC BILATERAL LOW BACK PAIN WITH RIGHT-SIDED SCIATICA: ICD-10-CM

## 2025-05-27 PROCEDURE — 97140 MANUAL THERAPY 1/> REGIONS: CPT | Mod: GP | Performed by: PHYSICAL THERAPIST

## 2025-05-27 PROCEDURE — 97110 THERAPEUTIC EXERCISES: CPT | Mod: GP | Performed by: PHYSICAL THERAPIST

## 2025-06-04 ENCOUNTER — THERAPY VISIT (OUTPATIENT)
Dept: PHYSICAL THERAPY | Facility: REHABILITATION | Age: 70
End: 2025-06-04
Payer: COMMERCIAL

## 2025-06-04 DIAGNOSIS — M54.16 LUMBAR RADICULOPATHY: ICD-10-CM

## 2025-06-04 DIAGNOSIS — M54.12 CERVICAL RADICULOPATHY: Primary | ICD-10-CM

## 2025-06-04 DIAGNOSIS — R29.6 MULTIPLE FALLS: ICD-10-CM

## 2025-06-04 DIAGNOSIS — M48.02 FORAMINAL STENOSIS OF CERVICAL REGION: ICD-10-CM

## 2025-06-04 DIAGNOSIS — M54.42 CHRONIC BILATERAL LOW BACK PAIN WITH LEFT-SIDED SCIATICA: ICD-10-CM

## 2025-06-04 DIAGNOSIS — G89.29 CHRONIC NECK PAIN: ICD-10-CM

## 2025-06-04 DIAGNOSIS — M54.50 CHRONIC BILATERAL LOW BACK PAIN WITHOUT SCIATICA: ICD-10-CM

## 2025-06-04 DIAGNOSIS — M47.816 LUMBAR FACET ARTHROPATHY: ICD-10-CM

## 2025-06-04 DIAGNOSIS — M54.2 CHRONIC NECK PAIN: ICD-10-CM

## 2025-06-04 DIAGNOSIS — G89.29 CHRONIC BILATERAL LOW BACK PAIN WITHOUT SCIATICA: ICD-10-CM

## 2025-06-04 DIAGNOSIS — M51.369 BULGING LUMBAR DISC: ICD-10-CM

## 2025-06-04 DIAGNOSIS — G89.29 CHRONIC BILATERAL LOW BACK PAIN WITH LEFT-SIDED SCIATICA: ICD-10-CM

## 2025-06-04 DIAGNOSIS — M79.7 FIBROMYALGIA: ICD-10-CM

## 2025-06-04 DIAGNOSIS — R26.89 BALANCE PROBLEMS: ICD-10-CM

## 2025-06-04 PROCEDURE — 97110 THERAPEUTIC EXERCISES: CPT | Mod: GP | Performed by: PHYSICAL THERAPIST

## 2025-06-04 PROCEDURE — 97140 MANUAL THERAPY 1/> REGIONS: CPT | Mod: GP | Performed by: PHYSICAL THERAPIST

## 2025-06-10 ENCOUNTER — THERAPY VISIT (OUTPATIENT)
Dept: PHYSICAL THERAPY | Facility: REHABILITATION | Age: 70
End: 2025-06-10
Attending: NURSE PRACTITIONER

## 2025-06-10 DIAGNOSIS — M48.02 FORAMINAL STENOSIS OF CERVICAL REGION: ICD-10-CM

## 2025-06-10 DIAGNOSIS — M54.50 CHRONIC BILATERAL LOW BACK PAIN WITHOUT SCIATICA: ICD-10-CM

## 2025-06-10 DIAGNOSIS — M54.42 CHRONIC BILATERAL LOW BACK PAIN WITH LEFT-SIDED SCIATICA: ICD-10-CM

## 2025-06-10 DIAGNOSIS — M54.12 CERVICAL RADICULOPATHY: Primary | ICD-10-CM

## 2025-06-10 DIAGNOSIS — R26.89 BALANCE PROBLEMS: ICD-10-CM

## 2025-06-10 DIAGNOSIS — M47.816 LUMBAR FACET ARTHROPATHY: ICD-10-CM

## 2025-06-10 DIAGNOSIS — G89.29 CHRONIC NECK PAIN: ICD-10-CM

## 2025-06-10 DIAGNOSIS — M54.16 LUMBAR RADICULOPATHY: ICD-10-CM

## 2025-06-10 DIAGNOSIS — R29.6 MULTIPLE FALLS: ICD-10-CM

## 2025-06-10 DIAGNOSIS — M54.2 CHRONIC NECK PAIN: ICD-10-CM

## 2025-06-10 DIAGNOSIS — M51.369 BULGING LUMBAR DISC: ICD-10-CM

## 2025-06-10 DIAGNOSIS — G89.29 CHRONIC BILATERAL LOW BACK PAIN WITHOUT SCIATICA: ICD-10-CM

## 2025-06-10 DIAGNOSIS — G89.29 CHRONIC BILATERAL LOW BACK PAIN WITH LEFT-SIDED SCIATICA: ICD-10-CM

## 2025-06-10 DIAGNOSIS — M79.7 FIBROMYALGIA: ICD-10-CM

## 2025-06-10 PROCEDURE — 97110 THERAPEUTIC EXERCISES: CPT | Mod: GP | Performed by: PHYSICAL THERAPIST

## 2025-06-10 PROCEDURE — 97140 MANUAL THERAPY 1/> REGIONS: CPT | Mod: GP | Performed by: PHYSICAL THERAPIST

## 2025-07-01 ENCOUNTER — THERAPY VISIT (OUTPATIENT)
Dept: PHYSICAL THERAPY | Facility: REHABILITATION | Age: 70
End: 2025-07-01
Payer: COMMERCIAL

## 2025-07-01 DIAGNOSIS — M54.12 CERVICAL RADICULOPATHY: Primary | ICD-10-CM

## 2025-07-01 DIAGNOSIS — M48.02 FORAMINAL STENOSIS OF CERVICAL REGION: ICD-10-CM

## 2025-07-01 DIAGNOSIS — R26.89 BALANCE PROBLEMS: ICD-10-CM

## 2025-07-01 DIAGNOSIS — R29.6 MULTIPLE FALLS: ICD-10-CM

## 2025-07-01 DIAGNOSIS — M51.369 BULGING LUMBAR DISC: ICD-10-CM

## 2025-07-01 DIAGNOSIS — G89.29 CHRONIC BILATERAL LOW BACK PAIN WITH LEFT-SIDED SCIATICA: ICD-10-CM

## 2025-07-01 DIAGNOSIS — M54.42 CHRONIC BILATERAL LOW BACK PAIN WITH LEFT-SIDED SCIATICA: ICD-10-CM

## 2025-07-01 PROCEDURE — 97110 THERAPEUTIC EXERCISES: CPT | Mod: GP | Performed by: PHYSICAL THERAPIST

## 2025-07-15 ENCOUNTER — THERAPY VISIT (OUTPATIENT)
Dept: PHYSICAL THERAPY | Facility: REHABILITATION | Age: 70
End: 2025-07-15
Payer: COMMERCIAL

## 2025-07-15 DIAGNOSIS — M54.42 CHRONIC BILATERAL LOW BACK PAIN WITH LEFT-SIDED SCIATICA: Primary | ICD-10-CM

## 2025-07-15 DIAGNOSIS — G89.29 CHRONIC BILATERAL LOW BACK PAIN WITH LEFT-SIDED SCIATICA: Primary | ICD-10-CM

## 2025-07-15 DIAGNOSIS — G89.29 CHRONIC NECK PAIN: ICD-10-CM

## 2025-07-15 DIAGNOSIS — M54.2 CHRONIC NECK PAIN: ICD-10-CM

## 2025-07-15 PROCEDURE — 97110 THERAPEUTIC EXERCISES: CPT | Mod: GP

## 2025-07-15 PROCEDURE — 97112 NEUROMUSCULAR REEDUCATION: CPT | Mod: GP

## 2025-08-12 ENCOUNTER — THERAPY VISIT (OUTPATIENT)
Dept: PHYSICAL THERAPY | Facility: REHABILITATION | Age: 70
End: 2025-08-12
Payer: COMMERCIAL

## 2025-08-12 DIAGNOSIS — G89.29 CHRONIC BILATERAL LOW BACK PAIN WITHOUT SCIATICA: ICD-10-CM

## 2025-08-12 DIAGNOSIS — M51.369 BULGING LUMBAR DISC: ICD-10-CM

## 2025-08-12 DIAGNOSIS — R26.89 BALANCE PROBLEMS: ICD-10-CM

## 2025-08-12 DIAGNOSIS — M54.12 CERVICAL RADICULOPATHY: ICD-10-CM

## 2025-08-12 DIAGNOSIS — R29.6 MULTIPLE FALLS: ICD-10-CM

## 2025-08-12 DIAGNOSIS — M79.7 FIBROMYALGIA: ICD-10-CM

## 2025-08-12 DIAGNOSIS — M47.816 LUMBAR FACET ARTHROPATHY: ICD-10-CM

## 2025-08-12 DIAGNOSIS — M54.2 CHRONIC NECK PAIN: ICD-10-CM

## 2025-08-12 DIAGNOSIS — M54.50 CHRONIC BILATERAL LOW BACK PAIN WITHOUT SCIATICA: ICD-10-CM

## 2025-08-12 DIAGNOSIS — G89.29 CHRONIC NECK PAIN: ICD-10-CM

## 2025-08-12 DIAGNOSIS — M48.02 FORAMINAL STENOSIS OF CERVICAL REGION: ICD-10-CM

## 2025-08-12 DIAGNOSIS — M54.42 CHRONIC BILATERAL LOW BACK PAIN WITH LEFT-SIDED SCIATICA: Primary | ICD-10-CM

## 2025-08-12 DIAGNOSIS — M54.16 LUMBAR RADICULOPATHY: ICD-10-CM

## 2025-08-12 DIAGNOSIS — G89.29 CHRONIC BILATERAL LOW BACK PAIN WITH LEFT-SIDED SCIATICA: Primary | ICD-10-CM

## 2025-08-12 PROCEDURE — 97110 THERAPEUTIC EXERCISES: CPT | Mod: GP | Performed by: PHYSICAL THERAPIST

## 2025-08-12 PROCEDURE — 97140 MANUAL THERAPY 1/> REGIONS: CPT | Mod: GP | Performed by: PHYSICAL THERAPIST

## 2025-08-12 PROCEDURE — 97112 NEUROMUSCULAR REEDUCATION: CPT | Mod: GP | Performed by: PHYSICAL THERAPIST

## 2025-08-26 ENCOUNTER — TELEPHONE (OUTPATIENT)
Dept: PHYSICAL THERAPY | Facility: REHABILITATION | Age: 70
End: 2025-08-26

## (undated) DEVICE — SUTURE VICRYL+ 0 27IN CT-1 UND VCP260H

## (undated) DEVICE — WIRE GUIDE 0.035"X260CM NAVIPRO ANG 5625 M00556251

## (undated) DEVICE — SU DERMABOND ADVANCED .7ML DNX12

## (undated) DEVICE — SWABCAP DISINFECTANT GREEN CFF10-250

## (undated) DEVICE — CANNULA NASAL COMFORT SOFT 25FT 0537

## (undated) DEVICE — SOL WATER IRRIG 500ML BOTTLE 2F7113

## (undated) DEVICE — DEVICE CLOSURE V-LOC 0 GS-21 6IN VLOCN0306

## (undated) DEVICE — SYR 50ML SLIP TIP W/O NDL 309654

## (undated) DEVICE — KIT IV START W/O CATH

## (undated) DEVICE — WIRE GUIDE 0.35X270CM STRAIGHT TIP VISIGLIDE G-260-3527S

## (undated) DEVICE — SYR 03ML LL W/O NDL 309657

## (undated) DEVICE — CONNECTOR ONE-LINK INJECTION SITE LF 7N8399

## (undated) DEVICE — TUBING ENDOGATOR + H2O PORT CO

## (undated) DEVICE — DAVINCI XI OBTURATOR BLADELESS 8MM 470359

## (undated) DEVICE — ESU GROUND PAD ADULT REM W/15' CORD E7507DB

## (undated) DEVICE — DRAPE U SPLIT 74X120" 29440

## (undated) DEVICE — BITE BLOCK SCOPE DISP 20 X 27 000429

## (undated) DEVICE — PREP CHLORAPREP 26ML TINTED HI-LITE ORANGE 930815

## (undated) DEVICE — DAVINCI XI DRAPE COLUMN 470341

## (undated) DEVICE — TUBING SUCTION MEDI-VAC 1/4"X20' N620A

## (undated) DEVICE — KIT CONNECTOR FOR OLYMPUS ENDOSCOPES DEFENDO 100310

## (undated) DEVICE — SUCTION CANISTER 1000ML 65651-510

## (undated) DEVICE — ENDO SHEARS RENEW LAP ENDOCUT SCISSOR TIP 16.5MM 3142

## (undated) DEVICE — CUSTOM PACK LAP CHOLE SBA5BLCHEA

## (undated) DEVICE — DAVINCI XI SEAL UNIVERSAL 5-12MM 470500

## (undated) DEVICE — LUBRICANT INST ELECTROLUBE EL101

## (undated) DEVICE — ENDO POUCH UNIVERSAL RETRIEVAL SYSTEM INZII 5MM CD003

## (undated) DEVICE — KIT SCOPE CLEANING ENDOSCOPY BX00719705

## (undated) DEVICE — SNARE OVAL SMALL DISP 100600

## (undated) DEVICE — NEEDLE HYPO 18X1-1/2 SAFETY 305918

## (undated) DEVICE — BALLOON EXTRACTION 15X1950MM 3.2MM TL B-V243Q-A

## (undated) DEVICE — SPHINCTEROTOME BILIARY NDL KNIFE 5MM 5FR TL  4584

## (undated) DEVICE — DRAPE SHEET REV FOLD 3/4 9349

## (undated) DEVICE — ENDO FUSION OMNI-TOME G31903

## (undated) DEVICE — TUBING SMOKE EVAC PNEUMOCLEAR HIGH FLOW 0620050250

## (undated) DEVICE — CATH SUCTION 14FR W/O CTRL DYND41962

## (undated) DEVICE — NDL INSUFFLATION 13GA 120MM C2201

## (undated) DEVICE — DAVINCI XI DRAPE ARM 470015

## (undated) DEVICE — DRAPE SHEET TABLE COVER KC 42301*

## (undated) DEVICE — DECANTER VIAL 2006S

## (undated) DEVICE — SOL WATER IRRIG 1000ML BOTTLE 2F7114

## (undated) DEVICE — SU VICRYL+ 3-0 27IN SH UND VCP416H

## (undated) DEVICE — BLADE KNIFE SURG 11 371111

## (undated) DEVICE — CLIP ENDO HEMO-LOC PURPLE LG 544240

## (undated) DEVICE — SUCTION MANIFOLD NEPTUNE 2 SYS 1 PORT 702-025-000

## (undated) DEVICE — GLOVE UNDER INDICATOR PI SZ 7.0 LF 41670

## (undated) RX ORDER — GLYCOPYRROLATE 0.2 MG/ML
INJECTION, SOLUTION INTRAMUSCULAR; INTRAVENOUS
Status: DISPENSED
Start: 2024-04-16

## (undated) RX ORDER — FENTANYL CITRATE 50 UG/ML
INJECTION, SOLUTION INTRAMUSCULAR; INTRAVENOUS
Status: DISPENSED
Start: 2024-04-14

## (undated) RX ORDER — PROPOFOL 10 MG/ML
INJECTION, EMULSION INTRAVENOUS
Status: DISPENSED
Start: 2024-04-16

## (undated) RX ORDER — LIDOCAINE HYDROCHLORIDE 10 MG/ML
INJECTION, SOLUTION EPIDURAL; INFILTRATION; INTRACAUDAL; PERINEURAL
Status: DISPENSED
Start: 2024-04-16

## (undated) RX ORDER — CEFAZOLIN SODIUM 1 G/3ML
INJECTION, POWDER, FOR SOLUTION INTRAMUSCULAR; INTRAVENOUS
Status: DISPENSED
Start: 2024-04-14

## (undated) RX ORDER — LIDOCAINE HYDROCHLORIDE AND EPINEPHRINE 10; 10 MG/ML; UG/ML
INJECTION, SOLUTION INFILTRATION; PERINEURAL
Status: DISPENSED
Start: 2024-04-14

## (undated) RX ORDER — ONDANSETRON 2 MG/ML
INJECTION INTRAMUSCULAR; INTRAVENOUS
Status: DISPENSED
Start: 2024-04-16